# Patient Record
Sex: MALE | Race: WHITE | NOT HISPANIC OR LATINO | Employment: OTHER | URBAN - METROPOLITAN AREA
[De-identification: names, ages, dates, MRNs, and addresses within clinical notes are randomized per-mention and may not be internally consistent; named-entity substitution may affect disease eponyms.]

---

## 2017-01-05 DIAGNOSIS — I10 HTN (HYPERTENSION): ICD-10-CM

## 2017-01-05 DIAGNOSIS — M10.9 GOUT: Primary | ICD-10-CM

## 2017-01-09 RX ORDER — LISINOPRIL 40 MG/1
40 TABLET ORAL DAILY
Qty: 90 TABLET | Refills: 2 | Status: SHIPPED
Start: 2017-01-09 | End: 2017-09-22

## 2017-01-09 RX ORDER — ALLOPURINOL 100 MG/1
100 TABLET ORAL DAILY
Qty: 90 TABLET | Refills: 1 | Status: SHIPPED
Start: 2017-01-09 | End: 2017-08-25

## 2017-01-09 NOTE — TELEPHONE ENCOUNTER
allopurinol (ZYLOPRIM) 100 MG tablet  Last Written Prescription Date:    5/6/16  Last Fill Quantity: 90,   # refills: 3  Last Office Visit with G, P or [facility] prescribing provider:   11/23/16  Future visit:   None  CREATININE   Date Value Ref Range Status   08/22/2016 0.92 0.66 - 1.25 mg/dL Final   ]  URIC ACID   Date Value Ref Range Status   02/06/2013 10.0* 3.5 - 8.5 mg/dL Final   ]WBC      7.4   5/3/2016  RBC     5.26   5/3/2016  HGB     15.4   5/3/2016  HCT     48.6   5/3/2016  No components found with this name: mct  MCV       92   5/3/2016  MCH     29.3   5/3/2016  MCHC     31.7   5/3/2016  RDW     15.2   5/3/2016  PLT      215   5/3/2016      lisinopril (PRINIVIL,ZESTRIL) 40 MG tablet  Last Written Prescription Date: 1/4/16  Last Fill Quantity: 90,   # refills: 2    POTASSIUM   Date Value Ref Range Status   08/22/2016 3.8 3.4 - 5.3 mmol/L Final   ]  CREATININE   Date Value Ref Range Status   08/22/2016 0.92 0.66 - 1.25 mg/dL Final   ]  BP Readings from Last 3 Encounters:   11/23/16 113/68   11/23/16 138/77   10/11/16 143/85             Routing refill request to provider for review/approval because:   allopurinol (ZYLOPRIM) 100 MG tablet.  Uric acid past due.

## 2017-01-18 ENCOUNTER — OFFICE VISIT (OUTPATIENT)
Dept: PSYCHOLOGY | Facility: CLINIC | Age: 82
End: 2017-01-18
Payer: COMMERCIAL

## 2017-01-18 ENCOUNTER — OFFICE VISIT (OUTPATIENT)
Dept: PSYCHIATRY | Facility: CLINIC | Age: 82
End: 2017-01-18
Attending: CLINICAL NURSE SPECIALIST
Payer: MEDICARE

## 2017-01-18 VITALS
HEART RATE: 71 BPM | BODY MASS INDEX: 31.82 KG/M2 | SYSTOLIC BLOOD PRESSURE: 164 MMHG | WEIGHT: 203.2 LBS | DIASTOLIC BLOOD PRESSURE: 91 MMHG

## 2017-01-18 DIAGNOSIS — F42.9 OCD (OBSESSIVE COMPULSIVE DISORDER): ICD-10-CM

## 2017-01-18 DIAGNOSIS — F41.9 ANXIETY DISORDER, UNSPECIFIED: ICD-10-CM

## 2017-01-18 DIAGNOSIS — F32.A DEPRESSION, UNSPECIFIED DEPRESSION TYPE: ICD-10-CM

## 2017-01-18 DIAGNOSIS — F84.0 AUTISM SPECTRUM DISORDER: Primary | ICD-10-CM

## 2017-01-18 DIAGNOSIS — F42.8 OTHER OBSESSIVE-COMPULSIVE DISORDER: Primary | ICD-10-CM

## 2017-01-18 PROCEDURE — 99212 OFFICE O/P EST SF 10 MIN: CPT | Mod: ZF

## 2017-01-18 PROCEDURE — 90834 PSYTX W PT 45 MINUTES: CPT | Performed by: SOCIAL WORKER

## 2017-01-18 RX ORDER — QUETIAPINE FUMARATE 25 MG/1
TABLET, FILM COATED ORAL
Qty: 90 TABLET | Refills: 1 | Status: SHIPPED | OUTPATIENT
Start: 2017-01-18 | End: 2017-06-09

## 2017-01-18 ASSESSMENT — ANXIETY QUESTIONNAIRES
1. FEELING NERVOUS, ANXIOUS, OR ON EDGE: SEVERAL DAYS
GAD7 TOTAL SCORE: 4
7. FEELING AFRAID AS IF SOMETHING AWFUL MIGHT HAPPEN: NOT AT ALL
IF YOU CHECKED OFF ANY PROBLEMS ON THIS QUESTIONNAIRE, HOW DIFFICULT HAVE THESE PROBLEMS MADE IT FOR YOU TO DO YOUR WORK, TAKE CARE OF THINGS AT HOME, OR GET ALONG WITH OTHER PEOPLE: SOMEWHAT DIFFICULT
5. BEING SO RESTLESS THAT IT IS HARD TO SIT STILL: NOT AT ALL
2. NOT BEING ABLE TO STOP OR CONTROL WORRYING: SEVERAL DAYS
6. BECOMING EASILY ANNOYED OR IRRITABLE: SEVERAL DAYS
3. WORRYING TOO MUCH ABOUT DIFFERENT THINGS: SEVERAL DAYS

## 2017-01-18 ASSESSMENT — PATIENT HEALTH QUESTIONNAIRE - PHQ9: 5. POOR APPETITE OR OVEREATING: NOT AT ALL

## 2017-01-18 NOTE — PROGRESS NOTES
Outpatient Psychiatry Progress Note     Provider: TINY Knox CNS  Date: 2017  Service:  Medication follow up with counseling.   Patient Identification: Sharon Baer  : 6/10/1931   MRN: [id number]    Sharon Baer is a 85 year old year old male who presents for ongoing psychiatric care.  Sharon Baer was last seen in clinic on .   At that time,   Assessment & Plan       Sharon Baer is seen today for follow up and reports he is feeling ok. Still getting used to his living situation. His sister reports that the family continues to see improvement. Agrees with plan to continue current medications. He is using skills he is learning in therapy.    Diagnosis  Axis 1: Autism Spectrum Disorder, Anxiety Unspecified, OCD, Depression Unspecified  Axis 2: none  Axis 3: See problem list in the medical record    Plan:  Medication: Continue current medications  OTC Recommendations: none  Lab Orders:  none  Referrals: none  Release of Information: none  Future Treatment Considerations:per symptoms  Return for Follow Up:8 weeks               2016  Patient is seen with his sister Josephine  Today Sharon reports he is feeling ok.  He has difficulty hearing me. Has been getting along at his assisted living center.  Reports there is another resident he would like to talk with because they have similar interests but not sure how to approach him.   Some obsessive thoughts but he can let them go easily.  Side effects of medication include: none reported  Psychiatric Review of Systems:  The patient endorses symptoms of depression: In the last 2 weeks several days anhedonia, feeling depressed, fatigue, feelings of failure, thoughts of being better off dead but denies SI.  He  patient endorses symptoms of anxiety : social anxiety  He endorses symptoms of vidya including none.    He endorses symptoms of psychosis including no psychotic symptoms.       Review of Medical  Systems:  Sleep: stable  Energy: stable  Concentration: stable  Appetite: stable  GI Concerns: none  Cardiac concerns: none  Neurological concerns: none  Other medical concerns: no new concerns  Current Substance Use:  Alcohol:none  Other drugs:none  Caffeine:not reviewed  Nicotine: none  Past Medical History:   Past Medical History   Diagnosis Date     IGT (impaired glucose tolerance)      Hypertension      Gout      Vitamin D deficiency      resolved 2/2013     GERD (gastroesophageal reflux disease)      OCD (obsessive compulsive disorder)      on cymbalta     Hearing loss      Hypotestosteronism      Osteoarthritis, hip, bilateral      Autism spectrum disorder 7/16/2015     Hyperlipemia      on atorvastatin     Patient Active Problem List   Diagnosis     Gout     Hypertension     IGT (impaired glucose tolerance)     GERD (gastroesophageal reflux disease)     Hypotestosteronism     Neoplasm of uncertain behavior of skin     AK (actinic keratosis)     History of nonmelanoma skin cancer     Osteoarthritis of right hip     OA (osteoarthritis) of hip     Fall     Facial laceration     Autism spectrum disorder     Anxiety disorder, unspecified     OCD (obsessive compulsive disorder)     Depression, unspecified depression type     Vitamin D deficiency     Loss of weight     ACP (advance care planning)     Obsessive compulsive disorder       Allergies:   Allergies   Allergen Reactions     Penicillins Rash          Current Medications     Current Outpatient Prescriptions Ordered in [facility]   Medication Sig Dispense Refill     allopurinol (ZYLOPRIM) 100 MG tablet Take 1 tablet (100 mg) by mouth daily 90 tablet 1     lisinopril (PRINIVIL/ZESTRIL) 40 MG tablet Take 1 tablet (40 mg) by mouth daily 90 tablet 2     cholecalciferol (VITAMIN D) 1000 UNIT tablet Take 2 tablets (2,000 Units) by mouth daily 100 tablet 3     furosemide (LASIX) 20 MG tablet Take 1 tablet (20 mg) by mouth daily 100 tablet 3     omeprazole (PRILOSEC) 40 MG  "capsule Take 1 capsule (40 mg) by mouth 2 times daily as needed 200 capsule 3     FLUoxetine (PROZAC) 20 MG capsule Take 2 capsules (40 mg) by mouth every morning 180 capsule 1     QUEtiapine (SEROQUEL) 25 MG tablet Take one tablet by mouth every evening with supper 90 tablet 1     acetaminophen 500 MG CAPS Take 1,000 mg by mouth 3 times daily 250 capsule 99     allopurinol (ZYLOPRIM) 100 MG tablet Take 1 tablet (100 mg) by mouth daily 90 tablet 3     atorvastatin (LIPITOR) 20 MG tablet Take 1 tablet (20 mg) by mouth daily 90 tablet 2     aspirin 325 MG EC tablet Take 1 tablet (325 mg) by mouth daily 90 tablet 3     No current Epic-ordered facility-administered medications on file.        Mental Status Exam     Appearance:  Casually dressed and Well groomed  Behavior/relationship to examiner/demeanor:  Cooperative and Reduced eye contact  Orientation: Oriented to person, place, time and situation  Psychomotor: normal form  Speech Rate:  Normal  Speech Spontaneity:  Normal  Mood:  \"okay\"  Affect:  Appropriate/mood-congruent  Thought Process (Associations):  Goal directed  Thought Content:  no overt psychosis, denies suicidal ideation, intent or thoughts and patient does not appear to be responding to internal stimuli  Abnormal Perception:  None  Attention/Concentration:  Normal  Language:  Intact  Insight:  Adequate  Judgment:  Good      Results     Vital signs: /91 mmHg  Pulse 71  Wt 92.171 kg (203 lb 3.2 oz)    Laboratory Data:  no new data reviewed    Assessment & Plan      Sharon Baer is seen today for follow up and reports he continues to have some improvement in anxiety and depression. Getting along in his living situation but some anxiety.  There are no concerns about safety. Patient agrees with plan to continue current medications.    Diagnosis  Axis 1: Autism Spectrum Disorder, Anxiety Unspecified, OCD, Depression, Unspecified  Axis 2: none  Axis 3: See problem list in the medical " record    Plan:  Medication: Continue current medications  OTC Recommendations: none  Lab Orders:  none  Referrals: none  Release of Information: none  Future Treatment Considerations:per symptoms  Return for Follow Up: 3 months.   The risks, benefits, alternatives and side effects have been discussed and are understood by the patient. The patient understands the risks of using street drugs or alcohol. There are no medical contraindications, the patient agrees to treatment, and has the capacity to do so. The patient understands to call 911 or come to the nearest ED if life threatening or urgent symptoms present.  Over 50% of this time was spent counseling the patient and/or coordinating care regarding review of social and occupational functioning.  In addition patient was counseled on health and wellness practices to augment medication treatment of symptoms. See note for details.    Dyan Mathis, APRN CNS 1/18/2017

## 2017-01-18 NOTE — MR AVS SNAPSHOT
After Visit Summary   1/18/2017    Sharon Baer    MRN: [id number]           Patient Information     Date Of Birth          6/10/1931        Visit Information        Provider Department      1/18/2017 2:15 PM Dyan Mathis APRN [facility]        Today's Diagnoses     Autism spectrum disorder    -  1     Anxiety disorder, unspecified         OCD (obsessive compulsive disorder)         Depression, unspecified depression type            Follow-ups after your visit        Follow-up notes from your care team     Return in about 3 months (around 4/18/2017).      Your next 10 appointments already scheduled     Jan 18, 2017  4:00 PM   Return Visit with Loi Prescott, [facility] ([facility])    [address] [place] [address] [phone]            Apr 19, 2017 10:15 AM   Adult Med Follow UP with TINY Knox CNS   Psychiatry Clinic ([facility])    [place] [id number] [facility] [address] [phone]              Who to contact     Please call your clinic at [phone] to:    Ask questions about your health    Make or cancel appointments    Discuss your medicines    Learn about your test results    Speak to your doctor   If you have compliments or concerns about an experience at your clinic, or if you wish to file a complaint, please contact [facility] Patient Relations at [phone] or email us at [email or web address]         Additional Information About Your Visit        MyChart Information     [employer] gives you secure access to your electronic health record. If you see a primary care provider, you can also send messages to your care team and make appointments. If you have questions, please call your primary care clinic.  If you do not have a primary care provider, please call [phone] and they  will assist you.      [email or web address] is an electronic gateway that provides easy, online access to your medical records. With [email or web address], you can request a clinic appointment, read your test results, renew a prescription or communicate with your care team.     To access your existing account, please contact your [facility] or call [phone] for assistance.        Care EveryWhere ID     This is your Care EveryWhere ID. This could be used by other organizations to access your [place] medical records  [id number]        Your Vitals Were     Pulse                   71            Blood Pressure from Last 3 Encounters:   01/18/17 164/91   11/23/16 113/68   11/23/16 138/77    Weight from Last 3 Encounters:   01/18/17 92.171 kg (203 lb 3.2 oz)   11/23/16 90.719 kg (200 lb)   11/23/16 90.357 kg (199 lb 3.2 oz)              Today, you had the following     No orders found for display         Where to get your medicines      These medications were sent to [facility] - [place], [place] - [address] [place] [address]  [address] [facility] [address]    Hours:  Mon-Fri 8:30am-5:00pm Toll Free [phone] Phone:  [phone]    - FLUoxetine 20 MG capsule  - QUEtiapine 25 MG tablet       Primary Care Provider Office Phone # Fax #    Neo Castano [phone]        [facility] [address] [facility] [address]  [facility] [address]        Thank you!     Thank you for choosing PSYCHIATRY CLINIC  for your care. Our goal is always to provide you with excellent care. Hearing back from our patients is one way we can continue to improve our services. Please take a few minutes to complete the written survey that you may receive in the mail after your visit with us. Thank you!             Your Updated Medication List - Protect others around you: Learn how to safely use, store and throw away your medicines at www.disposemymeds.org.          This list is accurate as  of: 1/18/17  3:06 PM.  Always use your most recent med list.                   Brand Name Dispense Instructions for use    acetaminophen 500 MG Caps     250 capsule    Take 1,000 mg by mouth 3 times daily       allopurinol 100 MG tablet    ZYLOPRIM    90 tablet    Take 1 tablet (100 mg) by mouth daily       aspirin 325 MG EC tablet     90 tablet    Take 1 tablet (325 mg) by mouth daily       atorvastatin 20 MG tablet    LIPITOR    90 tablet    Take 1 tablet (20 mg) by mouth daily       cholecalciferol 1000 UNIT tablet    vitamin D    100 tablet    Take 2 tablets (2,000 Units) by mouth daily       FLUoxetine 20 MG capsule    PROzac    180 capsule    Take 2 capsules (40 mg) by mouth every morning       furosemide 20 MG tablet    LASIX    100 tablet    Take 1 tablet (20 mg) by mouth daily       lisinopril 40 MG tablet    PRINIVIL/ZESTRIL    90 tablet    Take 1 tablet (40 mg) by mouth daily       omeprazole 40 MG capsule    priLOSEC    200 capsule    Take 1 capsule (40 mg) by mouth 2 times daily as needed       QUEtiapine 25 MG tablet    SEROquel    90 tablet    Take one tablet by mouth every evening with supper

## 2017-01-18 NOTE — NURSING NOTE
Chief Complaint   Patient presents with     Recheck Medication   Autism Spectrum Disorder, Anxiety Unspecified, OCD, Depression, Unspecified      Reviewed allergies, smoking status, and pharmacy preference  Administered abuse screening questions   Obtained weight, blood pressure and heart rate

## 2017-01-19 ASSESSMENT — ANXIETY QUESTIONNAIRES: GAD7 TOTAL SCORE: 4

## 2017-01-19 ASSESSMENT — PATIENT HEALTH QUESTIONNAIRE - PHQ9: SUM OF ALL RESPONSES TO PHQ QUESTIONS 1-9: 5

## 2017-01-19 NOTE — PROGRESS NOTES
"                                             Progress Note    Client Name: Sharon Baer  Date: 1/18/17         Service Type: Individual      Session Start Time: 4:00  Session End Time: 4:45      Session Length: 45     Session #: 9     Attendees: Client    Treatment Plan Last Reviewed: tx plan created on 9/19/16  PHQ-9 / KIET-7 : Completed this session      DATA      Progress Since Last Session (Related to Symptoms / Goals / Homework):   Symptoms: Stable    Homework: Completed in session discussed distraction activities he can engage in and continue the thought stopping process when obsessive thoughts appear. Discussed what he tried to decrease in regard to his intrusive harm thoughts. Processed harmful thoughts and reassured client that he has not acted on them.  Continue activities he engages in and how these activities can distract thoughts so he does not get caught up in his negative thinking.  Discussed people and events that he was grateful for in his life.  Client likes to discuss his past and happy and fond memories of the past.  This makes client feel good.  Client created list of important people and descriptors of them and we processed in session.        Episode of Care Goals: Satisfactory progress - ACTION (Actively working towards change); Intervened by reinforcing change plan / affirming steps taken     Current / Ongoing Stressors and Concerns:   Moved to new residence, has thoughts of hurting others and suicidal ideation which is apart of his Harm OCD.  He has never acted on these thoughts.      Treatment Objective(s) Addressed in This Session:   use thought-stopping strategy daily to reduce intrusive thoughts   Engage in activities that he enjoys to distract obssesive thoughts  Chew on ice or use cold pack on face to distract from anxiety about thoughts  Utilize deep breathing when stressed or anxious              Write in Book \"Handbook for Happiness\"                Complete \"Wake up in " "Gratitude and in the moment\" exercise in the book.  Joyful attention skills.     Intervention:   CBT: start teaching skills - gave client mood log and feelings worksheet to try and bring into next session.                Engage in Relaxation and distraction activities that he enjoys    Deep breathing exercises  Thought stopping strategy and engage in activities that he enjoys to distract from negative thinking.    Use guided imagery and go to pleasant memories and events to distract from negative intrusive thoughts when needed.  Complete some activities in happiness Book-bring in and we can process work     ASSESSMENT: Current Emotional / Mental Status (status of significant symptoms):   Risk status (Self / Other harm or suicidal ideation)   Client reports the following current fears or concerns for personal safety: concerned that he might hurt someone or self. (OCD Harm type) .   Client reports the following current or recent suicidal ideation or behaviors: Has obsessive thoughts about not wanting to be alive and suicidal ideation.  Has not attempted in past apart of his obsessive thinking. .   Client reports current or recent homicidal ideation or behaviors including thinking about hurting others but has not acted on this.    Client denies current or recent self injurious behavior or ideation.   Client denies other safety concerns.   A safety and risk management plan has not been developed at this time, however client was given the after-hours number / 911 should there be a change in any of these risk factors.     Appearance:   Appropriate    Eye Contact:   Fair    Psychomotor Behavior: Restless  Retarded (Slowed)    Attitude:   Cooperative    Orientation:   All   Speech    Rate / Production: Slow     Volume:  Soft    Mood:    Anxious    Affect:    Blunted  Flat  Restricted  Subdued    Thought Content:  Rumination    Thought Form:  Blocking  Coherent    Insight:    Fair      Medication Review:   No changes to " "current psychiatric medication(s)     Medication Compliance:   Yes     Changes in Health Issues:   None reported     Chemical Use Review:   Substance Use: Chemical use reviewed, no active concerns identified      Tobacco Use: No current tobacco use.       Collateral Reports Completed:   Not Applicable    PLAN: (Client Tasks / Therapist Tasks / Other)  Client to start using thought stopping process when he has obsessive thoughts of hurting others or self.  Engage in activities that he enjoys like: watching movies, comedies, cross word puzzles, word find games, coloring or drawing.  Chew on ice or use cold pack on face to distract thinking if anxiety is too great.  Engage in activities with others when he feels comfortable doing this. Practice deep breathing skills to decrease obsessive thoughts. Client was given mood log and feelings worksheet to read and bring into next session to process with therapist.  Ask for DVD's of movies or shows that he enjoys.   Listen to music and continue computer use when intrusive thoughts appear.  Think of pleasant memories and happy times from the past and write them down and go to list when he get's stuck in negative obsessive thoughts.   Continue with exercises discussed in session to complete in \"Handbook for Happiness\" .  Complete Wake up in gratitude and in the moment activity in book--to help with joyful attention skills. Discuss and process in next session.            Loi Prescott, [facility] HospitalSW                                                         ________________________________________________________________________    Treatment Plan    Client's Name: Sharon Baer  YOB: 1931    Date: 9/19/16    DSM-V Diagnoses: 300.3 (F42) Obsessive Compulsive Disorder (Harm OCD type)  Psychosocial / Contextual Factors: moving to new residence, has autism Spectrum disorder. Learning disorder, obsessive thoughts about harming others and self.  Increased anxiety.  WHODAS: " Completed first session    Referral / Collaboration:  Referral to another professional/service is not indicated at this time..    Anticipated number of session or this episode of care: 20      MeasurableTreatment Goal(s) related to diagnosis / functional impairment(s)  Goal 1: Client will function daily at a consistent level with minimal interference from obsessions and compulsions.     I will know I've met my goal when I do not have increased anxiety that I will hurt others or self.       Objective #A (Client Action)    Client will use thought-stopping strategy daily to reduce intrusive thoughts.  Status: Continued - Date(s):     Intervention(s)  Therapist will teach thought stopping process to interrupt obsessions.      Objective #B  Client will learn CBT skills to to identify distorted automatic thoughts and beliefs.  .  Status: Continued - Date(s):     Intervention(s)  Therapist will teach CBT skills. .    Objective #C  Client will practice 2 relaxation techniques and engage in 3 distraction activities that reduce obsessive thoughts .  Status: Continued - Date(s):     Intervention(s)  Therapist will assign homework determine a list of activities that client can use to distract form obsessive thinking.   Teach relaxation techniques that are helpful to reduce overall anxiety.       Goal 2: Client will resolve key life conflicts and the emotional stress that fuels obsessive-compulsive behavior patterns.     I will know I've met my goal when I feel settled and comfortable with my life.      Objective #A (Client Action)    Status: Continued - Date(s):     Client will engage in acceptance and commitment  (ACT) therapy or exposure and ritual prevention therapy to reduce obsessions.     Intervention(s)  Therapist will teach ACT or ERP therapy with client and evaluate it's effectiveness in reducing intrusive obsessive thoughts.      Objective #B  Client will limit amount of time spent on repetitive or obsessive thoughts  to 15 minutes.    Status: Continued - Date(s):     Intervention(s)  Therapist will teach and assign homework to determine time to accomplish good time to limit obsessive thoughts and evaluate it's effectiveness. .    Objective #C  Client will use positive self-affirmations daily.  Status: Continued - Date(s):     Intervention(s)  Therapist will provide encouragement and affirmations to develop further overall strengths and self esteem .        Client has reviewed and agreed to the above plan.      DANNI Raines  September 20, 2016

## 2017-02-02 PROBLEM — F42.8 OTHER OBSESSIVE-COMPULSIVE DISORDER: Status: ACTIVE | Noted: 2017-02-02

## 2017-02-14 ENCOUNTER — OFFICE VISIT (OUTPATIENT)
Dept: PSYCHOLOGY | Facility: CLINIC | Age: 82
End: 2017-02-14
Payer: COMMERCIAL

## 2017-02-14 DIAGNOSIS — F42.8 OTHER OBSESSIVE-COMPULSIVE DISORDER: Primary | ICD-10-CM

## 2017-02-14 PROCEDURE — 90834 PSYTX W PT 45 MINUTES: CPT | Performed by: SOCIAL WORKER

## 2017-02-14 ASSESSMENT — ANXIETY QUESTIONNAIRES
IF YOU CHECKED OFF ANY PROBLEMS ON THIS QUESTIONNAIRE, HOW DIFFICULT HAVE THESE PROBLEMS MADE IT FOR YOU TO DO YOUR WORK, TAKE CARE OF THINGS AT HOME, OR GET ALONG WITH OTHER PEOPLE: SOMEWHAT DIFFICULT
2. NOT BEING ABLE TO STOP OR CONTROL WORRYING: MORE THAN HALF THE DAYS
5. BEING SO RESTLESS THAT IT IS HARD TO SIT STILL: NOT AT ALL
6. BECOMING EASILY ANNOYED OR IRRITABLE: SEVERAL DAYS
3. WORRYING TOO MUCH ABOUT DIFFERENT THINGS: SEVERAL DAYS
1. FEELING NERVOUS, ANXIOUS, OR ON EDGE: SEVERAL DAYS
GAD7 TOTAL SCORE: 7
7. FEELING AFRAID AS IF SOMETHING AWFUL MIGHT HAPPEN: MORE THAN HALF THE DAYS

## 2017-02-14 ASSESSMENT — PATIENT HEALTH QUESTIONNAIRE - PHQ9: 5. POOR APPETITE OR OVEREATING: NOT AT ALL

## 2017-02-14 NOTE — MR AVS SNAPSHOT
MRN:[id number]                      After Visit Summary   2/14/2017    Sharon Baer    MRN: [id number]           Visit Information        Provider Department      2/14/2017 5:00 PM Loi Prescott [facility]      Your next 10 appointments already scheduled     Mar 22, 2017  2:00 PM CDT   Return Visit with Loi Prescott [facility] ([facility])    [address] [facility] [address]   [phone]            Apr 11, 2017  2:00 PM CDT   Return Visit with Loi Prescott [facility] ([facility])    [address] [facility] [address]   [phone]            Apr 19, 2017 10:15 AM CDT   Adult Med Follow UP with TINY Knox CNS   Psychiatry Clinic ([facility])    [place] [id number]  [address] [facility] [id number] [phone]              MyChart Information     [employer] gives you secure access to your electronic health record. If you see a primary care provider, you can also send messages to your care team and make appointments. If you have questions, please call your primary care clinic.  If you do not have a primary care provider, please call [phone] and they will assist you.        Care EveryWhere ID     This is your Care EveryWhere ID. This could be used by other organizations to access your [place] medical records  [id number]

## 2017-02-14 NOTE — PROGRESS NOTES
Progress Note    Client Name: Sharon Baer  Date: 2/14/17         Service Type: Individual      Session Start Time: 5:00  Session End Time: 5:45      Session Length: 45     Session #: 10     Attendees: Client    Treatment Plan Last Reviewed: tx plan created on 9/19/16  PHQ-9 / KIET-7 : Completed this session      DATA      Progress Since Last Session (Related to Symptoms / Goals / Homework):   Symptoms: Worsening    Homework: Completed in session discussed distraction activities he can engage in and continue the thought stopping process when obsessive thoughts appear. Discussed what he tried to decrease in regard to his intrusive harm thoughts. Processed harmful thoughts and reassured client that he has not acted on them.  Continue activities he engages in and how these activities can distract thoughts so he does not get caught up in his negative thinking.  Discussed people and events that he was grateful for in his life.  Client likes to discuss his past and happy and fond memories of the past.  This makes client feel good.  Client created list of important people and descriptors of them and we processed in session. Client forgot book we usually work in during session.  Discussed and reviewed CBT skills.        Episode of Care Goals: Minimal progress - PREPARATION (Decided to change - considering how); Intervened by negotiating a change plan and determining options / strategies for behavior change, identifying triggers, exploring social supports, and working towards setting a date to begin behavior change     Current / Ongoing Stressors and Concerns:   Moved to new residence, has thoughts of hurting others and suicidal ideation which is apart of his Harm OCD.  He has never acted on these thoughts.      Treatment Objective(s) Addressed in This Session:   use thought-stopping strategy daily to reduce intrusive thoughts   Engage in activities that he enjoys to  "distract obssesive thoughts  Chew on ice or use cold pack on face to distract from anxiety about thoughts  Utilize deep breathing when stressed or anxious              Write in Book \"Handbook for Happiness\"                Complete \"Wake up in Gratitude and in the moment\" exercise in the book.  Joyful attention skills.-bring in next session               Boththe PHQ9 and GAD7 scores higher this session. Discussed worries and concerns about the president and concerns about possible war for the future.  Reframed and concentrated on positives and                  what he is grateful for.      Intervention:   CBT: start teaching skills - gave client mood log and feelings worksheet to try and bring into next session.                Engage in Relaxation and distraction activities that he enjoys    Deep breathing exercises  Thought stopping strategy and engage in activities that he enjoys to distract from negative thinking.    Use guided imagery and go to pleasant memories and events to distract from negative intrusive thoughts when needed.  Complete some activities in happiness Book-bring in and we can process work   Emphasized CBT skills--gratitude and positive thoughts    ASSESSMENT: Current Emotional / Mental Status (status of significant symptoms):   Risk status (Self / Other harm or suicidal ideation)   Client reports the following current fears or concerns for personal safety: concerned that he might hurt someone or self. (OCD Harm type) .   Client reports the following current or recent suicidal ideation or behaviors: Has obsessive thoughts about not wanting to be alive and suicidal ideation.  Has not attempted in past apart of his obsessive thinking. .   Client reports current or recent homicidal ideation or behaviors including thinking about hurting others but has not acted on this.    Client denies current or recent self injurious behavior or ideation.   Client denies other safety concerns.   A safety and risk " "management plan has not been developed at this time, however client was given the after-hours number / 911 should there be a change in any of these risk factors.     Appearance:   Appropriate    Eye Contact:   Fair    Psychomotor Behavior: Restless  Retarded (Slowed)    Attitude:   Cooperative    Orientation:   All   Speech    Rate / Production: Slow     Volume:  Soft    Mood:    Anxious  Depressed  Sad    Affect:    Blunted  Flat  Restricted  Subdued    Thought Content:  Rumination    Thought Form:  Blocking  Coherent    Insight:    Fair      Medication Review:   No changes to current psychiatric medication(s)     Medication Compliance:   Yes     Changes in Health Issues:   None reported     Chemical Use Review:   Substance Use: Chemical use reviewed, no active concerns identified      Tobacco Use: No current tobacco use.       Collateral Reports Completed:   Not Applicable    PLAN: (Client Tasks / Therapist Tasks / Other)  Client to start using thought stopping process when he has obsessive thoughts of hurting others or self.  Engage in activities that he enjoys like: watching movies, comedies, cross word puzzles, word find games, coloring or drawing.  Chew on ice or use cold pack on face to distract thinking if anxiety is too great.  Engage in activities with others when he feels comfortable doing this. Practice deep breathing skills to decrease obsessive thoughts. Client was given mood log and feelings worksheet to read and bring into next session to process with therapist.  Ask for DVD's of movies or shows that he enjoys.   Listen to music and continue computer use when intrusive thoughts appear.  Think of pleasant memories and happy times from the past and write them down and go to list when he get's stuck in negative obsessive thoughts.   Continue with exercises discussed in session to complete in \"Handbook for Happiness\" .  Complete Wake up in gratitude and in the moment activity in book--to help with joyful " attention skills. Discuss and process in next session. Reviewed and practiced CBT skills--talked about positive things and what client was grateful for.  Concentrate on positive things and try to let go of negative intrusive thoughts.             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 Learning disorder, obsessive thoughts about harming others and self.  Increased anxiety.  WHODAS: Completed first session    Referral / Collaboration:  Referral to another professional/service is not indicated at this time..    Anticipated number of session or this episode of care: 20      MeasurableTreatment Goal(s) related to diagnosis / functional impairment(s)  Goal 1: Client will function daily at a consistent level with minimal interference from obsessions and compulsions.     I will know I've met my goal when I do not have increased anxiety that I will hurt others or self.       Objective #A (Client Action)    Client will use thought-stopping strategy daily to reduce intrusive thoughts.  Status: Continued - Date(s):     Intervention(s)  Therapist will teach thought stopping process to interrupt obsessions.      Objective #B  Client will learn CBT skills to to identify distorted automatic thoughts and beliefs.  .  Status: Continued - Date(s):     Intervention(s)  Therapist will teach CBT skills. .    Objective #C  Client will practice 2 relaxation techniques and engage in 3 distraction activities that reduce obsessive thoughts .  Status: Continued - Date(s):     Intervention(s)  Therapist will assign homework determine a list of activities that client can  use to distract form obsessive thinking.   Teach relaxation techniques that are helpful to reduce overall anxiety.       Goal 2: Client will resolve key life conflicts and the emotional stress that fuels obsessive-compulsive behavior patterns.     I will know I've met my goal when I feel settled and comfortable with my life.      Objective #A (Client Action)    Status: Continued - Date(s):     Client will engage in acceptance and commitment  (ACT) therapy or exposure and ritual prevention therapy to reduce obsessions.     Intervention(s)  Therapist will teach ACT or ERP therapy with client and evaluate it's effectiveness in reducing intrusive obsessive thoughts.      Objective #B  Client will limit amount of time spent on repetitive or obsessive thoughts to 15 minutes.    Status: Continued - Date(s):     Intervention(s)  Therapist will teach and assign homework to determine time to accomplish good time to limit obsessive thoughts and evaluate it's effectiveness. .    Objective #C  Client will use positive self-affirmations daily.  Status: Continued - Date(s):     Intervention(s)  Therapist will provide encouragement and affirmations to develop further overall strengths and self esteem .        Client has reviewed and agreed to the above plan.      DANNI Raines  September 20, 2016

## 2017-02-15 ASSESSMENT — PATIENT HEALTH QUESTIONNAIRE - PHQ9: SUM OF ALL RESPONSES TO PHQ QUESTIONS 1-9: 12

## 2017-02-15 ASSESSMENT — ANXIETY QUESTIONNAIRES: GAD7 TOTAL SCORE: 7

## 2017-02-21 ENCOUNTER — TELEPHONE (OUTPATIENT)
Dept: PSYCHIATRY | Facility: CLINIC | Age: 82
End: 2017-02-21

## 2017-02-21 ENCOUNTER — OFFICE VISIT (OUTPATIENT)
Dept: PSYCHIATRY | Facility: CLINIC | Age: 82
End: 2017-02-21
Attending: CLINICAL NURSE SPECIALIST
Payer: MEDICARE

## 2017-02-21 VITALS
DIASTOLIC BLOOD PRESSURE: 84 MMHG | HEART RATE: 96 BPM | WEIGHT: 200.7 LBS | BODY MASS INDEX: 31.43 KG/M2 | SYSTOLIC BLOOD PRESSURE: 159 MMHG

## 2017-02-21 DIAGNOSIS — M16.11 PRIMARY OSTEOARTHRITIS OF RIGHT HIP: ICD-10-CM

## 2017-02-21 DIAGNOSIS — F41.9 ANXIETY DISORDER, UNSPECIFIED: ICD-10-CM

## 2017-02-21 DIAGNOSIS — R73.02 IGT (IMPAIRED GLUCOSE TOLERANCE): ICD-10-CM

## 2017-02-21 DIAGNOSIS — F32.A DEPRESSION, UNSPECIFIED DEPRESSION TYPE: ICD-10-CM

## 2017-02-21 DIAGNOSIS — F42.8 OTHER OBSESSIVE-COMPULSIVE DISORDER: ICD-10-CM

## 2017-02-21 DIAGNOSIS — Z79.899 ENCOUNTER FOR LONG-TERM (CURRENT) USE OF MEDICATIONS: ICD-10-CM

## 2017-02-21 DIAGNOSIS — F84.0 AUTISM SPECTRUM DISORDER: Primary | ICD-10-CM

## 2017-02-21 LAB
ALBUMIN SERPL-MCNC: 3.8 G/DL (ref 3.4–5)
ALP SERPL-CCNC: 101 U/L (ref 40–150)
ALT SERPL W P-5'-P-CCNC: 18 U/L (ref 0–70)
ANION GAP SERPL CALCULATED.3IONS-SCNC: 10 MMOL/L (ref 3–14)
AST SERPL W P-5'-P-CCNC: 17 U/L (ref 0–45)
BILIRUB SERPL-MCNC: 0.2 MG/DL (ref 0.2–1.3)
BUN SERPL-MCNC: 22 MG/DL (ref 7–30)
CALCIUM SERPL-MCNC: 9 MG/DL (ref 8.5–10.1)
CHLORIDE SERPL-SCNC: 110 MMOL/L (ref 94–109)
CO2 SERPL-SCNC: 25 MMOL/L (ref 20–32)
CREAT SERPL-MCNC: 1.49 MG/DL (ref 0.66–1.25)
GFR SERPL CREATININE-BSD FRML MDRD: 45 ML/MIN/1.7M2
GLUCOSE SERPL-MCNC: 115 MG/DL (ref 70–99)
HBA1C MFR BLD: 6.1 % (ref 4.3–6)
POTASSIUM SERPL-SCNC: 4.3 MMOL/L (ref 3.4–5.3)
PROT SERPL-MCNC: 7.7 G/DL (ref 6.8–8.8)
SODIUM SERPL-SCNC: 145 MMOL/L (ref 133–144)

## 2017-02-21 PROCEDURE — 83036 HEMOGLOBIN GLYCOSYLATED A1C: CPT | Performed by: CLINICAL NURSE SPECIALIST

## 2017-02-21 PROCEDURE — 99212 OFFICE O/P EST SF 10 MIN: CPT | Mod: ZF

## 2017-02-21 PROCEDURE — 36415 COLL VENOUS BLD VENIPUNCTURE: CPT | Performed by: CLINICAL NURSE SPECIALIST

## 2017-02-21 PROCEDURE — 80053 COMPREHEN METABOLIC PANEL: CPT | Performed by: CLINICAL NURSE SPECIALIST

## 2017-02-21 RX ORDER — FLUOXETINE 10 MG/1
10 CAPSULE ORAL DAILY
Qty: 90 CAPSULE | Refills: 0 | Status: SHIPPED | OUTPATIENT
Start: 2017-02-21 | End: 2017-06-09

## 2017-02-21 NOTE — LETTER
February 21, 2017      RE: Sharon Baer  [address]  [facility] [address]        Koby the psychiatric provider for  Sharon Baer. He is not able to manage his finances due to the level of stress of this task and agrees that his sister Monica Newman be his Power Of .      Sincerely,        Dyan FRANKS CNS

## 2017-02-21 NOTE — LETTER
February 21, 2017      RE: Sharon Baer  [address]  [facility] [address]        I am the psychiatric provider for Sharon Baer. He is not able to manage his finances due to the level of stress of this task and agrees that his sister Monica Newman be his Power Of .      Sincerely,        Dyan FRANKS CNS

## 2017-02-21 NOTE — MR AVS SNAPSHOT
After Visit Summary   2/21/2017    Sharon Baer    MRN: [id number]           Patient Information     Date Of Birth          6/10/1931        Visit Information        Provider Department      2/21/2017 2:45 PM Dyan Mathis APRN [facility]        Today's Diagnoses     Autism spectrum disorder    -  1    Anxiety disorder, unspecified        Depression, unspecified depression type        Other obsessive-compulsive disorder        Primary osteoarthritis of right hip        Encounter for long-term (current) use of medications        IGT (impaired glucose tolerance)           Follow-ups after your visit        Follow-up notes from your care team     Return in about 4 weeks (around 3/21/2017).      Your next 10 appointments already scheduled     Mar 21, 2017  3:15 PM CDT   Adult Med Follow UP with TINY Knox CNS   Psychiatry Clinic ([facility])    [place] [id number] [facility] [id number] [phone]            Mar 22, 2017  2:00 PM CDT   [facility]    [address] [place] [id number]   [phone]            Apr 11, 2017  2:00 PM CDT   Return Visit with Loi Prescott [facility] ([facility])    [address] [place] [address]   [phone]            Apr 19, 2017 10:15 AM CDT   Adult Med Follow UP with TINY Knox CNS   Psychiatry Clinic ([facility])    [place] [id number] [facility] [address] [phone]              Who to contact     Please call your clinic at [phone] to:    Ask questions about your health    Make or cancel appointments    Discuss your medicines    Learn about your test results    Speak to your doctor   If you have compliments or  concerns about an experience at your clinic, or if you wish to file a complaint, please contact [facility] Patient Relations at [phone] or email us at [email or web address]         Additional Information About Your Visit        [employer] Information     [employer] gives you secure access to your electronic health record. If you see a primary care provider, you can also send messages to your care team and make appointments. If you have questions, please call your primary care clinic.  If you do not have a primary care provider, please call [phone] and they will assist you.      [employer] is an electronic gateway that provides easy, online access to your medical records. With [employer], you can request a clinic appointment, read your test results, renew a prescription or communicate with your care team.     To access your existing account, please contact your [facility] Clinic or call [phone] for assistance.        Care EveryWhere ID     This is your Care EveryWhere ID. This could be used by other organizations to access your [place] medical records  [id number]        Your Vitals Were     Pulse BMI (Body Mass Index)                96 31.43 kg/m2           Blood Pressure from Last 3 Encounters:   02/21/17 159/84   01/18/17 (!) 164/91   11/23/16 113/68    Weight from Last 3 Encounters:   02/21/17 91 kg (200 lb 11.2 oz)   01/18/17 92.2 kg (203 lb 3.2 oz)   11/23/16 90.7 kg (200 lb)              We Performed the Following     Comprehensive metabolic panel     Hemoglobin A1c          Today's Medication Changes          These changes are accurate as of: 2/21/17 11:59 PM.  If you have any questions, ask your nurse or doctor.               These medicines have changed or have updated prescriptions.        Dose/Directions    acetaminophen 500 MG Caps   This may have changed:    - how much to take  - when to take this   Used for:  Primary osteoarthritis of  right hip   Changed by:  Dyan Mathis APRN CNS        Dose:  500 mg   Take 500 mg by mouth 2 times daily   Refills:  0       * FLUoxetine 20 MG capsule   Commonly known as:  PROzac   This may have changed:  Another medication with the same name was added. Make sure you understand how and when to take each.   Used for:  OCD (obsessive compulsive disorder), Depression, unspecified depression type, Autism spectrum disorder   Changed by:  Dyan Mathis APRN CNS        Dose:  40 mg   Take 2 capsules (40 mg) by mouth every morning   Quantity:  180 capsule   Refills:  1       * FLUoxetine 10 MG capsule   Commonly known as:  PROzac   This may have changed:  You were already taking a medication with the same name, and this prescription was added. Make sure you understand how and when to take each.   Used for:  Anxiety disorder, unspecified, Depression, unspecified depression type, Other obsessive-compulsive disorder   Changed by:  Dyan Mathis APRN CNS        Dose:  10 mg   Take 1 capsule (10 mg) by mouth daily In addition to two 20mg capsules for total dose of 50mg.   Quantity:  90 capsule   Refills:  0       * Notice:  This list has 2 medication(s) that are the same as other medications prescribed for you. Read the directions carefully, and ask your doctor or other care provider to review them with you.         Where to get your medicines      These medications were sent to [facility] - [place], [place] - [address]  [address] [facility] [address]    Hours:  Mon-Fri 8:30am-5:00pm Toll Free [phone] Phone:  [phone]     FLUoxetine 10 MG capsule                Primary Care Provider Office Phone # Fax #    Neo Castano [phone]        [facility] [address] [facility] [address]  [facility] [address]        Thank you!     Thank you for choosing [facility] CLINIC  for your care. Our goal is always to provide you with  excellent care. Hearing back from our patients is one way we can continue to improve our services. Please take a few minutes to complete the written survey that you may receive in the mail after your visit with us. Thank you!             Your Updated Medication List - Protect others around you: Learn how to safely use, store and throw away your medicines at www.disposemymeds.org.          This list is accurate as of: 2/21/17 11:59 PM.  Always use your most recent med list.                   Brand Name Dispense Instructions for use    acetaminophen 500 MG Caps      Take 500 mg by mouth 2 times daily       allopurinol 100 MG tablet    ZYLOPRIM    90 tablet    Take 1 tablet (100 mg) by mouth daily       aspirin 325 MG EC tablet     90 tablet    Take 1 tablet (325 mg) by mouth daily       cholecalciferol 1000 UNIT tablet    vitamin D    100 tablet    Take 2 tablets (2,000 Units) by mouth daily       * FLUoxetine 20 MG capsule    PROzac    180 capsule    Take 2 capsules (40 mg) by mouth every morning       * FLUoxetine 10 MG capsule    PROzac    90 capsule    Take 1 capsule (10 mg) by mouth daily In addition to two 20mg capsules for total dose of 50mg.       furosemide 20 MG tablet    LASIX    100 tablet    Take 1 tablet (20 mg) by mouth daily       lisinopril 40 MG tablet    PRINIVIL/ZESTRIL    90 tablet    Take 1 tablet (40 mg) by mouth daily       omeprazole 40 MG capsule    priLOSEC    200 capsule    Take 1 capsule (40 mg) by mouth 2 times daily as needed       QUEtiapine 25 MG tablet    SEROquel    90 tablet    Take one tablet by mouth every evening with supper       * Notice:  This list has 2 medication(s) that are the same as other medications prescribed for you. Read the directions carefully, and ask your doctor or other care provider to review them with you.

## 2017-02-21 NOTE — NURSING NOTE
Chief Complaint   Patient presents with     Recheck Medication       Autism spectrum disorder      Reviewed allergies, smoking status, and pharmacy preference  Administered abuse screening questions   Obtained weight, blood pressure and heart rate

## 2017-02-21 NOTE — TELEPHONE ENCOUNTER
Rec'd letter from provider that pt's sister Josephine had given to clinic with update that pt is more depressed and with request to provide documentation that pt is unable to effectively manage his finances.  Per request of provider call is placed to Josephine to schedule pt for an appt ASAP.  No answer and msg left for Josephine at [phone] requesting c/b to schedule appt this week with Dyan Mathis, CNS, APRN.  Clinic number provided for c/b.  Letter placed in scanning.

## 2017-02-21 NOTE — PROGRESS NOTES
Outpatient Psychiatry Progress Note     Provider: TINY Knox CNS  Date: 2017  Service:  Medication follow up with counseling.   Patient Identification: Sharon Baer  : 6/10/1931   MRN: [id number]    Sharon Baer is a 85 year old year old male who presents for ongoing psychiatric care.  Sharon Baer was last seen in clinic on 17.   At that time,   Assessment & Plan      Sharon Baer is seen today for follow up and reports he continues to have some improvement in anxiety and depression. Getting along in his living situation but some anxiety. There are no concerns about safety. Patient agrees with plan to continue current medications.     Diagnosis  Axis 1: Autism Spectrum Disorder, Anxiety Unspecified, OCD, Depression, Unspecified  Axis 2: none  Axis 3: See problem list in the medical record     Plan:  Medication: Continue current medications  OTC Recommendations: none  Lab Orders: none  Referrals: none  Release of Information: none  Future Treatment Considerations:per symptoms  Return for Follow Up: 3 months      2017  Today Sharon is seen with his sister Josephine.  Patient reports that he has been feeling more depressed in the last month.  In part this might be due to Ricardo, an employee at nursing home, who came by a lot took a leave to take care of his mother. Ricardo would encourage him to attend activities and since he is gone he isn't going out much.  Josephine reported that he continues to isolate in his room.  He had the flu and was sick for last week. Worry about the new president.  Pt denies having any worry that he is going to harm other people.    Josephine requested another letter to state she is his power of  today.  This was already done, but  is requesting another letter.    Side effects of medication include: none known    Psychiatric Review of Systems:  The patient endorses symptoms of depression: In the last 2 weeks, several days  "anhedonia, feeling fatigue, trouble concentration, moving slow, thought of better off dead,  more than half the days: depressed, feeling of failure,  He  patient endorses symptoms of anxiety : worry about new president  He endorses symptoms of vidya including denies.    He endorses symptoms of psychosis including no psychotic symptoms.       Review of Medical Systems:  Sleep: \"OK\"  Energy: decreased   Concentration: no change  Appetite: no change, states \"I never had a good appetite.\"  GI Concerns: denies  Cardiac concerns: denies  Neurological concerns: denies  Other medical concerns: occasional back pain    Current Substance Use:  Alcohol:none  Other drugs:none  Caffeine:not reviewed  Nicotine: none    Past Medical History:   Past Medical History   Diagnosis Date     Autism spectrum disorder 7/16/2015     GERD (gastroesophageal reflux disease)      Gout      Hearing loss      Hyperlipemia      on atorvastatin     Hypertension      Hypotestosteronism      IGT (impaired glucose tolerance)      OCD (obsessive compulsive disorder)      on cymbalta     Osteoarthritis, hip, bilateral      Vitamin D deficiency      resolved 2/2013     Patient Active Problem List   Diagnosis     Gout     Hypertension     IGT (impaired glucose tolerance)     GERD (gastroesophageal reflux disease)     Hypotestosteronism     Neoplasm of uncertain behavior of skin     AK (actinic keratosis)     History of nonmelanoma skin cancer     Osteoarthritis of right hip     OA (osteoarthritis) of hip     Fall     Facial laceration     Autism spectrum disorder     Anxiety disorder, unspecified     Depression, unspecified depression type     Vitamin D deficiency     Loss of weight     ACP (advance care planning)     Other obsessive-compulsive disorder       Allergies:   Allergies   Allergen Reactions     Penicillins Rash          Current Medications     Current Outpatient Prescriptions Ordered in [facility]   Medication Sig Dispense Refill     FLUoxetine " "(PROZAC) 20 MG capsule Take 2 capsules (40 mg) by mouth every morning 180 capsule 1     QUEtiapine (SEROQUEL) 25 MG tablet Take one tablet by mouth every evening with supper 90 tablet 1     allopurinol (ZYLOPRIM) 100 MG tablet Take 1 tablet (100 mg) by mouth daily 90 tablet 1     lisinopril (PRINIVIL/ZESTRIL) 40 MG tablet Take 1 tablet (40 mg) by mouth daily 90 tablet 2     cholecalciferol (VITAMIN D) 1000 UNIT tablet Take 2 tablets (2,000 Units) by mouth daily 100 tablet 3     furosemide (LASIX) 20 MG tablet Take 1 tablet (20 mg) by mouth daily 100 tablet 3     omeprazole (PRILOSEC) 40 MG capsule Take 1 capsule (40 mg) by mouth 2 times daily as needed 200 capsule 3     acetaminophen 500 MG CAPS Take 1,000 mg by mouth 3 times daily 250 capsule 99     atorvastatin (LIPITOR) 20 MG tablet Take 1 tablet (20 mg) by mouth daily 90 tablet 2     aspirin 325 MG EC tablet Take 1 tablet (325 mg) by mouth daily 90 tablet 3     No current Epic-ordered facility-administered medications on file.         Mental Status Exam     Appearance:  Casually dressed and Well groomed  Behavior/relationship to examiner/demeanor:  Cooperative and Pleasant  Orientation: Oriented to person, place, time and situation  Psychomotor: normal form  Speech Rate:  Normal  Speech Spontaneity:  Normal  Mood:  \"more depressed\"  Affect:  Appropriate/mood-congruent  Thought Process (Associations):  Goal directed  Thought Content:  no overt psychosis, denies suicidal ideation, intent or thoughts and patient does not appear to be responding to internal stimuli  Abnormal Perception:  None  Attention/Concentration:  Normal  Language:  Intact  Insight:  Adequate  Judgment:  Adequate for safety      Results     Vital signs: /84  Pulse 96  Wt 91 kg (200 lb 11.2 oz)  BMI 31.43 kg/m2    Laboratory Data:  Data from PCP 8/2016 and 11/2016 reviewed.  Elevated Hgb A1c    Assessment & Plan      Sharon Baer is seen today for follow up with his sister Josephine.  " Pt reports worsening depressed mood with stable anxiety mostly.  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  Pt's depressed mood may be exacerbated by one of friendly staff leaving temporarily from work.  Will increase Prozac to 50 mg daily at this time to improve his mood.  Will also order CMP as his last lab was 6 months ago.  Also encouraged him to follow up with PCP in June per their instruction for elevated Hgb A1c.  Letter to state that pt agrees for his sister to be his power of  done today per their request.    Diagnosis  Axis 1: Autism Spectrum Disorder, Anxiety Unspecified, OCD, Depression, Unspecified  Axis 2: none  Axis 3: See problem list in the medical record    Plan:  Medication: Increase Fluoxetine to 50mg daily, Continue Seroquel   OTC Recommendations: none  Lab Orders:  CMP  Referrals: none  Release of Information: none  Future Treatment Considerations:per symptoms  Return for Follow Up:4 weeks   The risks, benefits, alternatives and side effects have been discussed and are understood by the patient. The patient understands the risks of using street drugs or alcohol. There are no medical contraindications, the patient agrees to treatment, and has the capacity to do so. The patient understands to call 911 or come to the nearest ED if life threatening or urgent symptoms present.  Over 50% of this time was spent counseling the patient and/or coordinating care regarding review of social and occupational functioning.  In addition patient was counseled on health and wellness practices to augment medication treatment of symptoms. See note for details.    Machelle Huntley, Psychiatric/Mental Health Nurse Practitioner Student, 2/21/2017      Dyan Mathis, APRN CNS 2/21/2017

## 2017-02-22 DIAGNOSIS — Z00.00 ROUTINE HEALTH MAINTENANCE: ICD-10-CM

## 2017-02-23 RX ORDER — ATORVASTATIN CALCIUM 20 MG/1
20 TABLET, FILM COATED ORAL DAILY
Qty: 90 TABLET | Refills: 2 | Status: SHIPPED | OUTPATIENT
Start: 2017-02-23 | End: 2017-09-22

## 2017-02-23 NOTE — TELEPHONE ENCOUNTER
atorvastatin (LIPITOR) 20 MG      Last Written Prescription Date:  2/16/16  Last Fill Quantity: 90,   # refills: 2  Last Office Visit : 11/23/16  Future Office visit:  none

## 2017-03-21 ENCOUNTER — OFFICE VISIT (OUTPATIENT)
Dept: PSYCHIATRY | Facility: CLINIC | Age: 82
End: 2017-03-21
Attending: CLINICAL NURSE SPECIALIST
Payer: MEDICARE

## 2017-03-21 VITALS
HEART RATE: 74 BPM | SYSTOLIC BLOOD PRESSURE: 173 MMHG | DIASTOLIC BLOOD PRESSURE: 90 MMHG | WEIGHT: 199 LBS | BODY MASS INDEX: 31.16 KG/M2

## 2017-03-21 DIAGNOSIS — F42.8 OTHER OBSESSIVE-COMPULSIVE DISORDER: ICD-10-CM

## 2017-03-21 DIAGNOSIS — F84.0 AUTISM SPECTRUM DISORDER: ICD-10-CM

## 2017-03-21 DIAGNOSIS — F41.9 ANXIETY DISORDER, UNSPECIFIED: Primary | ICD-10-CM

## 2017-03-21 DIAGNOSIS — F32.A DEPRESSION, UNSPECIFIED DEPRESSION TYPE: ICD-10-CM

## 2017-03-21 PROCEDURE — 99212 OFFICE O/P EST SF 10 MIN: CPT | Mod: ZF

## 2017-03-21 NOTE — PROGRESS NOTES
Outpatient Psychiatry Progress Note     Provider: TINY Knox CNS  Date: 3/21/2017  Service:  Medication follow up with counseling.   Patient Identification: Sharon Baer  : 6/10/1931   MRN: [id number]    Sharon Baer is a 85 year old year old male who presents for ongoing psychiatric care.  Sharon Baer was last seen in clinic on 17.   At that time,   Assessment & Plan      Sharon Baer is seen today for follow up with his sister Josephine. Pt reports worsening depressed mood with stable anxiety mostly. 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 Pt's depressed mood may be exacerbated by one of friendly staff leaving temporarily from work. Will increase Prozac to 50 mg daily at this time to improve his mood. Will also order CMP as his last lab was 6 months ago. Also encouraged him to follow up with PCP in  per their instruction for elevated Hgb A1c. Letter to state that pt agrees for his sister to be his power of  done today per their request.     Diagnosis  Axis 1: Autism Spectrum Disorder, Anxiety Unspecified, OCD, Depression, Unspecified  Axis 2: none  Axis 3: See problem list in the medical record     Plan:  Medication: Increase Fluoxetine to 50mg daily, Continue Seroquel   OTC Recommendations: none  Lab Orders: CMP  Referrals: none  Release of Information: none  Future Treatment Considerations:per symptoms  Return for Follow Up:4 weeks      3/21/2017  Patient is seen alone. Today Sharon reports he might feel a little better. He thinks he would be better if he could hear so he could understand them.  He has been using voice enhancer that has helped interactions with other residents and has been out of his room more.  Ricardo, the staff who he liked, is back and they played pool this afternoon. Still naps during the day.     Side effects of medication include: known none     Psychiatric Review of Systems:  The patient endorses symptoms of  depression: In the last 2 weeks per PHQ 9 several days anhedonia, feeling depressed, fatigue, feelings of failure, concentration problems.   He  patient endorses symptoms of anxiety : stable  He endorses symptoms of vidya including none.    He endorses symptoms of psychosis including no psychotic symptoms.       Review of Medical Systems:  Sleep: continued to have naps  Energy: stable  Concentration: stable  Appetite: stable  GI Concerns: denies  Cardiac concerns: denies  Neurological concerns: denies  Other medical concerns: denies    Current Substance Use:  Alcohol:denies  Other drugs:denies  Caffeine:not reviewed  Nicotine: denies    Past Medical History:   Past Medical History   Diagnosis Date     Autism spectrum disorder 7/16/2015     GERD (gastroesophageal reflux disease)      Gout      Hearing loss      Hyperlipemia      on atorvastatin     Hypertension      Hypotestosteronism      IGT (impaired glucose tolerance)      OCD (obsessive compulsive disorder)      on cymbalta     Osteoarthritis, hip, bilateral      Vitamin D deficiency      resolved 2/2013     Patient Active Problem List   Diagnosis     Gout     Hypertension     IGT (impaired glucose tolerance)     GERD (gastroesophageal reflux disease)     Hypotestosteronism     Neoplasm of uncertain behavior of skin     AK (actinic keratosis)     History of nonmelanoma skin cancer     Osteoarthritis of right hip     OA (osteoarthritis) of hip     Fall     Facial laceration     Autism spectrum disorder     Anxiety disorder, unspecified     Depression, unspecified depression type     Vitamin D deficiency     Loss of weight     ACP (advance care planning)     Other obsessive-compulsive disorder       Allergies:   Allergies   Allergen Reactions     Penicillins Rash          Current Medications     Current Outpatient Prescriptions Ordered in [facility]   Medication Sig Dispense Refill     atorvastatin (LIPITOR) 20 MG tablet Take 1 tablet (20 mg) by mouth daily 90 tablet 2  "    acetaminophen 500 MG CAPS Take 500 mg by mouth 2 times daily       FLUoxetine (PROZAC) 10 MG capsule Take 1 capsule (10 mg) by mouth daily In addition to two 20mg capsules for total dose of 50mg. 90 capsule 0     FLUoxetine (PROZAC) 20 MG capsule Take 2 capsules (40 mg) by mouth every morning 180 capsule 1     QUEtiapine (SEROQUEL) 25 MG tablet Take one tablet by mouth every evening with supper 90 tablet 1     allopurinol (ZYLOPRIM) 100 MG tablet Take 1 tablet (100 mg) by mouth daily 90 tablet 1     lisinopril (PRINIVIL/ZESTRIL) 40 MG tablet Take 1 tablet (40 mg) by mouth daily 90 tablet 2     cholecalciferol (VITAMIN D) 1000 UNIT tablet Take 2 tablets (2,000 Units) by mouth daily 100 tablet 3     furosemide (LASIX) 20 MG tablet Take 1 tablet (20 mg) by mouth daily 100 tablet 3     omeprazole (PRILOSEC) 40 MG capsule Take 1 capsule (40 mg) by mouth 2 times daily as needed 200 capsule 3     aspirin 325 MG EC tablet Take 1 tablet (325 mg) by mouth daily 90 tablet 3     No current Epic-ordered facility-administered medications on file.         Mental Status Exam     Appearance:  Casually dressed and Adequately groomed  Behavior/relationship to examiner/demeanor:  Cooperative, more engaged than last visit and Pleasant  Orientation: Oriented to person, place, time and situation  Psychomotor: normal form  Speech Rate:  Normal  Speech Spontaneity:  Normal  Mood:  \"okay\"  Affect:  Appropriate/mood-congruent  Thought Process (Associations):  Goal directed  Thought Content:  no overt psychosis, denies suicidal ideation, intent or thoughts and patient does not appear to be responding to internal stimuli  Abnormal Perception:  None  Attention/Concentration:  Fair  Language:  Intact  Insight:  Adequate  Judgment:  Adequate for safety      Results     Vital signs: Wt 90.3 kg (199 lb)  BMI 31.16 kg/m2    Laboratory Data:  reviewed elevated A1C,  increased creatinine and decreased GFR from 2/21/2017.  This is managed by PCP who " recommended 6 months follow up.    Assessment & Plan      Sharon Baer is seen today for follow up and reports stability in his mood and anxiety.  Pt seemed to be more engaged in interview than last visit.  This may be due to his increased hearing as pt is wearing voice enhancer today.  Recommended pt to follow up with PCP in May which would be 6 months from his last visit. Pt was escorted down to [facility] as he came alone to the clinic today to be picked up by his sister, Josephine.    Diagnosis  Axis 1: Autism Spectrum Disorder, Anxiety Unspecified, OCD, Depression, Unspecified  Axis 2: none  Axis 3: See problem list in the medical record      Plan:  Medication: Continue current medications  OTC Recommendations: none  Lab Orders:  none  Referrals: none  Release of Information: none  Future Treatment Considerations:per symptoms  Return for Follow Up:4 weeks   The risks, benefits, alternatives and side effects have been discussed and are understood by the patient. The patient understands the risks of using street drugs or alcohol. There are no medical contraindications, the patient agrees to treatment, and has the capacity to do so. The patient understands to call 911 or come to the nearest ED if life threatening or urgent symptoms present.  Over 50% of this time was spent counseling the patient and/or coordinating care regarding review of social and occupational functioning.  In addition patient was counseled on health and wellness practices to augment medication treatment of symptoms. See note for details.    Machelle Huntley, Psychiatric/Mental Health Nurse Practitioner Student, 3/21/2017      Dyan Mathis, APRN CNS 3/21/2017

## 2017-03-21 NOTE — MR AVS SNAPSHOT
After Visit Summary   3/21/2017    Sharon Baer    MRN: [id number]           Patient Information     Date Of Birth          6/10/1931        Visit Information        Provider Department      3/21/2017 3:15 PM Dyan Mathis APRN [facility] Psychiatry [facility]        Today's Diagnoses     Anxiety disorder, unspecified    -  1    Depression, unspecified depression type        Other obsessive-compulsive disorder        Autism spectrum disorder           Follow-ups after your visit        Follow-up notes from your care team     Return in about 4 weeks (around 4/18/2017).      Your next 10 appointments already scheduled     Mar 22, 2017  2:00 PM CDT   [facility]    [address] [address]   [phone]            Apr 11, 2017  2:00 PM CDT   Return Visit with Loi Prescott [facility] ([facility])    [address] [address]   [phone]            Apr 19, 2017 10:15 AM CDT   Adult Med Follow UP with TINY Knox CNS   Psychiatry Clinic ([facility])    [place] [id number] [address] [phone]              Who to contact     Please call your clinic at [phone] to:    Ask questions about your health    Make or cancel appointments    Discuss your medicines    Learn about your test results    Speak to your doctor   If you have compliments or concerns about an experience at your clinic, or if you wish to file a complaint, please contact [facility] Patient Relations at [phone] or email us at [email or web address]         Additional Information About Your Visit        MyChart Information     MyChart gives you secure access to your electronic health record. If you see a primary  care provider, you can also send messages to your care team and make appointments. If you have questions, please call your primary care clinic.  If you do not have a primary care provider, please call [phone] and they will assist you.      [employer] is an electronic gateway that provides easy, online access to your medical records. With [employer], you can request a clinic appointment, read your test results, renew a prescription or communicate with your care team.     To access your existing account, please contact your [facility] or call [phone] for assistance.        Care EveryWhere ID     This is your Care EveryWhere ID. This could be used by other organizations to access your [place] medical records  [id number]        Your Vitals Were     Pulse BMI (Body Mass Index)                74 31.16 kg/m2           Blood Pressure from Last 3 Encounters:   03/21/17 173/90   02/21/17 159/84   01/18/17 (!) 164/91    Weight from Last 3 Encounters:   03/21/17 90.3 kg (199 lb)   02/21/17 91 kg (200 lb 11.2 oz)   01/18/17 92.2 kg (203 lb 3.2 oz)              Today, you had the following     No orders found for display       Primary Care Provider Office Phone # Fax #    Neo Castano [phone]        [facility] [address] [address]        Thank you!     Thank you for choosing PSYCHIATRY CLINIC  for your care. Our goal is always to provide you with excellent care. Hearing back from our patients is one way we can continue to improve our services. Please take a few minutes to complete the written survey that you may receive in the mail after your visit with us. Thank you!             Your Updated Medication List - Protect others around you: Learn how to safely use, store and throw away your medicines at www.disposemymeds.org.          This list is accurate as of: 3/21/17  3:52 PM.  Always use your most recent med list.                    Brand Name Dispense Instructions for use    acetaminophen 500 MG Caps      Take 500 mg by mouth 2 times daily       allopurinol 100 MG tablet    ZYLOPRIM    90 tablet    Take 1 tablet (100 mg) by mouth daily       aspirin 325 MG EC tablet     90 tablet    Take 1 tablet (325 mg) by mouth daily       atorvastatin 20 MG tablet    LIPITOR    90 tablet    Take 1 tablet (20 mg) by mouth daily       cholecalciferol 1000 UNIT tablet    vitamin D    100 tablet    Take 2 tablets (2,000 Units) by mouth daily       * FLUoxetine 20 MG capsule    PROzac    180 capsule    Take 2 capsules (40 mg) by mouth every morning       * FLUoxetine 10 MG capsule    PROzac    90 capsule    Take 1 capsule (10 mg) by mouth daily In addition to two 20mg capsules for total dose of 50mg.       furosemide 20 MG tablet    LASIX    100 tablet    Take 1 tablet (20 mg) by mouth daily       lisinopril 40 MG tablet    PRINIVIL/ZESTRIL    90 tablet    Take 1 tablet (40 mg) by mouth daily       omeprazole 40 MG capsule    priLOSEC    200 capsule    Take 1 capsule (40 mg) by mouth 2 times daily as needed       QUEtiapine 25 MG tablet    SEROquel    90 tablet    Take one tablet by mouth every evening with supper       * Notice:  This list has 2 medication(s) that are the same as other medications prescribed for you. Read the directions carefully, and ask your doctor or other care provider to review them with you.

## 2017-03-21 NOTE — Clinical Note
Call jessica to see about scheduling with Dr. Castano in May for 6 month recommended follow up also discuss kidney function.

## 2017-03-21 NOTE — NURSING NOTE
Chief Complaint   Patient presents with     RECHECK     Autism spectrum disorder      Reviewed allergies, smoking status, and pharmacy preference   Obtained weight, blood pressure and heart rate   Zaynab Grady LPN

## 2017-03-22 ENCOUNTER — OFFICE VISIT (OUTPATIENT)
Dept: PSYCHOLOGY | Facility: CLINIC | Age: 82
End: 2017-03-22
Payer: COMMERCIAL

## 2017-03-22 DIAGNOSIS — F42.8 OTHER OBSESSIVE-COMPULSIVE DISORDER: Primary | ICD-10-CM

## 2017-03-22 PROCEDURE — 90834 PSYTX W PT 45 MINUTES: CPT | Performed by: SOCIAL WORKER

## 2017-03-22 ASSESSMENT — ANXIETY QUESTIONNAIRES
5. BEING SO RESTLESS THAT IT IS HARD TO SIT STILL: NOT AT ALL
IF YOU CHECKED OFF ANY PROBLEMS ON THIS QUESTIONNAIRE, HOW DIFFICULT HAVE THESE PROBLEMS MADE IT FOR YOU TO DO YOUR WORK, TAKE CARE OF THINGS AT HOME, OR GET ALONG WITH OTHER PEOPLE: SOMEWHAT DIFFICULT
GAD7 TOTAL SCORE: 5
1. FEELING NERVOUS, ANXIOUS, OR ON EDGE: SEVERAL DAYS
2. NOT BEING ABLE TO STOP OR CONTROL WORRYING: SEVERAL DAYS
7. FEELING AFRAID AS IF SOMETHING AWFUL MIGHT HAPPEN: SEVERAL DAYS
3. WORRYING TOO MUCH ABOUT DIFFERENT THINGS: SEVERAL DAYS
6. BECOMING EASILY ANNOYED OR IRRITABLE: SEVERAL DAYS

## 2017-03-22 ASSESSMENT — PATIENT HEALTH QUESTIONNAIRE - PHQ9: 5. POOR APPETITE OR OVEREATING: NOT AT ALL

## 2017-03-22 NOTE — MR AVS SNAPSHOT
MRN:[id number]                      After Visit Summary   3/22/2017    Sharon Baer    MRN: [id number]           Visit Information        Provider Department      3/22/2017 2:00 PM Loi Prescott [facility]      Your next 10 appointments already scheduled     Apr 11, 2017  2:00 PM CDT   Return Visit with DANNI Davidson   [facility])    [address] [address]   [phone]            Apr 19, 2017 10:15 AM CDT   Adult Med Follow UP with TINY Knox CNS   Psychiatry Clinic ([facility])    [place] [id number]  [address] [facility] [address] [facility] [address] [phone]            May 02, 2017  2:00 PM CDT   Return Visit with DANNI Davidson   [facility] ([facility])    [address] [address] [phone]              MyChart Information     [employer] gives you secure access to your electronic health record. If you see a primary care provider, you can also send messages to your care team and make appointments. If you have questions, please call your primary care clinic.  If you do not have a primary care provider, please call [phone] and they will assist you.        Care EveryWhere ID     This is your Care EveryWhere ID. This could be used by other organizations to access your [place] medical records  [id number]

## 2017-03-22 NOTE — PROGRESS NOTES
Progress Note    Client Name: Sharon Baer  Date: 3/22/17         Service Type: Individual      Session Start Time: 2:00  Session End Time: 2:45      Session Length: 45     Session #: 11     Attendees: Client    Treatment Plan Last Reviewed: tx plan created on 9/19/16  PHQ-9 / KIET-7 : Completed this session      DATA      Progress Since Last Session (Related to Symptoms / Goals / Homework):   Symptoms: Stable    Homework: Completed in session discussed distraction activities he can engage in and continue the thought stopping process when obsessive thoughts appear. Discussed what he tried to decrease in regard to his intrusive harm thoughts. Processed harmful thoughts and reassured client that he has not acted on them.  Continue activities he engages in and how these activities can distract thoughts so he does not get caught up in his negative thinking.  Discussed people and events that he was grateful for in his life.  Client likes to discuss his past and happy and fond memories of the past.  This makes client feel good.  Client created list of important people and descriptors of them and we processed in session. Client forgot book we usually work in during session.  Discussed and reviewed CBT skills, acceptance of OCD thoughts and distraction activities to get mind off of harmful thoughts he has towards others.          Episode of Care Goals: Minimal progress - PREPARATION (Decided to change - considering how); Intervened by negotiating a change plan and determining options / strategies for behavior change, identifying triggers, exploring social supports, and working towards setting a date to begin behavior change     Current / Ongoing Stressors and Concerns:   Moved to new residence, has thoughts of hurting others and suicidal ideation which is apart of his Harm OCD.  He has never acted on these thoughts.      Treatment Objective(s) Addressed in This  "Session:   use thought-stopping strategy daily to reduce intrusive thoughts   Engage in activities that he enjoys to distract obssesive thoughts  Chew on ice or use cold pack on face to distract from anxiety about thoughts  Utilize deep breathing when stressed or anxious              Write in Book \"Handbook for Happiness\"                Complete \"Wake up in Gratitude and in the moment\" exercise in the book.  Joyful attention skills.-bring in next session            Both the PHQ9 and GAD7 scores higher this session. Discussed worries and concerns about the president and concerns about possible war for the future.  Reframed and concentrated on positives and                     what he is grateful for.               Concentrated on CBT skills and cognitive restructuring this session, acceptance therapy of negative harmful thoughts, Concentrated on strengths and positives in life.      Intervention:   CBT: start teaching skills - gave client mood log and feelings worksheet to try and bring into next session.                Engage in Relaxation and distraction activities that he enjoys    Deep breathing exercises  Thought stopping strategy and engage in activities that he enjoys to distract from negative thinking.    Use guided imagery and go to pleasant memories and events to distract from negative intrusive thoughts when needed.  Complete some activities in happiness Book-bring in and we can process work   Emphasized CBT skills--gratitude and positive thoughts, acceptance therapy, strengths    ASSESSMENT: Current Emotional / Mental Status (status of significant symptoms):   Risk status (Self / Other harm or suicidal ideation)   Client reports the following current fears or concerns for personal safety: concerned that he might hurt someone or self. (OCD Harm type) .   Client reports the following current or recent suicidal ideation or behaviors: Has obsessive thoughts about not wanting to be alive and suicidal ideation. "  Has not attempted in past apart of his obsessive thinking. .   Client reports current or recent homicidal ideation or behaviors including thinking about hurting others but has not acted on this.    Client denies current or recent self injurious behavior or ideation.   Client denies other safety concerns.   A safety and risk management plan has not been developed at this time, however client was given the after-hours number / 911 should there be a change in any of these risk factors.     Appearance:   Appropriate    Eye Contact:   Fair    Psychomotor Behavior: Retarded (Slowed)    Attitude:   Cooperative    Orientation:   All   Speech    Rate / Production: Slow     Volume:  Soft    Mood:    Anxious    Affect:    Blunted  Flat  Restricted  Subdued    Thought Content:  Rumination    Thought Form:  Coherent    Insight:    Fair      Medication Review:   No changes to current psychiatric medication(s)     Medication Compliance:   Yes     Changes in Health Issues:   None reported     Chemical Use Review:   Substance Use: Chemical use reviewed, no active concerns identified      Tobacco Use: No current tobacco use.       Collateral Reports Completed:   Not Applicable    PLAN: (Client Tasks / Therapist Tasks / Other)  Client to start using thought stopping process when he has obsessive thoughts of hurting others or self.  Engage in activities that he enjoys like: watching movies, comedies, cross word puzzles, word find games, coloring or drawing.  Chew on ice or use cold pack on face to distract thinking if anxiety is too great.  Engage in activities with others when he feels comfortable doing this. Practice deep breathing skills to decrease obsessive thoughts. Client was given mood log and feelings worksheet to read and bring into next session to process with therapist.  Ask for DVD's of movies or shows that he enjoys.   Listen to music and continue computer use when intrusive thoughts appear.  Think of pleasant memories  "and happy times from the past and write them down and go to list when he get's stuck in negative obsessive thoughts.   Continue with exercises discussed in session to complete in \"Handbook for Happiness\" .  Complete Wake up in gratitude and in the moment activity in book--to help with joyful attention skills. Discuss and process in next session. Reviewed and practiced CBT skills--talked about positive things and what client was grateful for.  Concentrate on positive things and try to let go of negative intrusive thoughts. Revisited acceptance therapy and think of negative thoughts as a wave, reframe to positive thoughts, concentrated on strengths.  Client will bring in Ace[employer]o book next session for homework in between session.               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 Learning disorder, obsessive thoughts about harming others and self.  Increased anxiety.  WHODAS: Completed first session    Referral / Collaboration:  Referral to another professional/service is not indicated at this time..    Anticipated number of session or this episode of care: 20      MeasurableTreatment Goal(s) related to diagnosis / functional impairment(s)  Goal 1: Client will function daily at a consistent level with minimal interference from obsessions and compulsions.     I will know I've met my goal when I do not have increased anxiety that I will hurt others or self.       Objective #A (Client Action)    Client will use thought-stopping strategy daily to reduce intrusive thoughts.  Status: Continued - Date(s):     Intervention(s)  Therapist will teach " thought stopping process to interrupt obsessions.      Objective #B  Client will learn CBT skills to to identify distorted automatic thoughts and beliefs.  .  Status: Continued - Date(s):     Intervention(s)  Therapist will teach CBT skills. .    Objective #C  Client will practice 2 relaxation techniques and engage in 3 distraction activities that reduce obsessive thoughts .  Status: Continued - Date(s):     Intervention(s)  Therapist will assign homework determine a list of activities that client can use to distract form obsessive thinking.   Teach relaxation techniques that are helpful to reduce overall anxiety.       Goal 2: Client will resolve key life conflicts and the emotional stress that fuels obsessive-compulsive behavior patterns.     I will know I've met my goal when I feel settled and comfortable with my life.      Objective #A (Client Action)    Status: Continued - Date(s):     Client will engage in acceptance and commitment  (ACT) therapy or exposure and ritual prevention therapy to reduce obsessions.     Intervention(s)  Therapist will teach ACT or ERP therapy with client and evaluate it's effectiveness in reducing intrusive obsessive thoughts.      Objective #B  Client will limit amount of time spent on repetitive or obsessive thoughts to 15 minutes.    Status: Continued - Date(s):     Intervention(s)  Therapist will teach and assign homework to determine time to accomplish good time to limit obsessive thoughts and evaluate it's effectiveness. .    Objective #C  Client will use positive self-affirmations daily.  Status: Continued - Date(s):     Intervention(s)  Therapist will provide encouragement and affirmations to develop further overall strengths and self esteem .        Client has reviewed and agreed to the above plan.      Loi Prescott [facility]  September 20, 2016                                             Progress Note    Client Name: Sharon TEMPLE Penelopelisa  Date: 2/14/17         Service  Type: Individual      Session Start Time: 5:00  Session End Time: 5:45      Session Length: 45     Session #: 10     Attendees: Client    Treatment Plan Last Reviewed: tx plan created on 9/19/16  PHQ-9 / KIET-7 : Completed this session      DATA      Progress Since Last Session (Related to Symptoms / Goals / Homework):   Symptoms: Worsening    Homework: Completed in session discussed distraction activities he can engage in and continue the thought stopping process when obsessive thoughts appear. Discussed what he tried to decrease in regard to his intrusive harm thoughts. Processed harmful thoughts and reassured client that he has not acted on them.  Continue activities he engages in and how these activities can distract thoughts so he does not get caught up in his negative thinking.  Discussed people and events that he was grateful for in his life.  Client likes to discuss his past and happy and fond memories of the past.  This makes client feel good.  Client created list of important people and descriptors of them and we processed in session. Client forgot book we usually work in during session.  Discussed and reviewed CBT skills.        Episode of Care Goals: Minimal progress - PREPARATION (Decided to change - considering how); Intervened by negotiating a change plan and determining options / strategies for behavior change, identifying triggers, exploring social supports, and working towards setting a date to begin behavior change     Current / Ongoing Stressors and Concerns:   Moved to new residence, has thoughts of hurting others and suicidal ideation which is apart of his Harm OCD.  He has never acted on these thoughts.      Treatment Objective(s) Addressed in This Session:   use thought-stopping strategy daily to reduce intrusive thoughts   Engage in activities that he enjoys to distract obssesive thoughts  Chew on ice or use cold pack on face to distract from anxiety about thoughts  Utilize deep breathing  "when stressed or anxious              Write in Book \"Handbook for Happiness\"                Complete \"Wake up in Gratitude and in the moment\" exercise in the book.  Joyful attention skills.-bring in next session               Boththe PHQ9 and GAD7 scores higher this session. Discussed worries and concerns about the president and concerns about possible war for the future.  Reframed and concentrated on positives and                  what he is grateful for.      Intervention:   CBT: start teaching skills - gave client mood log and feelings worksheet to try and bring into next session.                Engage in Relaxation and distraction activities that he enjoys    Deep breathing exercises  Thought stopping strategy and engage in activities that he enjoys to distract from negative thinking.    Use guided imagery and go to pleasant memories and events to distract from negative intrusive thoughts when needed.  Complete some activities in happiness Book-bring in and we can process work   Emphasized CBT skills--gratitude and positive thoughts    ASSESSMENT: Current Emotional / Mental Status (status of significant symptoms):   Risk status (Self / Other harm or suicidal ideation)   Client reports the following current fears or concerns for personal safety: concerned that he might hurt someone or self. (OCD Harm type) .   Client reports the following current or recent suicidal ideation or behaviors: Has obsessive thoughts about not wanting to be alive and suicidal ideation.  Has not attempted in past apart of his obsessive thinking. .   Client reports current or recent homicidal ideation or behaviors including thinking about hurting others but has not acted on this.    Client denies current or recent self injurious behavior or ideation.   Client denies other safety concerns.   A safety and risk management plan has not been developed at this time, however client was given the after-hours number / 911 should there be a change " "in any of these risk factors.     Appearance:   Appropriate    Eye Contact:   Fair    Psychomotor Behavior: Restless  Retarded (Slowed)    Attitude:   Cooperative    Orientation:   All   Speech    Rate / Production: Slow     Volume:  Soft    Mood:    Anxious  Depressed  Sad    Affect:    Blunted  Flat  Restricted  Subdued    Thought Content:  Rumination    Thought Form:  Blocking  Coherent    Insight:    Fair      Medication Review:   No changes to current psychiatric medication(s)     Medication Compliance:   Yes     Changes in Health Issues:   None reported     Chemical Use Review:   Substance Use: Chemical use reviewed, no active concerns identified      Tobacco Use: No current tobacco use.       Collateral Reports Completed:   Not Applicable    PLAN: (Client Tasks / Therapist Tasks / Other)  Client to start using thought stopping process when he has obsessive thoughts of hurting others or self.  Engage in activities that he enjoys like: watching movies, comedies, cross word puzzles, word find games, coloring or drawing.  Chew on ice or use cold pack on face to distract thinking if anxiety is too great.  Engage in activities with others when he feels comfortable doing this. Practice deep breathing skills to decrease obsessive thoughts. Client was given mood log and feelings worksheet to read and bring into next session to process with therapist.  Ask for DVD's of movies or shows that he enjoys.   Listen to music and continue computer use when intrusive thoughts appear.  Think of pleasant memories and happy times from the past and write them down and go to list when he get's stuck in negative obsessive thoughts.   Continue with exercises discussed in session to complete in \"Handbook for Happiness\" .  Complete Wake up in gratitude and in the moment activity in book--to help with joyful attention skills. Discuss and process in next session. Reviewed and practiced CBT skills--talked about positive things and what " client was grateful for.  Concentrate on positive things and try to let go of negative intrusive thoughts.             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 Learning disorder, obsessive thoughts about harming others and self.  Increased anxiety.  WHODAS: Completed first session    Referral / Collaboration:  Referral to another professional/service is not indicated at this time..    Anticipated number of session or this episode of care: 20      MeasurableTreatment Goal(s) related to diagnosis / functional impairment(s)  Goal 1: Client will function daily at a consistent level with minimal interference from obsessions and compulsions.     I will know I've met my goal when I do not have increased anxiety that I will hurt others or self.       Objective #A (Client Action)    Client will use thought-stopping strategy daily to reduce intrusive thoughts.  Status: Continued - Date(s):     Intervention(s)  Therapist will teach thought stopping process to interrupt obsessions.      Objective #B  Client will learn CBT skills to to identify distorted automatic thoughts and beliefs.  .  Status: Continued - Date(s):     Intervention(s)  Therapist will teach CBT skills. .    Objective #C  Client will practice 2 relaxation techniques and engage in 3 distraction activities that reduce obsessive thoughts .  Status: Continued - Date(s):     Intervention(s)  Therapist will assign homework determine a list of activities that client can use to distract form obsessive thinking.   Teach relaxation techniques that are helpful to reduce overall anxiety.       Goal 2:  Client will resolve key life conflicts and the emotional stress that fuels obsessive-compulsive behavior patterns.     I will know I've met my goal when I feel settled and comfortable with my life.      Objective #A (Client Action)    Status: Continued - Date(s):     Client will engage in acceptance and commitment  (ACT) therapy or exposure and ritual prevention therapy to reduce obsessions.     Intervention(s)  Therapist will teach ACT or ERP therapy with client and evaluate it's effectiveness in reducing intrusive obsessive thoughts.      Objective #B  Client will limit amount of time spent on repetitive or obsessive thoughts to 15 minutes.    Status: Continued - Date(s):     Intervention(s)  Therapist will teach and assign homework to determine time to accomplish good time to limit obsessive thoughts and evaluate it's effectiveness. .    Objective #C  Client will use positive self-affirmations daily.  Status: Continued - Date(s):     Intervention(s)  Therapist will provide encouragement and affirmations to develop further overall strengths and self esteem .        Client has reviewed and agreed to the above plan.      Loi Prescott, [facility]  September 20, 2016

## 2017-03-23 ASSESSMENT — ANXIETY QUESTIONNAIRES: GAD7 TOTAL SCORE: 5

## 2017-03-23 ASSESSMENT — PATIENT HEALTH QUESTIONNAIRE - PHQ9: SUM OF ALL RESPONSES TO PHQ QUESTIONS 1-9: 4

## 2017-04-11 ENCOUNTER — OFFICE VISIT (OUTPATIENT)
Dept: PSYCHOLOGY | Facility: CLINIC | Age: 82
End: 2017-04-11
Payer: COMMERCIAL

## 2017-04-11 DIAGNOSIS — F42.8 OTHER OBSESSIVE-COMPULSIVE DISORDER: Primary | ICD-10-CM

## 2017-04-11 PROCEDURE — 90834 PSYTX W PT 45 MINUTES: CPT | Performed by: SOCIAL WORKER

## 2017-04-11 ASSESSMENT — PATIENT HEALTH QUESTIONNAIRE - PHQ9: 5. POOR APPETITE OR OVEREATING: NOT AT ALL

## 2017-04-11 ASSESSMENT — ANXIETY QUESTIONNAIRES
1. FEELING NERVOUS, ANXIOUS, OR ON EDGE: SEVERAL DAYS
7. FEELING AFRAID AS IF SOMETHING AWFUL MIGHT HAPPEN: SEVERAL DAYS
3. WORRYING TOO MUCH ABOUT DIFFERENT THINGS: SEVERAL DAYS
GAD7 TOTAL SCORE: 8
6. BECOMING EASILY ANNOYED OR IRRITABLE: SEVERAL DAYS
2. NOT BEING ABLE TO STOP OR CONTROL WORRYING: NEARLY EVERY DAY
IF YOU CHECKED OFF ANY PROBLEMS ON THIS QUESTIONNAIRE, HOW DIFFICULT HAVE THESE PROBLEMS MADE IT FOR YOU TO DO YOUR WORK, TAKE CARE OF THINGS AT HOME, OR GET ALONG WITH OTHER PEOPLE: SOMEWHAT DIFFICULT
5. BEING SO RESTLESS THAT IT IS HARD TO SIT STILL: SEVERAL DAYS

## 2017-04-11 NOTE — MR AVS SNAPSHOT
MRN:[id number]                      After Visit Summary   4/11/2017    Sharon Baer    MRN: [id number]           Visit Information        Provider Department      4/11/2017 2:00 PM Loi Prescott [facility]      Your next 10 appointments already scheduled     Apr 19, 2017 10:15 AM CDT   Adult Med Follow UP with TINY Knox CNS   Psychiatry Clinic ([facility])    [place] [id number]  [address] [id number]   [phone]            May 02, 2017  2:00 PM CDT   Return Visit with Loi Prescott [facility] ([facility])    [address] [place] [id number] [phone]            May 31, 2017  2:00 PM CDT   Return Visit with Loi Prescott [facility] ([facility])    [address] [place] [id number] [phone]              MyChart Information     [facility] gives you secure access to your electronic health record. If you see a primary care provider, you can also send messages to your care team and make appointments. If you have questions, please call your primary care clinic.  If you do not have a primary care provider, please call [phone] and they will assist you.        Care EveryWhere ID     This is your Care EveryWhere ID. This could be used by other organizations to access your [place] medical records  [id number]

## 2017-04-11 NOTE — PROGRESS NOTES
Progress Note    Client Name: Sharon Baer  Date: 4/11/17         Service Type: Individual      Session Start Time: 2:00  Session End Time: 2:45      Session Length: 45     Session #: 12     Attendees: Client    Treatment Plan Last Reviewed: tx plan created on 9/19/16  PHQ-9 / KIET-7 : Completed this session      DATA      Progress Since Last Session (Related to Symptoms / Goals / Homework):   Symptoms: Stable    Homework: Completed in session discussed distraction activities he can engage in and continue the thought stopping process when obsessive thoughts appear. Discussed what he tried to decrease in regard to his intrusive harm thoughts. Processed harmful thoughts and reassured client that he has not acted on them.  Continue activities he engages in and how these activities can distract thoughts so he does not get caught up in his negative thinking.  Discussed people and events that he was grateful for in his life.  Client likes to discuss his past and happy and fond memories of the past.  This makes client feel good.  Client created list of important people and descriptors of them and we processed in session. Discussed and reviewed CBT skills, acceptance of OCD thoughts and distraction activities to get mind off of harmful thoughts he has towards others.  We worked on two gratitude activities in book and client stated he would work on this.          Episode of Care Goals: Minimal progress - PREPARATION (Decided to change - considering how); Intervened by negotiating a change plan and determining options / strategies for behavior change, identifying triggers, exploring social supports, and working towards setting a date to begin behavior change     Current / Ongoing Stressors and Concerns:   Moved to new residence, has thoughts of hurting others and suicidal ideation which is apart of his Harm OCD.  He has never acted on these thoughts.      Treatment  "Objective(s) Addressed in This Session:   use thought-stopping strategy daily to reduce intrusive thoughts   Engage in activities that he enjoys to distract obssesive thoughts  Chew on ice or use cold pack on face to distract from anxiety about thoughts  Utilize deep breathing when stressed or anxious              Write in Book \"Handbook for Happiness\"                Complete \"Wake up in Gratitude and in the moment\" exercise in the book.  Joyful attention skills.-bring in next session            Both the PHQ9 and GAD7 scores higher this session. Discussed worries and concerns about the president and concerns about possible war for the future.  Reframed and concentrated on positives and                     what he is grateful for.               Concentrated on CBT skills and cognitive restructuring this session, acceptance therapy of negative harmful thoughts, Concentrated on strengths and positives in life.               Complete two gratitude exercises daily if he can-especially when he is ruminating on OCD thoughts   Intervention:   CBT: start teaching skills - gave client mood log and feelings worksheet to try and bring into next session.                Engage in Relaxation and distraction activities that he enjoys    Deep breathing exercises  Thought stopping strategy and engage in activities that he enjoys to distract from negative thinking.    Use guided imagery and go to pleasant memories and events to distract from negative intrusive thoughts when needed.  Complete some activities in happiness Book-bring in and we can process work   Emphasized CBT skills--gratitude and positive thoughts, acceptance therapy, strengths   Gratitude exercises   ASSESSMENT: Current Emotional / Mental Status (status of significant symptoms):   Risk status (Self / Other harm or suicidal ideation)   Client reports the following current fears or concerns for personal safety: concerned that he might hurt someone or self. (OCD Harm " type) .   Client reports the following current or recent suicidal ideation or behaviors: Has obsessive thoughts about not wanting to be alive and suicidal ideation.  Has not attempted in past apart of his obsessive thinking. .   Client reports current or recent homicidal ideation or behaviors including thinking about hurting others but has not acted on this.    Client denies current or recent self injurious behavior or ideation.   Client denies other safety concerns.   A safety and risk management plan has not been developed at this time, however client was given the after-hours number / 911 should there be a change in any of these risk factors.     Appearance:   Appropriate    Eye Contact:   Fair    Psychomotor Behavior: Retarded (Slowed)    Attitude:   Cooperative    Orientation:   All   Speech    Rate / Production: Slow     Volume:  Soft    Mood:    Anxious  Depressed    Affect:    Blunted  Flat  Restricted  Subdued    Thought Content:  Rumination    Thought Form:  Coherent    Insight:    Fair      Medication Review:   No changes to current psychiatric medication(s)     Medication Compliance:   Yes     Changes in Health Issues:   None reported     Chemical Use Review:   Substance Use: Chemical use reviewed, no active concerns identified      Tobacco Use: No current tobacco use.       Collateral Reports Completed:   Not Applicable    PLAN: (Client Tasks / Therapist Tasks / Other)  Client to start using thought stopping process when he has obsessive thoughts of hurting others or self.  Engage in activities that he enjoys like: watching movies, comedies, cross word puzzles, word find games, coloring or drawing.  Chew on ice or use cold pack on face to distract thinking if anxiety is too great.  Engage in activities with others when he feels comfortable doing this. Practice deep breathing skills to decrease obsessive thoughts. Client was given mood log and feelings worksheet to read and bring into next session to  "process with therapist.  Ask for DVD's of movies or shows that he enjoys.   Listen to music and continue computer use when intrusive thoughts appear.  Think of pleasant memories and happy times from the past and write them down and go to list when he get's stuck in negative obsessive thoughts.   Continue with exercises discussed in session to complete in \"Handbook for Happiness\" .  Complete Wake up in gratitude and in the moment activity in book--to help with joyful attention skills. Discuss and process in next session. Reviewed and practiced CBT skills--talked about positive things and what client was grateful for.  Concentrate on positive things and try to let go of negative intrusive thoughts. Revisited acceptance therapy and think of negative thoughts as a wave, reframe to positive thoughts, concentrated on strengths.  Client brought in Acevedo to session--we discussed 2 gratitude exercises to work on.  Once when he gets up in the morning and thinks about people he is grateful for.  The other to concentrate on nature and gratitude of the environment and the beauty of the outdoors.        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 Learning disorder, obsessive thoughts about harming others and self.  Increased anxiety.  WHODAS: Completed first session    Referral / Collaboration:  Referral to another professional/service is not indicated at this time..    Anticipated number of session or this episode of care: 20      MeasurableTreatment Goal(s) related to diagnosis / functional impairment(s)  Goal 1: Client will function daily at a consistent " level with minimal interference from obsessions and compulsions.     I will know I've met my goal when I do not have increased anxiety that I will hurt others or self.       Objective #A (Client Action)    Client will use thought-stopping strategy daily to reduce intrusive thoughts.  Status: Continued - Date(s):     Intervention(s)  Therapist will teach thought stopping process to interrupt obsessions.      Objective #B  Client will learn CBT skills to to identify distorted automatic thoughts and beliefs.  .  Status: Continued - Date(s):     Intervention(s)  Therapist will teach CBT skills. .    Objective #C  Client will practice 2 relaxation techniques and engage in 3 distraction activities that reduce obsessive thoughts .  Status: Continued - Date(s):     Intervention(s)  Therapist will assign homework determine a list of activities that client can use to distract form obsessive thinking.   Teach relaxation techniques that are helpful to reduce overall anxiety.       Goal 2: Client will resolve key life conflicts and the emotional stress that fuels obsessive-compulsive behavior patterns.     I will know I've met my goal when I feel settled and comfortable with my life.      Objective #A (Client Action)    Status: Continued - Date(s):     Client will engage in acceptance and commitment  (ACT) therapy or exposure and ritual prevention therapy to reduce obsessions.     Intervention(s)  Therapist will teach ACT or ERP therapy with client and evaluate it's effectiveness in reducing intrusive obsessive thoughts.      Objective #B  Client will limit amount of time spent on repetitive or obsessive thoughts to 15 minutes.    Status: Continued - Date(s):     Intervention(s)  Therapist will teach and assign homework to determine time to accomplish good time to limit obsessive thoughts and evaluate it's effectiveness. .    Objective #C  Client will use positive self-affirmations daily.  Status: Continued - Date(s):      Intervention(s)  Therapist will provide encouragement and affirmations to develop further overall strengths and self esteem .        Client has reviewed and agreed to the above plan.      Loi Prescott [facility]  September 20, 2016                                             Progress Note    Client Name: Sharon Baer  Date: 2/14/17         Service Type: Individual      Session Start Time: 5:00  Session End Time: 5:45      Session Length: 45     Session #: 10     Attendees: Client    Treatment Plan Last Reviewed: tx plan created on 9/19/16  PHQ-9 / KIET-7 : Completed this session      DATA      Progress Since Last Session (Related to Symptoms / Goals / Homework):   Symptoms: Worsening    Homework: Completed in session discussed distraction activities he can engage in and continue the thought stopping process when obsessive thoughts appear. Discussed what he tried to decrease in regard to his intrusive harm thoughts. Processed harmful thoughts and reassured client that he has not acted on them.  Continue activities he engages in and how these activities can distract thoughts so he does not get caught up in his negative thinking.  Discussed people and events that he was grateful for in his life.  Client likes to discuss his past and happy and fond memories of the past.  This makes client feel good.  Client created list of important people and descriptors of them and we processed in session. Client forgot book we usually work in during session.  Discussed and reviewed CBT skills.        Episode of Care Goals: Minimal progress - PREPARATION (Decided to change - considering how); Intervened by negotiating a change plan and determining options / strategies for behavior change, identifying triggers, exploring social supports, and working towards setting a date to begin behavior change     Current / Ongoing Stressors and Concerns:   Moved to new residence, has thoughts of hurting others and suicidal ideation which  "is apart of his Harm OCD.  He has never acted on these thoughts.      Treatment Objective(s) Addressed in This Session:   use thought-stopping strategy daily to reduce intrusive thoughts   Engage in activities that he enjoys to distract obssesive thoughts  Chew on ice or use cold pack on face to distract from anxiety about thoughts  Utilize deep breathing when stressed or anxious              Write in Book \"Handbook for Happiness\"                Complete \"Wake up in Gratitude and in the moment\" exercise in the book.  Joyful attention skills.-bring in next session               Boththe PHQ9 and GAD7 scores higher this session. Discussed worries and concerns about the president and concerns about possible war for the future.  Reframed and concentrated on positives and                  what he is grateful for.      Intervention:   CBT: start teaching skills - gave client mood log and feelings worksheet to try and bring into next session.                Engage in Relaxation and distraction activities that he enjoys    Deep breathing exercises  Thought stopping strategy and engage in activities that he enjoys to distract from negative thinking.    Use guided imagery and go to pleasant memories and events to distract from negative intrusive thoughts when needed.  Complete some activities in happiness Book-bring in and we can process work   Emphasized CBT skills--gratitude and positive thoughts    ASSESSMENT: Current Emotional / Mental Status (status of significant symptoms):   Risk status (Self / Other harm or suicidal ideation)   Client reports the following current fears or concerns for personal safety: concerned that he might hurt someone or self. (OCD Harm type) .   Client reports the following current or recent suicidal ideation or behaviors: Has obsessive thoughts about not wanting to be alive and suicidal ideation.  Has not attempted in past apart of his obsessive thinking. .   Client reports current or recent " homicidal ideation or behaviors including thinking about hurting others but has not acted on this.    Client denies current or recent self injurious behavior or ideation.   Client denies other safety concerns.   A safety and risk management plan has not been developed at this time, however client was given the after-hours number / 911 should there be a change in any of these risk factors.     Appearance:   Appropriate    Eye Contact:   Fair    Psychomotor Behavior: Restless  Retarded (Slowed)    Attitude:   Cooperative    Orientation:   All   Speech    Rate / Production: Slow     Volume:  Soft    Mood:    Anxious  Depressed  Sad    Affect:    Blunted  Flat  Restricted  Subdued    Thought Content:  Rumination    Thought Form:  Blocking  Coherent    Insight:    Fair      Medication Review:   No changes to current psychiatric medication(s)     Medication Compliance:   Yes     Changes in Health Issues:   None reported     Chemical Use Review:   Substance Use: Chemical use reviewed, no active concerns identified      Tobacco Use: No current tobacco use.       Collateral Reports Completed:   Not Applicable    PLAN: (Client Tasks / Therapist Tasks / Other)  Client to start using thought stopping process when he has obsessive thoughts of hurting others or self.  Engage in activities that he enjoys like: watching movies, comedies, cross word puzzles, word find games, coloring or drawing.  Chew on ice or use cold pack on face to distract thinking if anxiety is too great.  Engage in activities with others when he feels comfortable doing this. Practice deep breathing skills to decrease obsessive thoughts. Client was given mood log and feelings worksheet to read and bring into next session to process with therapist.  Ask for DVD's of movies or shows that he enjoys.   Listen to music and continue computer use when intrusive thoughts appear.  Think of pleasant memories and happy times from the past and write them down and go to  "list when he get's stuck in negative obsessive thoughts.   Continue with exercises discussed in session to complete in \"Handbook for Happiness\" .  Complete Wake up in gratitude and in the moment activity in book--to help with joyful attention skills. Discuss and process in next session. Reviewed and practiced CBT skills--talked about positive things and what client was grateful for.  Concentrate on positive things and try to let go of negative intrusive thoughts.             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 Learning disorder, obsessive thoughts about harming others and self.  Increased anxiety.  WHODAS: Completed first session    Referral / Collaboration:  Referral to another professional/service is not indicated at this time..    Anticipated number of session or this episode of care: 20      MeasurableTreatment Goal(s) related to diagnosis / functional impairment(s)  Goal 1: Client will function daily at a consistent level with minimal interference from obsessions and compulsions.     I will know I've met my goal when I do not have increased anxiety that I will hurt others or self.       Objective #A (Client Action)    Client will use thought-stopping strategy daily to reduce intrusive thoughts.  Status: Continued - Date(s):     Intervention(s)  Therapist will teach thought stopping process to interrupt obsessions.      Objective #B  Client will learn CBT skills to to identify distorted automatic thoughts and beliefs.  .  Status: Continued - Date(s):     Intervention(s)  Therapist will teach CBT skills. .    Objective #C  Client will " practice 2 relaxation techniques and engage in 3 distraction activities that reduce obsessive thoughts .  Status: Continued - Date(s):     Intervention(s)  Therapist will assign homework determine a list of activities that client can use to distract form obsessive thinking.   Teach relaxation techniques that are helpful to reduce overall anxiety.       Goal 2: Client will resolve key life conflicts and the emotional stress that fuels obsessive-compulsive behavior patterns.     I will know I've met my goal when I feel settled and comfortable with my life.      Objective #A (Client Action)    Status: Continued - Date(s):     Client will engage in acceptance and commitment  (ACT) therapy or exposure and ritual prevention therapy to reduce obsessions.     Intervention(s)  Therapist will teach ACT or ERP therapy with client and evaluate it's effectiveness in reducing intrusive obsessive thoughts.      Objective #B  Client will limit amount of time spent on repetitive or obsessive thoughts to 15 minutes.    Status: Continued - Date(s):     Intervention(s)  Therapist will teach and assign homework to determine time to accomplish good time to limit obsessive thoughts and evaluate it's effectiveness. .    Objective #C  Client will use positive self-affirmations daily.  Status: Continued - Date(s):     Intervention(s)  Therapist will provide encouragement and affirmations to develop further overall strengths and self esteem .        Client has reviewed and agreed to the above plan.      Loi Prescott [facility]  September 20, 2016                                             Progress Note    Client Name: Sharon Baer  Date: 3/22/17         Service Type: Individual      Session Start Time: 2:00  Session End Time: 2:45      Session Length: 45     Session #: 11     Attendees: Client    Treatment Plan Last Reviewed: tx plan created on 9/19/16  PHQ-9 / KIET-7 : Completed this session      DATA      Progress Since Last Session  "(Related to Symptoms / Goals / Homework):   Symptoms: Stable    Homework: Completed in session discussed distraction activities he can engage in and continue the thought stopping process when obsessive thoughts appear. Discussed what he tried to decrease in regard to his intrusive harm thoughts. Processed harmful thoughts and reassured client that he has not acted on them.  Continue activities he engages in and how these activities can distract thoughts so he does not get caught up in his negative thinking.  Discussed people and events that he was grateful for in his life.  Client likes to discuss his past and happy and fond memories of the past.  This makes client feel good.  Client created list of important people and descriptors of them and we processed in session. Client forgot book we usually work in during session.  Discussed and reviewed CBT skills, acceptance of OCD thoughts and distraction activities to get mind off of harmful thoughts he has towards others.          Episode of Care Goals: Minimal progress - PREPARATION (Decided to change - considering how); Intervened by negotiating a change plan and determining options / strategies for behavior change, identifying triggers, exploring social supports, and working towards setting a date to begin behavior change     Current / Ongoing Stressors and Concerns:   Moved to new residence, has thoughts of hurting others and suicidal ideation which is apart of his Harm OCD.  He has never acted on these thoughts.      Treatment Objective(s) Addressed in This Session:   use thought-stopping strategy daily to reduce intrusive thoughts   Engage in activities that he enjoys to distract obssesive thoughts  Chew on ice or use cold pack on face to distract from anxiety about thoughts  Utilize deep breathing when stressed or anxious              Write in Book \"Handbook for Happiness\"                Complete \"Wake up in Gratitude and in the moment\" exercise in the book.  " Joyful attention skills.-bring in next session            Both the PHQ9 and GAD7 scores higher this session. Discussed worries and concerns about the president and concerns about possible war for the future.  Reframed and concentrated on positives and                     what he is grateful for.               Concentrated on CBT skills and cognitive restructuring this session, acceptance therapy of negative harmful thoughts, Concentrated on strengths and positives in life.      Intervention:   CBT: start teaching skills - gave client mood log and feelings worksheet to try and bring into next session.                Engage in Relaxation and distraction activities that he enjoys    Deep breathing exercises  Thought stopping strategy and engage in activities that he enjoys to distract from negative thinking.    Use guided imagery and go to pleasant memories and events to distract from negative intrusive thoughts when needed.  Complete some activities in happiness Book-bring in and we can process work   Emphasized CBT skills--gratitude and positive thoughts, acceptance therapy, strengths    ASSESSMENT: Current Emotional / Mental Status (status of significant symptoms):   Risk status (Self / Other harm or suicidal ideation)   Client reports the following current fears or concerns for personal safety: concerned that he might hurt someone or self. (OCD Harm type) .   Client reports the following current or recent suicidal ideation or behaviors: Has obsessive thoughts about not wanting to be alive and suicidal ideation.  Has not attempted in past apart of his obsessive thinking. .   Client reports current or recent homicidal ideation or behaviors including thinking about hurting others but has not acted on this.    Client denies current or recent self injurious behavior or ideation.   Client denies other safety concerns.   A safety and risk management plan has not been developed at this time, however client was given the  "after-hours number / 911 should there be a change in any of these risk factors.     Appearance:   Appropriate    Eye Contact:   Fair    Psychomotor Behavior: Retarded (Slowed)    Attitude:   Cooperative    Orientation:   All   Speech    Rate / Production: Slow     Volume:  Soft    Mood:    Anxious    Affect:    Blunted  Flat  Restricted  Subdued    Thought Content:  Rumination    Thought Form:  Coherent    Insight:    Fair      Medication Review:   No changes to current psychiatric medication(s)     Medication Compliance:   Yes     Changes in Health Issues:   None reported     Chemical Use Review:   Substance Use: Chemical use reviewed, no active concerns identified      Tobacco Use: No current tobacco use.       Collateral Reports Completed:   Not Applicable    PLAN: (Client Tasks / Therapist Tasks / Other)  Client to start using thought stopping process when he has obsessive thoughts of hurting others or self.  Engage in activities that he enjoys like: watching movies, comedies, cross word puzzles, word find games, coloring or drawing.  Chew on ice or use cold pack on face to distract thinking if anxiety is too great.  Engage in activities with others when he feels comfortable doing this. Practice deep breathing skills to decrease obsessive thoughts. Client was given mood log and feelings worksheet to read and bring into next session to process with therapist.  Ask for DVD's of movies or shows that he enjoys.   Listen to music and continue computer use when intrusive thoughts appear.  Think of pleasant memories and happy times from the past and write them down and go to list when he get's stuck in negative obsessive thoughts.   Continue with exercises discussed in session to complete in \"Handbook for Happiness\" .  Complete Wake up in gratitude and in the moment activity in book--to help with joyful attention skills. Discuss and process in next session. Reviewed and practiced CBT skills--talked about positive " things and what client was grateful for.  Concentrate on positive things and try to let go of negative intrusive thoughts. Revisited acceptance therapy and think of negative thoughts as a wave, reframe to positive thoughts, concentrated on strengths.  Client will bring in [employer] book next session for homework in between session.               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 Learning disorder, obsessive thoughts about harming others and self.  Increased anxiety.  WHODAS: Completed first session    Referral / Collaboration:  Referral to another professional/service is not indicated at this time..    Anticipated number of session or this episode of care: 20      MeasurableTreatment Goal(s) related to diagnosis / functional impairment(s)  Goal 1: Client will function daily at a consistent level with minimal interference from obsessions and compulsions.     I will know I've met my goal when I do not have increased anxiety that I will hurt others or self.       Objective #A (Client Action)    Client will use thought-stopping strategy daily to reduce intrusive thoughts.  Status: Continued - Date(s):     Intervention(s)  Therapist will teach thought stopping process to interrupt obsessions.      Objective #B  Client will learn CBT skills to to identify distorted automatic thoughts and beliefs.  .  Status: Continued - Date(s):     Intervention(s)  Therapist will teach CBT skills. .    Objective #C  Client will practice 2 relaxation techniques and engage in 3 distraction activities that reduce obsessive thoughts .  Status: Continued - Date(s):      Intervention(s)  Therapist will assign homework determine a list of activities that client can use to distract form obsessive thinking.   Teach relaxation techniques that are helpful to reduce overall anxiety.       Goal 2: Client will resolve key life conflicts and the emotional stress that fuels obsessive-compulsive behavior patterns.     I will know I've met my goal when I feel settled and comfortable with my life.      Objective #A (Client Action)    Status: Continued - Date(s):     Client will engage in acceptance and commitment  (ACT) therapy or exposure and ritual prevention therapy to reduce obsessions.     Intervention(s)  Therapist will teach ACT or ERP therapy with client and evaluate it's effectiveness in reducing intrusive obsessive thoughts.      Objective #B  Client will limit amount of time spent on repetitive or obsessive thoughts to 15 minutes.    Status: Continued - Date(s):     Intervention(s)  Therapist will teach and assign homework to determine time to accomplish good time to limit obsessive thoughts and evaluate it's effectiveness. .    Objective #C  Client will use positive self-affirmations daily.  Status: Continued - Date(s):     Intervention(s)  Therapist will provide encouragement and affirmations to develop further overall strengths and self esteem .        Client has reviewed and agreed to the above plan.      Loi Prescott [facility]  September 20, 2016                                             Progress Note    Client Name: Sharon Baer  Date: 2/14/17         Service Type: Individual      Session Start Time: 5:00  Session End Time: 5:45      Session Length: 45     Session #: 10     Attendees: Client    Treatment Plan Last Reviewed: tx plan created on 9/19/16  PHQ-9 / KIET-7 : Completed this session      DATA      Progress Since Last Session (Related to Symptoms / Goals / Homework):   Symptoms: Worsening    Homework: Completed in session discussed distraction activities he  "can engage in and continue the thought stopping process when obsessive thoughts appear. Discussed what he tried to decrease in regard to his intrusive harm thoughts. Processed harmful thoughts and reassured client that he has not acted on them.  Continue activities he engages in and how these activities can distract thoughts so he does not get caught up in his negative thinking.  Discussed people and events that he was grateful for in his life.  Client likes to discuss his past and happy and fond memories of the past.  This makes client feel good.  Client created list of important people and descriptors of them and we processed in session. Client forgot book we usually work in during session.  Discussed and reviewed CBT skills.        Episode of Care Goals: Minimal progress - PREPARATION (Decided to change - considering how); Intervened by negotiating a change plan and determining options / strategies for behavior change, identifying triggers, exploring social supports, and working towards setting a date to begin behavior change     Current / Ongoing Stressors and Concerns:   Moved to new residence, has thoughts of hurting others and suicidal ideation which is apart of his Harm OCD.  He has never acted on these thoughts.      Treatment Objective(s) Addressed in This Session:   use thought-stopping strategy daily to reduce intrusive thoughts   Engage in activities that he enjoys to distract obssesive thoughts  Chew on ice or use cold pack on face to distract from anxiety about thoughts  Utilize deep breathing when stressed or anxious              Write in Book \"Handbook for Happiness\"                Complete \"Wake up in Gratitude and in the moment\" exercise in the book.  Joyful attention skills.-bring in next session               Boththe PHQ9 and GAD7 scores higher this session. Discussed worries and concerns about the president and concerns about possible war for the future.  Reframed and concentrated on positives " and                  what he is grateful for.      Intervention:   CBT: start teaching skills - gave client mood log and feelings worksheet to try and bring into next session.                Engage in Relaxation and distraction activities that he enjoys    Deep breathing exercises  Thought stopping strategy and engage in activities that he enjoys to distract from negative thinking.    Use guided imagery and go to pleasant memories and events to distract from negative intrusive thoughts when needed.  Complete some activities in happiness Book-bring in and we can process work   Emphasized CBT skills--gratitude and positive thoughts    ASSESSMENT: Current Emotional / Mental Status (status of significant symptoms):   Risk status (Self / Other harm or suicidal ideation)   Client reports the following current fears or concerns for personal safety: concerned that he might hurt someone or self. (OCD Harm type) .   Client reports the following current or recent suicidal ideation or behaviors: Has obsessive thoughts about not wanting to be alive and suicidal ideation.  Has not attempted in past apart of his obsessive thinking. .   Client reports current or recent homicidal ideation or behaviors including thinking about hurting others but has not acted on this.    Client denies current or recent self injurious behavior or ideation.   Client denies other safety concerns.   A safety and risk management plan has not been developed at this time, however client was given the after-hours number / 911 should there be a change in any of these risk factors.     Appearance:   Appropriate    Eye Contact:   Fair    Psychomotor Behavior: Restless  Retarded (Slowed)    Attitude:   Cooperative    Orientation:   All   Speech    Rate / Production: Slow     Volume:  Soft    Mood:    Anxious  Depressed  Sad    Affect:    Blunted  Flat  Restricted  Subdued    Thought Content:  Rumination    Thought Form:  Blocking  Coherent    Insight:    Fair  "     Medication Review:   No changes to current psychiatric medication(s)     Medication Compliance:   Yes     Changes in Health Issues:   None reported     Chemical Use Review:   Substance Use: Chemical use reviewed, no active concerns identified      Tobacco Use: No current tobacco use.       Collateral Reports Completed:   Not Applicable    PLAN: (Client Tasks / Therapist Tasks / Other)  Client to start using thought stopping process when he has obsessive thoughts of hurting others or self.  Engage in activities that he enjoys like: watching movies, comedies, cross word puzzles, word find games, coloring or drawing.  Chew on ice or use cold pack on face to distract thinking if anxiety is too great.  Engage in activities with others when he feels comfortable doing this. Practice deep breathing skills to decrease obsessive thoughts. Client was given mood log and feelings worksheet to read and bring into next session to process with therapist.  Ask for DVD's of movies or shows that he enjoys.   Listen to music and continue computer use when intrusive thoughts appear.  Think of pleasant memories and happy times from the past and write them down and go to list when he get's stuck in negative obsessive thoughts.   Continue with exercises discussed in session to complete in \"Handbook for Happiness\" .  Complete Wake up in gratitude and in the moment activity in book--to help with joyful attention skills. Discuss and process in next session. Reviewed and practiced CBT skills--talked about positive things and what client was grateful for.  Concentrate on positive things and try to let go of negative intrusive thoughts.             Loi Prescott, LICSW                                                         ________________________________________________________________________    Treatment Plan    Client's Name: Sharon Baer  YOB: 1931    Date: 9/19/16    DSM-V Diagnoses: 300.3 (F42) Obsessive " Compulsive Disorder (Harm OCD type)  Psychosocial / Contextual Factors: moving to new residence, has autism Spectrum disorder. Learning disorder, obsessive thoughts about harming others and self.  Increased anxiety.  WHODAS: Completed first session    Referral / Collaboration:  Referral to another professional/service is not indicated at this time..    Anticipated number of session or this episode of care: 20      MeasurableTreatment Goal(s) related to diagnosis / functional impairment(s)  Goal 1: Client will function daily at a consistent level with minimal interference from obsessions and compulsions.     I will know I've met my goal when I do not have increased anxiety that I will hurt others or self.       Objective #A (Client Action)    Client will use thought-stopping strategy daily to reduce intrusive thoughts.  Status: Continued - Date(s):     Intervention(s)  Therapist will teach thought stopping process to interrupt obsessions.      Objective #B  Client will learn CBT skills to to identify distorted automatic thoughts and beliefs.  .  Status: Continued - Date(s):     Intervention(s)  Therapist will teach CBT skills. .    Objective #C  Client will practice 2 relaxation techniques and engage in 3 distraction activities that reduce obsessive thoughts .  Status: Continued - Date(s):     Intervention(s)  Therapist will assign homework determine a list of activities that client can use to distract form obsessive thinking.   Teach relaxation techniques that are helpful to reduce overall anxiety.       Goal 2: Client will resolve key life conflicts and the emotional stress that fuels obsessive-compulsive behavior patterns.     I will know I've met my goal when I feel settled and comfortable with my life.      Objective #A (Client Action)    Status: Continued - Date(s):     Client will engage in acceptance and commitment  (ACT) therapy or exposure and ritual prevention therapy to reduce obsessions.      Intervention(s)  Therapist will teach ACT or ERP therapy with client and evaluate it's effectiveness in reducing intrusive obsessive thoughts.      Objective #B  Client will limit amount of time spent on repetitive or obsessive thoughts to 15 minutes.    Status: Continued - Date(s):     Intervention(s)  Therapist will teach and assign homework to determine time to accomplish good time to limit obsessive thoughts and evaluate it's effectiveness. .    Objective #C  Client will use positive self-affirmations daily.  Status: Continued - Date(s):     Intervention(s)  Therapist will provide encouragement and affirmations to develop further overall strengths and self esteem .        Client has reviewed and agreed to the above plan.      Loi Prescott [facility]  September 20, 2016

## 2017-04-12 ASSESSMENT — ANXIETY QUESTIONNAIRES: GAD7 TOTAL SCORE: 8

## 2017-04-12 ASSESSMENT — PATIENT HEALTH QUESTIONNAIRE - PHQ9: SUM OF ALL RESPONSES TO PHQ QUESTIONS 1-9: 6

## 2017-05-16 ENCOUNTER — OFFICE VISIT (OUTPATIENT)
Dept: PSYCHOLOGY | Facility: CLINIC | Age: 82
End: 2017-05-16
Payer: COMMERCIAL

## 2017-05-16 DIAGNOSIS — F42.8 OTHER OBSESSIVE-COMPULSIVE DISORDER: Primary | ICD-10-CM

## 2017-05-16 PROCEDURE — 90834 PSYTX W PT 45 MINUTES: CPT | Performed by: SOCIAL WORKER

## 2017-05-16 ASSESSMENT — PATIENT HEALTH QUESTIONNAIRE - PHQ9: 5. POOR APPETITE OR OVEREATING: SEVERAL DAYS

## 2017-05-16 ASSESSMENT — ANXIETY QUESTIONNAIRES
3. WORRYING TOO MUCH ABOUT DIFFERENT THINGS: SEVERAL DAYS
6. BECOMING EASILY ANNOYED OR IRRITABLE: SEVERAL DAYS
2. NOT BEING ABLE TO STOP OR CONTROL WORRYING: SEVERAL DAYS
GAD7 TOTAL SCORE: 7
7. FEELING AFRAID AS IF SOMETHING AWFUL MIGHT HAPPEN: SEVERAL DAYS
IF YOU CHECKED OFF ANY PROBLEMS ON THIS QUESTIONNAIRE, HOW DIFFICULT HAVE THESE PROBLEMS MADE IT FOR YOU TO DO YOUR WORK, TAKE CARE OF THINGS AT HOME, OR GET ALONG WITH OTHER PEOPLE: SOMEWHAT DIFFICULT
1. FEELING NERVOUS, ANXIOUS, OR ON EDGE: SEVERAL DAYS
5. BEING SO RESTLESS THAT IT IS HARD TO SIT STILL: SEVERAL DAYS

## 2017-05-16 NOTE — MR AVS SNAPSHOT
MRN:[id number]                      After Visit Summary   5/16/2017    Sharon Baer    MRN: [id number]           Visit Information        Provider Department      5/16/2017 10:30 AM Loi Prescott [facility]      Your next 10 appointments already scheduled     May 31, 2017  2:00 PM CDT   Return Visit with Loi HUTCHINSON [facility]    [address] [place] [id number] [phone]            Jun 20, 2017 10:30 AM CDT   Return Visit with Loi HUTCHINSON [facility]    [address] [place] [id number]   [phone]              MyChart Information     [employer] gives you secure access to your electronic health record. If you see a primary care provider, you can also send messages to your care team and make appointments. If you have questions, please call your primary care clinic.  If you do not have a primary care provider, please call [phone] and they will assist you.        Care EveryWhere ID     This is your Care EveryWhere ID. This could be used by other organizations to access your [facility] medical records  [id number]

## 2017-05-17 ASSESSMENT — PATIENT HEALTH QUESTIONNAIRE - PHQ9: SUM OF ALL RESPONSES TO PHQ QUESTIONS 1-9: 9

## 2017-05-17 ASSESSMENT — ANXIETY QUESTIONNAIRES: GAD7 TOTAL SCORE: 7

## 2017-06-09 ENCOUNTER — OFFICE VISIT (OUTPATIENT)
Dept: PSYCHIATRY | Facility: CLINIC | Age: 82
End: 2017-06-09
Attending: CLINICAL NURSE SPECIALIST
Payer: MEDICARE

## 2017-06-09 VITALS
HEART RATE: 86 BPM | WEIGHT: 198 LBS | DIASTOLIC BLOOD PRESSURE: 84 MMHG | SYSTOLIC BLOOD PRESSURE: 146 MMHG | BODY MASS INDEX: 31 KG/M2

## 2017-06-09 DIAGNOSIS — F32.A DEPRESSION, UNSPECIFIED DEPRESSION TYPE: ICD-10-CM

## 2017-06-09 DIAGNOSIS — F84.0 AUTISM SPECTRUM DISORDER: Primary | ICD-10-CM

## 2017-06-09 DIAGNOSIS — F42.9 OCD (OBSESSIVE COMPULSIVE DISORDER): ICD-10-CM

## 2017-06-09 DIAGNOSIS — F41.9 ANXIETY DISORDER, UNSPECIFIED: ICD-10-CM

## 2017-06-09 PROCEDURE — 99212 OFFICE O/P EST SF 10 MIN: CPT | Mod: ZF

## 2017-06-09 RX ORDER — FLUOXETINE 10 MG/1
10 CAPSULE ORAL DAILY
Qty: 90 CAPSULE | Refills: 0 | Status: SHIPPED | OUTPATIENT
Start: 2017-06-09 | End: 2017-08-25 | Stop reason: DRUGHIGH

## 2017-06-09 RX ORDER — QUETIAPINE FUMARATE 25 MG/1
TABLET, FILM COATED ORAL
Qty: 90 TABLET | Refills: 1 | Status: SHIPPED | OUTPATIENT
Start: 2017-06-09 | End: 2017-11-01

## 2017-06-09 NOTE — MR AVS SNAPSHOT
After Visit Summary   6/9/2017    Sharon Baer    MRN: [id number]           Patient Information     Date Of Birth          6/10/1931        Visit Information        Provider Department      6/9/2017 8:45 AM Dyan Mathis APRN [facility]        Today's Diagnoses     Autism spectrum disorder    -  1    Anxiety disorder, unspecified        Depression, unspecified depression type        Other obsessive-compulsive disorder        OCD (obsessive compulsive disorder)          Care Instructions    Schedule for followup with Dr. Castano. At last visit in November the recommendation was to return in 6 months so that would have been May.  Talk with Loi about breathing not being helpful and transitions with Ricardo leaving the assisted living facility and also the frequency of followups with him.     Thank you for coming to the PSYCHIATRY CLINIC.    Lab Testing:  **If you had lab testing today and your results are reassuring or normal they will be mailed to you or sent through NCR within 7 days.   **If the lab tests need quick action we will call you with the results.  The phone number we will call with results is # [phone] (home) . If this is not the best number please call our clinic and change the number.    Medication Refills:  If you need any refills please call your pharmacy and they will contact us. Please allow three business for refill processing.   If you need to  your refill at a new pharmacy, please contact the new pharmacy directly. The new pharmacy will help you get your medications transferred.     Scheduling:  If you have any concerns about today's visit or wish to schedule another appointment please call our office during normal business hours [phone] (8-5:00 M-F)    Contact Us:  Please call [phone] during business hours (8-5:00 M-F).  If after clinic hours, or on the weekend, please call  [phone].      MENTAL HEALTH CRISIS NUMBERS:  [place]:   [facility] - [phone]   Crisis Residence \[facility] [place] [facility]\"" [facility] [facility] - [phone]   Walk-In Counseling Center \[facility] [place] [facility]\"" - [phone]   COPE 24/7 [place] Mobile Team for Adults - [[phone]]; Child - [[phone]]     Crisis Connection - [phone]     [facility] - [phone]   Walk-in counseling [facility] - [phone]   Walk-in counseling [facility] - [phone]   Crisis Residence [facility] - [phone]   Urgent Care Adult Mental Health:   --Drop-in, 24/7 crisis line, and [facility] Mobile Team [[phone]]    CRISIS TEXT LINE: Text [phone] from anywhere, anytime, any crisis 24/7;    OR SEE www.crisistextline.org     Poison Control Center - [phone]    CHILD: [facility] needs assessment team - [phone]     [facility] Lifeline - [phone]; or [employer] [facility] - [phone]    If you have a medical emergency please call 911or go to the nearest ER.                    _____________________________________________    Again thank you for choosing PSYCHIATRY CLINIC and please let us know how we can best partner with you to improve you and your family's health.  You may be receiving a survey in the mail regarding this appointment. We would love to have your feedback, both positive and negative, so please fill out the survey and return it using the provided envolpe. The survey is done by an external company, so your answers are anonymous.             Follow-ups after your visit        Follow-up notes from your care team     Return in about 8 weeks (around 8/4/2017).      Your next 10 appointments already scheduled     Jun 20, 2017 10:30 AM CDT   Return Visit with DANNI Davidson   [facility]    [address] [facility] [address]   [phone]            Jun 26, 2017  3:30 PM CDT   Return  Visit with Loi [facility]    [address] [id number] [phone]            Aug 11, 2017 10:15 AM CDT   Adult Med Follow UP with TINY Knox CNS   Psychiatry Clinic ([facility])    [address]  [address] [facility] [address] [phone]              Who to contact     Please call your clinic at [phone] to:    Ask questions about your health    Make or cancel appointments    Discuss your medicines    Learn about your test results    Speak to your doctor   If you have compliments or concerns about an experience at your clinic, or if you wish to file a complaint, please contact [facility] Patient Relations at [phone] or email us at [email or web address]         Additional Information About Your Visit        [employer] Information     [employer] gives you secure access to your electronic health record. If you see a primary care provider, you can also send messages to your care team and make appointments. If you have questions, please call your primary care clinic.  If you do not have a primary care provider, please call [phone] and they will assist you.      [employer] is an electronic gateway that provides easy, online access to your medical records. With [employer], you can request a clinic appointment, read your test results, renew a prescription or communicate with your care team.     To access your existing account, please contact your [facility] Clinic or call [phone] for assistance.        Care EveryWhere ID     This is your Care EveryWhere ID. This could be used by other organizations to access your [place] medical records  [id number]        Your Vitals Were     Pulse BMI (Body Mass Index)                86 31 kg/m2           Blood Pressure from Last 3 Encounters:    06/09/17 146/84   03/21/17 173/90   02/21/17 159/84    Weight from Last 3 Encounters:   06/09/17 89.8 kg (198 lb)   03/21/17 90.3 kg (199 lb)   02/21/17 91 kg (200 lb 11.2 oz)              Today, you had the following     No orders found for display         Where to get your medicines      These medications were sent to [facility] - [facility] [address]  [address] [facility] [address]    Hours:  Mon-Fri 8:30am-5:00pm Toll Free [phone] Phone:  [phone]     FLUoxetine 10 MG capsule    FLUoxetine 20 MG capsule    QUEtiapine 25 MG tablet          Primary Care Provider Office Phone # Fax #    Neo Castano [phone]        PH[facility] [address] [facility] [address]  [facility] [address]        Thank you!     Thank you for choosing PSYCHIATRY CLINIC  for your care. Our goal is always to provide you with excellent care. Hearing back from our patients is one way we can continue to improve our services. Please take a few minutes to complete the written survey that you may receive in the mail after your visit with us. Thank you!             Your Updated Medication List - Protect others around you: Learn how to safely use, store and throw away your medicines at www.disposemymeds.org.          This list is accurate as of: 6/9/17  9:38 AM.  Always use your most recent med list.                   Brand Name Dispense Instructions for use    acetaminophen 500 MG Caps      Take 500 mg by mouth 2 times daily       allopurinol 100 MG tablet    ZYLOPRIM    90 tablet    Take 1 tablet (100 mg) by mouth daily       aspirin 325 MG EC tablet     90 tablet    Take 1 tablet (325 mg) by mouth daily       atorvastatin 20 MG tablet    LIPITOR    90 tablet    Take 1 tablet (20 mg) by mouth daily       cholecalciferol 1000 UNIT tablet    vitamin D    100 tablet    Take 2 tablets (2,000 Units) by mouth daily       * FLUoxetine 20 MG capsule    PROzac    180  capsule    Take 2 capsules (40 mg) by mouth every morning       * FLUoxetine 10 MG capsule    PROzac    90 capsule    Take 1 capsule (10 mg) by mouth daily In addition to two 20mg capsules for total dose of 50mg.       furosemide 20 MG tablet    LASIX    100 tablet    Take 1 tablet (20 mg) by mouth daily       lisinopril 40 MG tablet    PRINIVIL/ZESTRIL    90 tablet    Take 1 tablet (40 mg) by mouth daily       omeprazole 40 MG capsule    priLOSEC    200 capsule    Take 1 capsule (40 mg) by mouth 2 times daily as needed       QUEtiapine 25 MG tablet    SEROquel    90 tablet    Take one tablet by mouth every evening with supper       * Notice:  This list has 2 medication(s) that are the same as other medications prescribed for you. Read the directions carefully, and ask your doctor or other care provider to review them with you.

## 2017-06-09 NOTE — PATIENT INSTRUCTIONS
Schedule for followup with Dr. Castano. At last visit in November the recommendation was to return in 6 months so that would have been May.  Talk with Loi about breathing not being helpful and transitions with Ricardo leaving the assisted living facility and also the frequency of followups with him.     Thank you for coming to the PSYCHIATRY CLINIC.    Lab Testing:  **If you had lab testing today and your results are reassuring or normal they will be mailed to you or sent through [employer] within 7 days.   **If the lab tests need quick action we will call you with the results.  The phone number we will call with results is # [phone] (home) . If this is not the best number please call our clinic and change the number.    Medication Refills:  If you need any refills please call your pharmacy and they will contact us. Please allow three business for refill processing.   If you need to  your refill at a new pharmacy, please contact the new pharmacy directly. The new pharmacy will help you get your medications transferred.     Scheduling:  If you have any concerns about today's visit or wish to schedule another appointment please call our office during normal business hours [phone] (8-5:00 M-F)    Contact Us:  Please call [phone] during business hours (8-5:00 M-F).  If after clinic hours, or on the weekend, please call  [phone].      MENTAL HEALTH CRISIS NUMBERS:  [facility]:   [facility] - [phone]   [facility] - [phone]   Walk-In Counseling [facility] - [phone]   COPE 24/7 [facility] Mobile Team for Adults - [[phone]]; Child - [[phone]]     Crisis Connection - [phone]     [facility]:   [facility] - [phone]   Walk-in counseling [facility] - [phone]   Walk-in counseling [facility] - [phone]   Crisis Residence [facility] -  [phone]   Urgent Care Adult Mental Health:   --Drop-in, 24/7 crisis line, and [facility] Mobile Team [[phone]]    CRISIS TEXT LINE: Text [phone] from anywhere, anytime, any crisis 24/7;    OR SEE www.crisistextline.org     Poison Control Center - [phone]    CHILD: [facility] needs assessment team - [phone]     [facility] - [phone]; or [facility] Lifeline - [phone]    If you have a medical emergency please call 911or go to the nearest ER.                    _____________________________________________    Again thank you for choosing PSYCHIATRY CLINIC and please let us know how we can best partner with you to improve you and your family's health.  You may be receiving a survey in the mail regarding this appointment. We would love to have your feedback, both positive and negative, so please fill out the survey and return it using the provided envolpe. The survey is done by an external company, so your answers are anonymous.

## 2017-06-09 NOTE — NURSING NOTE
Chief Complaint   Patient presents with     RECHECK     Anxiety disorder, unspecified      Reviewed allergies, smoking status, and pharmacy preference  Administered abuse screening questions-done 6/9/17   Obtained weight, blood pressure and heart rate   Zaynab Grady LPN

## 2017-06-09 NOTE — PROGRESS NOTES
Outpatient Psychiatry Progress Note     Provider: TINY Knox CNS  Date: 2017  Service:  Medication follow up with counseling.   Patient Identification: Sharon Baer  : 6/10/1931   MRN: [id number]    Sharon Baer is a 85 year old year old male who presents for ongoing psychiatric care.  Sharon Baer was last seen in clinic on 3/21/17.   At that time,   Assessment & Plan       Sharon Baer is seen today for follow up and reports stability in his mood and anxiety.  Pt seemed to be more engaged in interview than last visit.  This may be due to his increased hearing as pt is wearing voice enhancer today.  Recommended pt to follow up with PCP in May which would be 6 months from his last visit. Pt was escorted down to [facility] as he came alone to the clinic today to be picked up by his sister, Josephine.     Diagnosis  Axis 1: Autism Spectrum Disorder, Anxiety Unspecified, OCD, Depression, Unspecified  Axis 2: none  Axis 3: See problem list in the medical record        Plan:  Medication: Continue current medications  OTC Recommendations: none  Lab Orders:  none  Referrals: none  Release of Information: none  Future Treatment Considerations:per symptoms  Return for Follow Up:4 weeks      ____________________________________________________________________________________________________________________________________________    2017   Seen with his sister Josephine.  Today Sharon reports has been having difficult with hearing aids working and so isolating more.  The person who had been most helpful getting him out of his room to do social things has taken a new job and Josephine has some worries about this.  Attends a few of the activities at his residence.  Gets along well with his family and is comfortable with them.  Side effects of medication include: none known  Psychiatric Review of Systems:  The patient endorses symptoms of depression: In the last 2 weeks per PHQ 9  several days anhedonia, fatigue, feelings of failure.   He  patient endorses symptoms of anxiety : stable worry, social anxiety  He endorses symptoms of vidya including none.    He endorses symptoms of psychosis including no psychotic symptoms.       Review of Medical Systems:  Sleep: stable  Energy: stable  Concentration: stable  Appetite: stable  GI Concerns: none  Cardiac concerns: none  Neurological concerns: none  Other medical concerns: no new concerns  Current Substance Use:  Alcohol:none  Other drugs:none  Caffeine:not reviewed  Nicotine: none  Past Medical History:   Past Medical History:   Diagnosis Date     Autism spectrum disorder 7/16/2015     GERD (gastroesophageal reflux disease)      Gout      Hearing loss      Hyperlipemia     on atorvastatin     Hypertension      Hypotestosteronism      IGT (impaired glucose tolerance)      OCD (obsessive compulsive disorder)     on cymbalta     Osteoarthritis, hip, bilateral      Vitamin D deficiency     resolved 2/2013     Patient Active Problem List   Diagnosis     Gout     Hypertension     IGT (impaired glucose tolerance)     GERD (gastroesophageal reflux disease)     Hypotestosteronism     Neoplasm of uncertain behavior of skin     AK (actinic keratosis)     History of nonmelanoma skin cancer     Osteoarthritis of right hip     OA (osteoarthritis) of hip     Fall     Facial laceration     Autism spectrum disorder     Anxiety disorder, unspecified     Depression, unspecified depression type     Vitamin D deficiency     Loss of weight     ACP (advance care planning)     Other obsessive-compulsive disorder       Allergies:   Allergies   Allergen Reactions     Penicillins Rash          Current Medications     Current Outpatient Prescriptions Ordered in [facility]   Medication Sig Dispense Refill     atorvastatin (LIPITOR) 20 MG tablet Take 1 tablet (20 mg) by mouth daily 90 tablet 2     acetaminophen 500 MG CAPS Take 500 mg by mouth 2 times daily       FLUoxetine  "(PROZAC) 10 MG capsule Take 1 capsule (10 mg) by mouth daily In addition to two 20mg capsules for total dose of 50mg. 90 capsule 0     FLUoxetine (PROZAC) 20 MG capsule Take 2 capsules (40 mg) by mouth every morning 180 capsule 1     QUEtiapine (SEROQUEL) 25 MG tablet Take one tablet by mouth every evening with supper 90 tablet 1     allopurinol (ZYLOPRIM) 100 MG tablet Take 1 tablet (100 mg) by mouth daily 90 tablet 1     lisinopril (PRINIVIL/ZESTRIL) 40 MG tablet Take 1 tablet (40 mg) by mouth daily 90 tablet 2     cholecalciferol (VITAMIN D) 1000 UNIT tablet Take 2 tablets (2,000 Units) by mouth daily 100 tablet 3     furosemide (LASIX) 20 MG tablet Take 1 tablet (20 mg) by mouth daily 100 tablet 3     omeprazole (PRILOSEC) 40 MG capsule Take 1 capsule (40 mg) by mouth 2 times daily as needed 200 capsule 3     aspirin 325 MG EC tablet Take 1 tablet (325 mg) by mouth daily 90 tablet 3     No current Epic-ordered facility-administered medications on file.         Mental Status Exam     Appearance:  Casually dressed and Adequately groomed  Behavior/relationship to examiner/demeanor:  Cooperative  Orientation: Oriented to person, place, time and situation  Psychomotor: normal form  Speech Rate:  Normal  Speech Spontaneity:  Normal  Mood:  \"okay\"  Affect:  Appropriate/mood-congruent and Anxious/Nervous  Thought Process (Associations):  Goal directed  Thought Content:  no overt psychosis, denies suicidal ideation, intent or thoughts, patient does not appear to be responding to internal stimuli, No violent ideation and No homicidal ideation  Abnormal Perception:  None  Attention/Concentration:  Normal  Language:  Intact  Insight:  Good  Judgment:  Good      Results     Vital signs: /84  Pulse 86  Wt 89.8 kg (198 lb)  BMI 31 kg/m2    Laboratory Data:  no new data    Assessment & Plan      Sharon Baer is seen today for follow up and reports his mood has been stable with residual symptoms.  Patient is seen " with his sister Josephine who notes that there are some stresses with a key staff person leaving his assisted living facility.    Diagnosis  Axis 1: Autism, Depression Unspecified, Anxiety, OCD  Axis 2: none  Axis 3: See problem list in the medical record    Plan:  Medication: Continue current medications  OTC Recommendations: none  Lab Orders:  none  Referrals: none, he will schedule follow up with Dr. Castano  Release of Information: none  Future Treatment Considerations:per symptoms  Return for Follow Up: 8 weeks   The risks, benefits, alternatives and side effects have been discussed and are understood by the patient. The patient understands the risks of using street drugs or alcohol. There are no medical contraindications, the patient agrees to treatment, and has the capacity to do so. The patient understands to call 911 or come to the nearest ED if life threatening or urgent symptoms present.  Over 50% of this time was spent counseling the patient and/or coordinating care regarding review of social and occupational functioning.  In addition patient was counseled on health and wellness practices to augment medication treatment of symptoms. See note for details.    Dyan Mathis, APRN CNS 6/9/2017

## 2017-06-20 ENCOUNTER — OFFICE VISIT (OUTPATIENT)
Dept: PSYCHOLOGY | Facility: CLINIC | Age: 82
End: 2017-06-20
Payer: COMMERCIAL

## 2017-06-20 DIAGNOSIS — F42.8 OTHER OBSESSIVE-COMPULSIVE DISORDER: Primary | ICD-10-CM

## 2017-06-20 PROCEDURE — 90834 PSYTX W PT 45 MINUTES: CPT | Performed by: SOCIAL WORKER

## 2017-06-20 ASSESSMENT — PATIENT HEALTH QUESTIONNAIRE - PHQ9: 5. POOR APPETITE OR OVEREATING: NOT AT ALL

## 2017-06-20 ASSESSMENT — ANXIETY QUESTIONNAIRES
GAD7 TOTAL SCORE: 3
IF YOU CHECKED OFF ANY PROBLEMS ON THIS QUESTIONNAIRE, HOW DIFFICULT HAVE THESE PROBLEMS MADE IT FOR YOU TO DO YOUR WORK, TAKE CARE OF THINGS AT HOME, OR GET ALONG WITH OTHER PEOPLE: SOMEWHAT DIFFICULT
7. FEELING AFRAID AS IF SOMETHING AWFUL MIGHT HAPPEN: SEVERAL DAYS
5. BEING SO RESTLESS THAT IT IS HARD TO SIT STILL: SEVERAL DAYS
1. FEELING NERVOUS, ANXIOUS, OR ON EDGE: SEVERAL DAYS
3. WORRYING TOO MUCH ABOUT DIFFERENT THINGS: NOT AT ALL
6. BECOMING EASILY ANNOYED OR IRRITABLE: NOT AT ALL
2. NOT BEING ABLE TO STOP OR CONTROL WORRYING: NOT AT ALL

## 2017-06-20 NOTE — PROGRESS NOTES
Progress Note    Client Name: Sharon Baer  Date: 6/20/17         Service Type: Individual      Session Start Time: 10:30  Session End Time: 11:15      Session Length: 45     Session #: 14     Attendees: Client    Treatment Plan Last Reviewed: tx plan created on 9/19/16-reviewed & updated  6-20-17  PHQ-9 / KIET-7 : Completed this session      DATA      Progress Since Last Session (Related to Symptoms / Goals / Homework):   Symptoms: Stable    Homework: Completed in session discussed distraction activities he can engage in and continue the thought stopping process when obsessive thoughts appear. Discussed what he tried to decrease in regard to his intrusive harm thoughts. Processed harmful thoughts and reassured client that he has not acted on them.  Continue activities he engages in and how these activities can distract thoughts so he does not get caught up in his negative thinking.  Discussed people and events that he was grateful for in his life.  Client likes to discuss his past and happy and fond memories of the past.  This makes client feel good.  Client created list of important people and descriptors of them and we processed in session. Discussed and reviewed CBT skills, acceptance of OCD thoughts and distraction activities to get mind off of harmful thoughts he has towards others.  We worked on two gratitude activities in book and client stated he would work on this. Did not bring in book today so we discussed activities he engages in and did depression and anxiety screening. Reviewed tx plan in session today.           Episode of Care Goals: Minimal progress - PREPARATION (Decided to change - considering how); Intervened by negotiating a change plan and determining options / strategies for behavior change, identifying triggers, exploring social supports, and working towards setting a date to begin behavior change     Current / Ongoing Stressors and  "Concerns:   Moved to new residence, has thoughts of hurting others and suicidal ideation which is apart of his Harm OCD.  He has never acted on these thoughts.      Treatment Objective(s) Addressed in This Session:   use thought-stopping strategy daily to reduce intrusive thoughts   Engage in activities that he enjoys to distract obssesive thoughts  Chew on ice or use cold pack on face to distract from anxiety about thoughts  Utilize deep breathing when stressed or anxious              Write in Book \"Handbook for Happiness\"                Complete \"Wake up in Gratitude and in the moment\" exercise in the book.  Joyful attention skills.-bring in next session            Both the PHQ9 and GAD7 scores higher this session. Discussed worries and concerns about the president and concerns about possible war for the future.  Reframed and concentrated on positives and                     what he is grateful for.               Concentrated on CBT skills and cognitive restructuring this session, acceptance therapy of negative harmful thoughts, Concentrated on strengths and positives in life.               Complete two gratitude exercises daily if he can-especially when he is ruminating on OCD thoughts               Engage in activities that distract from his thinking especially harmful thoughts and think of them as a wave and go with them instead of fighting them.  Knowing that he will not act on them.    Intervention:   CBT: start teaching skills - gave client mood log and feelings worksheet to try and bring into next session.                Engage in Relaxation and distraction activities that he enjoys    Deep breathing exercises  Thought stopping strategy and engage in activities that he enjoys to distract from negative thinking.    Use guided imagery and go to pleasant memories and events to distract from negative intrusive thoughts when needed.  Complete some activities in happiness Book-bring in and we can process work "   Emphasized CBT skills--gratitude and positive thoughts, acceptance therapy, strengths   Gratitude exercises  Acceptance and distraction activities to get off of ruminating thoughts   ASSESSMENT: Current Emotional / Mental Status (status of significant symptoms):   Risk status (Self / Other harm or suicidal ideation)   Client reports the following current fears or concerns for personal safety: concerned that he might hurt someone or self. (OCD Harm type) .   Client reports the following current or recent suicidal ideation or behaviors: Has obsessive thoughts about not wanting to be alive and suicidal ideation.  Has not attempted in past apart of his obsessive thinking. .   Client reports current or recent homicidal ideation or behaviors including thinking about hurting others but has not acted on this.    Client denies current or recent self injurious behavior or ideation.   Client denies other safety concerns.   A safety and risk management plan has not been developed at this time, however client was given the after-hours number / 911 should there be a change in any of these risk factors.     Appearance:   Appropriate    Eye Contact:   Fair    Psychomotor Behavior: Retarded (Slowed)    Attitude:   Cooperative    Orientation:   All   Speech    Rate / Production: Slow     Volume:  Soft    Mood:    Anxious  Depressed    Affect:    Blunted  Flat  Restricted  Subdued    Thought Content:  Rumination    Thought Form:  Blocking    Insight:    Fair      Medication Review:   No changes to current psychiatric medication(s)     Medication Compliance:   Yes     Changes in Health Issues:   None reported     Chemical Use Review:   Substance Use: Chemical use reviewed, no active concerns identified      Tobacco Use: No current tobacco use.       Collateral Reports Completed:   Not Applicable    PLAN: (Client Tasks / Therapist Tasks / Other)  Client to start using thought stopping process when he has obsessive thoughts of hurting  "others or self.  Engage in activities that he enjoys like: watching movies, comedies, cross word puzzles, word find games, coloring or drawing.  Chew on ice or use cold pack on face to distract thinking if anxiety is too great.  Engage in activities with others when he feels comfortable doing this. Practice deep breathing skills to decrease obsessive thoughts. Client was given mood log and feelings worksheet to read and bring into next session to process with therapist.  Ask for DVD's of movies or shows that he enjoys.   Listen to music and continue computer use when intrusive thoughts appear.  Think of pleasant memories and happy times from the past and write them down and go to list when he get's stuck in negative obsessive thoughts.   Continue with exercises discussed in session to complete in \"Handbook for Happiness\" .  Complete Wake up in gratitude and in the moment activity in book--to help with joyful attention skills. Discuss and process in next session. Reviewed and practiced CBT skills--talked about positive things and what client was grateful for.  Concentrate on positive things and try to let go of negative intrusive thoughts. Revisited acceptance therapy and think of negative thoughts as a wave, reframe to positive thoughts, concentrated on strengths.  Client brought in Acevedo to session--we discussed 2 gratitude exercises to work on.  Once when he gets up in the morning and thinks about people he is grateful for.  The other to concentrate on nature and gratitude of the environment and the beauty of the outdoors. Engage in distraction activities and acceptance of harmful thoughts-like a wave-knowing that he will not act on them. Started to discuss the book \"Brainlock\" and learning about the brain and reviewed tx plan in session today.        Loi Prescott, 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 Learning disorder, obsessive thoughts about harming others and self.  Increased anxiety.  WHODAS: Completed first session    Referral / Collaboration:  Referral to another professional/service is not indicated at this time..    Anticipated number of session or this episode of care: 20      MeasurableTreatment Goal(s) related to diagnosis / functional impairment(s)  Goal 1: Client will function daily at a consistent level with minimal interference from obsessions and compulsions.     I will know I've met my goal when I do not have increased anxiety that I will hurt others or self.       Objective #A (Client Action)    Client will use thought-stopping strategy daily to reduce intrusive thoughts.  Status: Continued - Date(s): 6-20-17    Intervention(s)  Therapist will teach thought stopping process to interrupt obsessions.      Objective #B  Client will learn CBT skills to to identify distorted automatic thoughts and beliefs.  .  Status: Continued - Date(s): 6-20-17    Intervention(s)  Therapist will teach CBT skills. .    Objective #C  Client will practice 2 relaxation techniques and engage in 3 distraction activities that reduce obsessive thoughts .  Status: Continued - Date(s): 6-20-17    Intervention(s)  Therapist will assign homework determine a list of activities that client can use to distract form obsessive thinking.   Teach relaxation techniques that are helpful to reduce overall anxiety.       Goal 2: Client will resolve key life conflicts and the emotional stress that fuels obsessive-compulsive behavior patterns.     I will know I've met my goal when I feel settled and comfortable with my life.      Objective #A (Client Action)    Status: Continued -  "Date(s):6-20-17     Client will engage in acceptance and commitment  (ACT) therapy or exposure and ritual prevention therapy to reduce obsessions.     Intervention(s)  Therapist will teach ACT or ERP therapy with client and evaluate it's effectiveness in reducing intrusive obsessive thoughts.      Objective #B  Client will limit amount of time spent on repetitive or obsessive thoughts to 15 minutes.    Status: Continued - Date(s): 6-20-17    Intervention(s)  Therapist will teach and assign homework to determine time to accomplish good time to limit obsessive thoughts and evaluate it's effectiveness. .    Objective #C  Client will learn skills based onthe book \"Brainlock\" to manage OCD obsessions and compulsions.     Status: Continued - Date(s): 6-20-17    Intervention(s)  Therapist will teach 4 step process of relabeling. reattributing, refocusing and revaluing to diminish OCD intrusive thoughts.         Client has reviewed and agreed to the above plan.      Loi Prescott [facility]  September 20, 2016                                             Progress Note    Client Name: Sharon Baer  Date: 2/14/17         Service Type: Individual      Session Start Time: 5:00  Session End Time: 5:45      Session Length: 45     Session #: 10     Attendees: Client    Treatment Plan Last Reviewed: tx plan created on 9/19/16  PHQ-9 / KIET-7 : Completed this session      DATA      Progress Since Last Session (Related to Symptoms / Goals / Homework):   Symptoms: Worsening    Homework: Completed in session discussed distraction activities he can engage in and continue the thought stopping process when obsessive thoughts appear. Discussed what he tried to decrease in regard to his intrusive harm thoughts. Processed harmful thoughts and reassured client that he has not acted on them.  Continue activities he engages in and how these activities can distract thoughts so he does not get caught up in his negative thinking.  Discussed " "people and events that he was grateful for in his life.  Client likes to discuss his past and happy and fond memories of the past.  This makes client feel good.  Client created list of important people and descriptors of them and we processed in session. Client forgot book we usually work in during session.  Discussed and reviewed CBT skills.        Episode of Care Goals: Minimal progress - PREPARATION (Decided to change - considering how); Intervened by negotiating a change plan and determining options / strategies for behavior change, identifying triggers, exploring social supports, and working towards setting a date to begin behavior change     Current / Ongoing Stressors and Concerns:   Moved to new residence, has thoughts of hurting others and suicidal ideation which is apart of his Harm OCD.  He has never acted on these thoughts.      Treatment Objective(s) Addressed in This Session:   use thought-stopping strategy daily to reduce intrusive thoughts   Engage in activities that he enjoys to distract obssesive thoughts  Chew on ice or use cold pack on face to distract from anxiety about thoughts  Utilize deep breathing when stressed or anxious              Write in Book \"Handbook for Happiness\"                Complete \"Wake up in Gratitude and in the moment\" exercise in the book.  Joyful attention skills.-bring in next session               Boththe PHQ9 and GAD7 scores higher this session. Discussed worries and concerns about the president and concerns about possible war for the future.  Reframed and concentrated on positives and                  what he is grateful for.      Intervention:   CBT: start teaching skills - gave client mood log and feelings worksheet to try and bring into next session.                Engage in Relaxation and distraction activities that he enjoys    Deep breathing exercises  Thought stopping strategy and engage in activities that he enjoys to distract from negative thinking.    Use " guided imagery and go to pleasant memories and events to distract from negative intrusive thoughts when needed.  Complete some activities in happiness Book-bring in and we can process work   Emphasized CBT skills--gratitude and positive thoughts    ASSESSMENT: Current Emotional / Mental Status (status of significant symptoms):   Risk status (Self / Other harm or suicidal ideation)   Client reports the following current fears or concerns for personal safety: concerned that he might hurt someone or self. (OCD Harm type) .   Client reports the following current or recent suicidal ideation or behaviors: Has obsessive thoughts about not wanting to be alive and suicidal ideation.  Has not attempted in past apart of his obsessive thinking. .   Client reports current or recent homicidal ideation or behaviors including thinking about hurting others but has not acted on this.    Client denies current or recent self injurious behavior or ideation.   Client denies other safety concerns.   A safety and risk management plan has not been developed at this time, however client was given the after-hours number / 911 should there be a change in any of these risk factors.     Appearance:   Appropriate    Eye Contact:   Fair    Psychomotor Behavior: Restless  Retarded (Slowed)    Attitude:   Cooperative    Orientation:   All   Speech    Rate / Production: Slow     Volume:  Soft    Mood:    Anxious  Depressed  Sad    Affect:    Blunted  Flat  Restricted  Subdued    Thought Content:  Rumination    Thought Form:  Blocking  Coherent    Insight:    Fair      Medication Review:   No changes to current psychiatric medication(s)     Medication Compliance:   Yes     Changes in Health Issues:   None reported     Chemical Use Review:   Substance Use: Chemical use reviewed, no active concerns identified      Tobacco Use: No current tobacco use.       Collateral Reports Completed:   Not Applicable    PLAN: (Client Tasks / Therapist Tasks /  "Other)  Client to start using thought stopping process when he has obsessive thoughts of hurting others or self.  Engage in activities that he enjoys like: watching movies, comedies, cross word puzzles, word find games, coloring or drawing.  Chew on ice or use cold pack on face to distract thinking if anxiety is too great.  Engage in activities with others when he feels comfortable doing this. Practice deep breathing skills to decrease obsessive thoughts. Client was given mood log and feelings worksheet to read and bring into next session to process with therapist.  Ask for DVD's of movies or shows that he enjoys.   Listen to music and continue computer use when intrusive thoughts appear.  Think of pleasant memories and happy times from the past and write them down and go to list when he get's stuck in negative obsessive thoughts.   Continue with exercises discussed in session to complete in \"Handbook for Happiness\" .  Complete Wake up in gratitude and in the moment activity in book--to help with joyful attention skills. Discuss and process in next session. Reviewed and practiced CBT skills--talked about positive things and what client was grateful for.  Concentrate on positive things and try to let go of negative intrusive thoughts.             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 Learning disorder, obsessive thoughts about harming others and self.  Increased anxiety.  WHODAS: Completed first session    Referral / Collaboration:  Referral to another professional/service is not indicated at this time..    Anticipated number of session or " this episode of care: 20      MeasurableTreatment Goal(s) related to diagnosis / functional impairment(s)  Goal 1: Client will function daily at a consistent level with minimal interference from obsessions and compulsions.     I will know I've met my goal when I do not have increased anxiety that I will hurt others or self.       Objective #A (Client Action)    Client will use thought-stopping strategy daily to reduce intrusive thoughts.  Status: Continued - Date(s):     Intervention(s)  Therapist will teach thought stopping process to interrupt obsessions.      Objective #B  Client will learn CBT skills to to identify distorted automatic thoughts and beliefs.  .  Status: Continued - Date(s):     Intervention(s)  Therapist will teach CBT skills. .    Objective #C  Client will practice 2 relaxation techniques and engage in 3 distraction activities that reduce obsessive thoughts .  Status: Continued - Date(s):     Intervention(s)  Therapist will assign homework determine a list of activities that client can use to distract form obsessive thinking.   Teach relaxation techniques that are helpful to reduce overall anxiety.       Goal 2: Client will resolve key life conflicts and the emotional stress that fuels obsessive-compulsive behavior patterns.     I will know I've met my goal when I feel settled and comfortable with my life.      Objective #A (Client Action)    Status: Continued - Date(s):     Client will engage in acceptance and commitment  (ACT) therapy or exposure and ritual prevention therapy to reduce obsessions.     Intervention(s)  Therapist will teach ACT or ERP therapy with client and evaluate it's effectiveness in reducing intrusive obsessive thoughts.      Objective #B  Client will limit amount of time spent on repetitive or obsessive thoughts to 15 minutes.    Status: Continued - Date(s):     Intervention(s)  Therapist will teach and assign homework to determine time to accomplish good time to limit  obsessive thoughts and evaluate it's effectiveness. .    Objective #C  Client will use positive self-affirmations daily.  Status: Continued - Date(s):     Intervention(s)  Therapist will provide encouragement and affirmations to develop further overall strengths and self esteem .        Client has reviewed and agreed to the above plan.      Loi Prescott [facility]  September 20, 2016                                             Progress Note    Client Name: Sharon Baer  Date: 3/22/17         Service Type: Individual      Session Start Time: 2:00  Session End Time: 2:45      Session Length: 45     Session #: 11     Attendees: Client    Treatment Plan Last Reviewed: tx plan created on 9/19/16  PHQ-9 / KIET-7 : Completed this session      DATA      Progress Since Last Session (Related to Symptoms / Goals / Homework):   Symptoms: Stable    Homework: Completed in session discussed distraction activities he can engage in and continue the thought stopping process when obsessive thoughts appear. Discussed what he tried to decrease in regard to his intrusive harm thoughts. Processed harmful thoughts and reassured client that he has not acted on them.  Continue activities he engages in and how these activities can distract thoughts so he does not get caught up in his negative thinking.  Discussed people and events that he was grateful for in his life.  Client likes to discuss his past and happy and fond memories of the past.  This makes client feel good.  Client created list of important people and descriptors of them and we processed in session. Client forgot book we usually work in during session.  Discussed and reviewed CBT skills, acceptance of OCD thoughts and distraction activities to get mind off of harmful thoughts he has towards others.          Episode of Care Goals: Minimal progress - PREPARATION (Decided to change - considering how); Intervened by negotiating a change plan and determining options / strategies  "for behavior change, identifying triggers, exploring social supports, and working towards setting a date to begin behavior change     Current / Ongoing Stressors and Concerns:   Moved to new residence, has thoughts of hurting others and suicidal ideation which is apart of his Harm OCD.  He has never acted on these thoughts.      Treatment Objective(s) Addressed in This Session:   use thought-stopping strategy daily to reduce intrusive thoughts   Engage in activities that he enjoys to distract obssesive thoughts  Chew on ice or use cold pack on face to distract from anxiety about thoughts  Utilize deep breathing when stressed or anxious              Write in Book \"Handbook for Happiness\"                Complete \"Wake up in Gratitude and in the moment\" exercise in the book.  Joyful attention skills.-bring in next session            Both the PHQ9 and GAD7 scores higher this session. Discussed worries and concerns about the president and concerns about possible war for the future.  Reframed and concentrated on positives and                     what he is grateful for.               Concentrated on CBT skills and cognitive restructuring this session, acceptance therapy of negative harmful thoughts, Concentrated on strengths and positives in life.      Intervention:   CBT: start teaching skills - gave client mood log and feelings worksheet to try and bring into next session.                Engage in Relaxation and distraction activities that he enjoys    Deep breathing exercises  Thought stopping strategy and engage in activities that he enjoys to distract from negative thinking.    Use guided imagery and go to pleasant memories and events to distract from negative intrusive thoughts when needed.  Complete some activities in happiness Book-bring in and we can process work   Emphasized CBT skills--gratitude and positive thoughts, acceptance therapy, strengths    ASSESSMENT: Current Emotional / Mental Status (status of " significant symptoms):   Risk status (Self / Other harm or suicidal ideation)   Client reports the following current fears or concerns for personal safety: concerned that he might hurt someone or self. (OCD Harm type) .   Client reports the following current or recent suicidal ideation or behaviors: Has obsessive thoughts about not wanting to be alive and suicidal ideation.  Has not attempted in past apart of his obsessive thinking. .   Client reports current or recent homicidal ideation or behaviors including thinking about hurting others but has not acted on this.    Client denies current or recent self injurious behavior or ideation.   Client denies other safety concerns.   A safety and risk management plan has not been developed at this time, however client was given the after-hours number / 911 should there be a change in any of these risk factors.     Appearance:   Appropriate    Eye Contact:   Fair    Psychomotor Behavior: Retarded (Slowed)    Attitude:   Cooperative    Orientation:   All   Speech    Rate / Production: Slow     Volume:  Soft    Mood:    Anxious    Affect:    Blunted  Flat  Restricted  Subdued    Thought Content:  Rumination    Thought Form:  Coherent    Insight:    Fair      Medication Review:   No changes to current psychiatric medication(s)     Medication Compliance:   Yes     Changes in Health Issues:   None reported     Chemical Use Review:   Substance Use: Chemical use reviewed, no active concerns identified      Tobacco Use: No current tobacco use.       Collateral Reports Completed:   Not Applicable    PLAN: (Client Tasks / Therapist Tasks / Other)  Client to start using thought stopping process when he has obsessive thoughts of hurting others or self.  Engage in activities that he enjoys like: watching movies, comedies, cross word puzzles, word find games, coloring or drawing.  Chew on ice or use cold pack on face to distract thinking if anxiety is too great.  Engage in activities  "with others when he feels comfortable doing this. Practice deep breathing skills to decrease obsessive thoughts. Client was given mood log and feelings worksheet to read and bring into next session to process with therapist.  Ask for DVD's of movies or shows that he enjoys.   Listen to music and continue computer use when intrusive thoughts appear.  Think of pleasant memories and happy times from the past and write them down and go to list when he get's stuck in negative obsessive thoughts.   Continue with exercises discussed in session to complete in \"Handbook for Happiness\" .  Complete Wake up in gratitude and in the moment activity in book--to help with joyful attention skills. Discuss and process in next session. Reviewed and practiced CBT skills--talked about positive things and what client was grateful for.  Concentrate on positive things and try to let go of negative intrusive thoughts. Revisited acceptance therapy and think of negative thoughts as a wave, reframe to positive thoughts, concentrated on strengths.  Client will bring in Acevedo book next session for homework in between session.               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 Learning disorder, obsessive thoughts about harming others and self.  Increased anxiety.  WHODAS: Completed first session    Referral / Collaboration:  Referral to another professional/service is not indicated at this time..    Anticipated number of session or this episode of care: 20      MeasurableTreatment Goal(s) related to diagnosis / functional impairment(s)  Goal 1: Client will function daily " at a consistent level with minimal interference from obsessions and compulsions.     I will know I've met my goal when I do not have increased anxiety that I will hurt others or self.       Objective #A (Client Action)    Client will use thought-stopping strategy daily to reduce intrusive thoughts.  Status: Continued - Date(s):     Intervention(s)  Therapist will teach thought stopping process to interrupt obsessions.      Objective #B  Client will learn CBT skills to to identify distorted automatic thoughts and beliefs.  .  Status: Continued - Date(s):     Intervention(s)  Therapist will teach CBT skills. .    Objective #C  Client will practice 2 relaxation techniques and engage in 3 distraction activities that reduce obsessive thoughts .  Status: Continued - Date(s):     Intervention(s)  Therapist will assign homework determine a list of activities that client can use to distract form obsessive thinking.   Teach relaxation techniques that are helpful to reduce overall anxiety.       Goal 2: Client will resolve key life conflicts and the emotional stress that fuels obsessive-compulsive behavior patterns.     I will know I've met my goal when I feel settled and comfortable with my life.      Objective #A (Client Action)    Status: Continued - Date(s):     Client will engage in acceptance and commitment  (ACT) therapy or exposure and ritual prevention therapy to reduce obsessions.     Intervention(s)  Therapist will teach ACT or ERP therapy with client and evaluate it's effectiveness in reducing intrusive obsessive thoughts.      Objective #B  Client will limit amount of time spent on repetitive or obsessive thoughts to 15 minutes.    Status: Continued - Date(s):     Intervention(s)  Therapist will teach and assign homework to determine time to accomplish good time to limit obsessive thoughts and evaluate it's effectiveness. .    Objective #C  Client will use positive self-affirmations daily.  Status: Continued -  Date(s):     Intervention(s)  Therapist will provide encouragement and affirmations to develop further overall strengths and self esteem .        Client has reviewed and agreed to the above plan.      Loi Prescott [facility]  September 20, 2016                                             Progress Note    Client Name: Sharon Baer  Date: 2/14/17         Service Type: Individual      Session Start Time: 5:00  Session End Time: 5:45      Session Length: 45     Session #: 10     Attendees: Client    Treatment Plan Last Reviewed: tx plan created on 9/19/16  PHQ-9 / KIET-7 : Completed this session      DATA      Progress Since Last Session (Related to Symptoms / Goals / Homework):   Symptoms: Worsening    Homework: Completed in session discussed distraction activities he can engage in and continue the thought stopping process when obsessive thoughts appear. Discussed what he tried to decrease in regard to his intrusive harm thoughts. Processed harmful thoughts and reassured client that he has not acted on them.  Continue activities he engages in and how these activities can distract thoughts so he does not get caught up in his negative thinking.  Discussed people and events that he was grateful for in his life.  Client likes to discuss his past and happy and fond memories of the past.  This makes client feel good.  Client created list of important people and descriptors of them and we processed in session. Client forgot book we usually work in during session.  Discussed and reviewed CBT skills.        Episode of Care Goals: Minimal progress - PREPARATION (Decided to change - considering how); Intervened by negotiating a change plan and determining options / strategies for behavior change, identifying triggers, exploring social supports, and working towards setting a date to begin behavior change     Current / Ongoing Stressors and Concerns:   Moved to new residence, has thoughts of hurting others and suicidal  "ideation which is apart of his Harm OCD.  He has never acted on these thoughts.      Treatment Objective(s) Addressed in This Session:   use thought-stopping strategy daily to reduce intrusive thoughts   Engage in activities that he enjoys to distract obssesive thoughts  Chew on ice or use cold pack on face to distract from anxiety about thoughts  Utilize deep breathing when stressed or anxious              Write in Book \"Handbook for Happiness\"                Complete \"Wake up in Gratitude and in the moment\" exercise in the book.  Joyful attention skills.-bring in next session               Boththe PHQ9 and GAD7 scores higher this session. Discussed worries and concerns about the president and concerns about possible war for the future.  Reframed and concentrated on positives and                  what he is grateful for.      Intervention:   CBT: start teaching skills - gave client mood log and feelings worksheet to try and bring into next session.                Engage in Relaxation and distraction activities that he enjoys    Deep breathing exercises  Thought stopping strategy and engage in activities that he enjoys to distract from negative thinking.    Use guided imagery and go to pleasant memories and events to distract from negative intrusive thoughts when needed.  Complete some activities in happiness Book-bring in and we can process work   Emphasized CBT skills--gratitude and positive thoughts    ASSESSMENT: Current Emotional / Mental Status (status of significant symptoms):   Risk status (Self / Other harm or suicidal ideation)   Client reports the following current fears or concerns for personal safety: concerned that he might hurt someone or self. (OCD Harm type) .   Client reports the following current or recent suicidal ideation or behaviors: Has obsessive thoughts about not wanting to be alive and suicidal ideation.  Has not attempted in past apart of his obsessive thinking. .   Client reports current " or recent homicidal ideation or behaviors including thinking about hurting others but has not acted on this.    Client denies current or recent self injurious behavior or ideation.   Client denies other safety concerns.   A safety and risk management plan has not been developed at this time, however client was given the after-hours number / 911 should there be a change in any of these risk factors.     Appearance:   Appropriate    Eye Contact:   Fair    Psychomotor Behavior: Restless  Retarded (Slowed)    Attitude:   Cooperative    Orientation:   All   Speech    Rate / Production: Slow     Volume:  Soft    Mood:    Anxious  Depressed  Sad    Affect:    Blunted  Flat  Restricted  Subdued    Thought Content:  Rumination    Thought Form:  Blocking  Coherent    Insight:    Fair      Medication Review:   No changes to current psychiatric medication(s)     Medication Compliance:   Yes     Changes in Health Issues:   None reported     Chemical Use Review:   Substance Use: Chemical use reviewed, no active concerns identified      Tobacco Use: No current tobacco use.       Collateral Reports Completed:   Not Applicable    PLAN: (Client Tasks / Therapist Tasks / Other)  Client to start using thought stopping process when he has obsessive thoughts of hurting others or self.  Engage in activities that he enjoys like: watching movies, comedies, cross word puzzles, word find games, coloring or drawing.  Chew on ice or use cold pack on face to distract thinking if anxiety is too great.  Engage in activities with others when he feels comfortable doing this. Practice deep breathing skills to decrease obsessive thoughts. Client was given mood log and feelings worksheet to read and bring into next session to process with therapist.  Ask for DVD's of movies or shows that he enjoys.   Listen to music and continue computer use when intrusive thoughts appear.  Think of pleasant memories and happy times from the past and write them down  "and go to list when he get's stuck in negative obsessive thoughts.   Continue with exercises discussed in session to complete in \"Handbook for Happiness\" .  Complete Wake up in gratitude and in the moment activity in book--to help with joyful attention skills. Discuss and process in next session. Reviewed and practiced CBT skills--talked about positive things and what client was grateful for.  Concentrate on positive things and try to let go of negative intrusive thoughts.             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 Learning disorder, obsessive thoughts about harming others and self.  Increased anxiety.  WHODAS: Completed first session    Referral / Collaboration:  Referral to another professional/service is not indicated at this time..    Anticipated number of session or this episode of care: 20      MeasurableTreatment Goal(s) related to diagnosis / functional impairment(s)  Goal 1: Client will function daily at a consistent level with minimal interference from obsessions and compulsions.     I will know I've met my goal when I do not have increased anxiety that I will hurt others or self.       Objective #A (Client Action)    Client will use thought-stopping strategy daily to reduce intrusive thoughts.  Status: Continued - Date(s):     Intervention(s)  Therapist will teach thought stopping process to interrupt obsessions.      Objective #B  Client will learn CBT skills to to identify distorted automatic thoughts and beliefs.  .  Status: Continued - Date(s):     Intervention(s)  Therapist will teach CBT skills. .    Objective " #C  Client will practice 2 relaxation techniques and engage in 3 distraction activities that reduce obsessive thoughts .  Status: Continued - Date(s):     Intervention(s)  Therapist will assign homework determine a list of activities that client can use to distract form obsessive thinking.   Teach relaxation techniques that are helpful to reduce overall anxiety.       Goal 2: Client will resolve key life conflicts and the emotional stress that fuels obsessive-compulsive behavior patterns.     I will know I've met my goal when I feel settled and comfortable with my life.      Objective #A (Client Action)    Status: Continued - Date(s):     Client will engage in acceptance and commitment  (ACT) therapy or exposure and ritual prevention therapy to reduce obsessions.     Intervention(s)  Therapist will teach ACT or ERP therapy with client and evaluate it's effectiveness in reducing intrusive obsessive thoughts.      Objective #B  Client will limit amount of time spent on repetitive or obsessive thoughts to 15 minutes.    Status: Continued - Date(s):     Intervention(s)  Therapist will teach and assign homework to determine time to accomplish good time to limit obsessive thoughts and evaluate it's effectiveness. .    Objective #C  Client will use positive self-affirmations daily.  Status: Continued - Date(s):     Intervention(s)  Therapist will provide encouragement and affirmations to develop further overall strengths and self esteem .        Client has reviewed and agreed to the above plan.      Loi Prescott [facility]  September 20, 2016

## 2017-06-20 NOTE — MR AVS SNAPSHOT
MRN:[id number]                      After Visit Summary   6/20/2017    Sharon Baer    MRN: [id number]           Visit Information        Provider Department      6/20/2017 10:30 AM Loi Prescott LIC[facility]      Your next 10 appointments already scheduled     Jun 26, 2017  3:30 PM CDT   Return Visit with DANNI Davidson   [facility] ([facility])    [address] [place] [id number] [phone]            Aug 11, 2017 10:15 AM CDT   Adult Med Follow UP with TINY Knox CNS   [facility] ([facility])    [place] [id number] [facility] [id number] [phone]              MyChart Information     [employer] gives you secure access to your electronic health record. If you see a primary care provider, you can also send messages to your care team and make appointments. If you have questions, please call your primary care clinic.  If you do not have a primary care provider, please call [phone] and they will assist you.        Care EveryWhere ID     This is your Care EveryWhere ID. This could be used by other organizations to access your [place] medical records  [id number]

## 2017-06-21 ASSESSMENT — PATIENT HEALTH QUESTIONNAIRE - PHQ9: SUM OF ALL RESPONSES TO PHQ QUESTIONS 1-9: 4

## 2017-06-21 ASSESSMENT — ANXIETY QUESTIONNAIRES: GAD7 TOTAL SCORE: 3

## 2017-07-20 ENCOUNTER — OFFICE VISIT (OUTPATIENT)
Dept: PSYCHOLOGY | Facility: CLINIC | Age: 82
End: 2017-07-20
Payer: COMMERCIAL

## 2017-07-20 DIAGNOSIS — F42.8 OTHER OBSESSIVE-COMPULSIVE DISORDER: Primary | ICD-10-CM

## 2017-07-20 PROCEDURE — 90834 PSYTX W PT 45 MINUTES: CPT | Performed by: SOCIAL WORKER

## 2017-07-20 NOTE — PROGRESS NOTES
"                                             Progress Note    Client Name: Sharon Baer  Date: 6/20/17         Service Type: Individual      Session Start Time: 10:30  Session End Time: 11:15      Session Length: 45     Session #: 14     Attendees: Client    Treatment Plan Last Reviewed: tx plan created on 9/19/16-reviewed & updated  6-20-17  PHQ-9 / KIET-7 : Complete next session      DATA      Progress Since Last Session (Related to Symptoms / Goals / Homework):   Symptoms: Stable    Homework: Completed in session discussed distraction activities he can engage in and continue the thought stopping process when obsessive thoughts appear. Discussed what he tried to decrease in regard to his intrusive harm thoughts. Processed harmful thoughts and reassured client that he has not acted on them.  Continue activities he engages in and how these activities can distract thoughts so he does not get caught up in his negative thinking.  Discussed people and events that he was grateful for in his life.  Client likes to discuss his past and happy and fond memories of the past.  This makes client feel good.  Client created list of important people and descriptors of them and we processed in session. Discussed and reviewed CBT skills, acceptance of OCD thoughts and distraction activities to get mind off of harmful thoughts he has towards others.  We worked on two gratitude activities in book and client stated he would work on this. Did not bring in book today so we discussed activities he engages in and did depression and anxiety screening. Reviewed tx plan in session today.        Started with refocusing and stating the mantra of \"It's not me-It's my OCD\" to develop awareness and mindfulness that it is an obsessive thought he get's caught up in when he has negative harmful thoughts.    Episode of Care Goals: Minimal progress - PREPARATION (Decided to change - considering how); Intervened by negotiating a change plan and " "determining options / strategies for behavior change, identifying triggers, exploring social supports, and working towards setting a date to begin behavior change     Current / Ongoing Stressors and Concerns:   Moved to new residence, has thoughts of hurting others and suicidal ideation which is apart of his Harm OCD.  He has never acted on these thoughts.      Treatment Objective(s) Addressed in This Session:   use thought-stopping strategy daily to reduce intrusive thoughts   Engage in activities that he enjoys to distract obssesive thoughts  Chew on ice or use cold pack on face to distract from anxiety about thoughts  Utilize deep breathing when stressed or anxious              Write in Book \"Handbook for Happiness\"                Complete \"Wake up in Gratitude and in the moment\" exercise in the book.  Joyful attention skills.-bring in next session            Both the PHQ9 and GAD7 scores higher this session. Discussed worries and concerns about the president and concerns about possible war for the future.  Reframed and concentrated on positives and                     what he is grateful for.               Concentrated on CBT skills and cognitive restructuring this session, acceptance therapy of negative harmful thoughts, Concentrated on strengths and positives in life.               Complete two gratitude exercises daily if he can-especially when he is ruminating on OCD thoughts               Engage in activities that distract from his thinking especially harmful thoughts and think of them as a wave and go with them instead of fighting them.  Knowing that he will not act on them.                Refocus obsessions and use mantra discussed in session when obsessive thoughts occur.    Intervention:   CBT: start teaching skills - gave client mood log and feelings worksheet to try and bring into next session.                Engage in Relaxation and distraction activities that he enjoys    Deep breathing " exercises  Thought stopping strategy and engage in activities that he enjoys to distract from negative thinking.    Use guided imagery and go to pleasant memories and events to distract from negative intrusive thoughts when needed.  Complete some activities in happiness Book-bring in and we can process work   Emphasized CBT skills--gratitude and positive thoughts, acceptance therapy, strengths   Gratitude exercises  Acceptance and distraction activities to get off of ruminating thoughts  Refocus thoughts and develop mindfulness   ASSESSMENT: Current Emotional / Mental Status (status of significant symptoms):   Risk status (Self / Other harm or suicidal ideation)   Client reports the following current fears or concerns for personal safety: concerned that he might hurt someone or self. (OCD Harm type) .   Client reports the following current or recent suicidal ideation or behaviors: Has obsessive thoughts about not wanting to be alive and suicidal ideation.  Has not attempted in past apart of his obsessive thinking. .   Client reports current or recent homicidal ideation or behaviors including thinking about hurting others but has not acted on this.    Client denies current or recent self injurious behavior or ideation.   Client denies other safety concerns.   A safety and risk management plan has not been developed at this time, however client was given the after-hours number / 911 should there be a change in any of these risk factors.     Appearance:   Appropriate    Eye Contact:   Fair    Psychomotor Behavior: Retarded (Slowed)    Attitude:   Cooperative    Orientation:   All   Speech    Rate / Production: Slow     Volume:  Soft    Mood:    Anxious    Affect:    Blunted  Flat  Restricted  Subdued    Thought Content:  Rumination    Thought Form:  Blocking    Insight:    Fair      Medication Review:   No changes to current psychiatric medication(s)     Medication Compliance:   Yes     Changes in Health Issues:   None  "reported     Chemical Use Review:   Substance Use: Chemical use reviewed, no active concerns identified      Tobacco Use: No current tobacco use.       Collateral Reports Completed:   Not Applicable    PLAN: (Client Tasks / Therapist Tasks / Other)  Client to start using thought stopping process when he has obsessive thoughts of hurting others or self.  Engage in activities that he enjoys like: watching movies, comedies, cross word puzzles, word find games, coloring or drawing.  Chew on ice or use cold pack on face to distract thinking if anxiety is too great.  Engage in activities with others when he feels comfortable doing this. Practice deep breathing skills to decrease obsessive thoughts. Client was given mood log and feelings worksheet to read and bring into next session to process with therapist.  Ask for DVD's of movies or shows that he enjoys.   Listen to music and continue computer use when intrusive thoughts appear.  Think of pleasant memories and happy times from the past and write them down and go to list when he get's stuck in negative obsessive thoughts.   Continue with exercises discussed in session to complete in \"Handbook for Happiness\" .  Complete Wake up in gratitude and in the moment activity in book--to help with joyful attention skills. Discuss and process in next session. Reviewed and practiced CBT skills--talked about positive things and what client was grateful for.  Concentrate on positive things and try to let go of negative intrusive thoughts. Revisited acceptance therapy and think of negative thoughts as a wave, reframe to positive thoughts, concentrated on strengths.  Client brought in Acevedo to session--we discussed 2 gratitude exercises to work on.  Once when he gets up in the morning and thinks about people he is grateful for.  The other to concentrate on nature and gratitude of the environment and the beauty of the outdoors. Engage in distraction activities and acceptance of harmful " "thoughts-like a wave-knowing that he will not act on them. Started to discuss the book \"Brainlock\" and learning about the brain and reviewed tx plan in session today.  Started with refocusing and stating the mantra of \"It's not me-It's my OCD\" to develop awareness and mindfulness that it is an obsessive thought he get's caught up in when he has negative harmful thoughts. Discussed with Reilly it is a chemical issue in his brain that is locked up and to practice this mantra when he has these obsessions.          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 Learning disorder, obsessive thoughts about harming others and self.  Increased anxiety.  WHODAS: Completed first session    Referral / Collaboration:  Referral to another professional/service is not indicated at this time..    Anticipated number of session or this episode of care: 20      MeasurableTreatment Goal(s) related to diagnosis / functional impairment(s)  Goal 1: Client will function daily at a consistent level with minimal interference from obsessions and compulsions.     I will know I've met my goal when I do not have increased anxiety that I will hurt others or self.       Objective #A (Client Action)    Client will use thought-stopping strategy daily to reduce intrusive thoughts.  Status: Continued - Date(s): 6-20-17    Intervention(s)  Therapist will teach thought stopping process to interrupt obsessions.      Objective #B  Client will learn CBT skills to to identify distorted automatic thoughts and beliefs.  .  Status: Continued - Date(s): 6-20-17    Intervention(s)  Therapist will " "teach CBT skills. .    Objective #C  Client will practice 2 relaxation techniques and engage in 3 distraction activities that reduce obsessive thoughts .  Status: Continued - Date(s): 6-20-17    Intervention(s)  Therapist will assign homework determine a list of activities that client can use to distract form obsessive thinking.   Teach relaxation techniques that are helpful to reduce overall anxiety.       Goal 2: Client will resolve key life conflicts and the emotional stress that fuels obsessive-compulsive behavior patterns.     I will know I've met my goal when I feel settled and comfortable with my life.      Objective #A (Client Action)    Status: Continued - Date(s):6-20-17     Client will engage in acceptance and commitment  (ACT) therapy or exposure and ritual prevention therapy to reduce obsessions.     Intervention(s)  Therapist will teach ACT or ERP therapy with client and evaluate it's effectiveness in reducing intrusive obsessive thoughts.      Objective #B  Client will limit amount of time spent on repetitive or obsessive thoughts to 15 minutes.    Status: Continued - Date(s): 6-20-17    Intervention(s)  Therapist will teach and assign homework to determine time to accomplish good time to limit obsessive thoughts and evaluate it's effectiveness. .    Objective #C  Client will learn skills based onthe book \"Brainlock\" to manage OCD obsessions and compulsions.     Status: Continued - Date(s): 6-20-17    Intervention(s)  Therapist will teach 4 step process of relabeling. reattributing, refocusing and revaluing to diminish OCD intrusive thoughts.         Client has reviewed and agreed to the above plan.      Loi Prescott, [facility]  September 20, 2016                                               "

## 2017-07-20 NOTE — MR AVS SNAPSHOT
MRN:[id number]                      After Visit Summary   7/20/2017    Sharon Baer    MRN: [id number]           Visit Information        Provider Department      7/20/2017 3:30 PM Loi Prescott LICSW [facility]      Your next 10 appointments already scheduled     Aug 11, 2017 10:15 AM CDT   Adult Med Follow UP with TINY Knox CNS   Psychiatry Clinic ([facility])    [place] [id number]  [address] [facility] [address] [phone]            Aug 21, 2017  2:30 PM CDT   Return Visit with DANNI Davidson   [facility]    [address] [place] [address] [phone]              MyChart Information     [facility] gives you secure access to your electronic health record. If you see a primary care provider, you can also send messages to your care team and make appointments. If you have questions, please call your primary care clinic.  If you do not have a primary care provider, please call [phone] and they will assist you.        Care EveryWhere ID     This is your Care EveryWhere ID. This could be used by other organizations to access your [place] medical records  [id number]        Equal Access to Services     ESTELITA WAGNER: Jd baugho Solauraali, waaxda luqadaha, qaybta kaalmada adeegyada, fahad wagner. So [facility] [phone].    ATENCIÓN: Si habla español, tiene a delacruz disposición servicios gratuitos de asistencia lingüística. Llame al [phone].    We comply with applicable federal civil rights laws and [place] laws. We do not discriminate on the basis of race, color, national origin, age, disability sex, sexual orientation or gender identity.

## 2017-07-24 DIAGNOSIS — Z00.00 ROUTINE HEALTH MAINTENANCE: ICD-10-CM

## 2017-07-25 NOTE — TELEPHONE ENCOUNTER
allopurinol 100 MG      Last Written Prescription Date:  1/9/17  Last Fill Quantity: 90,   # refills: 1  Last Office Visit : 11/23/16  Future Office visit:  NONE  CBC RESULTS:   Recent Labs   Lab Test  05/03/16   1011   WBC  7.4   RBC  5.26   HGB  15.4   HCT  48.6   MCV  92   MCH  29.3   MCHC  31.7   RDW  15.2*   PLT  215     Creatinine   Date Value Ref Range Status   02/21/2017 1.49 (H) 0.66 - 1.25 mg/dL Final      Routing refill request to provider for review/approval because:**labs are overdue, route to provider

## 2017-07-26 RX ORDER — ALLOPURINOL 100 MG/1
100 TABLET ORAL DAILY
Qty: 90 TABLET | Refills: 0 | Status: SHIPPED | OUTPATIENT
Start: 2017-07-26 | End: 2017-09-22

## 2017-08-25 ENCOUNTER — OFFICE VISIT (OUTPATIENT)
Dept: PSYCHIATRY | Facility: CLINIC | Age: 82
End: 2017-08-25
Attending: CLINICAL NURSE SPECIALIST
Payer: MEDICARE

## 2017-08-25 VITALS
WEIGHT: 200 LBS | SYSTOLIC BLOOD PRESSURE: 175 MMHG | DIASTOLIC BLOOD PRESSURE: 85 MMHG | BODY MASS INDEX: 31.32 KG/M2 | HEART RATE: 81 BPM

## 2017-08-25 DIAGNOSIS — F42.8 OTHER OBSESSIVE-COMPULSIVE DISORDER: ICD-10-CM

## 2017-08-25 DIAGNOSIS — F41.9 ANXIETY DISORDER, UNSPECIFIED: ICD-10-CM

## 2017-08-25 DIAGNOSIS — F32.A DEPRESSION, UNSPECIFIED DEPRESSION TYPE: ICD-10-CM

## 2017-08-25 DIAGNOSIS — Z79.899 ENCOUNTER FOR LONG-TERM (CURRENT) USE OF MEDICATIONS: ICD-10-CM

## 2017-08-25 DIAGNOSIS — F84.0 AUTISM SPECTRUM DISORDER: Primary | ICD-10-CM

## 2017-08-25 PROCEDURE — 99212 OFFICE O/P EST SF 10 MIN: CPT | Mod: ZF

## 2017-08-25 NOTE — PROGRESS NOTES
Outpatient Psychiatry Progress Note     Provider: TINY Knox CNS  Date: 2017  Service:  Medication follow up with counseling.   Patient Identification: Sharon Baer  : 6/10/1931   MRN: [id number]    Sharon Baer is a 86 year old year old male who presents for ongoing psychiatric care.  Sharon Baer was last seen in clinic on 17.   At that time,   Assessment & Plan       Sharon Baer is seen today for follow up and reports his mood has been stable with residual symptoms.  Patient is seen with his sister Josephine who notes that there are some stresses with a key staff person leaving his assisted living facility.     Diagnosis  Axis 1: Autism, Depression Unspecified, Anxiety, OCD  Axis 2: none  Axis 3: See problem list in the medical record     Plan:  Medication: Continue current medications  OTC Recommendations: none  Lab Orders:  none  Referrals: none, he will schedule follow up with Dr. Castano  Release of Information: none  Future Treatment Considerations:per symptoms  Return for Follow Up: 8 weeks      ____________________________________________________________________________________________________________________________________________    2017   Seen with his sister Nicolette.  Today Sharon reports his mood has been alittle worse lately with some increased obsessive thinking. Meets with  regularly but hasn't changed much as far as activities. Has difficulty hearing what people say to him at activities making it difficult to socialize.  Side effects of medication include: none known  Psychiatric Review of Systems:  The patient endorses symptoms of depression: In the last 2 weeks per PHQ 9 several days anhedonia, feeling depressed, fatigue, feelings of failure.   He  patient endorses symptoms of anxiety :  See subjective  He endorses symptoms of vidya including none.    He endorses symptoms of psychosis including no psychotic symptoms.        Review of Medical Systems:  Sleep: some napping, doesn't think he sleeps too much. Sleeps well at night.  Energy: mild  Concentration: no concerns  Appetite: stable  GI Concerns: none  Cardiac concerns: none  Neurological concerns: none  Other medical concerns: no new concerns  Current Substance Use:  Alcohol:denies  Other drugs:none  Caffeine:no change  Nicotine: none  Past Medical History:   Past Medical History:   Diagnosis Date     Autism spectrum disorder 7/16/2015     GERD (gastroesophageal reflux disease)      Gout      Hearing loss      Hyperlipemia     on atorvastatin     Hypertension      Hypotestosteronism      IGT (impaired glucose tolerance)      OCD (obsessive compulsive disorder)     on cymbalta     Osteoarthritis, hip, bilateral      Vitamin D deficiency     resolved 2/2013     Patient Active Problem List   Diagnosis     Gout     Hypertension     IGT (impaired glucose tolerance)     GERD (gastroesophageal reflux disease)     Hypotestosteronism     Neoplasm of uncertain behavior of skin     AK (actinic keratosis)     History of nonmelanoma skin cancer     Osteoarthritis of right hip     OA (osteoarthritis) of hip     Fall     Facial laceration     Autism spectrum disorder     Anxiety disorder, unspecified     Depression, unspecified depression type     Vitamin D deficiency     Loss of weight     ACP (advance care planning)     Other obsessive-compulsive disorder       Allergies:   Allergies   Allergen Reactions     Penicillins Rash          Current Medications     Current Outpatient Prescriptions Ordered in [facility]   Medication Sig Dispense Refill     allopurinol (ZYLOPRIM) 100 MG tablet Take 1 tablet (100 mg) by mouth daily 90 tablet 0     QUEtiapine (SEROQUEL) 25 MG tablet Take one tablet by mouth every evening with supper 90 tablet 1     FLUoxetine (PROZAC) 20 MG capsule Take 2 capsules (40 mg) by mouth every morning 180 capsule 1     FLUoxetine (PROZAC) 10 MG capsule Take 1 capsule (10 mg) by  "mouth daily In addition to two 20mg capsules for total dose of 50mg. 90 capsule 0     atorvastatin (LIPITOR) 20 MG tablet Take 1 tablet (20 mg) by mouth daily 90 tablet 2     acetaminophen 500 MG CAPS Take 500 mg by mouth 2 times daily       allopurinol (ZYLOPRIM) 100 MG tablet Take 1 tablet (100 mg) by mouth daily 90 tablet 1     lisinopril (PRINIVIL/ZESTRIL) 40 MG tablet Take 1 tablet (40 mg) by mouth daily 90 tablet 2     cholecalciferol (VITAMIN D) 1000 UNIT tablet Take 2 tablets (2,000 Units) by mouth daily 100 tablet 3     furosemide (LASIX) 20 MG tablet Take 1 tablet (20 mg) by mouth daily 100 tablet 3     omeprazole (PRILOSEC) 40 MG capsule Take 1 capsule (40 mg) by mouth 2 times daily as needed 200 capsule 3     aspirin 325 MG EC tablet Take 1 tablet (325 mg) by mouth daily 90 tablet 3     No current Epic-ordered facility-administered medications on file.         Mental Status Exam     Appearance:  Casually dressed and Well groomed  Behavior/relationship to examiner/demeanor:  Cooperative, some difficulty hearing me  Orientation: Oriented to person, place, time and situation  Psychomotor: normal form  Speech Rate:  Normal  Speech Spontaneity:  Mild latency of response and poverty of speech  Mood:  \"a little more anxious I think\"  Affect:  Appropriate/mood-congruent and Anxious/Nervous  Thought Process (Associations):  Goal directed  Thought Content:  no overt psychosis, denies suicidal ideation, intent or thoughts and patient does not appear to be responding to internal stimuli  Abnormal Perception:  None  Attention/Concentration:  Normal  Language:  Intact  Insight:  Good  Judgment:  Good      Results     Vital signs: /85  Pulse 81  Wt 90.7 kg (200 lb)  BMI 31.32 kg/m2    Laboratory Data:  no new data    Assessment & Plan      Sharon Baer is seen today for follow up and reports he has been a little more anxious lately but still much improved from before starting Fluoxetine. No apparent " side effects.  Patient and his sister agree to trial of increase in Fluoxetine. Watch for fatigue, tremor, lethargy.  Will also follow up with PCP for 6 month check up.     Diagnosis  Autism, Spectrum, Anxiety,  OCD, other type, Depression Unspecified    Plan:  Medication: Increase Fluoxetine to 60mg, Continue Seroquel 25mg at bedtime  OTC Recommendations: none  Lab Orders:  CMP - need electrolytes with SSRI medication  Referrals: none  Release of Information: none  Future Treatment Considerations:per symptoms, side effects  Return for Follow Up:8 weeks   The risks, benefits, alternatives and side effects have been discussed and are understood by the patient. The patient understands the risks of using street drugs or alcohol. There are no medical contraindications, the patient agrees to treatment, and has the capacity to do so. The patient understands to call 911 or come to the nearest ED if life threatening or urgent symptoms present.  Over 50% of this time was spent counseling the patient and/or coordinating care regarding review of social and occupational functioning.  In addition patient was counseled on health and wellness practices to augment medication treatment of symptoms. See note for details.    Dyan Mathis, APRN CNS 8/25/2017

## 2017-08-25 NOTE — Clinical Note
Hello, I just wanted to check in and see how you think Reilly has been doing?  I increased Prozac further at the last appointment due to some increase in anxiety and intrusive thoughts.  Not sure if it is due to changes where is living or isolation.  I will watch carefully for side effects since he is at high dose now. Dyan Mathis CNS, APRN

## 2017-08-25 NOTE — PATIENT INSTRUCTIONS
Plan:  Medication: Increase Fluoxetine to 60mg, Continue Seroquel 25mg at bedtime  OTC Recommendations: none  Lab Orders:  CMP - need electrolytes with SSRI medication  Referrals: none  Release of Information: none  Future Treatment Considerations:per symptoms, side effects  Return for Follow Up:8 weeks

## 2017-08-25 NOTE — NURSING NOTE
Chief Complaint   Patient presents with     Recheck Medication     Autism spectrum disorder      Reviewed allergies, smoking status, and pharmacy preference  Administered abuse screening question  Obtained weight, blood pressure and heart rate

## 2017-08-25 NOTE — MR AVS SNAPSHOT
After Visit Summary   8/25/2017    Sharon Baer    MRN: [id number]           Patient Information     Date Of Birth          6/10/1931        Visit Information        Provider Department      8/25/2017 8:45 AM yDan Mathis APRN [facility]        Today's Diagnoses     Autism spectrum disorder    -  1    Anxiety disorder, unspecified        Depression, unspecified depression type        Other obsessive-compulsive disorder        Encounter for long-term (current) use of medications          Care Instructions    Plan:  Medication: Increase Fluoxetine to 60mg, Continue Seroquel 25mg at bedtime  OTC Recommendations: none  Lab Orders:  CMP - need electrolytes with SSRI medication  Referrals: none  Release of Information: none  Future Treatment Considerations:per symptoms, side effects  Return for Follow Up:8 weeks          Follow-ups after your visit        Follow-up notes from your care team     Return in about 8 weeks (around 10/20/2017).      Your next 10 appointments already scheduled     Sep 19, 2017  3:00 PM CDT   Return Visit with DANNI Davidson   [facility] ([facility])    [address] [place] [address] [phone]            Sep 22, 2017  2:40 PM CDT   (Arrive by 2:25 PM)   Return Visit with Neo Castano MD   [facility] ([facility])    [address] [place] [address] [phone]            Oct 18, 2017  9:45 AM CDT   Adult Med Follow UP with TINY Knox CNS   Psychiatry Clinic ([facility])    [address] [facility] [id number] [phone]              Who to contact     Please call your clinic at [phone] to:    Ask questions about your health    Make or cancel appointments    Discuss your medicines    Learn about your test results    Speak  to your doctor   If you have compliments or concerns about an experience at your clinic, or if you wish to file a complaint, please contact [facility] Patient Relations at [phone] or email us at [email or web address]         Additional Information About Your Visit        MyChart Information     Tip or Skiphart gives you secure access to your electronic health record. If you see a primary care provider, you can also send messages to your care team and make appointments. If you have questions, please call your primary care clinic.  If you do not have a primary care provider, please call [phone] and they will assist you.      Connectify is an electronic gateway that provides easy, online access to your medical records. With Connectify, you can request a clinic appointment, read your test results, renew a prescription or communicate with your care team.     To access your existing account, please contact your [facility] or call [phone] for assistance.        Care EveryWhere ID     This is your Care EveryWhere ID. This could be used by other organizations to access your [place] medical records  [id number]        Your Vitals Were     Pulse BMI (Body Mass Index)                81 31.32 kg/m2           Blood Pressure from Last 3 Encounters:   08/25/17 175/85   06/09/17 146/84   03/21/17 173/90    Weight from Last 3 Encounters:   08/25/17 90.7 kg (200 lb)   06/09/17 89.8 kg (198 lb)   03/21/17 90.3 kg (199 lb)                 Today's Medication Changes          These changes are accurate as of: 8/25/17 11:59 PM.  If you have any questions, ask your nurse or doctor.               These medicines have changed or have updated prescriptions.        Dose/Directions    FLUoxetine 20 MG capsule   Commonly known as:  PROzac   This may have changed:    - how much to take  - Another medication with the same name was removed. Continue taking this medication, and  follow the directions you see here.   Used for:  Depression, unspecified depression type, Other obsessive-compulsive disorder   Changed by:  Dyan Mathis APRN CNS        Dose:  60 mg   Take 3 capsules (60 mg) by mouth every morning   Quantity:  270 capsule   Refills:  1            Where to get your medicines      These medications were sent to [facility] - [place], [place] - [address]   [address] , [facility] [address]    Hours:  Mon-Fri 8:30am-5:00pm Toll Free [phone] Phone:  [phone]     FLUoxetine 20 MG capsule                Primary Care Provider Office Phone # Fax #    Neo Castano [phone]       [address] [place] [address]  [facility] [address]        Equal Access to Services     [facility]: Hadii june stanley hadasho Soluis, waaxda luqadaha, qaybta kaalmada adeegyada, fahad ricardo . So [facility] [phone].    ATENCIÓN: Si habla español, tiene a delacruz disposición servicios gratuitos de asistencia lingüística. Helen al [phone].    We comply with applicable federal civil rights laws and [place] laws. We do not discriminate on the basis of race, color, national origin, age, disability sex, sexual orientation or gender identity.            Thank you!     Thank you for choosing PSY[facility]ATRY CLINIC  for your care. Our goal is always to provide you with excellent care. Hearing back from our patients is one way we can continue to improve our services. Please take a few minutes to complete the written survey that you may receive in the mail after your visit with us. Thank you!             Your Updated Medication List - Protect others around you: Learn how to safely use, store and throw away your medicines at www.disposemymeds.org.          This list is accurate as of: 8/25/17 11:59 PM.  Always use your most recent med list.                   Brand Name Dispense Instructions for use Diagnosis     acetaminophen 500 MG Caps      Take 500 mg by mouth 2 times daily    Primary osteoarthritis of right hip       allopurinol 100 MG tablet    ZYLOPRIM    90 tablet    Take 1 tablet (100 mg) by mouth daily    Routine health maintenance       aspirin 325 MG EC tablet     90 tablet    Take 1 tablet (325 mg) by mouth daily    Routine health maintenance       atorvastatin 20 MG tablet    LIPITOR    90 tablet    Take 1 tablet (20 mg) by mouth daily    Routine health maintenance       cholecalciferol 1000 UNIT tablet    vitamin D    100 tablet    Take 2 tablets (2,000 Units) by mouth daily    Routine health maintenance, IGT (impaired glucose tolerance), Gastroesophageal reflux disease without esophagitis       FLUoxetine 20 MG capsule    PROzac    270 capsule    Take 3 capsules (60 mg) by mouth every morning    Depression, unspecified depression type, Other obsessive-compulsive disorder       furosemide 20 MG tablet    LASIX    100 tablet    Take 1 tablet (20 mg) by mouth daily    Routine health maintenance, IGT (impaired glucose tolerance), Gastroesophageal reflux disease without esophagitis       lisinopril 40 MG tablet    PRINIVIL/ZESTRIL    90 tablet    Take 1 tablet (40 mg) by mouth daily    HTN (hypertension)       omeprazole 40 MG capsule    priLOSEC    200 capsule    Take 1 capsule (40 mg) by mouth 2 times daily as needed    Gastroesophageal reflux disease without esophagitis, Routine health maintenance, IGT (impaired glucose tolerance)       QUEtiapine 25 MG tablet    SEROquel    90 tablet    Take one tablet by mouth every evening with supper    OCD (obsessive compulsive disorder), Anxiety disorder, unspecified

## 2017-08-29 ENCOUNTER — OFFICE VISIT (OUTPATIENT)
Dept: PSYCHOLOGY | Facility: CLINIC | Age: 82
End: 2017-08-29
Payer: COMMERCIAL

## 2017-08-29 DIAGNOSIS — F42.8 OTHER OBSESSIVE-COMPULSIVE DISORDER: Primary | ICD-10-CM

## 2017-08-29 PROCEDURE — 90834 PSYTX W PT 45 MINUTES: CPT | Performed by: SOCIAL WORKER

## 2017-08-29 ASSESSMENT — PATIENT HEALTH QUESTIONNAIRE - PHQ9
SUM OF ALL RESPONSES TO PHQ QUESTIONS 1-9: 5
5. POOR APPETITE OR OVEREATING: NOT AT ALL

## 2017-08-29 ASSESSMENT — ANXIETY QUESTIONNAIRES
5. BEING SO RESTLESS THAT IT IS HARD TO SIT STILL: NOT AT ALL
6. BECOMING EASILY ANNOYED OR IRRITABLE: SEVERAL DAYS
IF YOU CHECKED OFF ANY PROBLEMS ON THIS QUESTIONNAIRE, HOW DIFFICULT HAVE THESE PROBLEMS MADE IT FOR YOU TO DO YOUR WORK, TAKE CARE OF THINGS AT HOME, OR GET ALONG WITH OTHER PEOPLE: SOMEWHAT DIFFICULT
3. WORRYING TOO MUCH ABOUT DIFFERENT THINGS: SEVERAL DAYS
GAD7 TOTAL SCORE: 5
1. FEELING NERVOUS, ANXIOUS, OR ON EDGE: SEVERAL DAYS
2. NOT BEING ABLE TO STOP OR CONTROL WORRYING: SEVERAL DAYS
7. FEELING AFRAID AS IF SOMETHING AWFUL MIGHT HAPPEN: SEVERAL DAYS

## 2017-08-29 NOTE — PROGRESS NOTES
"                                             Progress Note    Client Name: Sharon Baer  Date: 8/29/17         Service Type: Individual      Session Start Time: 3:15  Session End Time: 4:00      Session Length: 45     Session #: 15     Attendees: Client    Treatment Plan Last Reviewed: tx plan created on 9/19/16-reviewed & updated  6-20-17  PHQ-9 / KIET-7 : Completed this  session      DATA      Progress Since Last Session (Related to Symptoms / Goals / Homework):   Symptoms: Stable    Homework: Completed in session discussed distraction activities he can engage in and continue the thought stopping process when obsessive thoughts appear. Discussed what he tried to decrease in regard to his intrusive harm thoughts. Processed harmful thoughts and reassured client that he has not acted on them.  Continue activities he engages in and how these activities can distract thoughts so he does not get caught up in his negative thinking.  Discussed people and events that he was grateful for in his life.  Client likes to discuss his past and happy and fond memories of the past.  This makes client feel good.  Client created list of important people and descriptors of them and we processed in session. Discussed and reviewed CBT skills, acceptance of OCD thoughts and distraction activities to get mind off of harmful thoughts he has towards others.  We worked on two gratitude activities in book and client stated he would work on this. Did not bring in book today so we discussed activities he engages in and did depression and anxiety screening. Reviewed tx plan in session today. Started with refocusing and stating the mantra of \"It's not me-It's my OCD\" to develop awareness and mindfulness that it is an obsessive thought he get's caught up in when he has negative harmful thoughts. Continue to reattribute, relabel and refocus when OCD thoughts appear, engage in distraction activities discussed in session for 15 minutes to get " "mind off of obsessive thoughts.  Concentrate on writing book which makes him feel good, stay away from negative news stories that create anxiety.  Concentrate on gratitude and things that he accomplishes each day and write three of them down a day to feel better about self.     Episode of Care Goals: Minimal progress - PREPARATION (Decided to change - considering how); Intervened by negotiating a change plan and determining options / strategies for behavior change, identifying triggers, exploring social supports, and working towards setting a date to begin behavior change     Current / Ongoing Stressors and Concerns:   Moved to new residence, has thoughts of hurting others and suicidal ideation which is apart of his Harm OCD.  He has never acted on these thoughts.      Treatment Objective(s) Addressed in This Session:   use thought-stopping strategy daily to reduce intrusive thoughts   Engage in activities that he enjoys to distract obssesive thoughts  Chew on ice or use cold pack on face to distract from anxiety about thoughts  Utilize deep breathing when stressed or anxious              Write in Book \"Handbook for Happiness\"                Complete \"Wake up in Gratitude and in the moment\" exercise in the book.  Joyful attention skills.-bring in next session            Both the PHQ9 and GAD7 scores higher this session. Discussed worries and concerns about the president and concerns about possible war for the future.  Reframed and concentrated on positives and                     what he is grateful for.               Concentrated on CBT skills and cognitive restructuring this session, acceptance therapy of negative harmful thoughts, Concentrated on strengths and positives in life.               Complete two gratitude exercises daily if he can-especially when he is ruminating on OCD thoughts               Engage in activities that distract from his thinking especially harmful thoughts and think of them as a wave and " go with them instead of fighting them.  Knowing that he will not act on them.                Refocus obsessions and use mantra discussed in session when obsessive thoughts occur.                Practice OCD skills discussed in session, engage in distraction activities that he enjoys, practice gratitude and what he has accomplished each day to feel better about self and improve self esteem    Intervention:   CBT: start teaching skills - gave client mood log and feelings worksheet to try and bring into next session.                Engage in Relaxation and distraction activities that he enjoys    Deep breathing exercises  Thought stopping strategy and engage in activities that he enjoys to distract from negative thinking.    Use guided imagery and go to pleasant memories and events to distract from negative intrusive thoughts when needed.  Complete some activities in happiness Book-bring in and we can process work   Emphasized CBT skills--gratitude and positive thoughts, acceptance therapy, strengths   Gratitude exercises  Acceptance and distraction activities to get off of ruminating thoughts  Refocus thoughts and develop mindfulness  Continue OCD strategies, concentrate on gratitude and self compassion   ASSESSMENT: Current Emotional / Mental Status (status of significant symptoms):   Risk status (Self / Other harm or suicidal ideation)   Client reports the following current fears or concerns for personal safety: concerned that he might hurt someone or self. (OCD Harm type) .   Client reports the following current or recent suicidal ideation or behaviors: Has obsessive thoughts about not wanting to be alive and suicidal ideation.  Has not attempted in past apart of his obsessive thinking. .   Client reports current or recent homicidal ideation or behaviors including thinking about hurting others but has not acted on this.    Client denies current or recent self injurious behavior or ideation.   Client denies other  "safety concerns.   A safety and risk management plan has not been developed at this time, however client was given the after-hours number / 911 should there be a change in any of these risk factors.     Appearance:   Appropriate    Eye Contact:   Fair    Psychomotor Behavior: Retarded (Slowed)    Attitude:   Cooperative    Orientation:   All   Speech    Rate / Production: Slow     Volume:  Soft    Mood:    Anxious  Irritable    Affect:    Blunted  Flat  Restricted  Subdued    Thought Content:  Rumination    Thought Form:  Blocking    Insight:    Fair      Medication Review:   No changes to current psychiatric medication(s)     Medication Compliance:   Yes     Changes in Health Issues:   None reported     Chemical Use Review:   Substance Use: Chemical use reviewed, no active concerns identified      Tobacco Use: No current tobacco use.       Collateral Reports Completed:   Not Applicable    PLAN: (Client Tasks / Therapist Tasks / Other)  Client to start using thought stopping process when he has obsessive thoughts of hurting others or self.  Engage in activities that he enjoys like: watching movies, comedies, cross word puzzles, word find games, coloring or drawing.  Chew on ice or use cold pack on face to distract thinking if anxiety is too great.  Engage in activities with others when he feels comfortable doing this. Practice deep breathing skills to decrease obsessive thoughts. Client was given mood log and feelings worksheet to read and bring into next session to process with therapist.  Ask for DVD's of movies or shows that he enjoys.   Listen to music and continue computer use when intrusive thoughts appear.  Think of pleasant memories and happy times from the past and write them down and go to list when he get's stuck in negative obsessive thoughts.   Continue with exercises discussed in session to complete in \"Handbook for Happiness\" .  Complete Wake up in gratitude and in the moment activity in book--to help " "with joyful attention skills. Discuss and process in next session. Reviewed and practiced CBT skills--talked about positive things and what client was grateful for.  Concentrate on positive things and try to let go of negative intrusive thoughts. Revisited acceptance therapy and think of negative thoughts as a wave, reframe to positive thoughts, concentrated on strengths.  Client brought in Acevedo to session--we discussed 2 gratitude exercises to work on.  Once when he gets up in the morning and thinks about people he is grateful for.  The other to concentrate on nature and gratitude of the environment and the beauty of the outdoors. Engage in distraction activities and acceptance of harmful thoughts-like a wave-knowing that he will not act on them. Started to discuss the book \"Brainlock\" and learning about the brain and reviewed tx plan in session today.  Started with refocusing and stating the mantra of \"It's not me-It's my OCD\" to develop awareness and mindfulness that it is an obsessive thought he get's caught up in when he has negative harmful thoughts. Discussed with Reilly it is a chemical issue in his brain that is locked up and to practice this mantra when he has these obsessions.Continue to reattribute, relabel and refocus when OCD thoughts appear, engage in distraction activities discussed in session for 15 minutes to get mind off of obsessive thoughts.  Concentrate on writing book which makes him feel good, stay away from negative news stories that create anxiety.  Concentrate on gratitude and things that he accomplishes each day and write three of them down a day to feel better about self.              Loi Prescott, [facility]                                                         ________________________________________________________________________    Treatment Plan    Client's Name: Sharon Baer  YOB: 1931    Date: 9/19/16    DSM-V Diagnoses: 300.3 (F42) Obsessive Compulsive " Disorder (Harm OCD type)  Psychosocial / Contextual Factors: moving to new residence, has autism Spectrum disorder. Learning disorder, obsessive thoughts about harming others and self.  Increased anxiety.  WHODAS: Completed first session    Referral / Collaboration:  Referral to another professional/service is not indicated at this time..    Anticipated number of session or this episode of care: 20      MeasurableTreatment Goal(s) related to diagnosis / functional impairment(s)  Goal 1: Client will function daily at a consistent level with minimal interference from obsessions and compulsions.     I will know I've met my goal when I do not have increased anxiety that I will hurt others or self.       Objective #A (Client Action)    Client will use thought-stopping strategy daily to reduce intrusive thoughts.  Status: Continued - Date(s): 6-20-17    Intervention(s)  Therapist will teach thought stopping process to interrupt obsessions.      Objective #B  Client will learn CBT skills to to identify distorted automatic thoughts and beliefs.  .  Status: Continued - Date(s): 6-20-17    Intervention(s)  Therapist will teach CBT skills. .    Objective #C  Client will practice 2 relaxation techniques and engage in 3 distraction activities that reduce obsessive thoughts .  Status: Continued - Date(s): 6-20-17    Intervention(s)  Therapist will assign homework determine a list of activities that client can use to distract form obsessive thinking.   Teach relaxation techniques that are helpful to reduce overall anxiety.       Goal 2: Client will resolve key life conflicts and the emotional stress that fuels obsessive-compulsive behavior patterns.     I will know I've met my goal when I feel settled and comfortable with my life.      Objective #A (Client Action)    Status: Continued - Date(s):6-20-17     Client will engage in acceptance and commitment  (ACT) therapy or exposure and ritual prevention therapy to reduce obsessions.  "    Intervention(s)  Therapist will teach ACT or ERP therapy with client and evaluate it's effectiveness in reducing intrusive obsessive thoughts.      Objective #B  Client will limit amount of time spent on repetitive or obsessive thoughts to 15 minutes.    Status: Continued - Date(s): 6-20-17    Intervention(s)  Therapist will teach and assign homework to determine time to accomplish good time to limit obsessive thoughts and evaluate it's effectiveness. .    Objective #C  Client will learn skills based onthe book \"Brainlock\" to manage OCD obsessions and compulsions.     Status: Continued - Date(s): 6-20-17    Intervention(s)  Therapist will teach 4 step process of relabeling. reattributing, refocusing and revaluing to diminish OCD intrusive thoughts.         Client has reviewed and agreed to the above plan.      Loi Prescott, [facility]  September 20, 2016                                               "

## 2017-08-29 NOTE — MR AVS SNAPSHOT
MRN:[id number]                      After Visit Summary   8/29/2017    Sharon Baer    MRN: [id number]           Visit Information        Provider Department      8/29/2017 3:00 PM Loi Prescott LICSW [facility]      Your next 10 appointments already scheduled     Sep 19, 2017  3:00 PM CDT   Return Visit with DANNI Davidson   [facility] ([facility])    [address] [facility] [address] [phone]            Sep 22, 2017  2:40 PM CDT   (Arrive by 2:25 PM)   Return Visit with Neo Castano MD   [facility] ([facility])    [address] [address] [phone]            Oct 18, 2017  9:45 AM CDT   Adult Med Follow UP with TINY Knox CNS   Psychiatry Clinic ([facility])    [place] [id number] [place] [address] [phone]              MyChart Information     Breeze Technologyhart gives you secure access to your electronic health record. If you see a primary care provider, you can also send messages to your care team and make appointments. If you have questions, please call your primary care clinic.  If you do not have a primary care provider, please call [phone] and they will assist you.        Care EveryWhere ID     This is your Care EveryWhere ID. This could be used by other organizations to access your [place] medical records  [id number]        Equal Access to Services     [facility]: Hadii aad ku hadasho Soomaali, waaxda luqadaha, qaybta kaalmada adeegyada, waxay ari wagner. So [facility] [phone].    ATENCIÓN: Si habla español, tiene a delacrzu disposición servicios gratuitos de asistencia lingüística. Llame al [phone].    We comply with applicable federal civil rights laws and [place]  laws. We do not discriminate on the basis of race, color, national origin, age, disability sex, sexual orientation or gender identity.

## 2017-08-30 ASSESSMENT — ANXIETY QUESTIONNAIRES: GAD7 TOTAL SCORE: 5

## 2017-09-19 ENCOUNTER — OFFICE VISIT (OUTPATIENT)
Dept: PSYCHOLOGY | Facility: CLINIC | Age: 82
End: 2017-09-19
Payer: COMMERCIAL

## 2017-09-19 DIAGNOSIS — F42.8 OTHER OBSESSIVE-COMPULSIVE DISORDER: Primary | ICD-10-CM

## 2017-09-19 PROCEDURE — 90834 PSYTX W PT 45 MINUTES: CPT | Performed by: SOCIAL WORKER

## 2017-09-19 ASSESSMENT — ANXIETY QUESTIONNAIRES
6. BECOMING EASILY ANNOYED OR IRRITABLE: SEVERAL DAYS
2. NOT BEING ABLE TO STOP OR CONTROL WORRYING: SEVERAL DAYS
IF YOU CHECKED OFF ANY PROBLEMS ON THIS QUESTIONNAIRE, HOW DIFFICULT HAVE THESE PROBLEMS MADE IT FOR YOU TO DO YOUR WORK, TAKE CARE OF THINGS AT HOME, OR GET ALONG WITH OTHER PEOPLE: SOMEWHAT DIFFICULT
3. WORRYING TOO MUCH ABOUT DIFFERENT THINGS: MORE THAN HALF THE DAYS
1. FEELING NERVOUS, ANXIOUS, OR ON EDGE: SEVERAL DAYS
5. BEING SO RESTLESS THAT IT IS HARD TO SIT STILL: NOT AT ALL
GAD7 TOTAL SCORE: 6
7. FEELING AFRAID AS IF SOMETHING AWFUL MIGHT HAPPEN: SEVERAL DAYS

## 2017-09-19 ASSESSMENT — PATIENT HEALTH QUESTIONNAIRE - PHQ9
5. POOR APPETITE OR OVEREATING: NOT AT ALL
SUM OF ALL RESPONSES TO PHQ QUESTIONS 1-9: 5

## 2017-09-19 NOTE — PROGRESS NOTES
"                                             Progress Note    Client Name: Sharon Baer  Date: 9/19/17         Service Type: Individual      Session Start Time: 3:00  Session End Time: 3:45      Session Length: 45     Session #: 16     Attendees: Client    Treatment Plan Last Reviewed: tx plan created on 9/19/16-reviewed & updated  6-20-17  PHQ-9 / KIET-7 : Completed this  session      DATA      Progress Since Last Session (Related to Symptoms / Goals / Homework):   Symptoms: Stable    Homework: Completed in session discussed distraction activities he can engage in and continue the thought stopping process when obsessive thoughts appear. Discussed what he tried to decrease in regard to his intrusive harm thoughts. Processed harmful thoughts and reassured client that he has not acted on them.  Continue activities he engages in and how these activities can distract thoughts so he does not get caught up in his negative thinking.  Discussed people and events that he was grateful for in his life.  Client likes to discuss his past and happy and fond memories of the past.  This makes client feel good.  Client created list of important people and descriptors of them and we processed in session. Discussed and reviewed CBT skills, acceptance of OCD thoughts and distraction activities to get mind off of harmful thoughts he has towards others.  We worked on two gratitude activities in book and client stated he would work on this. Did not bring in book today so we discussed activities he engages in and did depression and anxiety screening. Reviewed tx plan in session today. Started with refocusing and stating the mantra of \"It's not me-It's my OCD\" to develop awareness and mindfulness that it is an obsessive thought he get's caught up in when he has negative harmful thoughts. Continue to reattribute, relabel and refocus when OCD thoughts appear, engage in distraction activities discussed in session for 15 minutes to get " "mind off of obsessive thoughts.  Concentrate on writing book which makes him feel good, stay away from negative news stories that create anxiety.  Concentrate on gratitude and things that he accomplishes each day and write three of them down a day to feel better about self.  Discussed ways to be assertive with staff if he wants something like weaker coffee, what his bill is and what he is being charged, better food.  Gave client worksheets to read on assertiveness and a copy of his persona;l bill of rights.  Reinforced OCD skills that we have discussed in previous sessions.      Episode of Care Goals: Minimal progress - PREPARATION (Decided to change - considering how); Intervened by negotiating a change plan and determining options / strategies for behavior change, identifying triggers, exploring social supports, and working towards setting a date to begin behavior change     Current / Ongoing Stressors and Concerns:   Moved to new residence, has thoughts of hurting others and suicidal ideation which is apart of his Harm OCD.  He has never acted on these thoughts.      Treatment Objective(s) Addressed in This Session:   use thought-stopping strategy daily to reduce intrusive thoughts   Engage in activities that he enjoys to distract obssesive thoughts  Chew on ice or use cold pack on face to distract from anxiety about thoughts  Utilize deep breathing when stressed or anxious              Write in Book \"Handbook for Happiness\"                Complete \"Wake up in Gratitude and in the moment\" exercise in the book.  Joyful attention skills.-bring in next session            Both the PHQ9 and GAD7 scores higher this session. Discussed worries and concerns about the president and concerns about possible war for the future.  Reframed and concentrated on positives and                     what he is grateful for.               Concentrated on CBT skills and cognitive restructuring this session, acceptance therapy of " negative harmful thoughts, Concentrated on strengths and positives in life.               Complete two gratitude exercises daily if he can-especially when he is ruminating on OCD thoughts               Engage in activities that distract from his thinking especially harmful thoughts and think of them as a wave and go with them instead of fighting them.  Knowing that he will not act on them.                Refocus obsessions and use mantra discussed in session when obsessive thoughts occur.                Practice OCD skills discussed in session, engage in distraction activities that he enjoys, practice gratitude and what he has accomplished each day to feel better about self and improve self esteem                Assertiveness skills, reinforced OCD skills to practice daily   Intervention:   CBT: start teaching skills - gave client mood log and feelings worksheet to try and bring into next session.                Engage in Relaxation and distraction activities that he enjoys    Deep breathing exercises  Thought stopping strategy and engage in activities that he enjoys to distract from negative thinking.    Use guided imagery and go to pleasant memories and events to distract from negative intrusive thoughts when needed.  Complete some activities in happiness Book-bring in and we can process work   Emphasized CBT skills--gratitude and positive thoughts, acceptance therapy, strengths   Gratitude exercises  Acceptance and distraction activities to get off of ruminating thoughts  Refocus thoughts and develop mindfulness  Continue OCD strategies, concentrate on gratitude and self compassion  OCD strategies and assertiveness   ASSESSMENT: Current Emotional / Mental Status (status of significant symptoms):   Risk status (Self / Other harm or suicidal ideation)   Client reports the following current fears or concerns for personal safety: concerned that he might hurt someone or self. (OCD Harm type) .   Client reports the  following current or recent suicidal ideation or behaviors: Has obsessive thoughts about not wanting to be alive and suicidal ideation.  Has not attempted in past apart of his obsessive thinking. .   Client reports current or recent homicidal ideation or behaviors including thinking about hurting others but has not acted on this.    Client denies current or recent self injurious behavior or ideation.   Client denies other safety concerns.   A safety and risk management plan has not been developed at this time, however client was given the after-hours number / 911 should there be a change in any of these risk factors.     Appearance:   Appropriate    Eye Contact:   Fair    Psychomotor Behavior: Retarded (Slowed)    Attitude:   Cooperative    Orientation:   All   Speech    Rate / Production: Slow     Volume:  Soft    Mood:    Anxious    Affect:    Blunted  Flat  Restricted  Subdued    Thought Content:  Rumination    Thought Form:  Blocking    Insight:    Fair      Medication Review:   No changes to current psychiatric medication(s)     Medication Compliance:   Yes     Changes in Health Issues:   None reported     Chemical Use Review:   Substance Use: Chemical use reviewed, no active concerns identified      Tobacco Use: No current tobacco use.       Collateral Reports Completed:   Not Applicable    PLAN: (Client Tasks / Therapist Tasks / Other)  Client to start using thought stopping process when he has obsessive thoughts of hurting others or self.  Engage in activities that he enjoys like: watching movies, comedies, cross word puzzles, word find games, coloring or drawing.  Chew on ice or use cold pack on face to distract thinking if anxiety is too great.  Engage in activities with others when he feels comfortable doing this. Practice deep breathing skills to decrease obsessive thoughts. Client was given mood log and feelings worksheet to read and bring into next session to process with therapist.  Ask for DVD's of  "movies or shows that he enjoys.   Listen to music and continue computer use when intrusive thoughts appear.  Think of pleasant memories and happy times from the past and write them down and go to list when he get's stuck in negative obsessive thoughts.   Continue with exercises discussed in session to complete in \"Handbook for Happiness\" .  Complete Wake up in gratitude and in the moment activity in book--to help with joyful attention skills. Discuss and process in next session. Reviewed and practiced CBT skills--talked about positive things and what client was grateful for.  Concentrate on positive things and try to let go of negative intrusive thoughts. Revisited acceptance therapy and think of negative thoughts as a wave, reframe to positive thoughts, concentrated on strengths.  Client brought in Acevedo to session--we discussed 2 gratitude exercises to work on.  Once when he gets up in the morning and thinks about people he is grateful for.  The other to concentrate on nature and gratitude of the environment and the beauty of the outdoors. Engage in distraction activities and acceptance of harmful thoughts-like a wave-knowing that he will not act on them. Started to discuss the book \"Brainlock\" and learning about the brain and reviewed tx plan in session today.  Started with refocusing and stating the mantra of \"It's not me-It's my OCD\" to develop awareness and mindfulness that it is an obsessive thought he get's caught up in when he has negative harmful thoughts. Discussed with Reilly it is a chemical issue in his brain that is locked up and to practice this mantra when he has these obsessions.Continue to reattribute, relabel and refocus when OCD thoughts appear, engage in distraction activities discussed in session for 15 minutes to get mind off of obsessive thoughts.  Concentrate on writing book which makes him feel good, stay away from negative news stories that create anxiety.  Concentrate on gratitude and things " that he accomplishes each day and write three of them down a day to feel better about self.  Reinforced and reviewed OCD strategies and gave client assertiveness worksheets to work on at his residence and gave him a personal bill of rights sheet to review and read on a regular basis.             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 Learning disorder, obsessive thoughts about harming others and self.  Increased anxiety.  WHODAS: Completed first session    Referral / Collaboration:  Referral to another professional/service is not indicated at this time..    Anticipated number of session or this episode of care: 20      MeasurableTreatment Goal(s) related to diagnosis / functional impairment(s)  Goal 1: Client will function daily at a consistent level with minimal interference from obsessions and compulsions.     I will know I've met my goal when I do not have increased anxiety that I will hurt others or self.       Objective #A (Client Action)    Client will use thought-stopping strategy daily to reduce intrusive thoughts.  Status: Continued - Date(s): 6-20-17    Intervention(s)  Therapist will teach thought stopping process to interrupt obsessions.      Objective #B  Client will learn CBT skills to to identify distorted automatic thoughts and beliefs.  .  Status: Continued - Date(s): 6-20-17    Intervention(s)  Therapist will teach CBT skills. .    Objective #C  Client will practice 2 relaxation techniques and engage in 3 distraction activities that reduce obsessive thoughts .  Status: Continued - Date(s):  "6-20-17    Intervention(s)  Therapist will assign homework determine a list of activities that client can use to distract form obsessive thinking.   Teach relaxation techniques that are helpful to reduce overall anxiety.       Goal 2: Client will resolve key life conflicts and the emotional stress that fuels obsessive-compulsive behavior patterns.     I will know I've met my goal when I feel settled and comfortable with my life.      Objective #A (Client Action)    Status: Continued - Date(s):6-20-17     Client will engage in acceptance and commitment  (ACT) therapy or exposure and ritual prevention therapy to reduce obsessions.     Intervention(s)  Therapist will teach ACT or ERP therapy with client and evaluate it's effectiveness in reducing intrusive obsessive thoughts.      Objective #B  Client will limit amount of time spent on repetitive or obsessive thoughts to 15 minutes.    Status: Continued - Date(s): 6-20-17    Intervention(s)  Therapist will teach and assign homework to determine time to accomplish good time to limit obsessive thoughts and evaluate it's effectiveness. .    Objective #C  Client will learn skills based onthe book \"Brainlock\" to manage OCD obsessions and compulsions.     Status: Continued - Date(s): 6-20-17    Intervention(s)  Therapist will teach 4 step process of relabeling. reattributing, refocusing and revaluing to diminish OCD intrusive thoughts.         Client has reviewed and agreed to the above plan.      Loi Prescott, [facility]  September 20, 2016                                               "

## 2017-09-19 NOTE — MR AVS SNAPSHOT
MRN:[id number]                      After Visit Summary   9/19/2017    Sharon Baer    MRN: [id number]           Visit Information        Provider Department      9/19/2017 3:00 PM Loi Prescott LICSW [facility]      Your next 10 appointments already scheduled     Sep 22, 2017  2:40 PM CDT   (Arrive by 2:25 PM)   Return Visit with Neo Castano MD   [facility] ([facility])    [address] [id number] [phone]            Oct 18, 2017  9:45 AM CDT   Adult Med Follow UP with TINY Knox CNS   Psychiatry Clinic ([facility])    [place] [id number] [facility] [address] [phone]            Oct 19, 2017  2:30 PM CDT   Return Visit with DANNI Davidson   [facility] ([facility])    [address] [place] [id number] [phone]              MyChart Information     [email or web address] gives you secure access to your electronic health record. If you see a primary care provider, you can also send messages to your care team and make appointments. If you have questions, please call your primary care clinic.  If you do not have a primary care provider, please call [phone] and they will assist you.        Care EveryWhere ID     This is your Care EveryWhere ID. This could be used by other organizations to access your [facility] medical records  [id number]        Equal Access to Services     ESTELITA HARE AH: Hadii june stanley hadasho Solauraali, waaxda luqadaha, qaybta kaalmada adeegyada, fahad wagner. So [facility] [phone].    ATENCIÓN: Si habla español, tiene a delacruz disposición servicios gratuitos de asistencia lingüística. Llame al [phone].    We comply with applicable federal civil rights laws and [place]  laws. We do not discriminate on the basis of race, color, national origin, age, disability sex, sexual orientation or gender identity.

## 2017-09-20 ASSESSMENT — ANXIETY QUESTIONNAIRES: GAD7 TOTAL SCORE: 6

## 2017-09-22 ENCOUNTER — OFFICE VISIT (OUTPATIENT)
Dept: INTERNAL MEDICINE | Facility: CLINIC | Age: 82
End: 2017-09-22

## 2017-09-22 VITALS
HEART RATE: 63 BPM | WEIGHT: 198.6 LBS | OXYGEN SATURATION: 96 % | SYSTOLIC BLOOD PRESSURE: 155 MMHG | DIASTOLIC BLOOD PRESSURE: 86 MMHG | RESPIRATION RATE: 18 BRPM | BODY MASS INDEX: 31.1 KG/M2

## 2017-09-22 DIAGNOSIS — N28.9 RENAL INSUFFICIENCY: ICD-10-CM

## 2017-09-22 DIAGNOSIS — R73.02 IGT (IMPAIRED GLUCOSE TOLERANCE): ICD-10-CM

## 2017-09-22 DIAGNOSIS — Z00.00 ROUTINE HEALTH MAINTENANCE: ICD-10-CM

## 2017-09-22 DIAGNOSIS — M10.9 GOUT, UNSPECIFIED CAUSE, UNSPECIFIED CHRONICITY, UNSPECIFIED SITE: ICD-10-CM

## 2017-09-22 DIAGNOSIS — I10 ESSENTIAL HYPERTENSION: Primary | ICD-10-CM

## 2017-09-22 DIAGNOSIS — Z79.899 ENCOUNTER FOR LONG-TERM (CURRENT) USE OF MEDICATIONS: ICD-10-CM

## 2017-09-22 DIAGNOSIS — Z91.81 AT RISK FOR FALLING: ICD-10-CM

## 2017-09-22 DIAGNOSIS — I10 ESSENTIAL HYPERTENSION: ICD-10-CM

## 2017-09-22 LAB
ALBUMIN SERPL-MCNC: 3.5 G/DL (ref 3.4–5)
ALP SERPL-CCNC: 106 U/L (ref 40–150)
ALT SERPL W P-5'-P-CCNC: 18 U/L (ref 0–70)
ANION GAP SERPL CALCULATED.3IONS-SCNC: 7 MMOL/L (ref 3–14)
AST SERPL W P-5'-P-CCNC: 13 U/L (ref 0–45)
BASOPHILS # BLD AUTO: 0.1 10E9/L (ref 0–0.2)
BASOPHILS NFR BLD AUTO: 0.9 %
BILIRUB SERPL-MCNC: 0.4 MG/DL (ref 0.2–1.3)
BUN SERPL-MCNC: 27 MG/DL (ref 7–30)
CALCIUM SERPL-MCNC: 8.9 MG/DL (ref 8.5–10.1)
CHLORIDE SERPL-SCNC: 111 MMOL/L (ref 94–109)
CO2 SERPL-SCNC: 22 MMOL/L (ref 20–32)
CREAT SERPL-MCNC: 1.33 MG/DL (ref 0.66–1.25)
DIFFERENTIAL METHOD BLD: NORMAL
EOSINOPHIL # BLD AUTO: 0.3 10E9/L (ref 0–0.7)
EOSINOPHIL NFR BLD AUTO: 4.3 %
ERYTHROCYTE [DISTWIDTH] IN BLOOD BY AUTOMATED COUNT: 14.9 % (ref 10–15)
GFR SERPL CREATININE-BSD FRML MDRD: 51 ML/MIN/1.7M2
GLUCOSE SERPL-MCNC: 112 MG/DL (ref 70–99)
HBA1C MFR BLD: 6.6 % (ref 4.3–6)
HCT VFR BLD AUTO: 43.4 % (ref 40–53)
HGB BLD-MCNC: 13.7 G/DL (ref 13.3–17.7)
IMM GRANULOCYTES # BLD: 0 10E9/L (ref 0–0.4)
IMM GRANULOCYTES NFR BLD: 0.4 %
LYMPHOCYTES # BLD AUTO: 1.8 10E9/L (ref 0.8–5.3)
LYMPHOCYTES NFR BLD AUTO: 23.8 %
MCH RBC QN AUTO: 29.1 PG (ref 26.5–33)
MCHC RBC AUTO-ENTMCNC: 31.6 G/DL (ref 31.5–36.5)
MCV RBC AUTO: 92 FL (ref 78–100)
MONOCYTES # BLD AUTO: 0.5 10E9/L (ref 0–1.3)
MONOCYTES NFR BLD AUTO: 6.2 %
NEUTROPHILS # BLD AUTO: 5 10E9/L (ref 1.6–8.3)
NEUTROPHILS NFR BLD AUTO: 64.4 %
NRBC # BLD AUTO: 0 10*3/UL
NRBC BLD AUTO-RTO: 0 /100
PLATELET # BLD AUTO: 178 10E9/L (ref 150–450)
POTASSIUM SERPL-SCNC: 4.5 MMOL/L (ref 3.4–5.3)
PROT SERPL-MCNC: 7.6 G/DL (ref 6.8–8.8)
RBC # BLD AUTO: 4.71 10E12/L (ref 4.4–5.9)
SODIUM SERPL-SCNC: 140 MMOL/L (ref 133–144)
URATE SERPL-MCNC: 5.7 MG/DL (ref 3.5–7.2)
WBC # BLD AUTO: 7.7 10E9/L (ref 4–11)

## 2017-09-22 RX ORDER — ATORVASTATIN CALCIUM 20 MG/1
20 TABLET, FILM COATED ORAL DAILY
Qty: 100 TABLET | Refills: 3 | Status: SHIPPED | OUTPATIENT
Start: 2017-09-22 | End: 2018-02-28

## 2017-09-22 RX ORDER — LISINOPRIL 40 MG/1
40 TABLET ORAL DAILY
Qty: 100 TABLET | Refills: 3 | Status: SHIPPED | OUTPATIENT
Start: 2017-09-22 | End: 2018-02-28

## 2017-09-22 RX ORDER — ALLOPURINOL 100 MG/1
100 TABLET ORAL DAILY
Qty: 100 TABLET | Refills: 3 | Status: SHIPPED | OUTPATIENT
Start: 2017-09-22 | End: 2018-02-28

## 2017-09-22 ASSESSMENT — PAIN SCALES - GENERAL: PAINLEVEL: MILD PAIN (2)

## 2017-09-22 NOTE — NURSING NOTE
Chief Complaint   Patient presents with     Medication Follow-up     Patient is here to follow up on medications.      Lu Aguirre LPN at 2:39 PM on 9/22/2017.

## 2017-09-22 NOTE — PATIENT INSTRUCTIONS
[facility]: [phone]     [facility] Medication Refill Request Information:  * Please contact your pharmacy regarding ANY request for medication refills.  ** [facility] Prescription Fax = [phone]  * Please allow 3 business days for routine medication refills.  * Please allow 5 business days for controlled substance medication refills.     [facility] Test Result notification information:  *You will be notified with in 7-10 days of your appointment day regarding the results of your test.  If you are on MyChart you will be notified as soon as the provider has reviewed the results and signed off on them.

## 2017-09-22 NOTE — PROGRESS NOTES
HPI  86-year-old seen today with his sister for reevaluation of his blood pressure. He is currently on a consistent to a shin and a living facility. He's been doing well in regards to his autism and his obsessive-compulsive behavior. He's been feeling well but he isn't doing limited physical activity. He does limited walking and he is not participating in the daily exercise classes and we will discuss this. He is asymptomatic no chest pain dyspnea change in bowel movements or other complaints. His having no problems on his present medications. Is following with mental health and doing extremely well under their direction. His weight is been stable and his diet unchanged.    Past and Family hx reviewed and updated    Past Medical History:   Diagnosis Date     Autism spectrum disorder 7/16/2015     GERD (gastroesophageal reflux disease)      Gout      Hearing loss      Hyperlipemia     on atorvastatin     Hypertension      Hypotestosteronism      IGT (impaired glucose tolerance)      OCD (obsessive compulsive disorder)     on cymbalta     Osteoarthritis, hip, bilateral      Vitamin D deficiency     resolved 2/2013     Past Surgical History:   Procedure Laterality Date     bilateral cataract surgery       bilateral ear surgery       HERNIA REPAIR, INGUINAL RT/LT       SUPRAPUBIC PROSTATECTOMY       Family History   Problem Relation Age of Onset     CANCER Sister      skin     Myocardial Infarction Maternal Uncle 60     Social History     Social History     Marital status: Single     Spouse name: N/A     Number of children: N/A     Years of education: N/A     Occupational History     retired      Social History Main Topics     Smoking status: Former Smoker     Packs/day: 1.00     Years: 20.00     Smokeless tobacco: Never Used     Alcohol use No     Drug use: No     Sexual activity: Not Asked     Other Topics Concern     Special Diet No     Exercise No     Social History Narrative     Complete review of symptoms negative  except as noted above.    Exam:  /86  Pulse 63  Resp 18  Wt 90.1 kg (198 lb 9.6 oz)  SpO2 96%  BMI 31.1 kg/m2  198 lbs 9.6 oz  The patient is alert, oriented with a clear sensorium.   Skin shows no lesions or rashes and good turgor.   Head is normocephalic and atraumatic.    Neck shows no nodes, thyromegaly.     Lungs are clear.   Heart shows normal S1 and S2 without murmur or gallop.    Extremities show no edema.    ASSESSMENT  1 hypertension marginal control  2 hyperlipidemia on atorvastatin  3 impaired glucose tolerance needs reassessment  4 gout in remission on allopurinol  5 renal insufficiency needs reevaluation    Plan  Reinforced nonpharmacologic blood pressure control measures with an emphasis on increasing his physical activity and exercise. May have him have his labs done today she did get his flu shot at his home and will have him reassessed next year I refilled his medications for the next year.      Neo Castano MD  General Internal Medicine  [facility]  [phone]

## 2017-09-22 NOTE — MR AVS SNAPSHOT
After Visit Summary   9/22/2017    Sharon Baer    MRN: [id number]           Patient Information     Date Of Birth          6/10/1931        Visit Information        Provider Department      9/22/2017 2:40 PM Neo Castano MD [facility]        Today's Diagnoses     Essential hypertension    -  1    Renal insufficiency        At risk for falling        IGT (impaired glucose tolerance)        Gout, unspecified cause, unspecified chronicity, unspecified site        Routine health maintenance          Care Instructions    Primary Care Center: [phone]     Primary Care Center Medication Refill Request Information:  * Please contact your pharmacy regarding ANY request for medication refills.  ** [facility] Prescription Fax = [phone]  * Please allow 3 business days for routine medication refills.  * Please allow 5 business days for controlled substance medication refills.     Primary Care Center Test Result notification information:  *You will be notified with in 7-10 days of your appointment day regarding the results of your test.  If you are on MyChart you will be notified as soon as the provider has reviewed the results and signed off on them.            Follow-ups after your visit        Follow-up notes from your care team     Return in about 1 year (around 9/22/2018) for BP Recheck.      Your next 10 appointments already scheduled     Sep 22, 2017  3:15 PM CDT   LAB with  LAB   [facility] ([facility])    [address] [place] [address] [phone]           Patient must bring picture ID. Patient should be prepared to give a urine specimen  Please do not eat 10-12 hours before your appointment if you are coming in fasting for labs on lipids, cholesterol, or glucose (sugar). Pregnant women should follow their Care Team instructions. Water with medications is okay. Do not drink coffee or other fluids. If you  have concerns about taking  your medications, please ask at office or if scheduling via Flare Code, send a message by clicking on Secure Messaging, Message Your Care Team.            Oct 18, 2017  9:45 AM CDT   Adult Med Follow UP with TINY Knox CNS   Psychiatry Clinic ([facility])    [address]  [address] [facility] [address]   [phone]            Oct 19, 2017  2:30 PM CDT   Return Visit with Loi Prescott [facility] ([facility])    [address] [facility] [address] [phone]              Future tests that were ordered for you today     Open Future Orders        Priority Expected Expires Ordered    Uric acid Routine 9/22/2017 9/22/2018 9/22/2017    Hemoglobin A1c Routine  9/22/2018 9/22/2017    CBC with platelets differential Routine  9/22/2018 9/22/2017    Basic metabolic panel Routine  9/22/2018 9/22/2017            Who to contact     Please call your clinic at [phone] to:    Ask questions about your health    Make or cancel appointments    Discuss your medicines    Learn about your test results    Speak to your doctor   If you have compliments or concerns about an experience at your clinic, or if you wish to file a complaint, please contact [facility] Patient Relations at [phone] or email us at [email or web address]         Additional Information About Your Visit        [employer] Information     Flare Code gives you secure access to your electronic health record. If you see a primary care provider, you can also send messages to your care team and make appointments. If you have questions, please call your primary care clinic.  If you do not have a primary care provider, please call [phone] and they will assist you.      Flare Code is an electronic gateway that provides easy, online access to your medical records. With Flare Code,  you can request a clinic appointment, read your test results, renew a prescription or communicate with your care team.     To access your existing account, please contact your [facility] or call [phone] for assistance.        Care EveryWhere ID     This is your Care EveryWhere ID. This could be used by other organizations to access your [place] medical records  [id number]        Your Vitals Were     Pulse Respirations Pulse Oximetry BMI (Body Mass Index)          63 18 96% 31.1 kg/m2         Blood Pressure from Last 3 Encounters:   09/22/17 148/86   11/23/16 113/68   08/22/16 160/81    Weight from Last 3 Encounters:   09/22/17 90.1 kg (198 lb 9.6 oz)   11/23/16 90.7 kg (200 lb)   08/22/16 91.9 kg (202 lb 8 oz)                 Where to get your medicines      These medications were sent to [facility] - [place], [place] - [facility]  [address] [facility] [address]    Hours:  Mon-Fri 8:30am-5:00pm Toll Free [phone] Phone:  [phone]     allopurinol 100 MG tablet    atorvastatin 20 MG tablet    lisinopril 40 MG tablet          Primary Care Provider Office Phone # Fax #    Noe Castano [phone]       [address] [place] [address]  [facility] [address]        Equal Access to Services     ESTELITA [facility] HospitalCECILIA AH: Hadii june ku hadasho Soluis, waaxda luqadaha, qaybta kaalmada adeegyada, fahad wagner. So [facility] [phone].    ATENCIÓN: Si habla español, tiene a delacruz disposición servicios gratuitos de asistencia lingüística. Helen al [phone].    We comply with applicable federal civil rights laws and [place] laws. We do not discriminate on the basis of race, color, national origin, age, disability sex, sexual orientation or gender identity.            Thank you!     Thank you for choosing [facility]  for your care. Our goal is always to provide you with excellent  care. Hearing back from our patients is one way we can continue to improve our services. Please take a few minutes to complete the written survey that you may receive in the mail after your visit with us. Thank you!             Your Updated Medication List - Protect others around you: Learn how to safely use, store and throw away your medicines at www.disposemymeds.org.          This list is accurate as of: 9/22/17  3:05 PM.  Always use your most recent med list.                   Brand Name Dispense Instructions for use Diagnosis    acetaminophen 500 MG Caps      Take 500 mg by mouth 2 times daily    Primary osteoarthritis of right hip       allopurinol 100 MG tablet    ZYLOPRIM    100 tablet    Take 1 tablet (100 mg) by mouth daily    Routine health maintenance       aspirin 325 MG EC tablet     90 tablet    Take 1 tablet (325 mg) by mouth daily    Routine health maintenance       atorvastatin 20 MG tablet    LIPITOR    100 tablet    Take 1 tablet (20 mg) by mouth daily    Routine health maintenance       cholecalciferol 1000 UNIT tablet    vitamin D    100 tablet    Take 2 tablets (2,000 Units) by mouth daily    Routine health maintenance, IGT (impaired glucose tolerance), Gastroesophageal reflux disease without esophagitis       FLUoxetine 20 MG capsule    PROzac    270 capsule    Take 3 capsules (60 mg) by mouth every morning    Depression, unspecified depression type, Other obsessive-compulsive disorder       furosemide 20 MG tablet    LASIX    100 tablet    Take 1 tablet (20 mg) by mouth daily    Routine health maintenance, IGT (impaired glucose tolerance), Gastroesophageal reflux disease without esophagitis       lisinopril 40 MG tablet    PRINIVIL/ZESTRIL    100 tablet    Take 1 tablet (40 mg) by mouth daily    Essential hypertension       omeprazole 40 MG capsule    priLOSEC    200 capsule    Take 1 capsule (40 mg) by mouth 2 times daily as needed    Gastroesophageal reflux disease without esophagitis,  Routine health maintenance, IGT (impaired glucose tolerance)       QUEtiapine 25 MG tablet    SEROquel    90 tablet    Take one tablet by mouth every evening with supper    OCD (obsessive compulsive disorder), Anxiety disorder, unspecified

## 2017-10-19 ENCOUNTER — OFFICE VISIT (OUTPATIENT)
Dept: PSYCHOLOGY | Facility: CLINIC | Age: 82
End: 2017-10-19
Payer: COMMERCIAL

## 2017-10-19 DIAGNOSIS — F42.8 OTHER OBSESSIVE-COMPULSIVE DISORDER: Primary | ICD-10-CM

## 2017-10-19 PROCEDURE — 90834 PSYTX W PT 45 MINUTES: CPT | Performed by: SOCIAL WORKER

## 2017-10-19 NOTE — MR AVS SNAPSHOT
MRN:[id number]                      After Visit Summary   10/19/2017    Sharon Baer    MRN: [id number]           Visit Information        Provider Department      10/19/2017 2:30 PM Loi Prescott [facility]      Your next 10 appointments already scheduled     Oct 25, 2017  2:25 PM CDT   (Arrive by 2:10 PM)   Return Visit with Rebeca Lemons MD   [facility] ([facility])    [address] [facility]  [address] [facility] [id number] [phone]            Nov 01, 2017 10:45 AM CDT   Adult Med Follow UP with TINY Knox CNS   Psychiatry Clinic ([facility])    [place] [id number]  [address] [facility] [id number] [phone]            Nov 21, 2017  2:00 PM CST   Return Visit with DANNI Davidson   [facility] ([facility])    [address] [place] [address] [phone]              MyChart Information     [employer] gives you secure access to your electronic health record. If you see a primary care provider, you can also send messages to your care team and make appointments. If you have questions, please call your primary care clinic.  If you do not have a primary care provider, please call [phone] and they will assist you.        Care EveryWhere ID     This is your Care EveryWhere ID. This could be used by other organizations to access your [place] medical records  [id number]        Equal Access to Services     ESTELITA HARE AH: Hadii aad ku hadasho Soomaali, waaxda luqadaha, qaybta kaalmada adeegyada, waxbutch ari wagner. So [facility] [phone].    ATENCIÓN: Si habla español, tiene a delacruz disposición servicios gratuitos de asistencia lingüística. Llame al [phone].    We comply with applicable federal civil rights laws and [place] laws.  We do not discriminate on the basis of race, color, national origin, age, disability, sex, sexual orientation, or gender identity.
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
## 2017-10-25 ENCOUNTER — OFFICE VISIT (OUTPATIENT)
Dept: INTERNAL MEDICINE | Facility: CLINIC | Age: 82
End: 2017-10-25

## 2017-10-25 VITALS
WEIGHT: 190.2 LBS | DIASTOLIC BLOOD PRESSURE: 87 MMHG | TEMPERATURE: 97.7 F | HEART RATE: 86 BPM | RESPIRATION RATE: 18 BRPM | OXYGEN SATURATION: 95 % | BODY MASS INDEX: 29.78 KG/M2 | SYSTOLIC BLOOD PRESSURE: 157 MMHG

## 2017-10-25 DIAGNOSIS — R19.5 DARK STOOLS: ICD-10-CM

## 2017-10-25 DIAGNOSIS — Z23 NEED FOR PROPHYLACTIC VACCINATION AND INOCULATION AGAINST INFLUENZA: Primary | ICD-10-CM

## 2017-10-25 LAB
ERYTHROCYTE [DISTWIDTH] IN BLOOD BY AUTOMATED COUNT: 14.6 % (ref 10–15)
HCT VFR BLD AUTO: 46.9 % (ref 40–53)
HGB BLD-MCNC: 14.4 G/DL (ref 13.3–17.7)
MCH RBC QN AUTO: 28.5 PG (ref 26.5–33)
MCHC RBC AUTO-ENTMCNC: 30.7 G/DL (ref 31.5–36.5)
MCV RBC AUTO: 93 FL (ref 78–100)
PLATELET # BLD AUTO: 191 10E9/L (ref 150–450)
RBC # BLD AUTO: 5.05 10E12/L (ref 4.4–5.9)
WBC # BLD AUTO: 6.7 10E9/L (ref 4–11)

## 2017-10-25 ASSESSMENT — PAIN SCALES - GENERAL: PAINLEVEL: NO PAIN (0)

## 2017-10-25 NOTE — MR AVS SNAPSHOT
After Visit Summary   10/25/2017    Sharon Baer    MRN: [id number]           Patient Information     Date Of Birth          6/10/1931        Visit Information        Provider Department      10/25/2017 2:25 PM Rebeca Lemons MD [facility]        Today's Diagnoses     Need for prophylactic vaccination and inoculation against influenza    -  1    Dark stools          Care Instructions    Primary Care Center: [phone]     Primary Care Center Medication Refill Request Information:  * Please contact your pharmacy regarding ANY request for medication refills.  ** [facility] Prescription Fax = [phone]  * Please allow 3 business days for routine medication refills.  * Please allow 5 business days for controlled substance medication refills.     Primary Care Center Test Result notification information:  *You will be notified with in 7-10 days of your appointment day regarding the results of your test.  If you are on MyChart you will be notified as soon as the provider has reviewed the results and signed off on them.            Follow-ups after your visit        Your next 10 appointments already scheduled     Nov 01, 2017 10:45 AM CDT   Adult Med Follow UP with TINY Knox CNS   Psychiatry Clinic ([facility])    [address]  [address] [facility] [address] [phone]            Nov 21, 2017  2:00 PM CST   Return Visit with Loi Prescott [facility] ([facility])    [address] [facility] [id number] [phone]              Who to contact     Please call your clinic at [phone] to:    Ask questions about your health    Make or cancel appointments    Discuss your medicines    Learn about your test results    Speak to your doctor   If you have compliments or concerns about an experience at your clinic, or if you wish to file a complaint,  please contact [facility] Patient Relations at [phone] or email us at [email or web address]         Additional Information About Your Visit        [employer] Information     [employer] gives you secure access to your electronic health record. If you see a primary care provider, you can also send messages to your care team and make appointments. If you have questions, please call your primary care clinic.  If you do not have a primary care provider, please call [phone] and they will assist you.      [employer] is an electronic gateway that provides easy, online access to your medical records. With [employer], you can request a clinic appointment, read your test results, renew a prescription or communicate with your care team.     To access your existing account, please contact your [facility] or call [phone] for assistance.        Care EveryWhere ID     This is your Care EveryWhere ID. This could be used by other organizations to access your [facility] medical records  [id number]        Your Vitals Were     Pulse Temperature Respirations Pulse Oximetry BMI (Body Mass Index)       86 97.7  F (36.5  C) (Oral) 18 95% 29.78 kg/m2        Blood Pressure from Last 3 Encounters:   10/25/17 157/87   09/22/17 155/86   11/23/16 113/68    Weight from Last 3 Encounters:   10/25/17 86.3 kg (190 lb 3.2 oz)   09/22/17 90.1 kg (198 lb 9.6 oz)   11/23/16 90.7 kg (200 lb)              We Performed the Following     FLU VACCINE, INCREASED ANTIGEN, PRESV FREE, AGE 65+ [93637]        Primary Care Provider Office Phone # Fax #    Neo Castano [phone]       [address] [address]        Equal Access to Services     ESTELITA HARE : Jd Cazares, laith hardin, fahad patterson. So [facility] [phone].    ATENCIÓN: Si ken flores delacruz  disposición servicios gratuitos de asistencia lingüística. Helen arredondo [phone].    We comply with applicable federal civil rights laws and [place] laws. We do not discriminate on the basis of race, color, national origin, age, disability, sex, sexual orientation, or gender identity.            Thank you!     Thank you for choosing [facility]  for your care. Our goal is always to provide you with excellent care. Hearing back from our patients is one way we can continue to improve our services. Please take a few minutes to complete the written survey that you may receive in the mail after your visit with us. Thank you!             Your Updated Medication List - Protect others around you: Learn how to safely use, store and throw away your medicines at www.disposemymeds.org.          This list is accurate as of: 10/25/17  4:05 PM.  Always use your most recent med list.                   Brand Name Dispense Instructions for use Diagnosis    acetaminophen 500 MG Caps      Take 500 mg by mouth 2 times daily    Primary osteoarthritis of right hip       allopurinol 100 MG tablet    ZYLOPRIM    100 tablet    Take 1 tablet (100 mg) by mouth daily    Routine health maintenance       aspirin 325 MG EC tablet     90 tablet    Take 1 tablet (325 mg) by mouth daily    Routine health maintenance       atorvastatin 20 MG tablet    LIPITOR    100 tablet    Take 1 tablet (20 mg) by mouth daily    Routine health maintenance       cholecalciferol 1000 UNIT tablet    vitamin D3    100 tablet    Take 2 tablets (2,000 Units) by mouth daily    Routine health maintenance, IGT (impaired glucose tolerance), Gastroesophageal reflux disease without esophagitis       FLUoxetine 20 MG capsule    PROzac    270 capsule    Take 3 capsules (60 mg) by mouth every morning    Depression, unspecified depression type, Other obsessive-compulsive disorder       furosemide 20 MG tablet    LASIX    100 tablet    Take 1 tablet (20 mg) by  mouth daily    Routine health maintenance, IGT (impaired glucose tolerance), Gastroesophageal reflux disease without esophagitis       lisinopril 40 MG tablet    PRINIVIL/ZESTRIL    100 tablet    Take 1 tablet (40 mg) by mouth daily    Essential hypertension       omeprazole 40 MG capsule    priLOSEC    200 capsule    Take 1 capsule (40 mg) by mouth 2 times daily as needed    Gastroesophageal reflux disease without esophagitis, Routine health maintenance, IGT (impaired glucose tolerance)       QUEtiapine 25 MG tablet    SEROquel    90 tablet    Take one tablet by mouth every evening with supper    OCD (obsessive compulsive disorder), Anxiety disorder, unspecified

## 2017-10-25 NOTE — NURSING NOTE
Chief Complaint   Patient presents with     GI Problem     Patient is here to discuss GI problems.      Lu Aguirre LPN at 2:43 PM on 10/25/2017.

## 2017-10-25 NOTE — PATIENT INSTRUCTIONS
[facility]: [phone]     [facility] Medication Refill Request Information:  * Please contact your pharmacy regarding ANY request for medication refills.  ** [facility] Prescription Fax = [phone]  * Please allow 3 business days for routine medication refills.  * Please allow 5 business days for controlled substance medication refills.     [facility] Test Result notification information:  *You will be notified with in 7-10 days of your appointment day regarding the results of your test.  If you are on MyChart you will be notified as soon as the provider has reviewed the results and signed off on them.

## 2017-10-25 NOTE — NURSING NOTE
"Injectable Influenza Immunization Documentation    1.  Has the patient received the information for the injectable influenza vaccine? YES     2. Is the patient 6 months of age or older? YES     3. Does the patient have any of the following contraindications?         Severe allergy to eggs?  No     Severe allergic reaction to previous influenza vaccines? No   Severe allergy to latex? No       History of Guillain-Euless syndrome? No     Currently have a temperature greater than 100.4F? No        4.  Severely egg allergic patients should have flu vaccine eligibility assessed by an MD, RN, or pharmacist, and those who received flu vaccine should be observed for 15 min by an MD, RN, Pharmacist, Medical Technician, or member of clinic staff.\": YES    5. Latex-allergic patients should be given latex-free influenza vaccine Yes. Please reference the Vaccine latex table to determine if your clinic s product is latex-containing.       Vaccination given by Lu Aguirre LPN at 4:18 PM on 10/25/2017.        "

## 2017-10-26 NOTE — PROGRESS NOTES
"HPI:  This 86 year old man presents today with his sister.  He has autism and other long-standing mental health problems and lives in a supervised setting.  He was feeling well until about 2 weeks ago when he had \"stomach upset\" and mild nausea.  He consulted a pharmacist at the facility where he lives and given Pepto-Bismol.  Abd discomfort has largely resolved but he has developed dark/black stools that he mentioned to his sister, who became concerned.  He has no lightheadedness or red blood in his stool.  He has no more nausea or vomiting, no diarrhea.  Of note, he has a h/o GERD and has a prescription for omeprazole on his medication but has not been taking it regularly.  He also takes ASA prophylaxis. He is eating and drinking well without difficulty.    He had a CBC in September 2017 showing a hemoglobin of 13.7.    ROS:  Constitutional: no fevers, night sweats or unintentional weight change   Cardiovascular: no chest pain, palpitations,orthopnea or PND   Respiratory: no dyspnea, cough, shortness of breath or wheezing   GI: see HPI  : no change in urine, no dysuria or hematuria     Patient Active Problem List   Diagnosis     Gout     Hypertension     IGT (impaired glucose tolerance)     GERD (gastroesophageal reflux disease)     Hypotestosteronism     Neoplasm of uncertain behavior of skin     AK (actinic keratosis)     History of nonmelanoma skin cancer     Osteoarthritis of right hip     OA (osteoarthritis) of hip     Fall     Facial laceration     Autism spectrum disorder     Anxiety disorder, unspecified     Depression, unspecified depression type     Vitamin D deficiency     Loss of weight     ACP (advance care planning)     Other obsessive-compulsive disorder     Past Medical History:   Diagnosis Date     Autism spectrum disorder 7/16/2015     GERD (gastroesophageal reflux disease)      Gout      Hearing loss      Hyperlipemia     on atorvastatin     Hypertension      Hypotestosteronism      IGT " (impaired glucose tolerance)      OCD (obsessive compulsive disorder)     on cymbalta     Osteoarthritis, hip, bilateral      Vitamin D deficiency     resolved 2/2013     Past Surgical History:   Procedure Laterality Date     bilateral cataract surgery       bilateral ear surgery       HERNIA REPAIR, INGUINAL RT/LT       SUPRAPUBIC PROSTATECTOMY       Social History   Substance Use Topics     Smoking status: Former Smoker     Packs/day: 1.00     Years: 20.00     Smokeless tobacco: Never Used     Alcohol use No     Family History   Problem Relation Age of Onset     CANCER Sister      skin     Myocardial Infarction Maternal Uncle 60     Allergies   Allergen Reactions     Penicillins Rash and Other (See Comments)     He believes he had a positive skin test for PCN allergy at the time of a dog bite, about 1960.     Current Outpatient Prescriptions   Medication Sig Dispense Refill     lisinopril (PRINIVIL/ZESTRIL) 40 MG tablet Take 1 tablet (40 mg) by mouth daily 100 tablet 3     atorvastatin (LIPITOR) 20 MG tablet Take 1 tablet (20 mg) by mouth daily 100 tablet 3     allopurinol (ZYLOPRIM) 100 MG tablet Take 1 tablet (100 mg) by mouth daily 100 tablet 3     FLUoxetine (PROZAC) 20 MG capsule Take 3 capsules (60 mg) by mouth every morning 270 capsule 1     QUEtiapine (SEROQUEL) 25 MG tablet Take one tablet by mouth every evening with supper 90 tablet 1     acetaminophen 500 MG CAPS Take 500 mg by mouth 2 times daily       cholecalciferol (VITAMIN D) 1000 UNIT tablet Take 2 tablets (2,000 Units) by mouth daily 100 tablet 3     furosemide (LASIX) 20 MG tablet Take 1 tablet (20 mg) by mouth daily 100 tablet 3     omeprazole (PRILOSEC) 40 MG capsule Take 1 capsule (40 mg) by mouth 2 times daily as needed 200 capsule 3     aspirin 325 MG EC tablet Take 1 tablet (325 mg) by mouth daily 90 tablet 3     /87  Pulse 86  Temp 97.7  F (36.5  C) (Oral)  Resp 18  Wt 86.3 kg (190 lb 3.2 oz)  SpO2 95%  BMI 29.78  kg/m2    PHYSICAL EXAM:  Constitutional: no distress, comfortable, pleasant, overweight  Cardiovascular: regular rate and rhythm, normal S1 and S2, no murmurs, rubs or gallops  Respiratory: clear to auscultation, no wheezes or crackles, normal breath sounds   Gastrointestinal: positive bowel sounds, nontender, no hepatosplenomegaly, no masses    Psychological: appropriate mood, sister helping with a number of Qs, he seemed to mostly, but not completely understand the Qs  Lymphatic: no cervical lymphadenopathy  Patient declined rectal exam.    Hg today: 14.4    A/P:    GI upset x 2 weeks, improving. Dark stools likely due to Pepto-Bismol; hg stable and no other evidence of bleeding.  Rec he discontinue Pepto-Bismol and start PPI (omeprazole 20 mg QD) on a regular basis. RTC prn no improvement.    Total time spent 25 minutes.  More than 50% of the time spent with Mr. Baer on counseling / coordinating his care.

## 2017-11-01 ENCOUNTER — OFFICE VISIT (OUTPATIENT)
Dept: PSYCHIATRY | Facility: CLINIC | Age: 82
End: 2017-11-01
Attending: CLINICAL NURSE SPECIALIST
Payer: MEDICARE

## 2017-11-01 VITALS
SYSTOLIC BLOOD PRESSURE: 172 MMHG | HEART RATE: 91 BPM | BODY MASS INDEX: 29.88 KG/M2 | WEIGHT: 190.8 LBS | DIASTOLIC BLOOD PRESSURE: 94 MMHG

## 2017-11-01 DIAGNOSIS — F32.A DEPRESSION, UNSPECIFIED DEPRESSION TYPE: ICD-10-CM

## 2017-11-01 DIAGNOSIS — F41.8 OTHER SPECIFIED ANXIETY DISORDERS: ICD-10-CM

## 2017-11-01 DIAGNOSIS — R73.02 IGT (IMPAIRED GLUCOSE TOLERANCE): ICD-10-CM

## 2017-11-01 DIAGNOSIS — F42.9 OBSESSIVE-COMPULSIVE DISORDER, UNSPECIFIED TYPE: ICD-10-CM

## 2017-11-01 DIAGNOSIS — F84.0 AUTISM SPECTRUM DISORDER: Primary | ICD-10-CM

## 2017-11-01 DIAGNOSIS — F42.8 OTHER OBSESSIVE-COMPULSIVE DISORDER: ICD-10-CM

## 2017-11-01 DIAGNOSIS — Z00.00 ROUTINE HEALTH MAINTENANCE: ICD-10-CM

## 2017-11-01 DIAGNOSIS — K21.9 GASTROESOPHAGEAL REFLUX DISEASE WITHOUT ESOPHAGITIS: ICD-10-CM

## 2017-11-01 PROCEDURE — 99212 OFFICE O/P EST SF 10 MIN: CPT | Mod: ZF

## 2017-11-01 RX ORDER — QUETIAPINE FUMARATE 25 MG/1
TABLET, FILM COATED ORAL
Qty: 90 TABLET | Refills: 1 | Status: SHIPPED | OUTPATIENT
Start: 2017-11-01 | End: 2018-03-29

## 2017-11-01 RX ORDER — OMEPRAZOLE 40 MG/1
40 CAPSULE, DELAYED RELEASE ORAL DAILY
COMMUNITY
Start: 2017-11-01 | End: 2018-05-23

## 2017-11-01 NOTE — PATIENT INSTRUCTIONS
Take a shower on Monday and Thursday night.  Change  Clothes every other day.  Take all the medications in the morning in the white box.   In the evening take all the medication in the blue box.

## 2017-11-01 NOTE — MR AVS SNAPSHOT
After Visit Summary   11/1/2017    Sharon Baer    MRN: [id number]           Patient Information     Date Of Birth          6/10/1931        Visit Information        Provider Department      11/1/2017 10:45 AM Dyan Mathis APRN [facility]        Today's Diagnoses     Autism spectrum disorder    -  1    Other specified anxiety disorders        Depression, unspecified depression type        Gastroesophageal reflux disease without esophagitis        Routine health maintenance        IGT (impaired glucose tolerance)        Obsessive-compulsive disorder, unspecified type        Other obsessive-compulsive disorder          Care Instructions    Take a shower on Monday and Thursday night.  Change  Clothes every other day.  Take all the medications in the morning in the white box.   In the evening take all the medication in the blue box.              Follow-ups after your visit        Your next 10 appointments already scheduled     Nov 21, 2017  2:00 PM CST   Return Visit with Loi Prescott, [facility] ([facility])    [address] [place] [id number]   [phone]            Dec 01, 2017 10:45 AM CST   Adult Med Follow UP with TINY Knox CNS   Psychiatry Clinic ([facility])    [address] [facility] [address] [phone]              Who to contact     Please call your clinic at [phone] to:    Ask questions about your health    Make or cancel appointments    Discuss your medicines    Learn about your test results    Speak to your doctor   If you have compliments or concerns about an experience at your clinic, or if you wish to file a complaint, please contact [facility] Patient Relations at [phone] or email us at [email or web address]         Additional Information About Your Visit         [employer] Information     [employer] gives you secure access to your electronic health record. If you see a primary care provider, you can also send messages to your care team and make appointments. If you have questions, please call your primary care clinic.  If you do not have a primary care provider, please call [phone] and they will assist you.      [employer] is an electronic gateway that provides easy, online access to your medical records. With [employer], you can request a clinic appointment, read your test results, renew a prescription or communicate with your care team.     To access your existing account, please contact your [facility] or call [phone] for assistance.        Care EveryWhere ID     This is your Care EveryWhere ID. This could be used by other organizations to access your [place] medical records  [id number]        Your Vitals Were     Pulse BMI (Body Mass Index)                91 29.88 kg/m2           Blood Pressure from Last 3 Encounters:   11/01/17 (!) 172/94   10/25/17 157/87   09/22/17 155/86    Weight from Last 3 Encounters:   11/01/17 86.5 kg (190 lb 12.8 oz)   10/25/17 86.3 kg (190 lb 3.2 oz)   09/22/17 90.1 kg (198 lb 9.6 oz)              Today, you had the following     No orders found for display         Today's Medication Changes          These changes are accurate as of: 11/1/17 12:05 PM.  If you have any questions, ask your nurse or doctor.               These medicines have changed or have updated prescriptions.        Dose/Directions    FLUoxetine 20 MG capsule   Commonly known as:  PROzac   This may have changed:    - how much to take  - additional instructions   Used for:  Depression, unspecified depression type, Other obsessive-compulsive disorder   Changed by:  Dyan Mathis APRN CNS        Dose:  40 mg   Take 2 capsules (40 mg) by mouth every morning Discontinue order for 3 capsules daily   Quantity:  180 capsule   Refills:  1        omeprazole 40 MG capsule   Commonly known as:  priLOSEC   This may have changed:    - when to take this  - reasons to take this  - additional instructions   Used for:  Gastroesophageal reflux disease without esophagitis, Routine health maintenance, IGT (impaired glucose tolerance)   Changed by:  Dyan Mathis APRN CNS        Dose:  40 mg   Take 1 capsule (40 mg) by mouth daily Reported by family   Refills:  0            Where to get your medicines      These medications were sent to [facility] - [address]  [address] [facility] [address]    Hours:  Mon-Fri 8:30am-5:00pm Toll Free [phone] Phone:  [phone]     FLUoxetine 20 MG capsule    QUEtiapine 25 MG tablet                Primary Care Provider Office Phone # Fax #    Neo Castano [phone]       [address] [place] [address]  [facility] [address]        Equal Access to Services     ESTELITA HARE AH: Hadii june ku hadasho Soomaali, waaxda luqadaha, qaybta kaalmada adeegyada, waxay bernadettein haysuzannen radha ricardo . So [facility] [phone].    ATENCIÓN: Si habla español, tiene a delacruz disposición servicios gratuitos de asistencia lingüística. Llame al [phone].    We comply with applicable federal civil rights laws and [place] laws. We do not discriminate on the basis of race, color, national origin, age, disability, sex, sexual orientation, or gender identity.            Thank you!     Thank you for choosing [facility]  for your care. Our goal is always to provide you with excellent care. Hearing back from our patients is one way we can continue to improve our services. Please take a few minutes to complete the written survey that you may receive in the mail after your visit with us. Thank you!             Your Updated Medication List - Protect others around you: Learn how to safely use, store and throw away your medicines at www.disposemymeds.org.           This list is accurate as of: 11/1/17 12:05 PM.  Always use your most recent med list.                   Brand Name Dispense Instructions for use Diagnosis    acetaminophen 500 MG Caps      Take 500 mg by mouth 2 times daily    Primary osteoarthritis of right hip       allopurinol 100 MG tablet    ZYLOPRIM    100 tablet    Take 1 tablet (100 mg) by mouth daily    Routine health maintenance       aspirin 325 MG EC tablet     90 tablet    Take 1 tablet (325 mg) by mouth daily    Routine health maintenance       atorvastatin 20 MG tablet    LIPITOR    100 tablet    Take 1 tablet (20 mg) by mouth daily    Routine health maintenance       cholecalciferol 1000 UNIT tablet    vitamin D3    100 tablet    Take 2 tablets (2,000 Units) by mouth daily    Routine health maintenance, IGT (impaired glucose tolerance), Gastroesophageal reflux disease without esophagitis       FLUoxetine 20 MG capsule    PROzac    180 capsule    Take 2 capsules (40 mg) by mouth every morning Discontinue order for 3 capsules daily    Depression, unspecified depression type, Other obsessive-compulsive disorder       furosemide 20 MG tablet    LASIX    100 tablet    Take 1 tablet (20 mg) by mouth daily    Routine health maintenance, IGT (impaired glucose tolerance), Gastroesophageal reflux disease without esophagitis       lisinopril 40 MG tablet    PRINIVIL/ZESTRIL    100 tablet    Take 1 tablet (40 mg) by mouth daily    Essential hypertension       omeprazole 40 MG capsule    priLOSEC     Take 1 capsule (40 mg) by mouth daily Reported by family    Gastroesophageal reflux disease without esophagitis, Routine health maintenance, IGT (impaired glucose tolerance)       QUEtiapine 25 MG tablet    SEROquel    90 tablet    Take one tablet by mouth every evening with supper    Obsessive-compulsive disorder, unspecified type

## 2017-11-01 NOTE — NURSING NOTE
Chief Complaint   Patient presents with     Recheck Medication     anxiety disorder, autism spectrum     Reviewed allergies, smoking status, and pharmacy preference  Obtained weight, blood pressure and heart rate

## 2017-11-01 NOTE — PROGRESS NOTES
Outpatient Psychiatry Progress Note     Provider: TINY Knox CNS  Date: 2017  Service:  Medication follow up with counseling.   Patient Identification: Sharon Baer  : 6/10/1931   MRN: [id number]    Sharon Bear is a 86 year old year old male who presents for ongoing psychiatric care.  Sharon Baer was last seen in clinic on 17.   At that time,  Assessment & Plan       Sharon Baer is seen today for follow up and reports he has been a little more anxious lately but still much improved from before starting Fluoxetine. No apparent side effects.  Patient and his sister agree to trial of increase in Fluoxetine. Watch for fatigue, tremor, lethargy.  Will also follow up with PCP for 6 month check up.      Diagnosis  Autism, Spectrum, Anxiety,  OCD, other type, Depression Unspecified     Plan:  Medication: Increase Fluoxetine to 60mg, Continue Seroquel 25mg at bedtime  OTC Recommendations: none  Lab Orders:  CMP - need electrolytes with SSRI medication  Referrals: none  Release of Information: none  Future Treatment Considerations:per symptoms, side effects  Return for Follow Up:8 weeks       ____________________________________________________________________________________________________________________________________________    2017  Today Sharon reports his is feeling more depressed. Having trouble with internet access. The staff at his facility has been turning over and this is stressful. When visiting family he is ok.   Side effects of medication include: unknown  Psychiatric Review of Systems:  The patient endorses symptoms of depression: In the last 2 weeks per PHQ 9 several days anhedonia, feeling depressed, concentration problems, lethargy. More than 1/2 days fatigue, appetite disturbance.  Nearly everyday sleep disturbance, feelings of failure.  He  patient endorses symptoms of anxiety : increase intrusive thoughts but still much better than  before fluoxetine and Seroquel.  He endorses symptoms of vidya including none.    He endorses symptoms of psychosis including no psychotic symptoms.       Review of Medical Systems:  Sleep: probably excessive   Energy: mild  Concentration: reports more difficulty  Appetite: decreased, the food is  GI Concerns: none  Cardiac concerns: none  Neurological concerns: none  Other medical concerns: no new concerns. Had follow up with PCP and no concerns with labs  Current Substance Use:  Alcohol:denies  Other drugs:none  Caffeine:no change  Nicotine: none  Past Medical History:   Past Medical History:   Diagnosis Date     Autism spectrum disorder 7/16/2015     GERD (gastroesophageal reflux disease)      Gout      Hearing loss      Hyperlipemia     on atorvastatin     Hypertension      Hypotestosteronism      IGT (impaired glucose tolerance)      OCD (obsessive compulsive disorder)     on cymbalta     Osteoarthritis, hip, bilateral      Vitamin D deficiency     resolved 2/2013     Patient Active Problem List   Diagnosis     Gout     Hypertension     IGT (impaired glucose tolerance)     GERD (gastroesophageal reflux disease)     Hypotestosteronism     Neoplasm of uncertain behavior of skin     AK (actinic keratosis)     History of nonmelanoma skin cancer     Osteoarthritis of right hip     OA (osteoarthritis) of hip     Fall     Facial laceration     Autism spectrum disorder     Depression, unspecified depression type     Vitamin D deficiency     Loss of weight     ACP (advance care planning)     Other obsessive-compulsive disorder     Other specified anxiety disorders       Allergies:   Allergies   Allergen Reactions     Penicillins Rash and Other (See Comments)     He believes he had a positive skin test for PCN allergy at the time of a dog bite, about 1960.          Current Medications     Current Outpatient Prescriptions Ordered in [facility]   Medication Sig Dispense Refill     lisinopril (PRINIVIL/ZESTRIL) 40 MG tablet Take  "1 tablet (40 mg) by mouth daily 100 tablet 3     atorvastatin (LIPITOR) 20 MG tablet Take 1 tablet (20 mg) by mouth daily 100 tablet 3     allopurinol (ZYLOPRIM) 100 MG tablet Take 1 tablet (100 mg) by mouth daily 100 tablet 3     FLUoxetine (PROZAC) 20 MG capsule Take 3 capsules (60 mg) by mouth every morning 270 capsule 1     QUEtiapine (SEROQUEL) 25 MG tablet Take one tablet by mouth every evening with supper 90 tablet 1     acetaminophen 500 MG CAPS Take 500 mg by mouth 2 times daily       cholecalciferol (VITAMIN D) 1000 UNIT tablet Take 2 tablets (2,000 Units) by mouth daily 100 tablet 3     furosemide (LASIX) 20 MG tablet Take 1 tablet (20 mg) by mouth daily 100 tablet 3     omeprazole (PRILOSEC) 40 MG capsule Take 1 capsule (40 mg) by mouth 2 times daily as needed 200 capsule 3     aspirin 325 MG EC tablet Take 1 tablet (325 mg) by mouth daily 90 tablet 3     No current Epic-ordered facility-administered medications on file.         Mental Status Exam     Appearance:  Casually dressed and Well groomed  Behavior/relationship to examiner/demeanor:  Cooperative but hard of hearing   Orientation: Oriented to person, place, time and situation  Psychomotor: normal form  Speech Rate:  Normal  Speech Spontaneity:  Mild latency and poverty but also hard of hearling  Mood:  \"I think more depressed, anxious\"  Affect:  Appropriate/mood-congruent and Anxious/Nervous  Thought Process (Associations):  Goal directed and Circumstantial  Thought Content:  no overt psychosis, denies suicidal ideation, intent or thoughts, patient does not appear to be responding to internal stimuli, No violent ideation and No homicidal ideation  Abnormal Perception:  None  Attention/Concentration:  Normal  Language:  Intact  Insight:  Adequate  Judgment:  Good      Results     Vital signs: BP (!) 172/94  Pulse 91  Wt 86.5 kg (190 lb 12.8 oz)  BMI 29.88 kg/m2    Laboratory Data:  reviewed from PCP appointment. No abnormalities affecting " psychiatric medications.    Assessment & Plan      Sharon Baer is seen today for follow up with his sister Josephine.  Patient reports he has been more depressed and having mild increase in intrusive thoughts.  Although Josephine reports that she sets up his medications and gives him 60mg of Fluoxetine, Reilly state that he takes one out and only takes 2 of them.  It is not clear what happens to the extra since there are not in the med keeper when Josephine sets them up.  Since prior to the increase to 60mg he was taking 50mg (two 20mg and one 10mg capsule from June 9 to 8/23/17), it is also unclear if he had been taking this dose.  Do to possibility that he is taking 60mg and may be more anxious at this time will decrease Fluoxetine to 40mg and see if mood changes or is the same.  Possibly increase symptoms are due to changes in staff resulting in increase isolation at his care center.  With family, his mood and behavior have not changed and he continues to be more social with them.    Diagnosis  Autism, Spectrum, Anxiety,  OCD, other type, Depression Unspecified    Plan:  Medication: Decrease Fluoxetine to 40mg again, Continue current dose of Seroquel  OTC Recommendations: none  Lab Orders:  none  Referrals: none  Release of Information: none  Future Treatment Considerations:per symptom improvement or worsening  Return for Follow Up:4 weeks   The risks, benefits, alternatives and side effects have been discussed and are understood by the patient. The patient understands the risks of using street drugs or alcohol. There are no medical contraindications, the patient agrees to treatment, and has the capacity to do so. The patient understands to call 911 or come to the nearest ED if life threatening or urgent symptoms present.  In addition time was spent counseling the patient and/or coordinating care regarding review of social and occupational functioning.  In addition patient was counseled on health and wellness  practices to augment medication treatment of symptoms. See note for details.    Dyan Mathis, APRN CNS 11/1/2017

## 2017-11-21 ENCOUNTER — OFFICE VISIT (OUTPATIENT)
Dept: PSYCHOLOGY | Facility: CLINIC | Age: 82
End: 2017-11-21
Payer: COMMERCIAL

## 2017-11-21 DIAGNOSIS — F42.8 OTHER OBSESSIVE-COMPULSIVE DISORDER: Primary | ICD-10-CM

## 2017-11-21 PROCEDURE — 90834 PSYTX W PT 45 MINUTES: CPT | Performed by: SOCIAL WORKER

## 2017-11-21 ASSESSMENT — ANXIETY QUESTIONNAIRES
2. NOT BEING ABLE TO STOP OR CONTROL WORRYING: SEVERAL DAYS
GAD7 TOTAL SCORE: 6
5. BEING SO RESTLESS THAT IT IS HARD TO SIT STILL: NOT AT ALL
1. FEELING NERVOUS, ANXIOUS, OR ON EDGE: SEVERAL DAYS
IF YOU CHECKED OFF ANY PROBLEMS ON THIS QUESTIONNAIRE, HOW DIFFICULT HAVE THESE PROBLEMS MADE IT FOR YOU TO DO YOUR WORK, TAKE CARE OF THINGS AT HOME, OR GET ALONG WITH OTHER PEOPLE: VERY DIFFICULT
6. BECOMING EASILY ANNOYED OR IRRITABLE: SEVERAL DAYS
3. WORRYING TOO MUCH ABOUT DIFFERENT THINGS: SEVERAL DAYS
7. FEELING AFRAID AS IF SOMETHING AWFUL MIGHT HAPPEN: SEVERAL DAYS

## 2017-11-21 ASSESSMENT — PATIENT HEALTH QUESTIONNAIRE - PHQ9
5. POOR APPETITE OR OVEREATING: SEVERAL DAYS
SUM OF ALL RESPONSES TO PHQ QUESTIONS 1-9: 5

## 2017-11-21 NOTE — MR AVS SNAPSHOT
MRN:[id number]                      After Visit Summary   11/21/2017    Sharon Baer    MRN: [id number]           Visit Information        Provider Department      11/21/2017 2:00 PM Loi Prescott LICSW [facility]      Your next 10 appointments already scheduled     Dec 01, 2017 10:45 AM CST   Adult Med Follow UP with TINY Knox CNS   Psychiatry Clinic ([facility])    [place] [id number]  [address] [facility] [address] [phone]            Dec 19, 2017  2:00 PM CST   Return Visit with DANNI Davidson   [facility]    [address] [place] [address] [phone]              MyChart Information     [employer] gives you secure access to your electronic health record. If you see a primary care provider, you can also send messages to your care team and make appointments. If you have questions, please call your primary care clinic.  If you do not have a primary care provider, please call [phone] and they will assist you.        Care EveryWhere ID     This is your Care EveryWhere ID. This could be used by other organizations to access your [place] medical records  [id number]        Equal Access to Services     ESTELITA HARE : Jd baugho Solauraali, waaxda luqadaha, qaybta kaalmada adeegyada, fahad wagner. So [facility] [phone].    ATENCIÓN: Si habla español, tiene a delacruz disposición servicios gratuitos de asistencia lingüística. Llame al [phone].    We comply with applicable federal civil rights laws and [place] laws. We do not discriminate on the basis of race, color, national origin, age, disability, sex, sexual orientation, or gender identity.
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
## 2017-11-22 ASSESSMENT — ANXIETY QUESTIONNAIRES: GAD7 TOTAL SCORE: 6

## 2017-12-06 ENCOUNTER — OFFICE VISIT (OUTPATIENT)
Dept: PSYCHIATRY | Facility: CLINIC | Age: 82
End: 2017-12-06
Attending: CLINICAL NURSE SPECIALIST
Payer: MEDICARE

## 2017-12-06 VITALS
WEIGHT: 193 LBS | SYSTOLIC BLOOD PRESSURE: 198 MMHG | HEART RATE: 84 BPM | DIASTOLIC BLOOD PRESSURE: 84 MMHG | BODY MASS INDEX: 30.22 KG/M2

## 2017-12-06 DIAGNOSIS — F32.A DEPRESSION, UNSPECIFIED DEPRESSION TYPE: ICD-10-CM

## 2017-12-06 DIAGNOSIS — F84.0 AUTISM SPECTRUM DISORDER: Primary | ICD-10-CM

## 2017-12-06 PROCEDURE — 99212 OFFICE O/P EST SF 10 MIN: CPT | Mod: ZF

## 2017-12-06 NOTE — MR AVS SNAPSHOT
After Visit Summary   12/6/2017    Sharon Baer    MRN: [id number]           Patient Information     Date Of Birth          6/10/1931        Visit Information        Provider Department      12/6/2017 1:15 PM Dyan Mathis APRN [facility]        Today's Diagnoses     Autism spectrum disorder    -  1    Depression, unspecified depression type           Follow-ups after your visit        Follow-up notes from your care team     Return in about 3 months (around 3/6/2018).      Your next 10 appointments already scheduled     Dec 19, 2017  2:00 PM CST   Return Visit with Loi Prescott, [facility] ([facility])    [address] [address] [phone]            Mar 06, 2018  1:45 PM CST   Adult Med Follow UP with TINY Knox CNS   Psychiatry Clinic ([facility])    [place] [id number]  [address] [address] [phone]              Who to contact     Please call your clinic at [phone] to:    Ask questions about your health    Make or cancel appointments    Discuss your medicines    Learn about your test results    Speak to your doctor   If you have compliments or concerns about an experience at your clinic, or if you wish to file a complaint, please contact [facility] Patient Relations at [phone] or email us at [email or web address]         Additional Information About Your Visit        MyChart Information     [employer] gives you secure access to your electronic health record. If you see a primary care provider, you can also send messages to your care team and make appointments. If you have questions, please call your primary care clinic.  If you do not have a primary care provider, please call [phone] and they will assist you.      [employer] is an electronic gateway that provides easy,  online access to your medical records. With [employer], you can request a clinic appointment, read your test results, renew a prescription or communicate with your care team.     To access your existing account, please contact your [facility] or call [phone] for assistance.        Care EveryWhere ID     This is your Care EveryWhere ID. This could be used by other organizations to access your [facility] medical records  [id number]        Your Vitals Were     Pulse BMI (Body Mass Index)                84 30.22 kg/m2           Blood Pressure from Last 3 Encounters:   12/06/17 198/84   11/01/17 (!) 172/94   10/25/17 157/87    Weight from Last 3 Encounters:   12/06/17 87.5 kg (193 lb)   11/01/17 86.5 kg (190 lb 12.8 oz)   10/25/17 86.3 kg (190 lb 3.2 oz)              Today, you had the following     No orders found for display       Primary Care Provider Office Phone # Fax #    Neo Castano [phone]       [address] [place] [address]        Equal Access to Services     [facility] : Hadii june peraza Soluis, waaxda lulauren, qaybta kaaldaniela mao, fahad wagner. So [facility] [phone].    ATENCIÓN: Si habla español, tiene a delacruz disposición servicios gratuitos de asistencia lingüística. [facility] [phone].    We comply with applicable federal civil rights laws and [place] laws. We do not discriminate on the basis of race, color, national origin, age, disability, sex, sexual orientation, or gender identity.            Thank you!     Thank you for choosing PSYCHIATRY CLINIC  for your care. Our goal is always to provide you with excellent care. Hearing back from our patients is one way we can continue to improve our services. Please take a few minutes to complete the written survey that you may receive in the mail after your visit with us. Thank you!             Your Updated Medication List - Protect  others around you: Learn how to safely use, store and throw away your medicines at www.disposemymeds.org.          This list is accurate as of: 12/6/17 11:59 PM.  Always use your most recent med list.                   Brand Name Dispense Instructions for use Diagnosis    acetaminophen 500 MG Caps      Take 500 mg by mouth 2 times daily    Primary osteoarthritis of right hip       allopurinol 100 MG tablet    ZYLOPRIM    100 tablet    Take 1 tablet (100 mg) by mouth daily    Routine health maintenance       aspirin 325 MG EC tablet     90 tablet    Take 1 tablet (325 mg) by mouth daily    Routine health maintenance       atorvastatin 20 MG tablet    LIPITOR    100 tablet    Take 1 tablet (20 mg) by mouth daily    Routine health maintenance       cholecalciferol 1000 UNIT tablet    vitamin D3    100 tablet    Take 2 tablets (2,000 Units) by mouth daily    Routine health maintenance, IGT (impaired glucose tolerance), Gastroesophageal reflux disease without esophagitis       FLUoxetine 20 MG capsule    PROzac    180 capsule    Take 2 capsules (40 mg) by mouth every morning Discontinue order for 3 capsules daily    Depression, unspecified depression type, Other obsessive-compulsive disorder       furosemide 20 MG tablet    LASIX    100 tablet    Take 1 tablet (20 mg) by mouth daily    Routine health maintenance, IGT (impaired glucose tolerance), Gastroesophageal reflux disease without esophagitis       lisinopril 40 MG tablet    PRINIVIL/ZESTRIL    100 tablet    Take 1 tablet (40 mg) by mouth daily    Essential hypertension       omeprazole 40 MG capsule    priLOSEC     Take 1 capsule (40 mg) by mouth daily Reported by family    Gastroesophageal reflux disease without esophagitis, Routine health maintenance, IGT (impaired glucose tolerance)       QUEtiapine 25 MG tablet    SEROquel    90 tablet    Take one tablet by mouth every evening with supper    Obsessive-compulsive disorder, unspecified type

## 2017-12-06 NOTE — NURSING NOTE
Chief Complaint   Patient presents with     Recheck Medication   Autism Spectrum Disorder    Reviewed allergies, smoking status, and pharmacy preference    Obtained weight, blood pressure and heart rate

## 2017-12-11 ENCOUNTER — TELEPHONE (OUTPATIENT)
Dept: PSYCHIATRY | Facility: CLINIC | Age: 82
End: 2017-12-11

## 2017-12-11 NOTE — TELEPHONE ENCOUNTER
Social Work   Incoming/Outgoing Call  [facility]    Outgoing Call To: Josephine Paty, patient's sister and power of     Reason for Call:  SW following up on referral from Dyan Mathis.    Response/Plan:  SW assessed needs and options. Patient is living in assisted living and his sister feels this is a good place for him. However, he is very lonely. He is unable to participate in groups due to severe hearing loss. Josephine has tried having staff come in and also hiring local college students, but these individuals often leave very quickly. Patient is in need of an individual that can spend consistent time with patient over a period of time.    Reilly self-pays for services at his assisted living. Josephine stated patient would be able to pay for  services. SUDHIR discussed option of [facility]. Josephine did not feel this would be the best approach. SUDHIR provided possible resource of [facility]'s Senior  Program. Per Josephine's approval, SUDHIR called and left a message with Ene Jones at [facility] [phone] to get more information about the program and options.        Will route to patient's current psychiatric provider(s) as an FYI.   Please call or EPIC message with any questions or concerns.    KLARISSA Londono, [facility]  [phone]

## 2017-12-11 NOTE — TELEPHONE ENCOUNTER
----- Message from TINY Knox sent at 12/6/2017  2:00 PM CST -----  Social Work Consult Request    Reason for Referral and/or Desired Outcome:    Sister Josephine is wondering about resources to have visitors for patient since he is lonely although living in assisted housing.  Has hearing impairment so difficulty communicating in dining stewart and activites    Is this a provider recommendation or patient request?   Family request    How have they been getting this need met or coping until this point?   Family visits but not able to get there enough.  Also had some staff from the residence but they have been quitting and so stop coming which is distressful to him.    Have there been changes recently that contribute to this need?   Staff leaving at residence as noted above    Does this person have a  or other  involved in their care currently?  If yes, is there an DAVID? No      Additional Comments or History:  (Are there specific dates of service SW can review that will provide helpful context)  DAVID for  is chart please call her instead of patient    Preferred Mode of Communication: Contact with patient and patient's family  - Is this individual aware of this consult?  Yes  - If the individual is someone other than the patient, is there a Release of Information/DAVID?  Yes    If contacting patient by phone, is it okay to leave a detailed message?   yes    Preferred Response Time:  When ever possilble  (Note: standard response time is 2-3 business days)

## 2017-12-12 ENCOUNTER — TELEPHONE (OUTPATIENT)
Dept: PSYCHIATRY | Facility: CLINIC | Age: 82
End: 2017-12-12

## 2017-12-13 ENCOUNTER — TELEPHONE (OUTPATIENT)
Dept: PSYCHIATRY | Facility: CLINIC | Age: 82
End: 2017-12-13

## 2017-12-13 DIAGNOSIS — Z00.00 ROUTINE HEALTH MAINTENANCE: ICD-10-CM

## 2017-12-13 DIAGNOSIS — R73.02 IGT (IMPAIRED GLUCOSE TOLERANCE): ICD-10-CM

## 2017-12-13 DIAGNOSIS — K21.9 GASTROESOPHAGEAL REFLUX DISEASE WITHOUT ESOPHAGITIS: ICD-10-CM

## 2017-12-13 NOTE — TELEPHONE ENCOUNTER
Social Work   Incoming/Outgoing Call  [facility]    Outgoing Call To: Monica Newman, patient's brother    Reason for Call:  SW following up on request for referral for in home socialization/befriender program.    Response/Plan:  SW provided update to Monica. [facility] has a Neighbor to Neighbor program that is private pay. SW noted that they do a background check and that [facility] staff stated there was typically not a lot of turnover within the program. Patient's sister felt this could be a good fit for Reilly.     SUDHIR provided contact information for CULLEN Bolaños at [phone]. SW encouraged patient's sister to call writer if she needed further referral ideas. Patient's sister will follow up with [facility].      Will route to patient's current psychiatric provider(s) as an FYI.   Please call or EPIC message with any questions or concerns.    Evelyn Gonzales, KLARISSA, [facility]  [phone]

## 2017-12-13 NOTE — TELEPHONE ENCOUNTER
Social Work   Incoming/Outgoing Call  [facility]    Incoming From:  Ene at [facility]. [phone]    Reason for Call:  Ene following up on SW request for more information.    Response/Plan:  SW shared that patient was in assisted living and was self-paying for services. He has support from staff, but would benefit from more social connections. Ene shared that their Neighbor to Neighbor program would be a better fit for him as this was a private pay option. The individuals do not do chores, but do provide opportunities to get out into the community. SW followed up on possible concerns from patient's sister. [facility] does do a [employer] background check on individuals and also checks the national sex offender registry. Workers go through an interview and orientation process. The program generally has limited turnover, but it does occasionally happen. Ene shared contact for the Neighbor to Neighbor program, as she does not coordinate this program: Mami at [phone].    SW will contact patient's sister to provide update and see if she would like to move forward with this referral.      Will route to patient's current psychiatric provider(s) as an FYI.   Please call or EPIC message with any questions or concerns.    Evelyn Gonzales, KLARISSA, [facility]  [phone]

## 2017-12-15 NOTE — TELEPHONE ENCOUNTER
prilosec  Last Written Prescription Date:  Pt reported    Last Office Visit : 10/25/17  Future Office visit:  No future appt    Routing refill request to clinic nurse for review/approval because:  Medication is reported/historical

## 2017-12-15 NOTE — TELEPHONE ENCOUNTER
I spoke to pt's sister for clarification regarding medication. Last written rx for 40mg BID, though recent notes suggest only daily use. Visit from October with Dr. Lemons states to take 20mg daily, but pt med list has 40mg dose daily. Per Monica (pt's sister), pt has been taking 40mg daily with good effect. He has not felt 2nd dose needed. Will update rx and route to provider for review.    Yajaira Duke RN

## 2017-12-16 RX ORDER — OMEPRAZOLE 40 MG/1
40 CAPSULE, DELAYED RELEASE ORAL DAILY
Qty: 90 CAPSULE | Refills: 3 | Status: SHIPPED | OUTPATIENT
Start: 2017-12-16 | End: 2018-08-02

## 2017-12-19 ENCOUNTER — OFFICE VISIT (OUTPATIENT)
Dept: PSYCHOLOGY | Facility: CLINIC | Age: 82
End: 2017-12-19
Payer: COMMERCIAL

## 2017-12-19 DIAGNOSIS — F42.8 OTHER OBSESSIVE-COMPULSIVE DISORDER: Primary | ICD-10-CM

## 2017-12-19 PROCEDURE — 90834 PSYTX W PT 45 MINUTES: CPT | Performed by: SOCIAL WORKER

## 2017-12-19 NOTE — MR AVS SNAPSHOT
MRN:[id number]                      After Visit Summary   12/19/2017    Sharon Baer    MRN: [id number]           Visit Information        Provider Department      12/19/2017 2:00 PM Loi Prescott LICSW [facility] Generic      Your next 10 appointments already scheduled     Jan 18, 2018  2:30 PM CST   Return Visit with Loi EVANGELINA SamDANNI may   [facility] ([facility])    [address] [id number] [phone]            Mar 06, 2018  1:45 PM CST   Adult Med Follow UP with TINY Knox CNS   Psychiatry Clinic (P [facility])    [place] [id number] [facility] [address] [phone]              MyChart Information     [facility] gives you secure access to your electronic health record. If you see a primary care provider, you can also send messages to your care team and make appointments. If you have questions, please call your primary care clinic.  If you do not have a primary care provider, please call [phone] and they will assist you.        Care EveryWhere ID     This is your Care EveryWhere ID. This could be used by other organizations to access your [place] medical records  [id number]        Equal Access to Services     ESTELITA HARE : Hadii june baugho Solauraali, waaxda luqadaha, qaybta kaalmada adeegyada, fahad wagner. So [facility] [phone].    ATENCIÓN: Si habla español, tiene a delacruz disposición servicios gratuitos de asistencia lingüística. Llame al [phone].    We comply with applicable federal civil rights laws and [place] laws. We do not discriminate on the basis of race, color, national origin, age, disability, sex, sexual orientation, or gender identity.
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
## 2018-01-18 ENCOUNTER — OFFICE VISIT (OUTPATIENT)
Dept: PSYCHOLOGY | Facility: CLINIC | Age: 83
End: 2018-01-18
Payer: COMMERCIAL

## 2018-01-18 DIAGNOSIS — F42.8 OTHER OBSESSIVE-COMPULSIVE DISORDER: Primary | ICD-10-CM

## 2018-01-18 PROCEDURE — 90834 PSYTX W PT 45 MINUTES: CPT | Performed by: SOCIAL WORKER

## 2018-01-18 ASSESSMENT — ANXIETY QUESTIONNAIRES
1. FEELING NERVOUS, ANXIOUS, OR ON EDGE: SEVERAL DAYS
GAD7 TOTAL SCORE: 6
IF YOU CHECKED OFF ANY PROBLEMS ON THIS QUESTIONNAIRE, HOW DIFFICULT HAVE THESE PROBLEMS MADE IT FOR YOU TO DO YOUR WORK, TAKE CARE OF THINGS AT HOME, OR GET ALONG WITH OTHER PEOPLE: SOMEWHAT DIFFICULT
3. WORRYING TOO MUCH ABOUT DIFFERENT THINGS: SEVERAL DAYS
7. FEELING AFRAID AS IF SOMETHING AWFUL MIGHT HAPPEN: SEVERAL DAYS
5. BEING SO RESTLESS THAT IT IS HARD TO SIT STILL: SEVERAL DAYS
2. NOT BEING ABLE TO STOP OR CONTROL WORRYING: SEVERAL DAYS
6. BECOMING EASILY ANNOYED OR IRRITABLE: SEVERAL DAYS

## 2018-01-18 ASSESSMENT — PATIENT HEALTH QUESTIONNAIRE - PHQ9
5. POOR APPETITE OR OVEREATING: NOT AT ALL
SUM OF ALL RESPONSES TO PHQ QUESTIONS 1-9: 7

## 2018-01-18 NOTE — MR AVS SNAPSHOT
MRN:[id number]                      After Visit Summary   1/18/2018    Sharon Baer    MRN: [id number]           Visit Information        Provider Department      1/18/2018 2:30 PM Loi Prescott LICSW [facility] Generic      Your next 10 appointments already scheduled     Feb 22, 2018  2:30 PM CST   Return Visit with Loi EVANGELINA DANNI Prescott   [facility] ([facility])    [address] [id number] [phone]            Mar 06, 2018  1:45 PM CST   Adult Med Follow UP with TINY Knox CNS   Psychiatry Clinic (P [facility])    [place] [id number] [address] [phone]              MyChart Information     [facility] gives you secure access to your electronic health record. If you see a primary care provider, you can also send messages to your care team and make appointments. If you have questions, please call your primary care clinic.  If you do not have a primary care provider, please call [phone] and they will assist you.        Care EveryWhere ID     This is your Care EveryWhere ID. This could be used by other organizations to access your [place] medical records  [id number]        Equal Access to Services     ESTELITA WAGNER: Jd baugho Solauraali, waaxda luqadaha, qaybta kaalmada adeegyada, fahad wagner. So [facility] [phone].    ATENCIÓN: Si habla español, tiene a delacruz disposición servicios gratuitos de asistencia lingüística. Llame al [phone].    We comply with applicable federal civil rights laws and [place] laws. We do not discriminate on the basis of race, color, national origin, age, disability, sex, sexual orientation, or gender identity.
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
## 2018-01-19 ASSESSMENT — ANXIETY QUESTIONNAIRES: GAD7 TOTAL SCORE: 6

## 2018-02-22 ENCOUNTER — OFFICE VISIT (OUTPATIENT)
Dept: PSYCHOLOGY | Facility: CLINIC | Age: 83
End: 2018-02-22
Payer: COMMERCIAL

## 2018-02-22 DIAGNOSIS — F42.8 OTHER OBSESSIVE-COMPULSIVE DISORDER: Primary | ICD-10-CM

## 2018-02-22 PROCEDURE — 90834 PSYTX W PT 45 MINUTES: CPT | Performed by: SOCIAL WORKER

## 2018-02-22 NOTE — MR AVS SNAPSHOT
MRN:[id number]                      After Visit Summary   2/22/2018    Sharon Baer    MRN: [id number]           Visit Information        Provider Department      2/22/2018 2:30 PM Loi Prescott LICSW [facility] Generic      Your next 10 appointments already scheduled     Mar 06, 2018  1:45 PM CST   Adult Med Follow UP with Dyan Mathis, APRN CNS   Psychiatry Clinic ([facility])    [place] [id number]  [address] [address] [phone]            Apr 05, 2018  2:30 PM CDT   Return Visit with DANNI Davidson   [facility] ([facility])    [address] [place] [id number] [phone]              MyChart Information     [facility] gives you secure access to your electronic health record. If you see a primary care provider, you can also send messages to your care team and make appointments. If you have questions, please call your primary care clinic.  If you do not have a primary care provider, please call [phone] and they will assist you.        Care EveryWhere ID     This is your Care EveryWhere ID. This could be used by other organizations to access your [facility] medical records  [id number]        Equal Access to Services     ESTELITA DALE: Jd baugho Solauraali, waaxda luqadaha, qaybta kaalmada adeegyada, fahad dale. So [facility] [phone].    ATENCIÓN: Si habla español, tiene a delacruz disposición servicios gratuitos de asistencia lingüística. Llame al [phone].    We comply with applicable federal civil rights laws and [place] laws. We do not discriminate on the basis of race, color, national origin, age, disability, sex, sexual orientation, or gender identity.
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
## 2018-02-28 ENCOUNTER — TELEPHONE (OUTPATIENT)
Dept: INTERNAL MEDICINE | Facility: CLINIC | Age: 83
End: 2018-02-28

## 2018-02-28 DIAGNOSIS — Z00.00 ROUTINE HEALTH MAINTENANCE: ICD-10-CM

## 2018-02-28 DIAGNOSIS — I10 ESSENTIAL HYPERTENSION: ICD-10-CM

## 2018-02-28 RX ORDER — ATORVASTATIN CALCIUM 20 MG/1
20 TABLET, FILM COATED ORAL DAILY
Qty: 100 TABLET | Refills: 1 | Status: SHIPPED | OUTPATIENT
Start: 2018-02-28 | End: 2019-04-29

## 2018-02-28 RX ORDER — LISINOPRIL 40 MG/1
40 TABLET ORAL DAILY
Qty: 100 TABLET | Refills: 1 | Status: SHIPPED | OUTPATIENT
Start: 2018-02-28 | End: 2018-11-09

## 2018-02-28 RX ORDER — ALLOPURINOL 100 MG/1
100 TABLET ORAL DAILY
Qty: 100 TABLET | Refills: 1 | Status: SHIPPED | OUTPATIENT
Start: 2018-02-28 | End: 2019-04-04

## 2018-03-12 ENCOUNTER — TELEPHONE (OUTPATIENT)
Dept: PSYCHIATRY | Facility: CLINIC | Age: 83
End: 2018-03-12

## 2018-03-12 NOTE — TELEPHONE ENCOUNTER
----- Message from Kingsley Solis sent at 3/12/2018 10:54 AM CDT -----  Regarding: Care Coordination  Contact: [phone]  Pt sister Monica call 3/12/18@10:55am regarding denial prescription for Fluoxetine and Quetiapine through [facility]. Pt sister asked if either the nurse or provider Del could please give her a call. Ok to leave msg if unavailable    Thanks

## 2018-03-12 NOTE — TELEPHONE ENCOUNTER
Called [facility] as refills had been sent to them in Nov.  Pharmacy confirms they still have 90 d/s on file for both Prozac and Seroquel.      Returned call to Monica (consent to communicate on file).  Inquires about denial of refills to [facility].  Unable to find documentation about this which is relayed to her.  States that she has been having issues with refilling through [facility] and at this time prefers to refill through [facility].  Encouraged to c/b if needing further assistance in refilling meds.

## 2018-03-29 ENCOUNTER — OFFICE VISIT (OUTPATIENT)
Dept: PSYCHIATRY | Facility: CLINIC | Age: 83
End: 2018-03-29
Attending: CLINICAL NURSE SPECIALIST
Payer: MEDICARE

## 2018-03-29 VITALS
DIASTOLIC BLOOD PRESSURE: 88 MMHG | WEIGHT: 191 LBS | HEART RATE: 74 BPM | SYSTOLIC BLOOD PRESSURE: 181 MMHG | BODY MASS INDEX: 29.91 KG/M2

## 2018-03-29 DIAGNOSIS — F32.A DEPRESSION, UNSPECIFIED DEPRESSION TYPE: Primary | ICD-10-CM

## 2018-03-29 DIAGNOSIS — F41.8 OTHER SPECIFIED ANXIETY DISORDERS: ICD-10-CM

## 2018-03-29 DIAGNOSIS — F84.0 AUTISM SPECTRUM DISORDER: ICD-10-CM

## 2018-03-29 DIAGNOSIS — F42.8 OTHER OBSESSIVE-COMPULSIVE DISORDER: ICD-10-CM

## 2018-03-29 DIAGNOSIS — F42.9 OBSESSIVE-COMPULSIVE DISORDER, UNSPECIFIED TYPE: ICD-10-CM

## 2018-03-29 PROCEDURE — G0463 HOSPITAL OUTPT CLINIC VISIT: HCPCS | Mod: ZF

## 2018-03-29 RX ORDER — BUPROPION HYDROCHLORIDE 100 MG/1
100 TABLET, EXTENDED RELEASE ORAL EVERY MORNING
Qty: 90 TABLET | Refills: 1 | Status: SHIPPED | OUTPATIENT
Start: 2018-03-29 | End: 2018-07-25

## 2018-03-29 RX ORDER — QUETIAPINE FUMARATE 25 MG/1
TABLET, FILM COATED ORAL
Qty: 90 TABLET | Refills: 1 | Status: SHIPPED | OUTPATIENT
Start: 2018-03-29 | End: 2018-07-25

## 2018-03-29 ASSESSMENT — PAIN SCALES - GENERAL: PAINLEVEL: NO PAIN (0)

## 2018-03-29 NOTE — NURSING NOTE
Chief Complaint   Patient presents with     Recheck Medication     Autism spectrum disorder      Reviewed Allergies, Smoking Status, and Pain Level     Obtained Weight, Blood Pressure, Heart Rate

## 2018-03-29 NOTE — Clinical Note
Hello, I thought I would check with you about Reilly's BP if he should come in to see you.  Currently feeling depressed, maybe due to vision, hearing impairment and isolation. Family is still very involved and supportive. Dyan KIMBROUGH, APRN

## 2018-03-29 NOTE — MR AVS SNAPSHOT
After Visit Summary   3/29/2018    Sharon Baer    MRN: [id number]           Patient Information     Date Of Birth          6/10/1931        Visit Information        Provider Department      3/29/2018 3:15 PM Dyan Mathis APRN [facility]        Today's Diagnoses     Depression, unspecified depression type    -  1    Autism spectrum disorder        Other obsessive-compulsive disorder        Other specified anxiety disorders        Obsessive-compulsive disorder, unspecified type           Follow-ups after your visit        Follow-up notes from your care team     Return in about 6 weeks (around 5/10/2018).      Your next 10 appointments already scheduled     Apr 05, 2018  2:30 PM CDT   Return Visit with Loi Prescott, [facility]    [address] [place] [id number] [phone]            May 10, 2018  3:15 PM CDT   Adult Med Follow UP with TINY Knox CNS   Psychiatry Clinic ([facility])    [place] [id number] [place] [address] [phone]              Who to contact     Please call your clinic at [phone] to:    Ask questions about your health    Make or cancel appointments    Discuss your medicines    Learn about your test results    Speak to your doctor            Additional Information About Your Visit        David Information     [employer] gives you secure access to your electronic health record. If you see a primary care provider, you can also send messages to your care team and make appointments. If you have questions, please call your primary care clinic.  If you do not have a primary care provider, please call [phone] and they will assist you.      [employer] is an electronic gateway that provides easy, online access to your medical records. With [employer], you can request a clinic appointment, read your  test results, renew a prescription or communicate with your care team.     To access your existing account, please contact your [facility] or call [phone] for assistance.        Care EveryWhere ID     This is your Care EveryWhere ID. This could be used by other organizations to access your [facility] medical records  [id number]        Your Vitals Were     Pulse BMI (Body Mass Index)                74 29.91 kg/m2           Blood Pressure from Last 3 Encounters:   03/29/18 181/88   12/06/17 198/84   11/01/17 (!) 172/94    Weight from Last 3 Encounters:   03/29/18 86.6 kg (191 lb)   12/06/17 87.5 kg (193 lb)   11/01/17 86.5 kg (190 lb 12.8 oz)              Today, you had the following     No orders found for display         Today's Medication Changes          These changes are accurate as of 3/29/18 11:59 PM.  If you have any questions, ask your nurse or doctor.               Start taking these medicines.        Dose/Directions    buPROPion 100 MG 12 hr tablet   Commonly known as:  WELLBUTRIN SR   Used for:  Depression, unspecified depression type   Started by:  Dyan Mathis APRN CNS        Dose:  100 mg   Take 1 tablet (100 mg) by mouth every morning   Quantity:  90 tablet   Refills:  1            Where to get your medicines      These medications were sent to [facility] - [place], [place] - [address]  [address] [facility] [address]    Hours:  Mon-Fri 8:30am-5:00pm Toll Free [phone] Phone:  [phone]     buPROPion 100 MG 12 hr tablet    FLUoxetine 20 MG capsule    QUEtiapine 25 MG tablet                Primary Care Provider Office Phone # Fax #    Neo Castano [phone]       [address] [facility] [address]  [facility] [address]        Equal Access to Services     ESTELITA HARE AH: Jd Cazares, waaxda luqadaha, qaybta kaalmada mayco, fahad ricardo  ah. So [facility] [phone].    ATENCIÓN: Si drake arriaza, tiene a delacruz disposición servicios gratuitos de asistencia lingüística. Helen arredondo [phone].    We comply with applicable federal civil rights laws and [place] laws. We do not discriminate on the basis of race, color, national origin, age, disability, sex, sexual orientation, or gender identity.            Thank you!     Thank you for choosing [facility]  for your care. Our goal is always to provide you with excellent care. Hearing back from our patients is one way we can continue to improve our services. Please take a few minutes to complete the written survey that you may receive in the mail after your visit with us. Thank you!             Your Updated Medication List - Protect others around you: Learn how to safely use, store and throw away your medicines at www.disposemymeds.org.          This list is accurate as of 3/29/18 11:59 PM.  Always use your most recent med list.                   Brand Name Dispense Instructions for use Diagnosis    acetaminophen 500 MG Caps      Take 500 mg by mouth 2 times daily    Primary osteoarthritis of right hip       allopurinol 100 MG tablet    ZYLOPRIM    100 tablet    Take 1 tablet (100 mg) by mouth daily    Routine health maintenance       aspirin 325 MG EC tablet     90 tablet    Take 1 tablet (325 mg) by mouth daily    Routine health maintenance       atorvastatin 20 MG tablet    LIPITOR    100 tablet    Take 1 tablet (20 mg) by mouth daily    Routine health maintenance       buPROPion 100 MG 12 hr tablet    WELLBUTRIN SR    90 tablet    Take 1 tablet (100 mg) by mouth every morning    Depression, unspecified depression type       cholecalciferol 1000 UNIT tablet    vitamin D3    100 tablet    Take 2 tablets (2,000 Units) by mouth daily    Routine health maintenance, IGT (impaired glucose tolerance), Gastroesophageal reflux disease without esophagitis       FLUoxetine 20 MG capsule    PROzac    180 capsule     Take 2 capsules (40 mg) by mouth every morning Discontinue order for 3 capsules daily    Depression, unspecified depression type, Other obsessive-compulsive disorder       furosemide 20 MG tablet    LASIX    100 tablet    Take 1 tablet (20 mg) by mouth daily    Routine health maintenance, IGT (impaired glucose tolerance), Gastroesophageal reflux disease without esophagitis       lisinopril 40 MG tablet    PRINIVIL/ZESTRIL    100 tablet    Take 1 tablet (40 mg) by mouth daily    Essential hypertension       * omeprazole 40 MG capsule    priLOSEC     Take 1 capsule (40 mg) by mouth daily Reported by family    Gastroesophageal reflux disease without esophagitis, Routine health maintenance, IGT (impaired glucose tolerance)       * omeprazole 40 MG capsule    priLOSEC    90 capsule    Take 1 capsule (40 mg) by mouth daily    Gastroesophageal reflux disease without esophagitis, Routine health maintenance, IGT (impaired glucose tolerance)       QUEtiapine 25 MG tablet    SEROquel    90 tablet    Take one tablet by mouth every evening with supper    Obsessive-compulsive disorder, unspecified type       * Notice:  This list has 2 medication(s) that are the same as other medications prescribed for you. Read the directions carefully, and ask your doctor or other care provider to review them with you.

## 2018-03-29 NOTE — PROGRESS NOTES
Outpatient Psychiatry Progress Note     Provider: TINY Knox CNS  Date: 3/29/2018  Service:  Medication follow up with counseling.   Patient Identification: Sharon Baer  : 6/10/1931   MRN: [id number]    Sharon Baer is a 86 year old year old male who presents for ongoing psychiatric care.  Sharon Baer was last seen in clinic on 17.   At that time,   Assessment & Plan       Sharon Baer is seen today for follow up and reports he is feeling about the same but his sister notes he has been much better- more outgoing and talkative.  Today he appears less anxious, more verbal and happy.  Josephine reports that the his biggest concern is loneliness and is wondering about services for visiting companions.  Diagnosis  Autism, Spectrum, Anxiety,  OCD, other type, Depression Unspecified     Plan:  Medication: Continued current medication  OTC Recommendations: none  Lab Orders:  none  Referrals: will  Have Kati Montes De Oca Carlson call Josephine about information possible visting problems  Release of Information: none  Future Treatment Considerations:per symptoms  Return for Follow Up: 3 months      ____________________________________________________________________________________________________________________________________________    2018   Reilly is seen with his sister Nicolette.  She reports that Reilly has visits from a volunteer and also his adult nephew weekly. Also seeing therapist Loi Prescott once a month.  Today Reilly reports he is feeling more depressed. He goes to exercise daily for 1/2 an hour then spends most of the time other than that by himself. His eyes hurt from dry eyes and he has been seen an optimologist and prescribed drops but not finding them helpful. This limits his reading. In addition he has difficulty using his phone and hearing. He does have hearing aids but has difficulty getting them to work. Had had been back in for adjustments to them but continues  to have difficulty hearing.  Side effects of medication include: none known  Psychiatric Review of Systems:  Depression: PHQ 9 not completed. Reports moderate anhedonia, feeling depressed, fatigue, passive thoughts of being better off dead.  Anxiety : stable. Denies obsessive thinking  Jackie n/a   Psychosis  n/a.   ADHD none    Review of Medical Systems:  Sleep: stable, continues to nap during the day also  Energy: decreased  Concentration: decreased  Appetite: doesn't like the food for lunch and dinner where he lives but usually eats breakfast and lunch.  GI Concerns: none  Cardiac concerns: denies concern, BP is elevated  Neurological concerns: no concerns  Other medical concerns: dry eyes  Current Substance Use:  Alcohol:denies  Other drugs:denies  Caffeine:no change  Nicotine: none  Past Medical History:   Past Medical History:   Diagnosis Date     Autism spectrum disorder 7/16/2015     GERD (gastroesophageal reflux disease)      Gout      Hearing loss      Hyperlipemia     on atorvastatin     Hypertension      Hypotestosteronism      IGT (impaired glucose tolerance)      OCD (obsessive compulsive disorder)     on cymbalta     Osteoarthritis, hip, bilateral      Vitamin D deficiency     resolved 2/2013     Patient Active Problem List   Diagnosis     Gout     Hypertension     IGT (impaired glucose tolerance)     GERD (gastroesophageal reflux disease)     Hypotestosteronism     Neoplasm of uncertain behavior of skin     AK (actinic keratosis)     History of nonmelanoma skin cancer     Osteoarthritis of right hip     OA (osteoarthritis) of hip     Fall     Facial laceration     Autism spectrum disorder     Depression, unspecified depression type     Vitamin D deficiency     Loss of weight     ACP (advance care planning)     Other obsessive-compulsive disorder     Other specified anxiety disorders       Allergies:   Allergies   Allergen Reactions     Penicillins Rash and Other (See Comments)     He believes he had a  "positive skin test for PCN allergy at the time of a dog bite, about 1960.          Current Medications     Current Outpatient Prescriptions Ordered in [facility]   Medication Sig Dispense Refill     lisinopril (PRINIVIL/ZESTRIL) 40 MG tablet Take 1 tablet (40 mg) by mouth daily 100 tablet 1     atorvastatin (LIPITOR) 20 MG tablet Take 1 tablet (20 mg) by mouth daily 100 tablet 1     allopurinol (ZYLOPRIM) 100 MG tablet Take 1 tablet (100 mg) by mouth daily 100 tablet 1     omeprazole (PRILOSEC) 40 MG capsule Take 1 capsule (40 mg) by mouth daily 90 capsule 3     omeprazole (PRILOSEC) 40 MG capsule Take 1 capsule (40 mg) by mouth daily Reported by family       QUEtiapine (SEROQUEL) 25 MG tablet Take one tablet by mouth every evening with supper 90 tablet 1     FLUoxetine (PROZAC) 20 MG capsule Take 2 capsules (40 mg) by mouth every morning Discontinue order for 3 capsules daily 180 capsule 1     acetaminophen 500 MG CAPS Take 500 mg by mouth 2 times daily       cholecalciferol (VITAMIN D) 1000 UNIT tablet Take 2 tablets (2,000 Units) by mouth daily 100 tablet 3     furosemide (LASIX) 20 MG tablet Take 1 tablet (20 mg) by mouth daily 100 tablet 3     aspirin 325 MG EC tablet Take 1 tablet (325 mg) by mouth daily 90 tablet 3     No current Epic-ordered facility-administered medications on file.         Mental Status Exam     Appearance:  Casually dressed and Well groomed  Behavior/relationship to examiner/demeanor:  Cooperative  Orientation: Oriented to person, place, time and situation  Psychomotor: slowed  Speech Rate:  Slowed  Speech Spontaneity:  Hard of hearing  And often did not respond because of this  Mood:  \"depressed\"  Affect:  Appropriate/mood-congruent and Anxious/Nervous  Thought Process (Associations):  Goal directed  Thought Content:  no overt psychosis, denies suicidal ideation, intent or thoughts, patient does not appear to be responding to internal stimuli, No violent ideation and No homicidal " ideation  Abnormal Perception:  None  Attention/Concentration:  Difficult due to hearing impairment  Insight:  Good  Judgment:  Good      Results     Vital signs: /88  Pulse 74  Wt 86.6 kg (191 lb)  BMI 29.91 kg/m2    Laboratory Data:  no new data    Assessment & Plan      Sharon Baer is seen today for follow up and reports he is feeling depressed with passive SI. This may be due to difficulty with vision and hearing resulting in more isolation and decrease structure.  He will be following up with optimology    Diagnosis  Autism, Spectrum, Anxiety,  OCD, other type, Depression Unspecified    Plan:  Medication: Continue current medication and add Wellbutrin SR 100mg in the morning  OTC Recommendations: none  Lab Orders:  none  Referrals: [facility]. Nicolette will call to schedule.  Release of Information: per symptoms  Future Treatment Considerations:per symptoms  Return for Follow Up:6 weeks   The risks, benefits, alternatives and side effects have been discussed and are understood by the patient. The patient understands the risks of using street drugs or alcohol. There are no medical contraindications, the patient agrees to treatment, and has the capacity to do so. The patient understands to call 911 or come to the nearest ED if life threatening or urgent symptoms present.  In addition time was spent counseling the patient and/or coordinating care regarding review of social and occupational functioning.  In addition patient was counseled on health and wellness practices to augment medication treatment of symptoms. See note for details.    Dyan Mathis, APRN CNS 3/29/2018

## 2018-04-05 ENCOUNTER — OFFICE VISIT (OUTPATIENT)
Dept: PSYCHOLOGY | Facility: CLINIC | Age: 83
End: 2018-04-05
Payer: COMMERCIAL

## 2018-04-05 DIAGNOSIS — F42.8 OTHER OBSESSIVE-COMPULSIVE DISORDER: Primary | ICD-10-CM

## 2018-04-05 PROCEDURE — 90834 PSYTX W PT 45 MINUTES: CPT | Performed by: SOCIAL WORKER

## 2018-04-05 ASSESSMENT — ANXIETY QUESTIONNAIRES
IF YOU CHECKED OFF ANY PROBLEMS ON THIS QUESTIONNAIRE, HOW DIFFICULT HAVE THESE PROBLEMS MADE IT FOR YOU TO DO YOUR WORK, TAKE CARE OF THINGS AT HOME, OR GET ALONG WITH OTHER PEOPLE: VERY DIFFICULT
5. BEING SO RESTLESS THAT IT IS HARD TO SIT STILL: NOT AT ALL
7. FEELING AFRAID AS IF SOMETHING AWFUL MIGHT HAPPEN: SEVERAL DAYS
3. WORRYING TOO MUCH ABOUT DIFFERENT THINGS: SEVERAL DAYS
6. BECOMING EASILY ANNOYED OR IRRITABLE: MORE THAN HALF THE DAYS
2. NOT BEING ABLE TO STOP OR CONTROL WORRYING: SEVERAL DAYS
GAD7 TOTAL SCORE: 6
1. FEELING NERVOUS, ANXIOUS, OR ON EDGE: SEVERAL DAYS

## 2018-04-05 ASSESSMENT — PATIENT HEALTH QUESTIONNAIRE - PHQ9: 5. POOR APPETITE OR OVEREATING: NOT AT ALL

## 2018-04-05 NOTE — MR AVS SNAPSHOT
MRN:[id number]                      After Visit Summary   4/5/2018    Sharon Baer    MRN: [id number]           Visit Information        Provider Department      4/5/2018 2:30 PM Loi Prescott LICSW [facility]      Your next 10 appointments already scheduled     Apr 24, 2018  1:00 PM CDT   Return Visit with Loi EVANGELINA DANNI Prescott   [facility] ([facility]    [address] [id number] [phone]            May 10, 2018  3:15 PM CDT   Adult Med Follow UP with TINY Knox CNS   Psychiatry Clinic ([facility])    [place] [id number]  [facility] [address] [phone]              MyChart Information     [employer] gives you secure access to your electronic health record. If you see a primary care provider, you can also send messages to your care team and make appointments. If you have questions, please call your primary care clinic.  If you do not have a primary care provider, please call [phone] and they will assist you.        Care EveryWhere ID     This is your Care EveryWhere ID. This could be used by other organizations to access your [place] medical records  [id number]        Equal Access to Services     ESTELITA WAGNER: Jd baugho Soluis, waaxda luqadaha, qaybta kaalmada adeegyada, fahad wagner. So [facility] [phone].    ATENCIÓN: Si habla español, tiene a delacruz disposición servicios gratuitos de asistencia lingüística. Llame al [phone].    We comply with applicable federal civil rights laws and [place] laws. We do not discriminate on the basis of race, color, national origin, age, disability, sex, sexual orientation, or gender identity.
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
## 2018-04-06 ASSESSMENT — PATIENT HEALTH QUESTIONNAIRE - PHQ9: SUM OF ALL RESPONSES TO PHQ QUESTIONS 1-9: 12

## 2018-04-06 ASSESSMENT — ANXIETY QUESTIONNAIRES: GAD7 TOTAL SCORE: 6

## 2018-04-24 ENCOUNTER — OFFICE VISIT (OUTPATIENT)
Dept: PSYCHOLOGY | Facility: CLINIC | Age: 83
End: 2018-04-24
Payer: COMMERCIAL

## 2018-04-24 DIAGNOSIS — F42.8 OTHER OBSESSIVE-COMPULSIVE DISORDER: Primary | ICD-10-CM

## 2018-04-24 PROCEDURE — 90834 PSYTX W PT 45 MINUTES: CPT | Performed by: SOCIAL WORKER
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
## 2018-04-24 NOTE — MR AVS SNAPSHOT
MRN:[id number]                      After Visit Summary   4/24/2018    Sharon Baer    MRN: [id number]           Visit Information        Provider Department      4/24/2018 1:00 PM Loi Prescott LICSW [facility]      Your next 10 appointments already scheduled     May 10, 2018  3:15 PM CDT   Adult Med Follow UP with TINY Knox CNS   Psychiatry Clinic ([facility])    [place] [id number]  [address] [address] [phone]            May 22, 2018  1:00 PM CDT   Return Visit with DANNI Davidson   [facility] ([facility])    [address] [place] [id number] [phone]              MyChart Information     [facility] gives you secure access to your electronic health record. If you see a primary care provider, you can also send messages to your care team and make appointments. If you have questions, please call your primary care clinic.  If you do not have a primary care provider, please call [phone] and they will assist you.        Care EveryWhere ID     This is your Care EveryWhere ID. This could be used by other organizations to access your [facility] medical records  [id number]        Equal Access to Services     ESTELITA WAGNER: Jd baugho Soluis, waaxda luqadaha, qaybta kaalmada adeegyada, fahad wagner. So [facility] [phone].    ATENCIÓN: Si habla español, tiene a delacruz disposición servicios gratuitos de asistencia lingüística. Llame al [phone].    We comply with applicable federal civil rights laws and [place] laws. We do not discriminate on the basis of race, color, national origin, age, disability, sex, sexual orientation, or gender identity.

## 2018-05-01 ENCOUNTER — TELEPHONE (OUTPATIENT)
Dept: PSYCHIATRY | Facility: CLINIC | Age: 83
End: 2018-05-01

## 2018-05-01 NOTE — TELEPHONE ENCOUNTER
"Email message from patients sister Josephine:    Good morning, Dyan,    Since we last saw you, Reilly is doing MUCH better; I've come to realize that these bouts with anxiety do seem to come in cycles, sometimes triggered by events that, unfortunately, often escape my attention.    During Reilly's last appointment with you, we talked some about the failing daily care-services at his current assisted living residence which, in the meantime, have gone from bad to worse. There is a growing exodus of both residents and employees. We, too, have decided it's not the right place for Reilly; he has now been (conditionally) accepted at [facility], an assisted living facility connected to the [facility]. Together with Reilly and two other family members we've made three visits, each time feeling more confident that this will be an o.k place for him. It's small and has an exceptional record of employee retention, both important issues for Reilly. A move will not be easy for him but he, himself, has said that it sounds \"kind of exciting.\"    As is customary, they require a doctor's report. I have given them the name and contact info of Dr. Castano but, because you are the one most familiar with Reilly's situation and whom he sees most frequently, I have listed you as primary contact. Since you have seen him within the past six months, they need only a) a recommendation from you that confirms his ability to live in an assisted care facility with no significant qualifications, and b) a list of his current medications. I have been open with them about Reilly's anxiety issues and that he is dependent on antidepressants.    If you need anything more from my end, just let me know. Your report can be faxed to Letty at [phone].    As ever, we are so grateful to have you as Reilly's main go-to person. He has not had many of those in his life. Because of you, I think he finally believes that he is not a terrible person. That " is a true victory.    Josephine Paty  [phone]    FYI:  [place] [address] [id number] [place] [address]          [email or web address]          Sent message back to Josephine letting her know that I will need DAVID signed by patient in order to send this information. May chose to schedule an appointment and then I can get the DAVID and write letter during that time.  Dyan KIMBROUGH, APRN

## 2018-05-08 ENCOUNTER — OFFICE VISIT (OUTPATIENT)
Dept: PSYCHIATRY | Facility: CLINIC | Age: 83
End: 2018-05-08
Attending: CLINICAL NURSE SPECIALIST
Payer: MEDICARE

## 2018-05-08 ENCOUNTER — TELEPHONE (OUTPATIENT)
Dept: PSYCHIATRY | Facility: CLINIC | Age: 83
End: 2018-05-08

## 2018-05-08 VITALS
SYSTOLIC BLOOD PRESSURE: 165 MMHG | HEART RATE: 71 BPM | WEIGHT: 189.8 LBS | DIASTOLIC BLOOD PRESSURE: 82 MMHG | BODY MASS INDEX: 29.72 KG/M2

## 2018-05-08 DIAGNOSIS — F84.0 AUTISM SPECTRUM DISORDER: Primary | ICD-10-CM

## 2018-05-08 DIAGNOSIS — F42.8 OTHER OBSESSIVE-COMPULSIVE DISORDER: ICD-10-CM

## 2018-05-08 DIAGNOSIS — F41.8 OTHER SPECIFIED ANXIETY DISORDERS: ICD-10-CM

## 2018-05-08 DIAGNOSIS — F32.A DEPRESSION, UNSPECIFIED DEPRESSION TYPE: ICD-10-CM

## 2018-05-08 PROCEDURE — G0463 HOSPITAL OUTPT CLINIC VISIT: HCPCS | Mod: ZF

## 2018-05-08 ASSESSMENT — PAIN SCALES - GENERAL: PAINLEVEL: NO PAIN (0)

## 2018-05-08 NOTE — TELEPHONE ENCOUNTER
"ON 5/8/2018 Monica Newman/POA signed an DAVID authorizing the release of a letter (re: \"appropriate for assisted lliving\") from Dyan Mathis at [facility] to Letty at [facility].  Per Dyan Mathis she faxed the DAVID and letter to Letty at fax number [phone].  I sent the DAVID to scanning and held a copy in [facility] until scanning is complete/confirmed.Tamera Kang/JASON    "

## 2018-05-08 NOTE — LETTER
May 8, 2018      TO: Sharon Baer  [address]  [facility] [address]       Mr. Baer has been a patient of mine since 5/26/2016. He is stable on his current medication which are listed to separately. He is treated for anxiety with obsessive features.   He is not a danger to himself or others and is appropriate for assisted living.   Please contact me if any further information is needed.      Sincerely,        Dyan FRANKS CNS

## 2018-05-08 NOTE — MR AVS SNAPSHOT
After Visit Summary   5/8/2018    Sharon Baer    MRN: [id number]           Patient Information     Date Of Birth          6/10/1931        Visit Information        Provider Department      5/8/2018 8:45 AM Dyan Mathis APRN [facility]        Today's Diagnoses     Autism spectrum disorder    -  1    Depression, unspecified depression type        Other obsessive-compulsive disorder        Other specified anxiety disorders           Follow-ups after your visit        Follow-up notes from your care team     Return in about 8 weeks (around 7/3/2018).      Your next 10 appointments already scheduled     May 22, 2018  1:00 PM CDT   Return Visit with Loi Prescott [facility] ([facility])    [address] [place] [address] [phone]              Who to contact     Please call your clinic at [phone] to:    Ask questions about your health    Make or cancel appointments    Discuss your medicines    Learn about your test results    Speak to your doctor            Additional Information About Your Visit        MyChart Information     [facility] gives you secure access to your electronic health record. If you see a primary care provider, you can also send messages to your care team and make appointments. If you have questions, please call your primary care clinic.  If you do not have a primary care provider, please call [phone] and they will assist you.      [facility] is an electronic gateway that provides easy, online access to your medical records. With [facility], you can request a clinic appointment, read your test results, renew a prescription or communicate with your care team.     To access your existing account, please contact your [facility] or call [phone] for assistance.        Care EveryWhere ID     This is your Care EveryWhere ID. This could be used by other  organizations to access your [place] medical records  [id number]        Your Vitals Were     Pulse BMI (Body Mass Index)                71 29.72 kg/m2           Blood Pressure from Last 3 Encounters:   05/08/18 165/82   03/29/18 181/88   12/06/17 198/84    Weight from Last 3 Encounters:   05/08/18 86.1 kg (189 lb 12.8 oz)   03/29/18 86.6 kg (191 lb)   12/06/17 87.5 kg (193 lb)              Today, you had the following     No orders found for display       Primary Care Provider Office Phone # Fax #    Neo Marcelo Castano [phone]       [address] [place] [address]        Equal Access to Services     ESTELITA HARE : Jd Cazares, waaxda luqadaha, qaybta kaalmada adeegyada, fahad awgner. So [facility] [phone].    ATENCIÓN: Si habla español, tiene a delacruz disposición servicios gratuitos de asistencia lingüística. [facility] [phone].    We comply with applicable federal civil rights laws and [place] laws. We do not discriminate on the basis of race, color, national origin, age, disability, sex, sexual orientation, or gender identity.            Thank you!     Thank you for choosing [facility]  for your care. Our goal is always to provide you with excellent care. Hearing back from our patients is one way we can continue to improve our services. Please take a few minutes to complete the written survey that you may receive in the mail after your visit with us. Thank you!             Your Updated Medication List - Protect others around you: Learn how to safely use, store and throw away your medicines at www.disposemymeds.org.          This list is accurate as of 5/8/18  2:45 PM.  Always use your most recent med list.                   Brand Name Dispense Instructions for use Diagnosis    acetaminophen 500 MG Caps      Take 500 mg by mouth 2 times daily    Primary osteoarthritis of right hip       allopurinol 100 MG tablet     ZYLOPRIM    100 tablet    Take 1 tablet (100 mg) by mouth daily    Routine health maintenance       aspirin 325 MG EC tablet     90 tablet    Take 1 tablet (325 mg) by mouth daily    Routine health maintenance       atorvastatin 20 MG tablet    LIPITOR    100 tablet    Take 1 tablet (20 mg) by mouth daily    Routine health maintenance       buPROPion 100 MG 12 hr tablet    WELLBUTRIN SR    90 tablet    Take 1 tablet (100 mg) by mouth every morning    Depression, unspecified depression type       cholecalciferol 1000 UNIT tablet    vitamin D3    100 tablet    Take 2 tablets (2,000 Units) by mouth daily    Routine health maintenance, IGT (impaired glucose tolerance), Gastroesophageal reflux disease without esophagitis       FLUoxetine 20 MG capsule    PROzac    180 capsule    Take 2 capsules (40 mg) by mouth every morning Discontinue order for 3 capsules daily    Depression, unspecified depression type, Other obsessive-compulsive disorder       furosemide 20 MG tablet    LASIX    100 tablet    Take 1 tablet (20 mg) by mouth daily    Routine health maintenance, IGT (impaired glucose tolerance), Gastroesophageal reflux disease without esophagitis       lisinopril 40 MG tablet    PRINIVIL/ZESTRIL    100 tablet    Take 1 tablet (40 mg) by mouth daily    Essential hypertension       * omeprazole 40 MG capsule    priLOSEC     Take 1 capsule (40 mg) by mouth daily Reported by family    Gastroesophageal reflux disease without esophagitis, Routine health maintenance, IGT (impaired glucose tolerance)       * omeprazole 40 MG capsule    priLOSEC    90 capsule    Take 1 capsule (40 mg) by mouth daily    Gastroesophageal reflux disease without esophagitis, Routine health maintenance, IGT (impaired glucose tolerance)       QUEtiapine 25 MG tablet    SEROquel    90 tablet    Take one tablet by mouth every evening with supper    Obsessive-compulsive disorder, unspecified type       * Notice:  This list has 2 medication(s) that are  the same as other medications prescribed for you. Read the directions carefully, and ask your doctor or other care provider to review them with you.

## 2018-05-08 NOTE — LETTER
May 8, 2018    [facility]  [address] [place] [address]  Fax: [phone]      RE: Sharon Baer  [address]  [facility] [address]       Mr. Baer has been a patient of mine since 5/26/2016. He is stable on his current medication which are listed to separately. He is treated for anxiety with obsessive features.   He is not a danger to himself or others and is appropriate for assisted living.   Please contact me if any further information is needed.      Sincerely,        Dyan FRANKS CNS

## 2018-05-08 NOTE — PROGRESS NOTES
Outpatient Psychiatry Progress Note     Provider: TINY Knox CNS  Date: 2018  Service:  Medication follow up with counseling.   Patient Identification: Sharon Baer  : 6/10/1931   MRN: [id number]    Sharon Baer is a 86 year old year old male who presents for ongoing psychiatric care.  Sharon Baer was last seen in clinic on 3/29/18.   At that time,   Assessment & Plan       Sharon Baer is seen today for follow up and reports he is feeling depressed with passive SI. This may be due to difficulty with vision and hearing resulting in more isolation and decrease structure.  He will be following up with [facility]     Diagnosis  Autism, Spectrum, Anxiety,  OCD, other type, Depression Unspecified     Plan:  Medication: Continue current medication and add Wellbutrin SR 100mg in the morning  OTC Recommendations: none  Lab Orders:  none  Referrals: [facility]. Nicolette will call to schedule.  Release of Information: per symptoms  Future Treatment Considerations:per symptoms  Return for Follow Up:6 weeks      ____________________________________________________________________________________________________________________________________________    My Chart message from Josephine:  Good morning, Dyan,    Since we last saw you, Reilly is doing MUCH better; I've come to realize that these bouts with anxiety do seem to come in cycles, sometimes triggered by events that, unfortunately, often escape my attention.    During Reilly's last appointment with you, we talked some about the failing daily care-services at his current assisted living residence which, in the meantime, have gone from bad to worse. There is a growing exodus of both residents and employees. We, too, have decided it's not the right place for Reilly; he has now been (conditionally) accepted at [facility], an assisted living facility connected to the [facility] organization.  "Together with Reilly and two other family members we've made three visits, each time feeling more confident that this will be an o.k place for him. It's small and has an exceptional record of employee retention, both important issues for Reilly. A move will not be easy for him but he, himself, has said that it sounds \"kind of exciting.\"    As is customary, they require a doctor's report. I have given them the name and contact info of Dr. Castano but, because you are the one most familiar with Reilly's situation and whom he sees most frequently, I have listed you as primary contact. Since you have seen him within the past six months, they need only a) a recommendation from you that confirms his ability to live in an assisted care facility with no significant qualifications, and b) a list of his current medications. I have been open with them about Reilly's anxiety issues and that he is dependent on antidepressants.    If you need anything more from my end, just let me know. Your report can be faxed to Letty at [phone].    As ever, we are so grateful to have you as Reilly's main go-to person. He has not had many of those in his life. Because of you, I think he finally believes that he is not a terrible person. That is a true victory.    Josephine Newman  [phone]    I:  [address] [address]          [email or web address]          Sent message back to Josephine letting her know that I will need DAVID signed by patient in order to send this information. May chose to schedule an appointment and then I can get the DAVID and write letter during that time.  Dyan Mathis CNS, APRN  ________________________________________________________________________________________________________________________________________________________________________________________________________      05/08/2018   Seen with his sisterMaribel Burton has seen an " improvement with the the addition of Wellbutrin.  He has followed up with scott and will be going back again. Was told it is normal for his age to have sore eyes.  During the appointment Reilly appears to hear better and is more responsive than at previous appointments.    Today Sharon reports he will be moving at the end of the month. He is not really excited about this and thinks thinks will be about the same.    Side effects of medication include: none known  Psychiatric Review of Systems:  Depression: In the last 2 weeks per PHQ 9 several days anhedonia, feeling depressed, fatigue, feelings of failure, concentration problems, restless/lethargy.  Passive thoughts of death but denies SI, plan or intent.   Anxiety : some increase due to move.   Jackie na   Psychosis  na.   ADHD na    Review of Medical Systems:  Sleep: stable  Energy: mild  Concentration: mild  Appetite: stable  GI Concerns: none  Cardiac concerns: none  Neurological concerns: none  Other medical concerns: difficulty seeing clearly  Current Substance Use:  Alcohol:denies  Other drugs:none  Caffeine:not reviewed  Nicotine: not reviewed  Past Medical History:   Past Medical History:   Diagnosis Date     Autism spectrum disorder 7/16/2015     GERD (gastroesophageal reflux disease)      Gout      Hearing loss      Hyperlipemia     on atorvastatin     Hypertension      Hypotestosteronism      IGT (impaired glucose tolerance)      OCD (obsessive compulsive disorder)     on cymbalta     Osteoarthritis, hip, bilateral      Vitamin D deficiency     resolved 2/2013     Patient Active Problem List   Diagnosis     Gout     Hypertension     IGT (impaired glucose tolerance)     GERD (gastroesophageal reflux disease)     Hypotestosteronism     Neoplasm of uncertain behavior of skin     AK (actinic keratosis)     History of nonmelanoma skin cancer     Osteoarthritis of right hip     OA (osteoarthritis) of hip     Fall     Facial laceration     Autism spectrum  disorder     Depression, unspecified depression type     Vitamin D deficiency     Loss of weight     ACP (advance care planning)     Other obsessive-compulsive disorder     Other specified anxiety disorders       Allergies:   Allergies   Allergen Reactions     Penicillins Rash and Other (See Comments)     He believes he had a positive skin test for PCN allergy at the time of a dog bite, about 1960.          Current Medications     Current Outpatient Prescriptions Ordered in [facility]   Medication Sig Dispense Refill     acetaminophen 500 MG CAPS Take 500 mg by mouth 2 times daily       allopurinol (ZYLOPRIM) 100 MG tablet Take 1 tablet (100 mg) by mouth daily 100 tablet 1     aspirin 325 MG EC tablet Take 1 tablet (325 mg) by mouth daily 90 tablet 3     atorvastatin (LIPITOR) 20 MG tablet Take 1 tablet (20 mg) by mouth daily 100 tablet 1     buPROPion (WELLBUTRIN SR) 100 MG 12 hr tablet Take 1 tablet (100 mg) by mouth every morning 90 tablet 1     cholecalciferol (VITAMIN D) 1000 UNIT tablet Take 2 tablets (2,000 Units) by mouth daily 100 tablet 3     FLUoxetine (PROZAC) 20 MG capsule Take 2 capsules (40 mg) by mouth every morning Discontinue order for 3 capsules daily 180 capsule 1     furosemide (LASIX) 20 MG tablet Take 1 tablet (20 mg) by mouth daily 100 tablet 3     lisinopril (PRINIVIL/ZESTRIL) 40 MG tablet Take 1 tablet (40 mg) by mouth daily 100 tablet 1     omeprazole (PRILOSEC) 40 MG capsule Take 1 capsule (40 mg) by mouth daily Reported by family       omeprazole (PRILOSEC) 40 MG capsule Take 1 capsule (40 mg) by mouth daily 90 capsule 3     QUEtiapine (SEROQUEL) 25 MG tablet Take one tablet by mouth every evening with supper 90 tablet 1     No current Epic-ordered facility-administered medications on file.         Mental Status Exam     Appearance:  Casually dressed and Adequately groomed  Behavior/relationship to examiner/demeanor:  Cooperative  Orientation: Oriented to person, place, time and  "situation  Psychomotor: mild restless which is not new  Speech Rate:  Normal  Speech Spontaneity:  Mild latency, poverty but improved  Mood:  \"okay\"  Affect:  Appropriate/mood-congruent and Anxious/Nervous  Thought Process (Associations):  Goal directed and Circumstantial  Thought Content:  no overt psychosis, denies suicidal ideation, intent or thoughts and patient does not appear to be responding to internal stimuli  Abnormal Perception:  Due to autism symptoms  Attention/Concentration:  Fair  Insight:  Adequate  Judgment:  Good      Results     Vital signs: /82  Pulse 71  Wt 86.1 kg (189 lb 12.8 oz)  BMI 29.72 kg/m2    Laboratory Data:  no new data    Assessment & Plan      Sharon Baer is seen today for follow up and reports he is aware of a change in mood since Wellbutrin was added but Josephine reports he is more talkative and responsive. No change in BP with addition of Wellbutrin.  Reilly will be moving to [facility] at the end of this month. He has some anxiety about this but is not overwhelmed.   Agrees with plan to continue current medications.    Diagnosis  Autism, Spectrum, Anxiety,  OCD, other type, Depression Unspecified    Plan:  Medication: Continue current medications.  OTC Recommendations: none  Lab Orders:  none  Referrals: none  Release of Information: [facility] Letty.  Letter faxed to [phone] today with list of medications.  Future Treatment Considerations:per symptoms  Return for Follow Up:8 weeks   The risks, benefits, alternatives and side effects have been discussed and are understood by the patient. The patient understands the risks of using street drugs or alcohol. There are no medical contraindications, the patient agrees to treatment, and has the capacity to do so. The patient understands to call 911 or come to the nearest ED if life threatening or urgent symptoms present.  In addition time was spent counseling the patient and/or coordinating care " regarding review of social and occupational functioning.  In addition patient was counseled on health and wellness practices to augment medication treatment of symptoms. See note for details.    Dyan Mathis, APRN CNS 5/8/2018

## 2018-05-08 NOTE — NURSING NOTE
Chief Complaint   Patient presents with     Recheck Medication     Depression    Patient unaware of pharmacy and medication list - Provider will review during visit

## 2018-05-09 ASSESSMENT — PATIENT HEALTH QUESTIONNAIRE - PHQ9: SUM OF ALL RESPONSES TO PHQ QUESTIONS 1-9: 7

## 2018-05-15 ENCOUNTER — TELEPHONE (OUTPATIENT)
Dept: INTERNAL MEDICINE | Facility: CLINIC | Age: 83
End: 2018-05-15

## 2018-05-15 NOTE — TELEPHONE ENCOUNTER
[facility] Call Center    Phone Message    May a detailed message be left on voicemail: yes    Reason for Call: Other: Monica is calling on behalf of the PT.  She is wondering if office notes from the PT's last visit with Dr. Castano are ok to use for the PT to move into assited living.  He is moving in on June 1st and there were no apptointments available before that date with Dr. Castano.  Please follow up with Monica at [phone].     Action Taken: Message routed to:  [facility]

## 2018-05-16 NOTE — TELEPHONE ENCOUNTER
Spoke sister Monica regarding patient needing an appointment for assisted living admit.  Due to availability appointment made with   On 5/23/18 at 1335.Shravan Silva LPN at 3:38 PM on 5/16/2018

## 2018-05-22 ENCOUNTER — OFFICE VISIT (OUTPATIENT)
Dept: PSYCHOLOGY | Facility: CLINIC | Age: 83
End: 2018-05-22
Payer: COMMERCIAL

## 2018-05-22 DIAGNOSIS — F42.8 OTHER OBSESSIVE-COMPULSIVE DISORDER: Primary | ICD-10-CM

## 2018-05-22 PROCEDURE — 90834 PSYTX W PT 45 MINUTES: CPT | Performed by: SOCIAL WORKER

## 2018-05-22 ASSESSMENT — ANXIETY QUESTIONNAIRES
IF YOU CHECKED OFF ANY PROBLEMS ON THIS QUESTIONNAIRE, HOW DIFFICULT HAVE THESE PROBLEMS MADE IT FOR YOU TO DO YOUR WORK, TAKE CARE OF THINGS AT HOME, OR GET ALONG WITH OTHER PEOPLE: SOMEWHAT DIFFICULT
7. FEELING AFRAID AS IF SOMETHING AWFUL MIGHT HAPPEN: SEVERAL DAYS
GAD7 TOTAL SCORE: 5
2. NOT BEING ABLE TO STOP OR CONTROL WORRYING: SEVERAL DAYS
3. WORRYING TOO MUCH ABOUT DIFFERENT THINGS: SEVERAL DAYS
6. BECOMING EASILY ANNOYED OR IRRITABLE: SEVERAL DAYS
1. FEELING NERVOUS, ANXIOUS, OR ON EDGE: SEVERAL DAYS
5. BEING SO RESTLESS THAT IT IS HARD TO SIT STILL: NOT AT ALL

## 2018-05-22 ASSESSMENT — PATIENT HEALTH QUESTIONNAIRE - PHQ9: 5. POOR APPETITE OR OVEREATING: NOT AT ALL

## 2018-05-22 NOTE — MR AVS SNAPSHOT
MRN:[id number]                      After Visit Summary   5/22/2018    Sharon Baer    MRN: [id number]           Visit Information        Provider Department      5/22/2018 1:00 PM Loi Prescott LICSW [facility]      Your next 10 appointments already scheduled     May 23, 2018  1:35 PM CDT   (Arrive by 1:20 PM)   Return Visit with Cristo Burch MD   [facility] ([facility])    [address] [address] [phone]            Jun 19, 2018  1:00 PM CDT   Return Visit with DANNI Davidson   [facility] ([facility])    [address] [place] [address] [phone]              MyChart Information     [facility] gives you secure access to your electronic health record. If you see a primary care provider, you can also send messages to your care team and make appointments. If you have questions, please call your primary care clinic.  If you do not have a primary care provider, please call [phone] and they will assist you.        Care EveryWhere ID     This is your Care EveryWhere ID. This could be used by other organizations to access your [place] medical records  [id number]        Equal Access to Services     ESTELITA HARE : Jd peraza Soluis, waaxda luqadaha, qaybta kaalmada adeegyada, fahad wagner. So [facility] [phone].    ATENCIÓN: Si habla español, tiene a delacruz disposición servicios gratuitos de asistencia lingüística. Llame al [phone].    We comply with applicable federal civil rights laws and [place] laws. We do not discriminate on the basis of race, color, national origin, age, disability, sex, sexual orientation, or gender identity.
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
## 2018-05-23 ENCOUNTER — OFFICE VISIT (OUTPATIENT)
Dept: FAMILY MEDICINE | Facility: CLINIC | Age: 83
End: 2018-05-23
Payer: COMMERCIAL

## 2018-05-23 VITALS
HEART RATE: 74 BPM | DIASTOLIC BLOOD PRESSURE: 89 MMHG | WEIGHT: 188.8 LBS | BODY MASS INDEX: 29.56 KG/M2 | SYSTOLIC BLOOD PRESSURE: 155 MMHG

## 2018-05-23 DIAGNOSIS — N18.9 CHRONIC KIDNEY DISEASE, UNSPECIFIED CKD STAGE: ICD-10-CM

## 2018-05-23 DIAGNOSIS — R73.09 ELEVATED GLUCOSE: Primary | ICD-10-CM

## 2018-05-23 DIAGNOSIS — R73.09 ELEVATED GLUCOSE: ICD-10-CM

## 2018-05-23 LAB
ALBUMIN SERPL-MCNC: 3.4 G/DL (ref 3.4–5)
ALP SERPL-CCNC: 105 U/L (ref 40–150)
ALT SERPL W P-5'-P-CCNC: 12 U/L (ref 0–70)
ANION GAP SERPL CALCULATED.3IONS-SCNC: 7 MMOL/L (ref 3–14)
AST SERPL W P-5'-P-CCNC: 14 U/L (ref 0–45)
BILIRUB SERPL-MCNC: 0.2 MG/DL (ref 0.2–1.3)
BUN SERPL-MCNC: 19 MG/DL (ref 7–30)
CALCIUM SERPL-MCNC: 9.1 MG/DL (ref 8.5–10.1)
CHLORIDE SERPL-SCNC: 115 MMOL/L (ref 94–109)
CO2 SERPL-SCNC: 21 MMOL/L (ref 20–32)
CREAT SERPL-MCNC: 1.32 MG/DL (ref 0.66–1.25)
ERYTHROCYTE [DISTWIDTH] IN BLOOD BY AUTOMATED COUNT: 14.8 % (ref 10–15)
GFR SERPL CREATININE-BSD FRML MDRD: 51 ML/MIN/1.7M2
GLUCOSE SERPL-MCNC: 91 MG/DL (ref 70–99)
HBA1C MFR BLD: 6.5 % (ref 0–5.6)
HCT VFR BLD AUTO: 44.3 % (ref 40–53)
HGB BLD-MCNC: 13.6 G/DL (ref 13.3–17.7)
MCH RBC QN AUTO: 27.5 PG (ref 26.5–33)
MCHC RBC AUTO-ENTMCNC: 30.7 G/DL (ref 31.5–36.5)
MCV RBC AUTO: 90 FL (ref 78–100)
PLATELET # BLD AUTO: 181 10E9/L (ref 150–450)
POTASSIUM SERPL-SCNC: 4.6 MMOL/L (ref 3.4–5.3)
PROT SERPL-MCNC: 7.8 G/DL (ref 6.8–8.8)
RBC # BLD AUTO: 4.94 10E12/L (ref 4.4–5.9)
SODIUM SERPL-SCNC: 143 MMOL/L (ref 133–144)
WBC # BLD AUTO: 7.8 10E9/L (ref 4–11)

## 2018-05-23 RX ORDER — ASPIRIN 325 MG
325 TABLET, DELAYED RELEASE (ENTERIC COATED) ORAL DAILY
Status: ON HOLD | COMMUNITY
End: 2019-08-15

## 2018-05-23 ASSESSMENT — PAIN SCALES - GENERAL: PAINLEVEL: NO PAIN (0)

## 2018-05-23 ASSESSMENT — ANXIETY QUESTIONNAIRES: GAD7 TOTAL SCORE: 5

## 2018-05-23 ASSESSMENT — PATIENT HEALTH QUESTIONNAIRE - PHQ9: SUM OF ALL RESPONSES TO PHQ QUESTIONS 1-9: 8

## 2018-05-23 NOTE — MR AVS SNAPSHOT
After Visit Summary   5/23/2018    Sharon Baer    MRN: [id number]           Patient Information     Date Of Birth          6/10/1931        Visit Information        Provider Department      5/23/2018 1:35 PM Cristo Burch MD [facility]        Today's Diagnoses     Elevated glucose    -  1    Chronic kidney disease, unspecified CKD stage          Care Instructions    Please go to the lab on the first floor before you leave today.               Follow-ups after your visit        Your next 10 appointments already scheduled     May 23, 2018  2:45 PM CDT   LAB with  LAB   [facility] ([facility])    [address]  1st Floor  [facility] [address] [phone]           Please do not eat 10-12 hours before your appointment if you are coming in fasting for labs on lipids, cholesterol, or glucose (sugar). This does not apply to pregnant women. Water, hot tea and black coffee (with nothing added) are okay. Do not drink other fluids, diet soda or chew gum.            Jun 19, 2018  1:00 PM CDT   Return Visit with DANNI Davidson   [facility] ([facility])    [address] [address] [phone]              Future tests that were ordered for you today     Open Future Orders        Priority Expected Expires Ordered    CBC with platelets Routine 5/23/2018 6/6/2018 5/23/2018    Comprehensive metabolic panel Routine 5/23/2018 6/6/2018 5/23/2018    Hemoglobin A1c Routine 5/23/2018 6/6/2018 5/23/2018            Who to contact     Please call your clinic at [phone] to:    Ask questions about your health    Make or cancel appointments    Discuss your medicines    Learn about your test results    Speak to your doctor            Additional Information About Your Visit        MyChart Information     [employer] gives you secure access to your electronic health record. If  you see a primary care provider, you can also send messages to your care team and make appointments. If you have questions, please call your primary care clinic.  If you do not have a primary care provider, please call [phone] and they will assist you.      [employer] is an electronic gateway that provides easy, online access to your medical records. With [employer], you can request a clinic appointment, read your test results, renew a prescription or communicate with your care team.     To access your existing account, please contact your [facility] or call [phone] for assistance.        Care EveryWhere ID     This is your Care EveryWhere ID. This could be used by other organizations to access your [facility] medical records  [id number]        Your Vitals Were     Pulse BMI (Body Mass Index)                74 29.56 kg/m2           Blood Pressure from Last 3 Encounters:   05/23/18 155/89   10/25/17 157/87   09/22/17 155/86    Weight from Last 3 Encounters:   05/23/18 85.6 kg (188 lb 12.8 oz)   10/25/17 86.3 kg (190 lb 3.2 oz)   09/22/17 90.1 kg (198 lb 9.6 oz)                 Today's Medication Changes          These changes are accurate as of 5/23/18  2:21 PM.  If you have any questions, ask your nurse or doctor.               These medicines have changed or have updated prescriptions.        Dose/Directions    aspirin 81 MG tablet   This may have changed:  Another medication with the same name was removed. Continue taking this medication, and follow the directions you see here.   Changed by:  Cristo Burch MD        Dose:  81 mg   Take 81 mg by mouth daily   Refills:  0       omeprazole 40 MG capsule   Commonly known as:  priLOSEC   This may have changed:  Another medication with the same name was removed. Continue taking this medication, and follow the directions you see here.   Used for:  Gastroesophageal reflux disease without esophagitis, Routine health maintenance, IGT  (impaired glucose tolerance)   Changed by:  Cristo Burch MD        Dose:  40 mg   Take 1 capsule (40 mg) by mouth daily   Quantity:  90 capsule   Refills:  3                Primary Care Provider Office Phone # Fax #    Neo Marcelo Castano [phone]       [address] [place] [address]  [facility] [place] [address]        Equal Access to Services     [facility] AH: Hadii aad ku hadasho Soomaali, waaxda luqadaha, qaybta kaalmada adeegyada, waxay idiin hayaan adeeg kharash la'suzannen ah. So [facility] [phone].    ATENCIÓN: Si habla español, tiene a delacruz disposición servicios gratuitos de asistencia lingüística. Llame al [phone].    We comply with applicable federal civil rights laws and [place] laws. We do not discriminate on the basis of race, color, national origin, age, disability, sex, sexual orientation, or gender identity.            Thank you!     Thank you for choosing [facility]  for your care. Our goal is always to provide you with excellent care. Hearing back from our patients is one way we can continue to improve our services. Please take a few minutes to complete the written survey that you may receive in the mail after your visit with us. Thank you!             Your Updated Medication List - Protect others around you: Learn how to safely use, store and throw away your medicines at www.disposemymeds.org.          This list is accurate as of 5/23/18  2:21 PM.  Always use your most recent med list.                   Brand Name Dispense Instructions for use Diagnosis    acetaminophen 500 MG Caps      Take 500 mg by mouth 2 times daily    Primary osteoarthritis of right hip       allopurinol 100 MG tablet    ZYLOPRIM    100 tablet    Take 1 tablet (100 mg) by mouth daily    Routine health maintenance       aspirin 81 MG tablet      Take 81 mg by mouth daily        atorvastatin 20 MG tablet    LIPITOR    100 tablet    Take 1 tablet (20 mg) by mouth daily    Routine health  maintenance       buPROPion 100 MG 12 hr tablet    WELLBUTRIN SR    90 tablet    Take 1 tablet (100 mg) by mouth every morning    Depression, unspecified depression type       cholecalciferol 1000 UNIT tablet    vitamin D3    100 tablet    Take 2 tablets (2,000 Units) by mouth daily    Routine health maintenance, IGT (impaired glucose tolerance), Gastroesophageal reflux disease without esophagitis       FLUoxetine 20 MG capsule    PROzac    180 capsule    Take 2 capsules (40 mg) by mouth every morning Discontinue order for 3 capsules daily    Depression, unspecified depression type, Other obsessive-compulsive disorder       furosemide 20 MG tablet    LASIX    100 tablet    Take 1 tablet (20 mg) by mouth daily    Routine health maintenance, IGT (impaired glucose tolerance), Gastroesophageal reflux disease without esophagitis       lisinopril 40 MG tablet    PRINIVIL/ZESTRIL    100 tablet    Take 1 tablet (40 mg) by mouth daily    Essential hypertension       omeprazole 40 MG capsule    priLOSEC    90 capsule    Take 1 capsule (40 mg) by mouth daily    Gastroesophageal reflux disease without esophagitis, Routine health maintenance, IGT (impaired glucose tolerance)       QUEtiapine 25 MG tablet    SEROquel    90 tablet    Take one tablet by mouth every evening with supper    Obsessive-compulsive disorder, unspecified type

## 2018-05-23 NOTE — PROGRESS NOTES
[facility]   Cristo Burch MD  05/23/2018      Chief Complaint:   Physical       History of Present Illness:   Sharon Baer is a 86 year old male with a history of depression, GERD, hypertension and autism spectrum disorder who presents for a physical. Today, the patient would like a check up. He is going to move from one elderly home to an assisted living complex. In order to move, he needs a physicians note that updates them on the state of the patient's care. There are no forms to be filled out by the physician. He will be moving in about a week to this new facility. The patient has no acute symptoms to address today.     Here with sister.    Other concerns discussed:  New shingles vaccine - the patient was told to check with insurance about coverage of this      Review of Systems:   Pertinent items are noted in HPI, remainder of complete ROS is negative.      Active Medications:     Current Outpatient Prescriptions:      acetaminophen 500 MG CAPS, Take 500 mg by mouth 2 times daily, Disp: , Rfl:      allopurinol (ZYLOPRIM) 100 MG tablet, Take 1 tablet (100 mg) by mouth daily, Disp: 100 tablet, Rfl: 1     aspirin 325 MG EC tablet, Take 1 tablet (325 mg) by mouth daily (Patient taking differently: Take 81 mg by mouth daily ), Disp: 90 tablet, Rfl: 3     atorvastatin (LIPITOR) 20 MG tablet, Take 1 tablet (20 mg) by mouth daily, Disp: 100 tablet, Rfl: 1     buPROPion (WELLBUTRIN SR) 100 MG 12 hr tablet, Take 1 tablet (100 mg) by mouth every morning, Disp: 90 tablet, Rfl: 1     cholecalciferol (VITAMIN D) 1000 UNIT tablet, Take 2 tablets (2,000 Units) by mouth daily, Disp: 100 tablet, Rfl: 3     FLUoxetine (PROZAC) 20 MG capsule, Take 2 capsules (40 mg) by mouth every morning Discontinue order for 3 capsules daily, Disp: 180 capsule, Rfl: 1     furosemide (LASIX) 20 MG tablet, Take 1 tablet (20 mg) by mouth daily, Disp: 100 tablet, Rfl: 3     lisinopril (PRINIVIL/ZESTRIL) 40 MG tablet,  Take 1 tablet (40 mg) by mouth daily, Disp: 100 tablet, Rfl: 1     omeprazole (PRILOSEC) 40 MG capsule, Take 1 capsule (40 mg) by mouth daily, Disp: 90 capsule, Rfl: 3     QUEtiapine (SEROQUEL) 25 MG tablet, Take one tablet by mouth every evening with supper, Disp: 90 tablet, Rfl: 1      Allergies:   Penicillins      Past Medical History:  Autism spectrum disorder  GERD  Gout  Hearing loss  Hyperlipidemia  Hypertension  Hypotestosteronism  Impaired glucose tolerance  Obsessive compulsive disorder  Osteoarthritis  Vitamin D deficiency  Neoplasm of uncertain behavior of skin  AK (actinitic keratosis)  History of non melanoma skin cancer  Osteoarthritis of right hip  Facial laceration  Depression  Loss of weight     Past Surgical History:  Bilateral cataract surgery  Bilateral ear surgery  Hernia repair, inguinal   Suprapubic prostatectomy    Family History:   Skin cancer - sister  Myocardial infarction - maternal uncle      Social History:   Tobacco Use: former smoker, 1 pack/day, 20 years  Alcohol Use: none  PCP: Neo Castano        Physical Exam:   /89  Pulse 74  Wt 85.6 kg (188 lb 12.8 oz)  BMI 29.56 kg/m2   Constitutional: Alert. In no distress.  Head: Normocephalic. No masses, lesions, tenderness or abnormalities.  ENT: No neck nodes or sinus tenderness.  Cardiovascular: RRR. No murmurs, clicks, gallops, or rub.  Respiratory: Clear to auscultation bilaterally, no wheezes or crackles.  Gastrointestinal: Abdomen soft. Non-tender. BS normal. No masses or organomegaly.  Musculoskeletal: Extremities normal. No gross deformities noted. Normal muscle tone. No edema.   Skin: No suspicious lesions. No rashes.  Neurologic: Gait normal. Reflexes normal and symmetric. Sensation grossly WNL.  Psychiatric: Mentation appears normal. Normal affect.   Hematologic/Lymphatic/Immunologic: Normal cervical lymph nodes.      Assessment and Plan:  Elevated glucose  Labs ordered today. Last lab was in September.  -  Hemoglobin A1c    Chronic kidney disease, unspecified CKD stage  Labs ordered today, Last lab was in September.  - CBC with platelets  - Comprehensive metabolic panel     Move to Assisted Living  The patient has no interval complaints since last seen here. He sees psychiatrist regularly. Notes reviewed. Shingles vaccine was discussed. The patient brought no papers for us to fill out today. We printed an updated list of medications.     Htn: good control     Follow-up: As needed.         Scribe Disclosure:   I, Dorota Altman, am serving as a scribe to document services personally performed by Cristo Burch MD at this visit, based upon the provider's statements to me. All documentation has been reviewed by the aforementioned provider prior to being entered into the official medical record.     Portions of this medical record were completed by a scribe. UPON MY REVIEW AND AUTHENTICATION BY ELECTRONIC SIGNATURE, this confirms (a) I performed the applicable clinical services, and (b) the record is accurate.   Cristo Burch MD

## 2018-05-23 NOTE — NURSING NOTE
Chief Complaint   Patient presents with     Physical     Assessment needed for assisted living.        Catie Kerr LPN at 1:43 PM on May 23, 2018    Rooming Note  Blood Pressure   BP Readings from Last 1 Encounters:   05/23/18 (!) 163/91    Single BP recheck started, 1:44 PM (3 minutes)

## 2018-06-19 ENCOUNTER — OFFICE VISIT (OUTPATIENT)
Dept: PSYCHOLOGY | Facility: CLINIC | Age: 83
End: 2018-06-19
Payer: COMMERCIAL

## 2018-06-19 DIAGNOSIS — F42.8 OTHER OBSESSIVE-COMPULSIVE DISORDER: Primary | ICD-10-CM

## 2018-06-19 PROCEDURE — 90834 PSYTX W PT 45 MINUTES: CPT | Performed by: SOCIAL WORKER

## 2018-06-19 ASSESSMENT — ANXIETY QUESTIONNAIRES
GAD7 TOTAL SCORE: 7
2. NOT BEING ABLE TO STOP OR CONTROL WORRYING: SEVERAL DAYS
1. FEELING NERVOUS, ANXIOUS, OR ON EDGE: SEVERAL DAYS
7. FEELING AFRAID AS IF SOMETHING AWFUL MIGHT HAPPEN: SEVERAL DAYS
6. BECOMING EASILY ANNOYED OR IRRITABLE: SEVERAL DAYS
IF YOU CHECKED OFF ANY PROBLEMS ON THIS QUESTIONNAIRE, HOW DIFFICULT HAVE THESE PROBLEMS MADE IT FOR YOU TO DO YOUR WORK, TAKE CARE OF THINGS AT HOME, OR GET ALONG WITH OTHER PEOPLE: SOMEWHAT DIFFICULT
5. BEING SO RESTLESS THAT IT IS HARD TO SIT STILL: SEVERAL DAYS
3. WORRYING TOO MUCH ABOUT DIFFERENT THINGS: SEVERAL DAYS

## 2018-06-19 ASSESSMENT — PATIENT HEALTH QUESTIONNAIRE - PHQ9: 5. POOR APPETITE OR OVEREATING: SEVERAL DAYS
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
## 2018-06-19 NOTE — MR AVS SNAPSHOT
MRN:[id number]                      After Visit Summary   6/19/2018    Sharon Baer    MRN: [id number]           Visit Information        Provider Department      6/19/2018 1:00 PM Loi Prescott LICSW [facility] Generic      Your next 10 appointments already scheduled     Jul 24, 2018  2:00 PM CDT   Return Visit with Loi EVANGELINA SamDANNI may   [facility] ([facility])    [address] [place] [id number] [phone]              MyChart Information     [employer] gives you secure access to your electronic health record. If you see a primary care provider, you can also send messages to your care team and make appointments. If you have questions, please call your primary care clinic.  If you do not have a primary care provider, please call [phone] and they will assist you.        Care EveryWhere ID     This is your Care EveryWhere ID. This could be used by other organizations to access your [facility] medical records  [id number]        Equal Access to Services     ESTELITA HARE : Hadii june baugho Soluis, waaxda luqadaha, qaybta kaalmada adeegyasaran, fahad wagner. So [facility] [phone].    ATENCIÓN: Si habla español, tiene a delacruz disposición servicios gratuitos de asistencia lingüística. Llame al [phone].    We comply with applicable federal civil rights laws and [place] laws. We do not discriminate on the basis of race, color, national origin, age, disability, sex, sexual orientation, or gender identity.

## 2018-06-20 ASSESSMENT — ANXIETY QUESTIONNAIRES: GAD7 TOTAL SCORE: 7

## 2018-06-20 ASSESSMENT — PATIENT HEALTH QUESTIONNAIRE - PHQ9: SUM OF ALL RESPONSES TO PHQ QUESTIONS 1-9: 8

## 2018-07-24 ENCOUNTER — OFFICE VISIT (OUTPATIENT)
Dept: PSYCHOLOGY | Facility: CLINIC | Age: 83
End: 2018-07-24
Payer: COMMERCIAL

## 2018-07-24 ENCOUNTER — TELEPHONE (OUTPATIENT)
Dept: PSYCHIATRY | Facility: CLINIC | Age: 83
End: 2018-07-24

## 2018-07-24 DIAGNOSIS — F42.9 OBSESSIVE-COMPULSIVE DISORDER, UNSPECIFIED TYPE: ICD-10-CM

## 2018-07-24 DIAGNOSIS — F42.8 OTHER OBSESSIVE-COMPULSIVE DISORDER: ICD-10-CM

## 2018-07-24 DIAGNOSIS — F42.8 OTHER OBSESSIVE-COMPULSIVE DISORDER: Primary | ICD-10-CM

## 2018-07-24 DIAGNOSIS — F32.A DEPRESSION, UNSPECIFIED DEPRESSION TYPE: ICD-10-CM

## 2018-07-24 PROCEDURE — 90834 PSYTX W PT 45 MINUTES: CPT | Performed by: SOCIAL WORKER

## 2018-07-24 NOTE — MR AVS SNAPSHOT
MRN:[id number]                      After Visit Summary   7/24/2018    Sharon Baer    MRN: [id number]           Visit Information        Provider Department      7/24/2018 2:00 PM Loi Prescott LICSW [facility] Generic      Your next 10 appointments already scheduled     Aug 28, 2018  3:00 PM CDT   Return Visit with Loi EVANGELINA SamDANNI may   [facility] ([facility])    [address] [place] [id number] [phone]              MyChart Information     [employer] gives you secure access to your electronic health record. If you see a primary care provider, you can also send messages to your care team and make appointments. If you have questions, please call your primary care clinic.  If you do not have a primary care provider, please call [phone] and they will assist you.        Care EveryWhere ID     This is your Care EveryWhere ID. This could be used by other organizations to access your [facility] medical records  [id number]        Equal Access to Services     ESTELITA HARE : Hadii aad ku hadasho Soluis, waaxda luqadaha, qaybta kaalmada adeegyasaran, fahad wagner. So [facility] [phone].    ATENCIÓN: Si habla español, tiene a delacruz disposición servicios gratuitos de asistencia lingüística. Llame al [phone].    We comply with applicable federal civil rights laws and [place] laws. We do not discriminate on the basis of race, color, national origin, age, disability, sex, sexual orientation, or gender identity.

## 2018-07-24 NOTE — TELEPHONE ENCOUNTER
-Rec'd fax from [facility] (fax: [phone])  -Pt is requesting new prescriptions through their pharmacy  -Will notify LUIS E Corea, TINY and seek approval to refill medications for 90 d/s
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
## 2018-07-25 RX ORDER — BUPROPION HYDROCHLORIDE 100 MG/1
100 TABLET, EXTENDED RELEASE ORAL EVERY MORNING
Qty: 90 TABLET | Refills: 0 | Status: SHIPPED | OUTPATIENT
Start: 2018-07-25 | End: 2018-11-07

## 2018-07-25 RX ORDER — QUETIAPINE FUMARATE 25 MG/1
TABLET, FILM COATED ORAL
Qty: 90 TABLET | Refills: 0 | Status: SHIPPED | OUTPATIENT
Start: 2018-07-25 | End: 2018-10-15

## 2018-07-31 ENCOUNTER — TELEPHONE (OUTPATIENT)
Dept: PSYCHIATRY | Facility: CLINIC | Age: 83
End: 2018-07-31

## 2018-07-31 NOTE — TELEPHONE ENCOUNTER
-Incoming fax from [facility]  -Seeking approval to refill omeprazole 40 mg capsule  -Returned fax to pharmacy denying refill as we are not rx'ing this medication  -Asked that request be forwarded to pt's PCP

## 2018-08-01 DIAGNOSIS — K21.9 GASTROESOPHAGEAL REFLUX DISEASE WITHOUT ESOPHAGITIS: ICD-10-CM

## 2018-08-01 DIAGNOSIS — Z00.00 ROUTINE HEALTH MAINTENANCE: ICD-10-CM

## 2018-08-01 DIAGNOSIS — R73.02 IGT (IMPAIRED GLUCOSE TOLERANCE): ICD-10-CM

## 2018-08-01 NOTE — TELEPHONE ENCOUNTER
[facility] Call Center    Phone Message    May a detailed message be left on voicemail: yes    Reason for Call: Medication Refill Request    Has the patient contacted the pharmacy for the refill? Yes   Name of medication being requested: omeprazole (PRILOSEC) 40 MG capsule   Provider who prescribed the medication: Dr. Castano   Pharmacy: [facility] - [place], [place] - [address]  Date medication is needed: 08/01/2018         Action Taken: Message routed to:  [facility] ([facility]): gabriela [facility]

## 2018-08-02 RX ORDER — OMEPRAZOLE 40 MG/1
40 CAPSULE, DELAYED RELEASE ORAL DAILY
Qty: 90 CAPSULE | Refills: 2 | Status: SHIPPED | OUTPATIENT
Start: 2018-08-02 | End: 2019-04-29

## 2018-08-20 ENCOUNTER — MEDICAL CORRESPONDENCE (OUTPATIENT)
Dept: HEALTH INFORMATION MANAGEMENT | Facility: CLINIC | Age: 83
End: 2018-08-20

## 2018-08-28 ENCOUNTER — OFFICE VISIT (OUTPATIENT)
Dept: PSYCHOLOGY | Facility: CLINIC | Age: 83
End: 2018-08-28
Payer: COMMERCIAL

## 2018-08-28 DIAGNOSIS — F42.8 OTHER OBSESSIVE-COMPULSIVE DISORDER: Primary | ICD-10-CM

## 2018-08-28 PROCEDURE — 90834 PSYTX W PT 45 MINUTES: CPT | Performed by: SOCIAL WORKER

## 2018-08-28 ASSESSMENT — ANXIETY QUESTIONNAIRES
IF YOU CHECKED OFF ANY PROBLEMS ON THIS QUESTIONNAIRE, HOW DIFFICULT HAVE THESE PROBLEMS MADE IT FOR YOU TO DO YOUR WORK, TAKE CARE OF THINGS AT HOME, OR GET ALONG WITH OTHER PEOPLE: SOMEWHAT DIFFICULT
7. FEELING AFRAID AS IF SOMETHING AWFUL MIGHT HAPPEN: SEVERAL DAYS
3. WORRYING TOO MUCH ABOUT DIFFERENT THINGS: NOT AT ALL
6. BECOMING EASILY ANNOYED OR IRRITABLE: SEVERAL DAYS
1. FEELING NERVOUS, ANXIOUS, OR ON EDGE: SEVERAL DAYS
GAD7 TOTAL SCORE: 5
2. NOT BEING ABLE TO STOP OR CONTROL WORRYING: SEVERAL DAYS
5. BEING SO RESTLESS THAT IT IS HARD TO SIT STILL: SEVERAL DAYS

## 2018-08-28 ASSESSMENT — PATIENT HEALTH QUESTIONNAIRE - PHQ9: 5. POOR APPETITE OR OVEREATING: NOT AT ALL
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
## 2018-08-28 NOTE — MR AVS SNAPSHOT
MRN:[id number]                      After Visit Summary   8/28/2018    Sharon Baer    MRN: [id number]           Visit Information        Provider Department      8/28/2018 3:00 PM Loi Prescott [facility]      MyChart Information     MyChart gives you secure access to your electronic health record. If you see a primary care provider, you can also send messages to your care team and make appointments. If you have questions, please call your primary care clinic.  If you do not have a primary care provider, please call [phone] and they will assist you.        Care EveryWhere ID     This is your Care EveryWhere ID. This could be used by other organizations to access your [place] medical records  [id number]        Equal Access to Services     ESTELITA HARE : Jd Cazares, wabarbarada lashawn, qagrant kaalmasaran mao, fahad wagner. So [facility] [phone].    ATENCIÓN: Si habla español, tiene a delacruz disposición servicios gratuitos de asistencia lingüística. Llame al [phone].    We comply with applicable federal civil rights laws and [place] laws. We do not discriminate on the basis of race, color, national origin, age, disability, sex, sexual orientation, or gender identity.

## 2018-08-29 ASSESSMENT — ANXIETY QUESTIONNAIRES: GAD7 TOTAL SCORE: 5

## 2018-08-29 ASSESSMENT — PATIENT HEALTH QUESTIONNAIRE - PHQ9: SUM OF ALL RESPONSES TO PHQ QUESTIONS 1-9: 14

## 2018-09-25 ENCOUNTER — OFFICE VISIT (OUTPATIENT)
Dept: PSYCHOLOGY | Facility: CLINIC | Age: 83
End: 2018-09-25
Payer: COMMERCIAL

## 2018-09-25 DIAGNOSIS — F42.8 OTHER OBSESSIVE-COMPULSIVE DISORDER: Primary | ICD-10-CM

## 2018-09-25 PROCEDURE — 90834 PSYTX W PT 45 MINUTES: CPT | Performed by: SOCIAL WORKER

## 2018-09-25 NOTE — MR AVS SNAPSHOT
MRN:[id number]                      After Visit Summary   9/25/2018    Sharon Baer    MRN: [id number]           Visit Information        Provider Department      9/25/2018 1:00 PM Loi Prescott LICSW [facility]      Your next 10 appointments already scheduled     Oct 18, 2018 12:30 PM CDT   New Visit with Cristo Espinoza DPM   [facility]    [address] [facility] [address] [phone]            Nov 13, 2018  2:00 PM CST   Return Visit with Loi EVANGELINA DANNI Prescott   [facility] ([facility])    [address] [facility] [address] [phone]              MyChart Information     [employer] gives you secure access to your electronic health record. If you see a primary care provider, you can also send messages to your care team and make appointments. If you have questions, please call your primary care clinic.  If you do not have a primary care provider, please call [phone] and they will assist you.        Care EveryWhere ID     This is your Care EveryWhere ID. This could be used by other organizations to access your [place] medical records  [id number]        Equal Access to Services     ESTELITA HARE : Jd peraza Soluis, waaxda luqadaha, qaybta kaalmada ademichael, fahad awgner. So [facility] [phone].    ATENCIÓN: Si habla español, tiene a delacruz disposición servicios gratuitos de asistencia lingüística. Llame al [phone].    We comply with applicable federal civil rights laws and [place] laws. We do not discriminate on the basis of race, color, national origin, age, disability, sex, sexual orientation, or gender identity.
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
## 2018-10-15 DIAGNOSIS — F42.8 OTHER OBSESSIVE-COMPULSIVE DISORDER: ICD-10-CM

## 2018-10-15 DIAGNOSIS — F42.9 OBSESSIVE-COMPULSIVE DISORDER, UNSPECIFIED TYPE: ICD-10-CM

## 2018-10-15 DIAGNOSIS — F32.A DEPRESSION, UNSPECIFIED DEPRESSION TYPE: ICD-10-CM

## 2018-10-15 RX ORDER — QUETIAPINE FUMARATE 25 MG/1
TABLET, FILM COATED ORAL
Qty: 30 TABLET | Refills: 0 | Status: SHIPPED | OUTPATIENT
Start: 2018-10-15 | End: 2018-11-07

## 2018-10-16 NOTE — TELEPHONE ENCOUNTER
Medication requested: FLUoxetine (PROZAC) 20 MG capsule         Last refilled: not given  Qty: not given    Medication requested: QUEtiapine (SEROQUEL) 25 MG tablet  Last refilled: not given  Qty: not given        Last seen: 5/8/18  RTC: 8 weeks  Cancel: 0  No-show: 0  Next appt: not scheduled    Refill decision:   30 day harshad refill sent to the pharmacy - including instructions for patient to call the clinic and schedule an appointment.

## 2018-11-07 ENCOUNTER — OFFICE VISIT (OUTPATIENT)
Dept: PSYCHIATRY | Facility: CLINIC | Age: 83
End: 2018-11-07
Attending: CLINICAL NURSE SPECIALIST
Payer: COMMERCIAL

## 2018-11-07 DIAGNOSIS — F41.8 OTHER SPECIFIED ANXIETY DISORDERS: ICD-10-CM

## 2018-11-07 DIAGNOSIS — F32.A DEPRESSION, UNSPECIFIED DEPRESSION TYPE: ICD-10-CM

## 2018-11-07 DIAGNOSIS — F84.0 AUTISM SPECTRUM DISORDER: Primary | ICD-10-CM

## 2018-11-07 DIAGNOSIS — F42.8 OTHER OBSESSIVE-COMPULSIVE DISORDER: ICD-10-CM

## 2018-11-07 DIAGNOSIS — F42.9 OBSESSIVE-COMPULSIVE DISORDER, UNSPECIFIED TYPE: ICD-10-CM

## 2018-11-07 RX ORDER — BUPROPION HYDROCHLORIDE 100 MG/1
100 TABLET, EXTENDED RELEASE ORAL EVERY MORNING
Qty: 90 TABLET | Refills: 1 | Status: SHIPPED | OUTPATIENT
Start: 2018-11-07 | End: 2019-01-02

## 2018-11-07 RX ORDER — QUETIAPINE FUMARATE 25 MG/1
TABLET, FILM COATED ORAL
Qty: 90 TABLET | Refills: 1 | Status: SHIPPED | OUTPATIENT
Start: 2018-11-07 | End: 2019-01-02

## 2018-11-07 ASSESSMENT — PATIENT HEALTH QUESTIONNAIRE - PHQ9: SUM OF ALL RESPONSES TO PHQ QUESTIONS 1-9: 5

## 2018-11-07 NOTE — PROGRESS NOTES
Outpatient Psychiatry Progress Note     Provider: TINY Knox CNS  Date: 2018  Service:  Medication follow up with counseling.   Patient Identification: Sharon Baer  : 6/10/1931   MRN: [id number]    Sharon Baer is a 87 year old year old male who presents for ongoing psychiatric care.  Sharon Baer was last seen in clinic on 18.   At that time,   Assessment & Plan       Sharon Baer is seen today for follow up and reports he is aware of a change in mood since Wellbutrin was added but Josephine reports he is more talkative and responsive. No change in BP with addition of Wellbutrin.  Reilly will be moving to [facility] at the end of this month. He has some anxiety about this but is not overwhelmed.   Agrees with plan to continue current medications.     Diagnosis  Autism, Spectrum, Anxiety,  OCD, other type, Depression Unspecified     Plan:  Medication: Continue current medications.  OTC Recommendations: none  Lab Orders:  none  Referrals: none  Release of Information: [facility].  Letter faxed to [phone] today with list of medications.  Future Treatment Considerations:per symptoms  Return for Follow Up:8 weeks      ____________________________________________________________________________________________________________________________________________    2018   Seen with  Josephine. Arrived late today.  Today Sharon reports he moved the end of May.  Feels about same as where he was before . Mood is about the same. Obsessive thoughts might worse but not really bothersome.  Nephew visits once a week.  He attend exercise on Friday from 9:30 to 2:30 and likes this.    He had surgery due to mold from fitting for hearing aid being logded in his middle ear in the September. He should not have had mold used since he has a history of mastoiditis. Even with new hearing aid he has difficulty.  Josephine notes that she has not noticed any  decline in mood.  She feels the place he is living now is better for him but the hearing difficulty causes isolation in addition to his symptoms.  Side effects of medication include: none reported  Psychiatric Review of Systems:  Depression: In the last 2 weeks per PHQ-9 score:   PHQ-9 SCORE 11/7/2018   Total Score -   Total Score 5       Anxiety : mild increase  Jackie na   Psychosis  na.   ADHD na    Review of Medical Systems:  Sleep: denies concerns  Energy: mild  Concentration: stable  Appetite: stable  GI Concerns: none  Cardiac concerns: none  Neurological concerns: no new concerns  Other medical concerns: no new concerns  Current Substance Use:  Alcohol:none  Other drugs:none  Caffeine:no change  Nicotine: none  Past Medical History:   Past Medical History:   Diagnosis Date     Autism spectrum disorder 7/16/2015     GERD (gastroesophageal reflux disease)      Gout      Hearing loss      Hyperlipemia     on atorvastatin     Hypertension      Hypotestosteronism      IGT (impaired glucose tolerance)      OCD (obsessive compulsive disorder)     on cymbalta     Osteoarthritis, hip, bilateral      Vitamin D deficiency     resolved 2/2013     Patient Active Problem List   Diagnosis     Gout     Hypertension     IGT (impaired glucose tolerance)     GERD (gastroesophageal reflux disease)     Hypotestosteronism     Neoplasm of uncertain behavior of skin     AK (actinic keratosis)     History of nonmelanoma skin cancer     Osteoarthritis of right hip     OA (osteoarthritis) of hip     Fall     Facial laceration     Autism spectrum disorder     Depression, unspecified depression type     Vitamin D deficiency     Loss of weight     ACP (advance care planning)     Other obsessive-compulsive disorder     Other specified anxiety disorders       Allergies:   Allergies   Allergen Reactions     Penicillins Rash and Other (See Comments)     He believes he had a positive skin test for PCN allergy at the time of a dog bite, about  "1960.          Current Medications     Current Outpatient Prescriptions Ordered in [facility]   Medication Sig Dispense Refill     acetaminophen 500 MG CAPS Take 500 mg by mouth 2 times daily       allopurinol (ZYLOPRIM) 100 MG tablet Take 1 tablet (100 mg) by mouth daily 100 tablet 1     aspirin 81 MG tablet Take 81 mg by mouth daily       atorvastatin (LIPITOR) 20 MG tablet Take 1 tablet (20 mg) by mouth daily 100 tablet 1     buPROPion (WELLBUTRIN SR) 100 MG 12 hr tablet Take 1 tablet (100 mg) by mouth every morning 90 tablet 0     cholecalciferol (VITAMIN D) 1000 UNIT tablet Take 2 tablets (2,000 Units) by mouth daily 100 tablet 3     FLUoxetine (PROZAC) 20 MG capsule Take 2 capsules (40 mg) by mouth every morning 60 capsule 0     furosemide (LASIX) 20 MG tablet Take 1 tablet (20 mg) by mouth daily 100 tablet 3     lisinopril (PRINIVIL/ZESTRIL) 40 MG tablet Take 1 tablet (40 mg) by mouth daily 100 tablet 1     omeprazole (PRILOSEC) 40 MG capsule Take 1 capsule (40 mg) by mouth daily 90 capsule 2     QUEtiapine (SEROQUEL) 25 MG tablet Take one tablet by mouth every evening with supper 30 tablet 0     No current Epic-ordered facility-administered medications on file.         Mental Status Exam     Appearance:  Casually dressed and Adequately groomed  Behavior/relationship to examiner/demeanor:  Cooperative  Orientation: Oriented to person, place, time and situation  Psychomotor: slowed  Speech Rate:  Normal  Speech Spontaneity:  Latency and poverty  Mood:  \"a ltttle worse I think\"  Affect:  Anxious/Nervous  Thought Process (Associations):  Circumstantial  Thought Content:  no overt psychosis, denies suicidal ideation, intent or thoughts and patient does not appear to be responding to internal stimuli  Abnormal Perception:  None  Attention/Concentration:  Normal  Insight:  Adequate  Judgment:  Adequate for safety      Results     Vital signs: There were no vitals taken for this visit.    Laboratory Data:  no new " data    Assessment & Plan      Sharon Baer is seen today for follow up with his sister Josephine and reports he might be a little worse.  Communication is difficulty due to his hearing impairment. Josephine has not noticed a decline. He is a little more active where he is living now but hearing impairment causes difficulty in socialization.    Diagnosis  Encounter Diagnoses   Name Primary?     Autism spectrum disorder Yes     Depression, unspecified depression type      Other specified anxiety disorders        Plan:  Medication: Continue current medications  OTC Recommendations: none  Lab Orders:  none  Referrals: none  Release of Information: none  Future Treatment Considerations:per followup  Return for Follow Up:8 weeks   The risks, benefits, alternatives and side effects have been discussed and are understood by the patient. The patient understands the risks of using street drugs or alcohol. There are no medical contraindications, the patient agrees to treatment, and has the capacity to do so. The patient understands to call 911 or come to the nearest ED if life threatening or urgent symptoms present.  In addition time was spent counseling the patient and/or coordinating care regarding review of social and occupational functioning.  In addition patient was counseled on health and wellness practices to augment medication treatment of symptoms. See note for details.    Dyan Mathis, APRN CNS 11/7/2018

## 2018-11-07 NOTE — NURSING NOTE
"Patient taken to treatment room before rooming/vitals done today.Tamera Kang/JASON  Rooming delayed due to \"Active Shooter\" drill      "

## 2018-11-07 NOTE — MR AVS SNAPSHOT
After Visit Summary   11/7/2018    Sharon Baer    MRN: [id number]           Patient Information     Date Of Birth          6/10/1931        Visit Information        Provider Department      11/7/2018 10:15 AM Dyan Mathis APRN [facility]        Today's Diagnoses     Autism spectrum disorder    -  1    Depression, unspecified depression type        Other specified anxiety disorders        Other obsessive-compulsive disorder        Obsessive-compulsive disorder, unspecified type           Follow-ups after your visit        Follow-up notes from your care team     Return in about 8 weeks (around 1/2/2019).      Your next 10 appointments already scheduled     Dec 20, 2018 12:30 PM CST   Return Visit with Loi Prescott, [facility] ([facility])    [address] [place] [address] [phone]            Jan 02, 2019  3:15 PM CST   Adult Med Follow UP with TINY Knox CNS   Psychiatry Clinic ([facility])    [place] [id number] [address] [address] [phone]              Who to contact     Please call your clinic at [phone] to:    Ask questions about your health    Make or cancel appointments    Discuss your medicines    Learn about your test results    Speak to your doctor            Additional Information About Your Visit        MyChart Information     [employer] gives you secure access to your electronic health record. If you see a primary care provider, you can also send messages to your care team and make appointments. If you have questions, please call your primary care clinic.  If you do not have a primary care provider, please call [phone] and they will assist you.      [employer] is an electronic gateway that provides easy, online access to your medical records. With [employer], you can request a clinic appointment, read your  test results, renew a prescription or communicate with your care team.     To access your existing account, please contact your [facility] or call [phone] for assistance.        Care EveryWhere ID     This is your Care EveryWhere ID. This could be used by other organizations to access your [facility] medical records  [id number]         Blood Pressure from Last 3 Encounters:   05/23/18 155/89   05/08/18 165/82   03/29/18 181/88    Weight from Last 3 Encounters:   05/23/18 85.6 kg (188 lb 12.8 oz)   05/08/18 86.1 kg (189 lb 12.8 oz)   03/29/18 86.6 kg (191 lb)              Today, you had the following     No orders found for display         Where to get your medicines      [facility] [place], [place] - [address] AT [address] to [place] [address] [address] [facility] [address]     Phone:  [phone]     buPROPion 100 MG 12 hr tablet    FLUoxetine 20 MG capsule    QUEtiapine 25 MG tablet          Primary Care Provider Office Phone # Fax #    Neo Castano [phone]       [address] [facility] [address]        Equal Access to Services     ESTELITA HARE : Hadii june ku hadasho Soomaali, waaxda luqadaha, qaybta kaalmada adeegyada, waxay bernadettein ysabel wagner. So [facility] [phone].    ATENCIÓN: Si habla español, tiene a delacruz disposición servicios gratuitos de asistencia lingüística. Llame al [phone].    We comply with applicable federal civil rights laws and [place] laws. We do not discriminate on the basis of race, color, national origin, age, disability, sex, sexual orientation, or gender identity.            Thank you!     Thank you for choosing [facility]  for your care. Our goal is always to provide you with excellent care. Hearing back from our patients is one way we can continue to improve our services. Please take a few minutes to complete the  written survey that you may receive in the mail after your visit with us. Thank you!             Your Updated Medication List - Protect others around you: Learn how to safely use, store and throw away your medicines at www.disposemymeds.org.          This list is accurate as of 11/7/18 11:59 PM.  Always use your most recent med list.                   Brand Name Dispense Instructions for use Diagnosis    acetaminophen 500 MG Caps      Take 500 mg by mouth 2 times daily    Primary osteoarthritis of right hip       allopurinol 100 MG tablet    ZYLOPRIM    100 tablet    Take 1 tablet (100 mg) by mouth daily    Routine health maintenance       aspirin 81 MG tablet    ASA     Take 81 mg by mouth daily        atorvastatin 20 MG tablet    LIPITOR    100 tablet    Take 1 tablet (20 mg) by mouth daily    Routine health maintenance       buPROPion 100 MG 12 hr tablet    WELLBUTRIN SR    90 tablet    Take 1 tablet (100 mg) by mouth every morning    Depression, unspecified depression type       FLUoxetine 20 MG capsule    PROzac    60 capsule    Take 2 capsules (40 mg) by mouth every morning    Depression, unspecified depression type, Other obsessive-compulsive disorder       furosemide 20 MG tablet    LASIX    100 tablet    Take 1 tablet (20 mg) by mouth daily    Routine health maintenance, IGT (impaired glucose tolerance), Gastroesophageal reflux disease without esophagitis       omeprazole 40 MG DR capsule    priLOSEC    90 capsule    Take 1 capsule (40 mg) by mouth daily    Gastroesophageal reflux disease without esophagitis, Routine health maintenance, IGT (impaired glucose tolerance)       QUEtiapine 25 MG tablet    SEROquel    90 tablet    Take one tablet by mouth every evening with supper    Obsessive-compulsive disorder, unspecified type       vitamin D3 1000 units (25 mcg) tablet    CHOLECALCIFEROL    100 tablet    Take 2 tablets (2,000 Units) by mouth daily    Routine health maintenance, IGT (impaired glucose  tolerance), Gastroesophageal reflux disease without esophagitis

## 2018-11-09 DIAGNOSIS — I10 ESSENTIAL HYPERTENSION: ICD-10-CM

## 2018-11-13 ENCOUNTER — OFFICE VISIT (OUTPATIENT)
Dept: PSYCHOLOGY | Facility: CLINIC | Age: 83
End: 2018-11-13
Payer: COMMERCIAL

## 2018-11-13 DIAGNOSIS — F32.A DEPRESSION, UNSPECIFIED DEPRESSION TYPE: ICD-10-CM

## 2018-11-13 DIAGNOSIS — F42.8 OTHER OBSESSIVE-COMPULSIVE DISORDER: Primary | ICD-10-CM

## 2018-11-13 PROCEDURE — 90834 PSYTX W PT 45 MINUTES: CPT | Performed by: SOCIAL WORKER

## 2018-11-13 ASSESSMENT — ANXIETY QUESTIONNAIRES
6. BECOMING EASILY ANNOYED OR IRRITABLE: SEVERAL DAYS
GAD7 TOTAL SCORE: 7
1. FEELING NERVOUS, ANXIOUS, OR ON EDGE: SEVERAL DAYS
3. WORRYING TOO MUCH ABOUT DIFFERENT THINGS: SEVERAL DAYS
IF YOU CHECKED OFF ANY PROBLEMS ON THIS QUESTIONNAIRE, HOW DIFFICULT HAVE THESE PROBLEMS MADE IT FOR YOU TO DO YOUR WORK, TAKE CARE OF THINGS AT HOME, OR GET ALONG WITH OTHER PEOPLE: SOMEWHAT DIFFICULT
5. BEING SO RESTLESS THAT IT IS HARD TO SIT STILL: SEVERAL DAYS
7. FEELING AFRAID AS IF SOMETHING AWFUL MIGHT HAPPEN: SEVERAL DAYS
2. NOT BEING ABLE TO STOP OR CONTROL WORRYING: MORE THAN HALF THE DAYS

## 2018-11-13 ASSESSMENT — PATIENT HEALTH QUESTIONNAIRE - PHQ9
SUM OF ALL RESPONSES TO PHQ QUESTIONS 1-9: 8
5. POOR APPETITE OR OVEREATING: NOT AT ALL

## 2018-11-13 NOTE — PROGRESS NOTES
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                                           "

## 2018-11-13 NOTE — MR AVS SNAPSHOT
MRN:[id number]                      After Visit Summary   11/13/2018    Sharon Baer    MRN: [id number]           Visit Information        Provider Department      11/13/2018 2:00 PM Loi Prescott LICSW [facility] Generic      Your next 10 appointments already scheduled     Dec 20, 2018 12:30 PM CST   Return Visit with Loi EVANGELINA BridgetpanfiloDANNI mya   [facility] ([facility])    [address] [facility] [id number] [phone]            Jan 02, 2019  3:15 PM CST   Adult Med Follow UP with TINY Knox CNS   Psychiatry Clinic ([facility])    [place] [id number]  [address] [address] [phone]              MyChart Information     [email or web address] gives you secure access to your electronic health record. If you see a primary care provider, you can also send messages to your care team and make appointments. If you have questions, please call your primary care clinic.  If you do not have a primary care provider, please call [phone] and they will assist you.        Care EveryWhere ID     This is your Care EveryWhere ID. This could be used by other organizations to access your [facility] medical records  [id number]        Equal Access to Services     ESTELITA HARE : Hadii june baugho Solauraali, waaxda luqadaha, qaybta kaalmada adeegyada, fahad wagner. So [facility] [phone].    ATENCIÓN: Si habla español, tiene a delacruz disposición servicios gratuitos de asistencia lingüística. Llame al [phone].    We comply with applicable federal civil rights laws and [place] laws. We do not discriminate on the basis of race, color, national origin, age, disability, sex, sexual orientation, or gender identity.

## 2018-11-14 RX ORDER — LISINOPRIL 40 MG/1
40 TABLET ORAL DAILY
Qty: 90 TABLET | Refills: 1 | Status: SHIPPED | OUTPATIENT
Start: 2018-11-14 | End: 2019-04-29

## 2018-11-14 ASSESSMENT — ANXIETY QUESTIONNAIRES: GAD7 TOTAL SCORE: 7

## 2018-12-07 ENCOUNTER — TELEPHONE (OUTPATIENT)
Dept: PSYCHIATRY | Facility: CLINIC | Age: 83
End: 2018-12-07

## 2018-12-07 DIAGNOSIS — F41.8 OTHER SPECIFIED ANXIETY DISORDERS: ICD-10-CM

## 2018-12-07 DIAGNOSIS — F32.A DEPRESSION, UNSPECIFIED DEPRESSION TYPE: Primary | ICD-10-CM

## 2018-12-07 DIAGNOSIS — F42.8 OTHER OBSESSIVE-COMPULSIVE DISORDER: ICD-10-CM

## 2018-12-07 RX ORDER — FLUOXETINE 40 MG/1
40 CAPSULE ORAL DAILY
Qty: 90 CAPSULE | Refills: 0 | Status: SHIPPED | OUTPATIENT
Start: 2018-12-07 | End: 2019-01-02

## 2018-12-07 NOTE — TELEPHONE ENCOUNTER
Dyan Mathis APRN CNS   You 28 minutes ago (2:34 PM)                 I sent new order to [facility] mail order for 90 capsules of the 40mg. Could you also call his sister Josephine Newman and let her know as she picks up the medication and I think fills his medication keeper thing.   Thank you,   Dyan (Routing comment)         Writer called sister Monica at [phone].  Writer informed Monica of the medication change.   Monica was instructed to call the clinic back if she had any questions.

## 2018-12-07 NOTE — TELEPHONE ENCOUNTER
"Writer received a Prescription Clarification Request from [facility]. Per the request, \"Fluoxetine 20 mg Capsules form is on back order. Fluoxetine 10 mg and 40 mg capsules are available.\" Writer routed a message to TINY Corea CNP.  "

## 2019-01-01 ENCOUNTER — OFFICE VISIT (OUTPATIENT)
Dept: OTOLARYNGOLOGY | Facility: CLINIC | Age: 84
End: 2019-01-01
Payer: COMMERCIAL

## 2019-01-01 ENCOUNTER — OFFICE VISIT (OUTPATIENT)
Dept: OPHTHALMOLOGY | Facility: CLINIC | Age: 84
End: 2019-01-01
Attending: OPTOMETRIST
Payer: COMMERCIAL

## 2019-01-01 ENCOUNTER — NURSING HOME VISIT (OUTPATIENT)
Dept: GERIATRICS | Facility: CLINIC | Age: 84
End: 2019-01-01
Payer: MEDICARE

## 2019-01-01 ENCOUNTER — NURSING HOME VISIT (OUTPATIENT)
Dept: GERIATRICS | Facility: CLINIC | Age: 84
End: 2019-01-01
Payer: COMMERCIAL

## 2019-01-01 ENCOUNTER — TELEPHONE (OUTPATIENT)
Dept: PSYCHIATRY | Facility: CLINIC | Age: 84
End: 2019-01-01

## 2019-01-01 VITALS
DIASTOLIC BLOOD PRESSURE: 73 MMHG | BODY MASS INDEX: 29.61 KG/M2 | RESPIRATION RATE: 16 BRPM | SYSTOLIC BLOOD PRESSURE: 154 MMHG | OXYGEN SATURATION: 96 % | TEMPERATURE: 96.3 F | HEART RATE: 66 BPM | WEIGHT: 195.4 LBS | HEIGHT: 68 IN

## 2019-01-01 VITALS
WEIGHT: 191 LBS | SYSTOLIC BLOOD PRESSURE: 156 MMHG | HEART RATE: 69 BPM | TEMPERATURE: 98.1 F | HEIGHT: 64 IN | RESPIRATION RATE: 19 BRPM | DIASTOLIC BLOOD PRESSURE: 66 MMHG | BODY MASS INDEX: 32.61 KG/M2

## 2019-01-01 VITALS
DIASTOLIC BLOOD PRESSURE: 80 MMHG | HEART RATE: 78 BPM | HEIGHT: 60 IN | TEMPERATURE: 97.6 F | RESPIRATION RATE: 20 BRPM | BODY MASS INDEX: 37.73 KG/M2 | OXYGEN SATURATION: 94 % | WEIGHT: 192.2 LBS | SYSTOLIC BLOOD PRESSURE: 122 MMHG

## 2019-01-01 VITALS — SYSTOLIC BLOOD PRESSURE: 124 MMHG | DIASTOLIC BLOOD PRESSURE: 67 MMHG | HEART RATE: 68 BPM | TEMPERATURE: 97.4 F

## 2019-01-01 DIAGNOSIS — I48.91 ATRIAL FIBRILLATION, UNSPECIFIED TYPE (H): ICD-10-CM

## 2019-01-01 DIAGNOSIS — C18.3 MALIGNANT NEOPLASM OF HEPATIC FLEXURE (H): ICD-10-CM

## 2019-01-01 DIAGNOSIS — H70.003: Primary | ICD-10-CM

## 2019-01-01 DIAGNOSIS — C18.9 ADENOCARCINOMA OF COLON (H): ICD-10-CM

## 2019-01-01 DIAGNOSIS — Z51.5 HOSPICE CARE PATIENT: ICD-10-CM

## 2019-01-01 DIAGNOSIS — F41.9 ANXIETY AND DEPRESSION: ICD-10-CM

## 2019-01-01 DIAGNOSIS — H02.834 DERMATOCHALASIS OF BOTH UPPER EYELIDS: ICD-10-CM

## 2019-01-01 DIAGNOSIS — F42.8 OTHER OBSESSIVE-COMPULSIVE DISORDER: ICD-10-CM

## 2019-01-01 DIAGNOSIS — H61.23 BILATERAL IMPACTED CERUMEN: ICD-10-CM

## 2019-01-01 DIAGNOSIS — E78.5 HYPERLIPIDEMIA, UNSPECIFIED HYPERLIPIDEMIA TYPE: ICD-10-CM

## 2019-01-01 DIAGNOSIS — C18.3 MALIGNANT NEOPLASM OF HEPATIC FLEXURE (H): Primary | ICD-10-CM

## 2019-01-01 DIAGNOSIS — F03.90 DEMENTIA WITHOUT BEHAVIORAL DISTURBANCE, UNSPECIFIED DEMENTIA TYPE: ICD-10-CM

## 2019-01-01 DIAGNOSIS — H90.3 SENSORINEURAL HEARING LOSS (SNHL) OF BOTH EARS: ICD-10-CM

## 2019-01-01 DIAGNOSIS — I10 ESSENTIAL HYPERTENSION: ICD-10-CM

## 2019-01-01 DIAGNOSIS — F32.A ANXIETY AND DEPRESSION: ICD-10-CM

## 2019-01-01 DIAGNOSIS — F32.A DEPRESSION, UNSPECIFIED DEPRESSION TYPE: ICD-10-CM

## 2019-01-01 DIAGNOSIS — H02.831 DERMATOCHALASIS OF BOTH UPPER EYELIDS: ICD-10-CM

## 2019-01-01 DIAGNOSIS — H70.13 CHRONIC MASTOIDITIS OF BOTH SIDES: ICD-10-CM

## 2019-01-01 DIAGNOSIS — Z96.1 PSEUDOPHAKIA OF BOTH EYES: ICD-10-CM

## 2019-01-01 DIAGNOSIS — H43.813 VITREOUS DETACHMENT OF BOTH EYES: Primary | ICD-10-CM

## 2019-01-01 DIAGNOSIS — D62 ACUTE BLOOD LOSS ANEMIA: ICD-10-CM

## 2019-01-01 DIAGNOSIS — H91.93 BILATERAL HEARING LOSS, UNSPECIFIED HEARING LOSS TYPE: Primary | ICD-10-CM

## 2019-01-01 DIAGNOSIS — H35.412 LATTICE DEGENERATION OF LEFT RETINA: ICD-10-CM

## 2019-01-01 LAB — INTERPRETATION ECG - MUSE: NORMAL

## 2019-01-01 PROCEDURE — 99308 SBSQ NF CARE LOW MDM 20: CPT | Performed by: INTERNAL MEDICINE

## 2019-01-01 PROCEDURE — G0463 HOSPITAL OUTPT CLINIC VISIT: HCPCS | Mod: ZF

## 2019-01-01 PROCEDURE — 99309 SBSQ NF CARE MODERATE MDM 30: CPT | Mod: GW | Performed by: NURSE PRACTITIONER

## 2019-01-01 RX ORDER — PANTOPRAZOLE SODIUM 20 MG/1
20 TABLET, DELAYED RELEASE ORAL DAILY
Start: 2019-01-01 | End: 2020-01-01

## 2019-01-01 RX ORDER — ACETAMINOPHEN 500 MG
1000 TABLET ORAL 2 TIMES DAILY
COMMUNITY
End: 2020-01-01

## 2019-01-01 RX ORDER — IPRATROPIUM BROMIDE AND ALBUTEROL SULFATE 2.5; .5 MG/3ML; MG/3ML
1 SOLUTION RESPIRATORY (INHALATION) EVERY 4 HOURS PRN
COMMUNITY
End: 2020-01-01

## 2019-01-01 RX ORDER — GUAIFENESIN AND DEXTROMETHORPHAN HYDROBROMIDE 100; 10 MG/5ML; MG/5ML
5 SOLUTION ORAL EVERY 4 HOURS PRN
COMMUNITY
End: 2020-01-01

## 2019-01-01 ASSESSMENT — PATIENT HEALTH QUESTIONNAIRE - PHQ9: SUM OF ALL RESPONSES TO PHQ QUESTIONS 1-9: 9

## 2019-01-01 ASSESSMENT — TONOMETRY
OD_IOP_MMHG: 20
IOP_METHOD: TONOPEN
OS_IOP_MMHG: 20

## 2019-01-01 ASSESSMENT — REFRACTION_WEARINGRX
OD_SPHERE: -1.50
OD_CYLINDER: +1.50
OS_CYLINDER: +2.50
OS_SPHERE: -1.25
OS_AXIS: 165
OD_AXIS: 015
SPECS_TYPE: BIFOCAL
OD_ADD: +2.50
OS_ADD: +2.50

## 2019-01-01 ASSESSMENT — CONF VISUAL FIELD
OS_NORMAL: 1
OD_NORMAL: 1

## 2019-01-01 ASSESSMENT — MIFFLIN-ST. JEOR
SCORE: 1530.83
SCORE: 1325.81
SCORE: 1447.37

## 2019-01-01 ASSESSMENT — VISUAL ACUITY
OD_CC: 20/40
METHOD: SNELLEN - LINEAR
OS_CC: 20/40
CORRECTION_TYPE: GLASSES

## 2019-01-01 ASSESSMENT — EXTERNAL EXAM - RIGHT EYE: OD_EXAM: NORMAL

## 2019-01-01 ASSESSMENT — PAIN SCALES - GENERAL: PAINLEVEL: NO PAIN (0)

## 2019-01-01 ASSESSMENT — SLIT LAMP EXAM - LIDS
COMMENTS: DERMATOCHALASIS
COMMENTS: DERMATOCHALASIS

## 2019-01-01 ASSESSMENT — EXTERNAL EXAM - LEFT EYE: OS_EXAM: NORMAL

## 2019-01-02 ENCOUNTER — OFFICE VISIT (OUTPATIENT)
Dept: PSYCHIATRY | Facility: CLINIC | Age: 84
End: 2019-01-02
Attending: CLINICAL NURSE SPECIALIST
Payer: COMMERCIAL

## 2019-01-02 VITALS
DIASTOLIC BLOOD PRESSURE: 88 MMHG | HEART RATE: 84 BPM | WEIGHT: 192.4 LBS | SYSTOLIC BLOOD PRESSURE: 150 MMHG | BODY MASS INDEX: 30.13 KG/M2

## 2019-01-02 DIAGNOSIS — F32.A DEPRESSION, UNSPECIFIED DEPRESSION TYPE: ICD-10-CM

## 2019-01-02 DIAGNOSIS — F42.9 OBSESSIVE-COMPULSIVE DISORDER, UNSPECIFIED TYPE: ICD-10-CM

## 2019-01-02 DIAGNOSIS — F42.8 OTHER OBSESSIVE-COMPULSIVE DISORDER: ICD-10-CM

## 2019-01-02 DIAGNOSIS — F41.8 OTHER SPECIFIED ANXIETY DISORDERS: ICD-10-CM

## 2019-01-02 PROCEDURE — G0463 HOSPITAL OUTPT CLINIC VISIT: HCPCS | Mod: ZF

## 2019-01-02 RX ORDER — QUETIAPINE FUMARATE 25 MG/1
TABLET, FILM COATED ORAL
Qty: 90 TABLET | Refills: 1 | Status: SHIPPED | OUTPATIENT
Start: 2019-01-02 | End: 2019-08-01

## 2019-01-02 RX ORDER — FLUOXETINE 40 MG/1
40 CAPSULE ORAL DAILY
Qty: 90 CAPSULE | Refills: 1 | Status: SHIPPED | OUTPATIENT
Start: 2019-01-02 | End: 2019-08-01

## 2019-01-02 RX ORDER — BUPROPION HYDROCHLORIDE 100 MG/1
100 TABLET, EXTENDED RELEASE ORAL EVERY MORNING
Qty: 90 TABLET | Refills: 1 | Status: SHIPPED | OUTPATIENT
Start: 2019-01-02 | End: 2019-04-29

## 2019-01-02 ASSESSMENT — PAIN SCALES - GENERAL: PAINLEVEL: NO PAIN (0)

## 2019-01-02 ASSESSMENT — PATIENT HEALTH QUESTIONNAIRE - PHQ9: SUM OF ALL RESPONSES TO PHQ QUESTIONS 1-9: 0

## 2019-01-02 NOTE — PROGRESS NOTES
Outpatient Psychiatry Progress Note     Provider: TINY Knox CNS  Date: 2019  Service:  Medication follow up with counseling.   Patient Identification: Sharon Baer  : 6/10/1931   MRN: [id number]    Sharon Baer is a 87 year old year old male who presents for ongoing psychiatric care.  Sharon Baer was last seen in clinic on 18.   At that time,   Assessment & Plan       Sharon Baer is seen today for follow up with his sister Josephine and reports he might be a little worse.  Communication is difficulty due to his hearing impairment. Josephine has not noticed a decline. He is a little more active where he is living now but hearing impairment causes difficulty in socialization.     Diagnosis       Encounter Diagnoses   Name Primary?     Autism spectrum disorder Yes     Depression, unspecified depression type       Other specified anxiety disorders           Plan:  Medication: Continue current medications  OTC Recommendations: none  Lab Orders:  none  Referrals: none  Release of Information: none  Future Treatment Considerations:per followup  Return for Follow Up:8 weeks      ____________________________________________________________________________________________________________________________________________    2019   Seen with Rosemarie his sisters's daughter in law and sister Josephine. He has been able to hear since he had problems due to the molding used for the earring aid form. He has not found the new hearing aids helpful.  It is difficult to conduct the interview but with writing down my questions it was fairly easy for him to read them and answer.  Side effects of medication include: none known  Psychiatric Review of Systems:  Depression: In the last 2 weeks per PHQ-9 score:   PHQ-9 SCORE 2019   PHQ-9 Total Score -   PHQ-9 Total Score 0       Anxiety : reports stable  Jackie na   Psychosis  na.   ADHD na    Review of Medical Systems:  Sleep: per  Josephine is sleeping more.   Energy: stable  Concentration: stable  Appetite: maybe decreased  GI Concerns: none  Cardiac concerns: none  Neurological concerns: none  Other medical concerns: no new concerns  Current Substance Use:  Alcohol:none  Other drugs:none  Caffeine:not reviewed   Nicotine: none  Past Medical History:   Past Medical History:   Diagnosis Date     Autism spectrum disorder 7/16/2015     GERD (gastroesophageal reflux disease)      Gout      Hearing loss      Hyperlipemia     on atorvastatin     Hypertension      Hypotestosteronism      IGT (impaired glucose tolerance)      OCD (obsessive compulsive disorder)     on cymbalta     Osteoarthritis, hip, bilateral      Vitamin D deficiency     resolved 2/2013     Patient Active Problem List   Diagnosis     Gout     Hypertension     IGT (impaired glucose tolerance)     GERD (gastroesophageal reflux disease)     Hypotestosteronism     Neoplasm of uncertain behavior of skin     AK (actinic keratosis)     History of nonmelanoma skin cancer     Osteoarthritis of right hip     OA (osteoarthritis) of hip     Fall     Facial laceration     Autism spectrum disorder     Depression, unspecified depression type     Vitamin D deficiency     Loss of weight     ACP (advance care planning)     Other obsessive-compulsive disorder     Other specified anxiety disorders       Allergies:   Allergies   Allergen Reactions     Penicillins Rash and Other (See Comments)     He believes he had a positive skin test for PCN allergy at the time of a dog bite, about 1960.          Current Medications     Current Outpatient Medications Ordered in Epic   Medication Sig Dispense Refill     acetaminophen 500 MG CAPS Take 500 mg by mouth 2 times daily       allopurinol (ZYLOPRIM) 100 MG tablet Take 1 tablet (100 mg) by mouth daily 100 tablet 1     aspirin 81 MG tablet Take 81 mg by mouth daily       atorvastatin (LIPITOR) 20 MG tablet Take 1 tablet (20 mg) by mouth daily 100 tablet 1      "buPROPion (WELLBUTRIN SR) 100 MG 12 hr tablet Take 1 tablet (100 mg) by mouth every morning 90 tablet 1     cholecalciferol (VITAMIN D) 1000 UNIT tablet Take 2 tablets (2,000 Units) by mouth daily 100 tablet 3     FLUoxetine (PROZAC) 40 MG capsule Take 1 capsule (40 mg) by mouth daily 90 capsule 0     furosemide (LASIX) 20 MG tablet Take 1 tablet (20 mg) by mouth daily 100 tablet 3     lisinopril (PRINIVIL/ZESTRIL) 40 MG tablet Take 1 tablet (40 mg) by mouth daily 90 tablet 1     omeprazole (PRILOSEC) 40 MG capsule Take 1 capsule (40 mg) by mouth daily 90 capsule 2     QUEtiapine (SEROQUEL) 25 MG tablet Take one tablet by mouth every evening with supper 90 tablet 1     No current Epic-ordered facility-administered medications on file.         Mental Status Exam     Appearance:  Casually dressed and Adequately groomed  Behavior/relationship to examiner/demeanor:  Cooperative. He is unable to hear and so conversation was difficult  Orientation: Oriented to person, place, time and situation  Psychomotor: mild tremor  Speech Rate:  Rapid  Speech Spontaneity:  Normal  Mood:  \"okay\"  Affect:  Appropriate/mood-congruent and Anxious/Nervous  Thought Process (Associations):  Circumstantial  Thought Content:  no overt psychosis, denies suicidal ideation, intent or thoughts and patient does not appear to be responding to internal stimuli  Abnormal Perception:  None  Attention/Concentration:  Normal  Insight:  Good  Judgment:  Good      Results     Vital signs: /88   Pulse 84   Wt 87.3 kg (192 lb 6.4 oz)   BMI 30.13 kg/m      Laboratory Data:  no new results    Assessment & Plan      Sharon Baer is seen today for follow up with his sister Josephine and Josephine's daughter in law Mami.  The interview was difficult due to his difficulty hearing but was able to answer some questions when I wrote them down.  Overall he reports that his mood has been pretty good even with the extra isolation due to hearing deficit. He " agrees to continue current medications at this time.    Diagnosis  Encounter Diagnoses   Name Primary?     Depression, unspecified depression type      Other specified anxiety disorders      Other obsessive-compulsive disorder      Obsessive-compulsive disorder, unspecified type        Plan:  Medication: Continue current medications   OTC Recommendations: none  Lab Orders:  none  Referrals: none  Release of Information: none needed  Future Treatment Considerations:per symptoms  Return for Follow Up:6 months   The risks, benefits, alternatives and side effects have been discussed and are understood by the patient. The patient understands the risks of using street drugs or alcohol. There are no medical contraindications, the patient agrees to treatment, and has the capacity to do so. The patient understands to call 911 or come to the nearest ED if life threatening or urgent symptoms present.  In addition time was spent counseling the patient and/or coordinating care regarding review of social and occupational functioning.  In addition patient was counseled on health and wellness practices to augment medication treatment of symptoms. See note for details.    Dyan Mathis, APRN CNS 1/2/2019

## 2019-02-28 ENCOUNTER — OFFICE VISIT (OUTPATIENT)
Dept: PSYCHOLOGY | Facility: CLINIC | Age: 84
End: 2019-02-28
Payer: COMMERCIAL

## 2019-02-28 DIAGNOSIS — F42.8 OTHER OBSESSIVE-COMPULSIVE DISORDER: Primary | ICD-10-CM

## 2019-02-28 PROCEDURE — 90834 PSYTX W PT 45 MINUTES: CPT | Performed by: SOCIAL WORKER

## 2019-02-28 ASSESSMENT — ANXIETY QUESTIONNAIRES
3. WORRYING TOO MUCH ABOUT DIFFERENT THINGS: SEVERAL DAYS
GAD7 TOTAL SCORE: 4
6. BECOMING EASILY ANNOYED OR IRRITABLE: SEVERAL DAYS
2. NOT BEING ABLE TO STOP OR CONTROL WORRYING: NOT AT ALL
1. FEELING NERVOUS, ANXIOUS, OR ON EDGE: SEVERAL DAYS
7. FEELING AFRAID AS IF SOMETHING AWFUL MIGHT HAPPEN: SEVERAL DAYS
5. BEING SO RESTLESS THAT IT IS HARD TO SIT STILL: NOT AT ALL
IF YOU CHECKED OFF ANY PROBLEMS ON THIS QUESTIONNAIRE, HOW DIFFICULT HAVE THESE PROBLEMS MADE IT FOR YOU TO DO YOUR WORK, TAKE CARE OF THINGS AT HOME, OR GET ALONG WITH OTHER PEOPLE: SOMEWHAT DIFFICULT

## 2019-02-28 ASSESSMENT — PATIENT HEALTH QUESTIONNAIRE - PHQ9
5. POOR APPETITE OR OVEREATING: NOT AT ALL
SUM OF ALL RESPONSES TO PHQ QUESTIONS 1-9: 8

## 2019-02-28 NOTE — PROGRESS NOTES
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 - Date(s): 6-20-17, 12-19-17, 1-18-18, 4-24-18, 7-24-18, 11-13-18, 2-28-29    Intervention(s)  Therapist will teach and assign homework to determine time to accomplish good time to limit obsessive thoughts and evaluate it's effectiveness. .    Objective #C  Client will learn skills based onthe book \"Brainlock\" to manage OCD obsessions and compulsions.     Status: Continued - Date(s): 6-20-1, 12-19-17, 1-18-18, 4-24-18, 7-24-18, 11-13-18, 2-28-19    Intervention(s)  Therapist will teach 4 step process of relabeling. reattributing, refocusing and revaluing to diminish OCD intrusive thoughts.         Client has reviewed and agreed to the above plan.      Loi Prescott, [facility]  February 28, 2019                                           "

## 2019-03-01 ASSESSMENT — ANXIETY QUESTIONNAIRES: GAD7 TOTAL SCORE: 4

## 2019-03-25 NOTE — TELEPHONE ENCOUNTER
"Refill Req.   Received: Today   Message Contents   Elton Dimas Emily, RN   Phone Number: [phone]             Patients sister Josephine called with the following message for Dyan:     \"I am hoping you can call in prescription refills for my brother, Reilly. Dyan will know which prescriptions and where to send them she has all that on file. She can call me if she has any questions.\"      -Medications were refilled on 1/2/19 by Dyan Mathis for a 90 day supply with 1 refill.    -Returned phone call to pt's sister to follow-up. No answer. LVM requesting pt's sister to return phone call to writer regarding this request.  "

## 2019-03-26 NOTE — TELEPHONE ENCOUNTER
Spoke with pt's sister, Josephine. Explained that pt's medications were refilled on 1/2/19 by Dyan for a 90 day supply with 1 refill. Therefore, refills should be on file at the pharmacy. Upon further discussion, sister stated he is out of a medication, but that the medication may be one prescribed by pt's PCP. Sister is going to return to pt's house today to confirm which medication he is out of. She will return call to writer if there is anything needed in regards to the medications managed by Dyan Mathis.

## 2019-04-04 DIAGNOSIS — Z00.00 ROUTINE HEALTH MAINTENANCE: ICD-10-CM

## 2019-04-04 RX ORDER — ALLOPURINOL 100 MG/1
100 TABLET ORAL DAILY
Qty: 30 TABLET | Refills: 0 | Status: SHIPPED | OUTPATIENT
Start: 2019-04-04 | End: 2019-04-29

## 2019-04-04 NOTE — TELEPHONE ENCOUNTER
"   allopurinol (ZYLOPRIM) 100 MG tablet  Last Written Prescription Date:  2/28/18  Last Fill Quantity: 100   # refills: 1  Last Office Visit : 5/23/18  Future Office visit:  4/29/19    Babs ARROYO    \"Notes Recorded by Cristo Burch MD on 5/23/2018 at 3:24 PM  Very stable labs. I'd suggest a routine visit w/ Dr Castano in about six months.     30 tabs to pharmacy    Routed because:  Uric acid past due    "

## 2019-04-04 NOTE — TELEPHONE ENCOUNTER
[facility] Call Center    Phone Message    May a detailed message be left on voicemail: yes    Reason for Call: Medication Refill Request    Has the patient contacted the pharmacy for the refill? Yes   Name of medication being requested: allopurinol (ZYLOPRIM) 100 MG tablet  Provider who prescribed the medication: [facility]  Pharmacy: [facility] [address] in [facility].  Date medication is needed: asap all out of RX     Please call Rosemarie to let her know when complete.      Action Taken: Message routed to:  [facility] PCC

## 2019-04-08 ENCOUNTER — MEDICAL CORRESPONDENCE (OUTPATIENT)
Dept: HEALTH INFORMATION MANAGEMENT | Facility: CLINIC | Age: 84
End: 2019-04-08

## 2019-04-09 ENCOUNTER — TELEPHONE (OUTPATIENT)
Dept: INTERNAL MEDICINE | Facility: CLINIC | Age: 84
End: 2019-04-09

## 2019-04-09 NOTE — TELEPHONE ENCOUNTER
[facility] Call Center    Phone Message    May a detailed message be left on voicemail: yes    Reason for Call: Medication Question or concern regarding medication   Prescription Clarification  Name of Medication: (systane) artificial tears hes been taking 2x a day per lewis the assisted living nurse     Prescribing Provider: n/a   Pharmacy: [facility] [address] [place], [place] [address] [facility] [address] [phone]   What on the order needs clarification?   Lewis the assisted living nurse wanted to know if its ok for the patient to take this medication if so can a verbal order be done then they will send something to be faxed back to them at [phone]          Action Taken: Message routed to:  [facility] ken EDGAR  JL

## 2019-04-09 NOTE — TELEPHONE ENCOUNTER
Spoke to Steven to relay ok for Artifical tears per Dr. Castano. Radha Lebron, MCKAYLA 4/9/2019 1:00 PM

## 2019-04-16 ENCOUNTER — MEDICAL CORRESPONDENCE (OUTPATIENT)
Dept: HEALTH INFORMATION MANAGEMENT | Facility: CLINIC | Age: 84
End: 2019-04-16

## 2019-04-29 ENCOUNTER — OFFICE VISIT (OUTPATIENT)
Dept: INTERNAL MEDICINE | Facility: CLINIC | Age: 84
End: 2019-04-29
Payer: COMMERCIAL

## 2019-04-29 VITALS
HEART RATE: 61 BPM | OXYGEN SATURATION: 98 % | WEIGHT: 189 LBS | SYSTOLIC BLOOD PRESSURE: 160 MMHG | DIASTOLIC BLOOD PRESSURE: 82 MMHG | BODY MASS INDEX: 29.59 KG/M2

## 2019-04-29 DIAGNOSIS — E61.1 IRON DEFICIENCY: ICD-10-CM

## 2019-04-29 DIAGNOSIS — R73.02 IGT (IMPAIRED GLUCOSE TOLERANCE): ICD-10-CM

## 2019-04-29 DIAGNOSIS — K21.9 GASTROESOPHAGEAL REFLUX DISEASE WITHOUT ESOPHAGITIS: ICD-10-CM

## 2019-04-29 DIAGNOSIS — D64.9 ANEMIA, UNSPECIFIED TYPE: ICD-10-CM

## 2019-04-29 DIAGNOSIS — M62.82 NON-TRAUMATIC RHABDOMYOLYSIS: ICD-10-CM

## 2019-04-29 DIAGNOSIS — H91.93 BILATERAL HEARING LOSS, UNSPECIFIED HEARING LOSS TYPE: Primary | ICD-10-CM

## 2019-04-29 DIAGNOSIS — Z00.00 ROUTINE HEALTH MAINTENANCE: ICD-10-CM

## 2019-04-29 DIAGNOSIS — H91.93 BILATERAL HEARING LOSS, UNSPECIFIED HEARING LOSS TYPE: ICD-10-CM

## 2019-04-29 LAB
ANION GAP SERPL CALCULATED.3IONS-SCNC: 6 MMOL/L (ref 3–14)
ANISOCYTOSIS BLD QL SMEAR: SLIGHT
BASOPHILS # BLD AUTO: 0 10E9/L (ref 0–0.2)
BASOPHILS NFR BLD AUTO: 0.2 %
BUN SERPL-MCNC: 23 MG/DL (ref 7–30)
BURR CELLS BLD QL SMEAR: SLIGHT
CALCIUM SERPL-MCNC: 9.1 MG/DL (ref 8.5–10.1)
CHLORIDE SERPL-SCNC: 112 MMOL/L (ref 94–109)
CK SERPL-CCNC: 23 U/L (ref 30–300)
CO2 SERPL-SCNC: 25 MMOL/L (ref 20–32)
CREAT SERPL-MCNC: 1.14 MG/DL (ref 0.66–1.25)
DIFFERENTIAL METHOD BLD: ABNORMAL
EOSINOPHIL # BLD AUTO: 0 10E9/L (ref 0–0.7)
EOSINOPHIL NFR BLD AUTO: 0.1 %
ERYTHROCYTE [DISTWIDTH] IN BLOOD BY AUTOMATED COUNT: 16.1 % (ref 10–15)
ERYTHROCYTE [SEDIMENTATION RATE] IN BLOOD BY WESTERGREN METHOD: 9 MM/H (ref 0–20)
FERRITIN SERPL-MCNC: 16 NG/ML (ref 26–388)
GFR SERPL CREATININE-BSD FRML MDRD: 57 ML/MIN/{1.73_M2}
GLUCOSE SERPL-MCNC: 109 MG/DL (ref 70–99)
HCT VFR BLD AUTO: 37.5 % (ref 40–53)
HGB BLD-MCNC: 11 G/DL (ref 13.3–17.7)
IMM GRANULOCYTES # BLD: 0.2 10E9/L (ref 0–0.4)
IMM GRANULOCYTES NFR BLD: 1.3 %
IRON SATN MFR SERPL: 6 % (ref 15–46)
IRON SERPL-MCNC: 20 UG/DL (ref 35–180)
LYMPHOCYTES # BLD AUTO: 1.1 10E9/L (ref 0.8–5.3)
LYMPHOCYTES NFR BLD AUTO: 8.7 %
MCH RBC QN AUTO: 25.3 PG (ref 26.5–33)
MCHC RBC AUTO-ENTMCNC: 29.3 G/DL (ref 31.5–36.5)
MCV RBC AUTO: 86 FL (ref 78–100)
MONOCYTES # BLD AUTO: 0.3 10E9/L (ref 0–1.3)
MONOCYTES NFR BLD AUTO: 2.7 %
NEUTROPHILS # BLD AUTO: 10.9 10E9/L (ref 1.6–8.3)
NEUTROPHILS NFR BLD AUTO: 87 %
NRBC # BLD AUTO: 0 10*3/UL
NRBC BLD AUTO-RTO: 0 /100
OVALOCYTES BLD QL SMEAR: SLIGHT
PLATELET # BLD AUTO: 162 10E9/L (ref 150–450)
PLATELET # BLD EST: ABNORMAL 10*3/UL
POIKILOCYTOSIS BLD QL SMEAR: SLIGHT
POTASSIUM SERPL-SCNC: 5.1 MMOL/L (ref 3.4–5.3)
RBC # BLD AUTO: 4.35 10E12/L (ref 4.4–5.9)
RETICS # AUTO: 76.6 10E9/L (ref 25–95)
RETICS/RBC NFR AUTO: 1.8 % (ref 0.5–2)
SODIUM SERPL-SCNC: 143 MMOL/L (ref 133–144)
TIBC SERPL-MCNC: 361 UG/DL (ref 240–430)
TSH SERPL DL<=0.005 MIU/L-ACNC: 2.08 MU/L (ref 0.4–4)
WBC # BLD AUTO: 12.6 10E9/L (ref 4–11)

## 2019-04-29 RX ORDER — ATORVASTATIN CALCIUM 20 MG/1
20 TABLET, FILM COATED ORAL DAILY
Qty: 100 TABLET | Refills: 3 | Status: SHIPPED | OUTPATIENT
Start: 2019-04-29 | End: 2019-01-01

## 2019-04-29 RX ORDER — OMEPRAZOLE 40 MG/1
40 CAPSULE, DELAYED RELEASE ORAL DAILY
Qty: 90 CAPSULE | Refills: 3 | Status: SHIPPED | OUTPATIENT
Start: 2019-04-29 | End: 2019-08-19

## 2019-04-29 RX ORDER — ALLOPURINOL 100 MG/1
100 TABLET ORAL DAILY
Qty: 100 TABLET | Refills: 3 | Status: SHIPPED | OUTPATIENT
Start: 2019-04-29 | End: 2020-01-01

## 2019-04-29 ASSESSMENT — PAIN SCALES - GENERAL: PAINLEVEL: NO PAIN (0)

## 2019-04-29 NOTE — NURSING NOTE
Chief Complaint   Patient presents with     Medication Follow-up     pt is here to follow up on medications.      Lu Aguirre LPN at 6:10 PM on 4/29/2019.

## 2019-04-29 NOTE — PATIENT INSTRUCTIONS
[facility] Medication Refill Request Information:  * Please contact your pharmacy regarding ANY request for medication refills.  ** [facility] Prescription Fax = [phone]  * Please allow 3 business days for routine medication refills.  * Please allow 5 business days for controlled substance medication refills.     [facility] Test Result notification information:  *You will be notified with in 7-10 days of your appointment day regarding the results of your test.  If you are on MyChart you will be notified as soon as the provider has reviewed the results and signed off on them.    [facility]: [phone]

## 2019-04-30 ENCOUNTER — TELEPHONE (OUTPATIENT)
Dept: GASTROENTEROLOGY | Facility: CLINIC | Age: 84
End: 2019-04-30

## 2019-04-30 NOTE — PROGRESS NOTES
HPI  Reilly returns today with his niece for medication renewal.  He has been doing reasonably well at his new assisted living facility however he lost his left hearing aid has grown increasingly hard of hearing recently and has had difficulty communicating because of this.  He is having to communicate with the family using notes.  He has had past ENT and audiology evaluations but nothing recently and nothing here.  They elected to pursue this year.  Unfortunately Reilly sister who has looked after him for years has end-stage pancreatic cancer.  He was hospitalized at [facility] in March with influenza found to be anemic with rhabdomyolysis.  Is been no follow-up on the renal function the CK or the blood count since that time.  He denies any bleeding or bleeding history.  He states that he is continued to have problems with depression he is being followed by psychiatry for this as well as autism spectrum disorder and has recently been doing quite well in this regard.  He is off the Wellbutrin by their report and is also not taking the lisinopril based on the report from [facility].  Although his blood pressure is elevated today there is no indication that the blood pressures been elevated at the assisted living.  We elected to continue to monitor it there.  Otherwise he is been doing well.  Past Medical History:   Diagnosis Date     Autism spectrum disorder 7/16/2015     GERD (gastroesophageal reflux disease)      Gout      Hearing loss      Hyperlipemia     on atorvastatin     Hypertension      Hypotestosteronism      IGT (impaired glucose tolerance)      OCD (obsessive compulsive disorder)     on cymbalta     Osteoarthritis, hip, bilateral      Vitamin D deficiency     resolved 2/2013     Past Surgical History:   Procedure Laterality Date     bilateral cataract surgery       bilateral ear surgery       HERNIA REPAIR, INGUINAL RT/LT       SUPRAPUBIC PROSTATECTOMY       Family History   Problem Relation Age of Onset      Cancer Sister         skin     Myocardial Infarction Maternal Uncle 60     Social History     Socioeconomic History     Marital status: Single     Spouse name: None     Number of children: None     Years of education: None     Highest education level: None   Occupational History     Occupation: retired   Social Needs     Financial resource strain: None     Food insecurity:     Worry: None     Inability: None     Transportation needs:     Medical: None     Non-medical: None   Tobacco Use     Smoking status: Former Smoker     Packs/day: 1.00     Years: 20.00     Pack years: 20.00     Smokeless tobacco: Never Used   Substance and Sexual Activity     Alcohol use: No     Drug use: No     Sexual activity: None   Lifestyle     Physical activity:     Days per week: None     Minutes per session: None     Stress: None   Relationships     Social connections:     Talks on phone: None     Gets together: None     Attends [employer] witness service: None     Active member of club or organization: None     Attends meetings of clubs or organizations: None     Relationship status: None     Intimate partner violence:     Fear of current or ex partner: None     Emotionally abused: None     Physically abused: None     Forced sexual activity: None   Other Topics Concern      Service Not Asked     Blood Transfusions Not Asked     Caffeine Concern Not Asked     Occupational Exposure Not Asked     Hobby Hazards Not Asked     Sleep Concern Not Asked     Stress Concern Not Asked     Weight Concern Not Asked     Special Diet No     Back Care Not Asked     Exercise No     Bike Helmet Not Asked     Seat Belt Not Asked     Self-Exams Not Asked   Social History Narrative     None       Exam:  /82   Pulse 61   Wt 85.7 kg (189 lb)   SpO2 98%   BMI 29.59 kg/m    189 lbs 0 oz  The patient is alert, oriented with a clear sensorium, extremely hard of hearing  Ears show scarring and retraction on the left with minimal scarring on the right..    Skin shows no lesions or rashes and good turgor.   Head is normocephalic and atraumatic.    Neck shows no nodes, thyromegaly.     Lungs are clear.   Heart shows normal S1 and S2 without murmur or gallop.    Extremities show no edema.    ASSESSMENT  1 severe hearing loss needs further evaluation  2 hypertension needs follow-up  3 GERD stable on omeprazole  4 anemia related to iron deficiency needs colonoscopy and EGD  5 recent rhabdomyolysis will reassess  6 depression appears improved  7 autism spectrum disorder  8 gout in remission on allopurinol  9 impaired glucose tolerance    Plan  When I have him get audiology and ENT evaluation renewed his medications will follow up on the above labs and notify him of these results and follow-up.    This note was completed using Dragon voice recognition software.  Although reviewed after completion, some word and grammatical errors may occur.    Neo Castano MD  General Internal Medicine  [facility]  [phone]

## 2019-04-30 NOTE — TELEPHONE ENCOUNTER
FUTURE VISIT INFORMATION      FUTURE VISIT INFORMATION:    Date: 5/24/19    Time: 11:15 ENT  10:00 am Audiology    Location: [facility]  REFERRAL INFORMATION:    Referring provider:  Dr. Neo Castano    Referring providers clinic:  [facility]    Reason for visit/diagnosis  Hearing loss    RECORDS REQUESTED FROM:       Clinic name Comments Records Status Imaging Status   [facility] Office Visit/Referral-4/29/19-Dr. Castano [facility] Head-6/23/15  [facility] Head-5/29/19 [facility] PACS   [facility] CT Head-3/27/19 [facility] PACS                       4/30/19-Faxed request for Images to [facility].  PB    5/10/2019 -2:17 PM-Received images from [facility]. Archived to PACS PB

## 2019-05-07 ENCOUNTER — TELEPHONE (OUTPATIENT)
Dept: GASTROENTEROLOGY | Facility: CLINIC | Age: 84
End: 2019-05-07

## 2019-05-08 ENCOUNTER — TELEPHONE (OUTPATIENT)
Dept: GASTROENTEROLOGY | Facility: CLINIC | Age: 84
End: 2019-05-08

## 2019-05-20 ENCOUNTER — TELEPHONE (OUTPATIENT)
Dept: INTERNAL MEDICINE | Facility: CLINIC | Age: 84
End: 2019-05-20

## 2019-05-20 NOTE — TELEPHONE ENCOUNTER
[facility] Call Center    Phone Message    May a detailed message be left on voicemail: yes    Reason for Call: Other: Per Steven with Assistant Living is calling to give an update on Pt. Pt had fallen over the weekend, yesterday, 5/19/2019 going up the stairs. Steven states the Pt has no complaints on dizziness or pain. Steven states the only thing he is concerned with is Pt heart rate at rest is 51.   Steven also states Pt has No blurred Vision and pulse is regular. Steven states Pts apical palse was 51.     Action Taken: Message routed to:  [facility] ken

## 2019-05-23 DIAGNOSIS — H91.90 HEARING LOSS, UNSPECIFIED HEARING LOSS TYPE, UNSPECIFIED LATERALITY: Primary | ICD-10-CM

## 2019-05-24 ENCOUNTER — PRE VISIT (OUTPATIENT)
Dept: OTOLARYNGOLOGY | Facility: CLINIC | Age: 84
End: 2019-05-24

## 2019-05-24 ENCOUNTER — OFFICE VISIT (OUTPATIENT)
Dept: AUDIOLOGY | Facility: CLINIC | Age: 84
End: 2019-05-24
Payer: COMMERCIAL

## 2019-05-24 ENCOUNTER — OFFICE VISIT (OUTPATIENT)
Dept: OTOLARYNGOLOGY | Facility: CLINIC | Age: 84
End: 2019-05-24
Payer: COMMERCIAL

## 2019-05-24 VITALS
BODY MASS INDEX: 34.02 KG/M2 | DIASTOLIC BLOOD PRESSURE: 61 MMHG | HEART RATE: 64 BPM | SYSTOLIC BLOOD PRESSURE: 137 MMHG | HEIGHT: 63 IN | RESPIRATION RATE: 21 BRPM | TEMPERATURE: 97.4 F | WEIGHT: 192 LBS

## 2019-05-24 DIAGNOSIS — H70.13 CHRONIC MASTOIDITIS OF BOTH SIDES: ICD-10-CM

## 2019-05-24 DIAGNOSIS — H61.23 BILATERAL IMPACTED CERUMEN: ICD-10-CM

## 2019-05-24 DIAGNOSIS — H90.3 SENSORINEURAL HEARING LOSS, BILATERAL: Primary | ICD-10-CM

## 2019-05-24 DIAGNOSIS — H91.90 HEARING LOSS, UNSPECIFIED HEARING LOSS TYPE, UNSPECIFIED LATERALITY: ICD-10-CM

## 2019-05-24 DIAGNOSIS — H90.3 SENSORINEURAL HEARING LOSS (SNHL) OF BOTH EARS: Primary | ICD-10-CM

## 2019-05-24 ASSESSMENT — MIFFLIN-ST. JEOR: SCORE: 1447.16

## 2019-05-24 ASSESSMENT — PAIN SCALES - GENERAL: PAINLEVEL: NO PAIN (0)

## 2019-05-24 NOTE — PATIENT INSTRUCTIONS
1.  You were seen in the ENT Clinic today by Dr. Riley.  If you have any questions or concerns after your appointment, please call [phone]. Press option #1 for scheduling related needs. Press option #3 for Nurse advice.    2.  Please schedule an appointment for the following:   - Audiology - Hearing-Aid Consultation    3.  Plan is to return to clinic to see Dr. Riley in 6 months for routine ear cleaning.      Ana Kennedy LPN  [facility] Otolaryngology  [phone]    The patient presents with a history of severe sensorineural hearing loss. His ears and mastoid cavities are cleaned today. Based upon his Audiogram and Tympanogram and the recommendations of Audiology, he will be referred for power hearing aids. He will be seen again as needed for mastoid cavity cleaning.

## 2019-05-24 NOTE — PROGRESS NOTES
AUDIOLOGY REPORT    SUMMARY: Audiology visit completed. See audiogram for results.      RECOMMENDATIONS: Follow-up with ENT.    Elizabeth Waite, [facility] [place]  Licensed Audiologist  [place] License #[id number]

## 2019-05-24 NOTE — NURSING NOTE
"Chief Complaint   Patient presents with     Consult     hearing loss      Blood pressure 137/61, pulse 64, temperature 97.4  F (36.3  C), resp. rate 21, height 1.61 m (5' 3.39\"), weight 87.1 kg (192 lb).    Kofi Ibarra LPN    "

## 2019-05-24 NOTE — LETTER
5/24/2019       RE: Sharon Baer  [address]  [place] [address]     Dear Colleague,    Thank you for referring your patient, Sharon Baer, to the [facility] EAR NOSE AND THROAT at [facility]. Please see a copy of my visit note below.    The patient presents with a history of profound sensorineural hearing loss. He has a history of ear infections and ear surgery including canal wall down mastoidectomy proceduresThe patient reports a progressive hearing loss in both ears over many years. The patient denies sinusitis, rhinitis, facial pain, nasal obstruction or purulent nasal discharge. The patient denies chronic or recurrent tonsillitis, chronic or recurrent pharyngitis. The patient denies otalgia, otorrhea, eustachian tube dysfunction, ear infections, dizziness or tinnitus. His Audiogram and Tympanogram are reviewed with him and they demonstrate profound bilateral sensorineural hearing loss with 40% word recognition scores bilaterally and tympanograms are type A bilaterally with some negative pressure in the left ear.     This patient is seen in consultation at the request of Dr. Neo Castano.    All other systems were reviewed and they are either negative or they are not directly pertinent to this Otolaryngology examination.      Past Medical History:    Past Medical History:   Diagnosis Date     Autism spectrum disorder 7/16/2015     GERD (gastroesophageal reflux disease)      Gout      Hearing loss      Hyperlipemia     on atorvastatin     Hypertension      Hypotestosteronism      IGT (impaired glucose tolerance)      OCD (obsessive compulsive disorder)     on cymbalta     Osteoarthritis, hip, bilateral      Vitamin D deficiency     resolved 2/2013       Past Surgical History:    Past Surgical History:   Procedure Laterality Date     bilateral cataract surgery       bilateral ear surgery       HERNIA REPAIR, INGUINAL RT/LT       SUPRAPUBIC  PROSTATECTOMY         Medications:      Current Outpatient Medications:      acetaminophen 500 MG CAPS, Take 500 mg by mouth 2 times daily, Disp: , Rfl:      allopurinol (ZYLOPRIM) 100 MG tablet, Take 1 tablet (100 mg) by mouth daily, Disp: 100 tablet, Rfl: 3     aspirin 81 MG tablet, Take 81 mg by mouth daily, Disp: , Rfl:      atorvastatin (LIPITOR) 20 MG tablet, Take 1 tablet (20 mg) by mouth daily, Disp: 100 tablet, Rfl: 3     cholecalciferol (VITAMIN D) 1000 UNIT tablet, Take 2 tablets (2,000 Units) by mouth daily, Disp: 100 tablet, Rfl: 3     FLUoxetine (PROZAC) 40 MG capsule, Take 1 capsule (40 mg) by mouth daily, Disp: 90 capsule, Rfl: 1     omeprazole (PRILOSEC) 40 MG DR capsule, Take 1 capsule (40 mg) by mouth daily, Disp: 90 capsule, Rfl: 3     QUEtiapine (SEROQUEL) 25 MG tablet, Take one tablet by mouth every evening with supper, Disp: 90 tablet, Rfl: 1    Allergies:    Penicillins    Physical Examination:    The patient is a well developed, well nourished male in no apparent distress.  He is normocephalic, atraumatic with pupils equally round and reactive to light.    Oral Cavity Examination:  Normal mucosa with no masses or lesions  Nasal Examination: Normal mucosa with no masses or lesions  Ear Examination: Ear canals are impacted. The ear canals are cleaned of cerumen using an alligator forceps using a binocular microscope for visualization. The tympanic membranes and middle ear spaces normal.  Neurological Examination: Facial nerve function intact and symmetric  Integumentary Examination: No lesions on the skin of the head and neck  Neck Examination: No masses or lesions, no lymphadenopathy  Endocrine Examination: Normal thyroid examination    Assessment and Plan:    The patient presents with a history of severe sensorineural hearing loss. His ears and mastoid cavities are cleaned today. Based upon his Audiogram and Tympanogram and the recommendations of Audiology, he will be referred for power  hearing aids. He will be seen again as needed for mastoid cavity cleaning.     CC: Dr. Neo Castano    Again, thank you for allowing me to participate in the care of your patient.      Sincerely,    Ramírez Riley MD

## 2019-05-24 NOTE — PROGRESS NOTES
The patient presents with a history of profound sensorineural hearing loss. He has a history of ear infections and ear surgery including canal wall down mastoidectomy proceduresThe patient reports a progressive hearing loss in both ears over many years. The patient denies sinusitis, rhinitis, facial pain, nasal obstruction or purulent nasal discharge. The patient denies chronic or recurrent tonsillitis, chronic or recurrent pharyngitis. The patient denies otalgia, otorrhea, eustachian tube dysfunction, ear infections, dizziness or tinnitus. His Audiogram and Tympanogram are reviewed with him and they demonstrate profound bilateral sensorineural hearing loss with 40% word recognition scores bilaterally and tympanograms are type A bilaterally with some negative pressure in the left ear.     This patient is seen in consultation at the request of Dr. Neo Castano.    All other systems were reviewed and they are either negative or they are not directly pertinent to this Otolaryngology examination.      Past Medical History:    Past Medical History:   Diagnosis Date     Autism spectrum disorder 7/16/2015     GERD (gastroesophageal reflux disease)      Gout      Hearing loss      Hyperlipemia     on atorvastatin     Hypertension      Hypotestosteronism      IGT (impaired glucose tolerance)      OCD (obsessive compulsive disorder)     on cymbalta     Osteoarthritis, hip, bilateral      Vitamin D deficiency     resolved 2/2013       Past Surgical History:    Past Surgical History:   Procedure Laterality Date     bilateral cataract surgery       bilateral ear surgery       HERNIA REPAIR, INGUINAL RT/LT       SUPRAPUBIC PROSTATECTOMY         Medications:      Current Outpatient Medications:      acetaminophen 500 MG CAPS, Take 500 mg by mouth 2 times daily, Disp: , Rfl:      allopurinol (ZYLOPRIM) 100 MG tablet, Take 1 tablet (100 mg) by mouth daily, Disp: 100 tablet, Rfl: 3     aspirin 81 MG tablet, Take 81 mg by mouth  daily, Disp: , Rfl:      atorvastatin (LIPITOR) 20 MG tablet, Take 1 tablet (20 mg) by mouth daily, Disp: 100 tablet, Rfl: 3     cholecalciferol (VITAMIN D) 1000 UNIT tablet, Take 2 tablets (2,000 Units) by mouth daily, Disp: 100 tablet, Rfl: 3     FLUoxetine (PROZAC) 40 MG capsule, Take 1 capsule (40 mg) by mouth daily, Disp: 90 capsule, Rfl: 1     omeprazole (PRILOSEC) 40 MG DR capsule, Take 1 capsule (40 mg) by mouth daily, Disp: 90 capsule, Rfl: 3     QUEtiapine (SEROQUEL) 25 MG tablet, Take one tablet by mouth every evening with supper, Disp: 90 tablet, Rfl: 1    Allergies:    Penicillins    Physical Examination:    The patient is a well developed, well nourished male in no apparent distress.  He is normocephalic, atraumatic with pupils equally round and reactive to light.    Oral Cavity Examination:  Normal mucosa with no masses or lesions  Nasal Examination: Normal mucosa with no masses or lesions  Ear Examination: Ear canals are impacted. The ear canals are cleaned of cerumen using an alligator forceps using a binocular microscope for visualization. The tympanic membranes and middle ear spaces normal.  Neurological Examination: Facial nerve function intact and symmetric  Integumentary Examination: No lesions on the skin of the head and neck  Neck Examination: No masses or lesions, no lymphadenopathy  Endocrine Examination: Normal thyroid examination    Assessment and Plan:    The patient presents with a history of severe sensorineural hearing loss. His ears and mastoid cavities are cleaned today. Based upon his Audiogram and Tympanogram and the recommendations of Audiology, he will be referred for power hearing aids. He will be seen again as needed for mastoid cavity cleaning.     CC: Dr. Neo Castano

## 2019-06-27 ENCOUNTER — TELEPHONE (OUTPATIENT)
Dept: AUDIOLOGY | Facility: CLINIC | Age: 84
End: 2019-06-27

## 2019-06-27 NOTE — TELEPHONE ENCOUNTER
I spoke with patient's niece, Rosemarie, regarding hearing aid benefits. I explained that Reilly does not have benefits through our clinic but does have benefits through [employer] and provided them with the phone number. I let Rosemarie know that we would still be happy to see Reilly here to discuss hearing aids, however it would be a self-pay cost for the hearing aids.    Elizabeth Waite, [facility]  Licensed Audiologist  [place] License #[id number]

## 2019-08-01 ENCOUNTER — OFFICE VISIT (OUTPATIENT)
Dept: PSYCHIATRY | Facility: CLINIC | Age: 84
End: 2019-08-01
Attending: CLINICAL NURSE SPECIALIST
Payer: COMMERCIAL

## 2019-08-01 VITALS
BODY MASS INDEX: 33.39 KG/M2 | HEART RATE: 84 BPM | WEIGHT: 190.8 LBS | DIASTOLIC BLOOD PRESSURE: 63 MMHG | SYSTOLIC BLOOD PRESSURE: 150 MMHG

## 2019-08-01 DIAGNOSIS — F42.9 OBSESSIVE-COMPULSIVE DISORDER, UNSPECIFIED TYPE: ICD-10-CM

## 2019-08-01 DIAGNOSIS — F32.A DEPRESSION, UNSPECIFIED DEPRESSION TYPE: Primary | ICD-10-CM

## 2019-08-01 DIAGNOSIS — F42.8 OTHER OBSESSIVE-COMPULSIVE DISORDER: ICD-10-CM

## 2019-08-01 DIAGNOSIS — F41.8 OTHER SPECIFIED ANXIETY DISORDERS: ICD-10-CM

## 2019-08-01 DIAGNOSIS — F84.0 AUTISM SPECTRUM DISORDER: ICD-10-CM

## 2019-08-01 PROCEDURE — G0463 HOSPITAL OUTPT CLINIC VISIT: HCPCS | Mod: ZF

## 2019-08-01 RX ORDER — FLUOXETINE 40 MG/1
40 CAPSULE ORAL DAILY
Qty: 90 CAPSULE | Refills: 1 | Status: SHIPPED | OUTPATIENT
Start: 2019-08-01 | End: 2019-08-01

## 2019-08-01 RX ORDER — QUETIAPINE FUMARATE 25 MG/1
TABLET, FILM COATED ORAL
Qty: 90 TABLET | Refills: 2 | Status: SHIPPED | OUTPATIENT
Start: 2019-08-01 | End: 2020-01-01

## 2019-08-01 RX ORDER — FLUOXETINE 40 MG/1
40 CAPSULE ORAL DAILY
Qty: 90 CAPSULE | Refills: 2 | Status: ON HOLD | OUTPATIENT
Start: 2019-08-01 | End: 2019-08-09

## 2019-08-01 RX ORDER — QUETIAPINE FUMARATE 25 MG/1
TABLET, FILM COATED ORAL
Qty: 90 TABLET | Refills: 1 | Status: SHIPPED | OUTPATIENT
Start: 2019-08-01 | End: 2019-08-01

## 2019-08-01 ASSESSMENT — PAIN SCALES - GENERAL: PAINLEVEL: NO PAIN (0)

## 2019-08-01 NOTE — PROGRESS NOTES
Outpatient Psychiatry Progress Note     Provider: TINY Knox CNS  Date: 2019  Service:  Medication follow up with counseling.   Patient Identification: Sharon Baer  : 6/10/1931   MRN: [id number]    Sharon Baer is a 88 year old year old male who presents for ongoing psychiatric care.  Sharon Baer was last seen in clinic on 19.   At that time,   Assessment & Plan       Sharon Baer is seen today for follow up with his sister Josephine and Joesphine's daughter in law Mami.  The interview was difficult due to his difficulty hearing but was able to answer some questions when I wrote them down.  Overall he reports that his mood has been pretty good even with the extra isolation due to hearing deficit. He agrees to continue current medications at this time.     Diagnosis       Encounter Diagnoses   Name Primary?     Depression, unspecified depression type       Other specified anxiety disorders       Other obsessive-compulsive disorder       Obsessive-compulsive disorder, unspecified type           Plan:  Medication: Continue current medications   OTC Recommendations: none  Lab Orders:  none  Referrals: none  Release of Information: none needed  Future Treatment Considerations:per symptoms  Return for Follow Up:6 months      ____________________________________________________________________________________________________________________________________________    2019  Today Reilly is seen with grand daughter Rosemarie. He now has headphones connected to a microphone and if people speak directly into the microphone he is able to have a conversation. Still has limitations in being able to hear and answer questions. Rosemarie reports that he is not as good at lip reading as he use to be. He is able to talk about his some of his interests such as comic books.  He did not talk about his sister, Josephine, who passed away in May and had been accompanying him to his appointments  and involved in his care at the care center he lives in.   He states that he has been worse and for about a year has been seeing witches or people who are not there. He is aware that they are not actually there.  He has visits from his nephew but doesn't interact much with the other residents where he lives.    Eats breakfast and supper. Nurses are giving him medications.  Rosemarie reports that in general his mood seems stable. He was hospitalized at [facility] after falling in March due to fainting. It was thought to be caused by having the flu.   Side effects of medication include: none known  Psychiatric Review of Systems:  Depression: In the last 2 weeks per PHQ-9 score:   PHQ-9 SCORE 2/28/2019   PHQ-9 Total Score -   PHQ-9 Total Score 8       Anxiety : appears stable  Jackie na   Psychosis  na.   ADHD na    Review of Medical Systems:  Sleep: reports it is ok. Naps some during the day  Energy: low  Concentration: no apparent change  Appetite: appears stable  GI Concerns: none  Cardiac concerns: none  Neurological concerns: fall after fainting in March but no symptoms since then  Other medical concerns: no new concerns  Current Substance Use:  Alcohol:denies  Other drugs:none  Caffeine:not reviewed  Nicotine: none  Past Medical History:   Past Medical History:   Diagnosis Date     Autism spectrum disorder 7/16/2015     GERD (gastroesophageal reflux disease)      Gout      Hearing loss      Hyperlipemia     on atorvastatin     Hypertension      Hypotestosteronism      IGT (impaired glucose tolerance)      OCD (obsessive compulsive disorder)     on cymbalta     Osteoarthritis, hip, bilateral      Vitamin D deficiency     resolved 2/2013     Patient Active Problem List   Diagnosis     Gout     Hypertension     IGT (impaired glucose tolerance)     GERD (gastroesophageal reflux disease)     Hypotestosteronism     Neoplasm of uncertain behavior of skin     AK (actinic keratosis)     History of nonmelanoma skin cancer      "Osteoarthritis of right hip     OA (osteoarthritis) of hip     Fall     Facial laceration     Autism spectrum disorder     Depression, unspecified depression type     Vitamin D deficiency     Loss of weight     ACP (advance care planning)     Other obsessive-compulsive disorder     Other specified anxiety disorders       Allergies:   Allergies   Allergen Reactions     Penicillins Rash and Other (See Comments)     He believes he had a positive skin test for PCN allergy at the time of a dog bite, about 1960.          Current Medications     Current Outpatient Medications Ordered in Epic   Medication Sig Dispense Refill     acetaminophen 500 MG CAPS Take 500 mg by mouth 2 times daily       allopurinol (ZYLOPRIM) 100 MG tablet Take 1 tablet (100 mg) by mouth daily 100 tablet 3     aspirin 81 MG tablet Take 81 mg by mouth daily       atorvastatin (LIPITOR) 20 MG tablet Take 1 tablet (20 mg) by mouth daily 100 tablet 3     cholecalciferol (VITAMIN D) 1000 UNIT tablet Take 2 tablets (2,000 Units) by mouth daily 100 tablet 3     FLUoxetine (PROZAC) 40 MG capsule Take 1 capsule (40 mg) by mouth daily 90 capsule 1     omeprazole (PRILOSEC) 40 MG DR capsule Take 1 capsule (40 mg) by mouth daily 90 capsule 3     QUEtiapine (SEROQUEL) 25 MG tablet Take one tablet by mouth every evening with supper 90 tablet 1     No current Epic-ordered facility-administered medications on file.         Mental Status Exam     Appearance:  Casually dressed and Adequately groomed  Behavior/relationship to examiner/demeanor: Hard of hearing, Cooperative  Orientation: Oriented to person, place, time and situation  Psychomotor: slowed  Speech Rate:  Normal  Speech Spontaneity:  Latency but is hard of hearing  Mood:  \"more depressed\"  Affect:  Appropriate/mood-congruent  Thought Process (Associations):  Circumstantial  Thought Content:  no overt psychosis, denies suicidal ideation, intent or thoughts and patient does not appear to be responding to " internal stimuli  Abnormal Perception:  None  Attention/Concentration:  Normal  Insight:  Adequate  Judgment:  Good      Results     Vital signs: BP (!) 150/63   Pulse 84   Wt 86.5 kg (190 lb 12.8 oz)   BMI 33.39 kg/m      Laboratory Data:  reviewed data from PCP and hospitalization. No concerns with psych medications    Assessment & Plan      Sharon Baer is seen today for follow up and reports he has been more depressed and also that at times,but that this has been occurring for over a year, he sees people that he knew, such as a brother and also at times dark shadows that looks like a witch. He knows that these are not real, he doesn't have conversations with them. This may be due to being alone in his apartment rather than psychosis. Will continue to monitor.   At this time continue current medication. Since Reilly has hearing difficulty and is not able to provide much information, can schedule for one year but Rosemarie will call or schedule Reilly earlier if any concerns.    Diagnosis  Encounter Diagnoses   Name Primary?     Depression, unspecified depression type Yes     Other specified anxiety disorders      Other obsessive-compulsive disorder      Obsessive-compulsive disorder, unspecified type      Autism spectrum disorder        Plan:  Medication: no change  OTC Recommendations: none  Lab Orders:  none  Referrals: none  Release of Information: none  Future Treatment Considerations:per symptoms  Return for Follow Up: 1 year but family will call or schedule earlier if needed.    The risks, benefits, alternatives and side effects have been discussed and are understood by the patient. The patient understands the risks of using street drugs or alcohol. There are no medical contraindications, the patient agrees to treatment, and has the capacity to do so. The patient understands to call 911 or come to the nearest ED if life threatening or urgent symptoms present.  In addition time was spent counseling the  patient and/or coordinating care regarding review of social and occupational functioning.  In addition patient was counseled on health and wellness practices to augment medication treatment of symptoms. See note for details.    Dyan Mathis, APRN CNS 8/1/2019

## 2019-08-01 NOTE — PATIENT INSTRUCTIONS
Thank you for coming to the PSYCHIATRY CLINIC.    Lab Testing:  If you had lab testing today and your results are reassuring or normal they will be mailed to you or sent through [employer] within 7 days.   If the lab tests need quick action we will call you with the results.  The phone number we will call with results is # [phone] (home) . If this is not the best number please call our clinic and change the number.    Medication Refills:  If you need any refills please call your pharmacy and they will contact us. Our fax number for refills is [phone]. Please allow three business for refill processing.   If you need to  your refill at a new pharmacy, please contact the new pharmacy directly. The new pharmacy will help you get your medications transferred.     Scheduling:  If you have any concerns about today's visit or wish to schedule another appointment please call our office during normal business hours [phone] (8-5:00 M-F)    Contact Us:  Please call [phone] during business hours (8-5:00 M-F).  If after clinic hours, or on the weekend, please call  [phone].    Financial Assistance [phone]  [facility] Billing [phone]  [place] Billing Office, [facility]: [phone]  [facility] Billing [phone]  Medical Records [phone]      MENTAL HEALTH CRISIS NUMBERS:  [facility]:   [facility] - [phone]   Crisis Residence [facility] - [phone]   Walk-In Counseling [facility] [phone]   COPE 24/7 [place] Mobile Team for Adults - [[phone]]; Child - [[phone]]        [facility]:   [facility] - [phone]   Walk-in counseling [facility] - [phone]   Walk-in counseling [facility] - [phone]   Crisis Residence [facility] - [phone]   Urgent Care Adult Mental Health:   --Drop-in, 24/7 crisis line, and [facility] Mobile Team  [[phone]]    CRISIS TEXT LINE: Text [phone] from anywhere, anytime, any crisis 24/7;    OR SEE www.crisistextline.org     Poison Control Center - [phone]    CHILD: [facility] needs assessment team - [phone]     [facility] - [phone]; or [facility] Lifeline - [phone]    If you have a medical emergency please call 911or go to the nearest ER.                    _____________________________________________    Again thank you for choosing PSYCHIATRY CLINIC and please let us know how we can best partner with you to improve you and your family's health.  You may be receiving a survey in the mail regarding this appointment. We would love to have your feedback, both positive and negative, so please fill out the survey and return it using the provided envelope. The survey is done by an external company, so your answers are anonymous.

## 2019-08-05 ENCOUNTER — TELEPHONE (OUTPATIENT)
Dept: PSYCHIATRY | Facility: CLINIC | Age: 84
End: 2019-08-05

## 2019-08-05 NOTE — TELEPHONE ENCOUNTER
Returned phone call to Steven, the nurse with pt's GH. Per Steven, the pt has been doing pretty well from a behavioral standpoint. However, there have been two recent episodes where the pt has gotten up in the middle of the night (usually around midnight) with plans to leave his facility, telling staff that he doesn't live there. EMS was called during the first occurrence. During these occurrences pt presents confused. Outside of these occurrences, the pt is alert and fully oriented.     The pt's sister passed away about 1-1.5 months ago. She was heavily involved in the care of the pt and had been responsible for medication management. Before sister passed away, she had been preparing pt's medications and pt was responsible for taking them, which he was not very compliant with.     In March the pt was hospitalized due to falling. When he was discharged home the GH took over medication management and administration. At that time, orders from [facility] were for Prozac 30mg daily, which the pt has been getting since at least April. Dr. Castano (PCP) has been prescribing this dose.    The  is uncertain of when the pt's last physical was. Writer encouraged GH to relay concerns to PCP and potentially get him in for an appointment. Writer will also confirm with provider whether the pt should increase fluoxetine to 40mg. Instructed the GH to contact 911 if they are at all concerned for pt's safety.

## 2019-08-05 NOTE — TELEPHONE ENCOUNTER
"----- Message from Ene Oro sent at 8/5/2019  1:17 PM CDT -----  Contact: [phone]  Steven calling from [facility], [phone]    Update on behaviors - pt has been exhibiting confusion, attempting to leave the building, which he's never done before and has become a safety risk. Has his backpack packed and says he's \"ready to go.\" No aggression or mood changes, but affect is kind of flat. They just wanted Dyan to be aware of it and see if she has any recommendations.  "

## 2019-08-06 ENCOUNTER — TELEPHONE (OUTPATIENT)
Dept: INTERNAL MEDICINE | Facility: CLINIC | Age: 84
End: 2019-08-06

## 2019-08-06 DIAGNOSIS — D64.9 ANEMIA, UNSPECIFIED TYPE: Primary | ICD-10-CM

## 2019-08-06 NOTE — TELEPHONE ENCOUNTER
Spoke to Steven to relay that provider would like a CBC drawn. Faxed lab order over to [phone]. Steven will fax results over to [facility] once he receives results. Radha Lebron LPN 8/6/2019 3:32 PM

## 2019-08-06 NOTE — TELEPHONE ENCOUNTER
Spoke to Steven who states that the patient is having a change of behavior of exit seeking, vitals are stable and normal baseline. Steven states that the patient usually exit seeks at night. Steven states that the patient does not complain of any pain or any symptoms of anything and states that the patient is not any more confused that normal. Steven contacted patients psychiatrist who stated to contact PCP. Steven is wondering if patient needs to come into clinic to be seen or if there is any tests that PCP would like or recommendations PCP may have.      Patients niece who takes care of appointments now.  Rosemarie: [phone]  Radha Lebron LPN 8/6/2019 12:10 PM      Given his previous anemia and iron deficiency he should have a F/U CBC  JL

## 2019-08-06 NOTE — TELEPHONE ENCOUNTER
[facility]    Phone Message    May a detailed message be left on voicemail: yes    Reason for Call: Other: .  reporting behaviors - wishing to speak to a nurse.  Possibly need to schedule a physical depending on nurse advise. If scheduling is needed/reccomended please call [phone] to assist.    Action Taken: Message routed to:  [facility] ken

## 2019-08-07 ENCOUNTER — TELEPHONE (OUTPATIENT)
Dept: INTERNAL MEDICINE | Facility: CLINIC | Age: 84
End: 2019-08-07

## 2019-08-07 NOTE — TELEPHONE ENCOUNTER
Health Call Center    Phone Message    May a detailed message be left on voicemail: no    Reason for Call: Other: Pt's care facility wanted to notify Dr. Castano they can't complete the CBC until tomorrow as ordered by him. Please call for any questions..     Action Taken: Message routed to:  [facility]

## 2019-08-08 ENCOUNTER — APPOINTMENT (OUTPATIENT)
Dept: CT IMAGING | Facility: CLINIC | Age: 84
DRG: 375 | End: 2019-08-08
Attending: EMERGENCY MEDICINE
Payer: COMMERCIAL

## 2019-08-08 ENCOUNTER — TRANSFERRED RECORDS (OUTPATIENT)
Dept: HEALTH INFORMATION MANAGEMENT | Facility: CLINIC | Age: 84
End: 2019-08-08

## 2019-08-08 ENCOUNTER — APPOINTMENT (OUTPATIENT)
Dept: GENERAL RADIOLOGY | Facility: CLINIC | Age: 84
DRG: 375 | End: 2019-08-08
Attending: EMERGENCY MEDICINE
Payer: COMMERCIAL

## 2019-08-08 ENCOUNTER — HOSPITAL ENCOUNTER (INPATIENT)
Facility: CLINIC | Age: 84
LOS: 8 days | Discharge: NURSING, REHAB OR LONG-TERM CARE | DRG: 375 | End: 2019-08-16
Attending: EMERGENCY MEDICINE | Admitting: INTERNAL MEDICINE
Payer: COMMERCIAL

## 2019-08-08 DIAGNOSIS — C18.3 MALIGNANT NEOPLASM OF HEPATIC FLEXURE (H): ICD-10-CM

## 2019-08-08 DIAGNOSIS — R06.02 SHORTNESS OF BREATH: ICD-10-CM

## 2019-08-08 DIAGNOSIS — F32.A DEPRESSION, UNSPECIFIED DEPRESSION TYPE: ICD-10-CM

## 2019-08-08 DIAGNOSIS — K92.2 GASTROINTESTINAL HEMORRHAGE, UNSPECIFIED GASTROINTESTINAL HEMORRHAGE TYPE: ICD-10-CM

## 2019-08-08 DIAGNOSIS — R41.82 ALTERED MENTAL STATUS, UNSPECIFIED ALTERED MENTAL STATUS TYPE: ICD-10-CM

## 2019-08-08 DIAGNOSIS — I10 ESSENTIAL HYPERTENSION: ICD-10-CM

## 2019-08-08 DIAGNOSIS — I48.20 CHRONIC ATRIAL FIBRILLATION (H): Primary | ICD-10-CM

## 2019-08-08 DIAGNOSIS — F51.04 PSYCHOPHYSIOLOGICAL INSOMNIA: ICD-10-CM

## 2019-08-08 DIAGNOSIS — D64.9 ACUTE ANEMIA: ICD-10-CM

## 2019-08-08 DIAGNOSIS — D64.9 ANEMIA, UNSPECIFIED TYPE: ICD-10-CM

## 2019-08-08 LAB
ABO + RH BLD: NORMAL
ABO + RH BLD: NORMAL
ALBUMIN SERPL-MCNC: 3.1 G/DL (ref 3.4–5)
ALBUMIN UR-MCNC: NEGATIVE MG/DL
ALP SERPL-CCNC: 62 U/L (ref 40–150)
ALT SERPL W P-5'-P-CCNC: 11 U/L (ref 0–70)
ANION GAP SERPL CALCULATED.3IONS-SCNC: 11 MMOL/L (ref 3–14)
APPEARANCE UR: CLEAR
APTT PPP: 23 SEC (ref 22–37)
AST SERPL W P-5'-P-CCNC: 9 U/L (ref 0–45)
BASOPHILS # BLD AUTO: 0 10E9/L (ref 0–0.2)
BASOPHILS NFR BLD AUTO: 0.2 %
BILIRUB SERPL-MCNC: 0.3 MG/DL (ref 0.2–1.3)
BILIRUB UR QL STRIP: NEGATIVE
BLD GP AB SCN SERPL QL: NORMAL
BLD PROD TYP BPU: NORMAL
BLD UNIT ID BPU: 0
BLD UNIT ID BPU: 0
BLOOD BANK CMNT PATIENT-IMP: NORMAL
BLOOD PRODUCT CODE: NORMAL
BLOOD PRODUCT CODE: NORMAL
BPU ID: NORMAL
BPU ID: NORMAL
BUN SERPL-MCNC: 30 MG/DL (ref 7–30)
CALCIUM SERPL-MCNC: 8.4 MG/DL (ref 8.5–10.1)
CHLORIDE SERPL-SCNC: 112 MMOL/L (ref 94–109)
CO2 SERPL-SCNC: 18 MMOL/L (ref 20–32)
COLOR UR AUTO: YELLOW
CREAT SERPL-MCNC: 1.44 MG/DL (ref 0.66–1.25)
DIFFERENTIAL METHOD BLD: ABNORMAL
EOSINOPHIL # BLD AUTO: 0 10E9/L (ref 0–0.7)
EOSINOPHIL NFR BLD AUTO: 0 %
ERYTHROCYTE [DISTWIDTH] IN BLOOD BY AUTOMATED COUNT: 19.2 % (ref 10–15)
GFR SERPL CREATININE-BSD FRML MDRD: 43 ML/MIN/{1.73_M2}
GLUCOSE SERPL-MCNC: 173 MG/DL (ref 70–99)
GLUCOSE UR STRIP-MCNC: NEGATIVE MG/DL
HCT VFR BLD AUTO: 22.4 % (ref 40–53)
HGB BLD-MCNC: 5.4 G/DL (ref 13.3–17.7)
HGB UR QL STRIP: NEGATIVE
IMM GRANULOCYTES # BLD: 0.2 10E9/L (ref 0–0.4)
IMM GRANULOCYTES NFR BLD: 1.6 %
INR PPP: 1.07 (ref 0.86–1.14)
INTERPRETATION ECG - MUSE: NORMAL
IRON SATN MFR SERPL: 3 % (ref 15–46)
IRON SERPL-MCNC: 10 UG/DL (ref 35–180)
KETONES UR STRIP-MCNC: NEGATIVE MG/DL
LDH SERPL L TO P-CCNC: 223 U/L (ref 85–227)
LEUKOCYTE ESTERASE UR QL STRIP: NEGATIVE
LYMPHOCYTES # BLD AUTO: 0.6 10E9/L (ref 0.8–5.3)
LYMPHOCYTES NFR BLD AUTO: 5.7 %
MAGNESIUM SERPL-MCNC: 2.3 MG/DL (ref 1.6–2.3)
MCH RBC QN AUTO: 17.7 PG (ref 26.5–33)
MCHC RBC AUTO-ENTMCNC: 24.1 G/DL (ref 31.5–36.5)
MCV RBC AUTO: 73 FL (ref 78–100)
MONOCYTES # BLD AUTO: 0.1 10E9/L (ref 0–1.3)
MONOCYTES NFR BLD AUTO: 1.1 %
NEUTROPHILS # BLD AUTO: 9.8 10E9/L (ref 1.6–8.3)
NEUTROPHILS NFR BLD AUTO: 91.4 %
NITRATE UR QL: NEGATIVE
NRBC # BLD AUTO: 0.1 10*3/UL
NRBC BLD AUTO-RTO: 1 /100
NUM BPU REQUESTED: 2
PH UR STRIP: 5 PH (ref 5–7)
PHOSPHATE SERPL-MCNC: 3.3 MG/DL (ref 2.5–4.5)
PLATELET # BLD AUTO: 297 10E9/L (ref 150–450)
POTASSIUM SERPL-SCNC: 4.3 MMOL/L (ref 3.4–5.3)
PROT SERPL-MCNC: 6.4 G/DL (ref 6.8–8.8)
RBC # BLD AUTO: 3.05 10E12/L (ref 4.4–5.9)
RETICS # AUTO: 80.2 10E9/L (ref 25–95)
RETICS/RBC NFR AUTO: 2.6 % (ref 0.5–2)
SODIUM SERPL-SCNC: 142 MMOL/L (ref 133–144)
SOURCE: NORMAL
SP GR UR STRIP: 1.02 (ref 1–1.03)
SPECIMEN EXP DATE BLD: NORMAL
TIBC SERPL-MCNC: 334 UG/DL (ref 240–430)
TRANSFUSION STATUS PATIENT QL: NORMAL
TROPONIN I SERPL-MCNC: <0.015 UG/L (ref 0–0.04)
UROBILINOGEN UR STRIP-MCNC: NORMAL MG/DL (ref 0–2)
WBC # BLD AUTO: 10.7 10E9/L (ref 4–11)

## 2019-08-08 PROCEDURE — 71046 X-RAY EXAM CHEST 2 VIEWS: CPT

## 2019-08-08 PROCEDURE — 99223 1ST HOSP IP/OBS HIGH 75: CPT | Mod: AI | Performed by: INTERNAL MEDICINE

## 2019-08-08 PROCEDURE — 25000128 H RX IP 250 OP 636: Performed by: PHYSICIAN ASSISTANT

## 2019-08-08 PROCEDURE — 85045 AUTOMATED RETICULOCYTE COUNT: CPT | Performed by: EMERGENCY MEDICINE

## 2019-08-08 PROCEDURE — 81003 URINALYSIS AUTO W/O SCOPE: CPT | Performed by: EMERGENCY MEDICINE

## 2019-08-08 PROCEDURE — 84100 ASSAY OF PHOSPHORUS: CPT | Performed by: EMERGENCY MEDICINE

## 2019-08-08 PROCEDURE — 12000001 ZZH R&B MED SURG/OB UMMC

## 2019-08-08 PROCEDURE — P9016 RBC LEUKOCYTES REDUCED: HCPCS | Performed by: EMERGENCY MEDICINE

## 2019-08-08 PROCEDURE — 40000611 ZZHCL STATISTIC MORPHOLOGY W/INTERP HEMEPATH TC 85060: Performed by: PHYSICIAN ASSISTANT

## 2019-08-08 PROCEDURE — 83735 ASSAY OF MAGNESIUM: CPT | Performed by: EMERGENCY MEDICINE

## 2019-08-08 PROCEDURE — 80053 COMPREHEN METABOLIC PANEL: CPT | Performed by: EMERGENCY MEDICINE

## 2019-08-08 PROCEDURE — 93010 ELECTROCARDIOGRAM REPORT: CPT | Mod: Z6 | Performed by: EMERGENCY MEDICINE

## 2019-08-08 PROCEDURE — 83010 ASSAY OF HAPTOGLOBIN QUANT: CPT | Performed by: EMERGENCY MEDICINE

## 2019-08-08 PROCEDURE — 86901 BLOOD TYPING SEROLOGIC RH(D): CPT | Performed by: EMERGENCY MEDICINE

## 2019-08-08 PROCEDURE — 83550 IRON BINDING TEST: CPT | Performed by: EMERGENCY MEDICINE

## 2019-08-08 PROCEDURE — 85610 PROTHROMBIN TIME: CPT | Performed by: EMERGENCY MEDICINE

## 2019-08-08 PROCEDURE — 86923 COMPATIBILITY TEST ELECTRIC: CPT | Performed by: EMERGENCY MEDICINE

## 2019-08-08 PROCEDURE — 84484 ASSAY OF TROPONIN QUANT: CPT | Performed by: EMERGENCY MEDICINE

## 2019-08-08 PROCEDURE — 86900 BLOOD TYPING SEROLOGIC ABO: CPT | Performed by: EMERGENCY MEDICINE

## 2019-08-08 PROCEDURE — 70450 CT HEAD/BRAIN W/O DYE: CPT

## 2019-08-08 PROCEDURE — 99207 ZZC APP CREDIT; MD BILLING SHARED VISIT: CPT | Performed by: PHYSICIAN ASSISTANT

## 2019-08-08 PROCEDURE — 85730 THROMBOPLASTIN TIME PARTIAL: CPT | Performed by: EMERGENCY MEDICINE

## 2019-08-08 PROCEDURE — 99285 EMERGENCY DEPT VISIT HI MDM: CPT | Mod: 25 | Performed by: EMERGENCY MEDICINE

## 2019-08-08 PROCEDURE — C9113 INJ PANTOPRAZOLE SODIUM, VIA: HCPCS | Performed by: PHYSICIAN ASSISTANT

## 2019-08-08 PROCEDURE — 83010 ASSAY OF HAPTOGLOBIN QUANT: CPT | Performed by: PHYSICIAN ASSISTANT

## 2019-08-08 PROCEDURE — 85025 COMPLETE CBC W/AUTO DIFF WBC: CPT | Performed by: EMERGENCY MEDICINE

## 2019-08-08 PROCEDURE — 93005 ELECTROCARDIOGRAM TRACING: CPT | Performed by: EMERGENCY MEDICINE

## 2019-08-08 PROCEDURE — 36430 TRANSFUSION BLD/BLD COMPNT: CPT | Performed by: EMERGENCY MEDICINE

## 2019-08-08 PROCEDURE — 99291 CRITICAL CARE FIRST HOUR: CPT | Mod: 25 | Performed by: EMERGENCY MEDICINE

## 2019-08-08 PROCEDURE — 83540 ASSAY OF IRON: CPT | Performed by: EMERGENCY MEDICINE

## 2019-08-08 PROCEDURE — 83615 LACTATE (LD) (LDH) ENZYME: CPT | Performed by: EMERGENCY MEDICINE

## 2019-08-08 PROCEDURE — 86850 RBC ANTIBODY SCREEN: CPT | Performed by: EMERGENCY MEDICINE

## 2019-08-08 RX ORDER — ONDANSETRON 4 MG/1
4 TABLET, ORALLY DISINTEGRATING ORAL EVERY 6 HOURS PRN
Status: DISCONTINUED | OUTPATIENT
Start: 2019-08-08 | End: 2019-08-16 | Stop reason: HOSPADM

## 2019-08-08 RX ORDER — BISACODYL 5 MG
15 TABLET, DELAYED RELEASE (ENTERIC COATED) ORAL DAILY PRN
Status: DISCONTINUED | OUTPATIENT
Start: 2019-08-08 | End: 2019-08-16 | Stop reason: HOSPADM

## 2019-08-08 RX ORDER — FLUOXETINE 10 MG/1
10 CAPSULE ORAL DAILY
COMMUNITY
Start: 2019-05-31 | End: 2019-08-19

## 2019-08-08 RX ORDER — AMOXICILLIN 250 MG
1 CAPSULE ORAL 2 TIMES DAILY
Status: DISCONTINUED | OUTPATIENT
Start: 2019-08-08 | End: 2019-08-09

## 2019-08-08 RX ORDER — PREDNISONE 10 MG/1
20 TABLET ORAL DAILY
Status: ON HOLD | COMMUNITY
Start: 2019-05-31 | End: 2019-08-14

## 2019-08-08 RX ORDER — ACETAMINOPHEN 325 MG/1
650 TABLET ORAL EVERY 4 HOURS PRN
Status: DISCONTINUED | OUTPATIENT
Start: 2019-08-08 | End: 2019-08-16 | Stop reason: HOSPADM

## 2019-08-08 RX ORDER — BISACODYL 5 MG
5 TABLET, DELAYED RELEASE (ENTERIC COATED) ORAL DAILY PRN
Status: DISCONTINUED | OUTPATIENT
Start: 2019-08-08 | End: 2019-08-16 | Stop reason: HOSPADM

## 2019-08-08 RX ORDER — NALOXONE HYDROCHLORIDE 0.4 MG/ML
.1-.4 INJECTION, SOLUTION INTRAMUSCULAR; INTRAVENOUS; SUBCUTANEOUS
Status: DISCONTINUED | OUTPATIENT
Start: 2019-08-08 | End: 2019-08-16 | Stop reason: HOSPADM

## 2019-08-08 RX ORDER — METOPROLOL SUCCINATE 50 MG/1
50 TABLET, EXTENDED RELEASE ORAL DAILY
COMMUNITY
Start: 2019-06-09 | End: 2019-08-19

## 2019-08-08 RX ORDER — ONDANSETRON 2 MG/ML
4 INJECTION INTRAMUSCULAR; INTRAVENOUS EVERY 6 HOURS PRN
Status: DISCONTINUED | OUTPATIENT
Start: 2019-08-08 | End: 2019-08-16 | Stop reason: HOSPADM

## 2019-08-08 RX ORDER — LIDOCAINE 40 MG/G
CREAM TOPICAL
Status: DISCONTINUED | OUTPATIENT
Start: 2019-08-08 | End: 2019-08-16 | Stop reason: HOSPADM

## 2019-08-08 RX ORDER — BISACODYL 5 MG
10 TABLET, DELAYED RELEASE (ENTERIC COATED) ORAL DAILY PRN
Status: DISCONTINUED | OUTPATIENT
Start: 2019-08-08 | End: 2019-08-16 | Stop reason: HOSPADM

## 2019-08-08 RX ORDER — AMOXICILLIN 250 MG
2 CAPSULE ORAL 2 TIMES DAILY
Status: DISCONTINUED | OUTPATIENT
Start: 2019-08-08 | End: 2019-08-09

## 2019-08-08 RX ADMIN — PANTOPRAZOLE SODIUM 40 MG: 40 INJECTION, POWDER, FOR SOLUTION INTRAVENOUS at 19:37

## 2019-08-08 ASSESSMENT — ACTIVITIES OF DAILY LIVING (ADL)
RETIRED_COMMUNICATION: 2-->DIFFICULTY UNDERSTANDING (NOT RELATED TO LANGUAGE BARRIER)
TRANSFERRING: 1-->ASSISTIVE EQUIPMENT
FALL_HISTORY_WITHIN_LAST_SIX_MONTHS: YES
DRESS: 0-->INDEPENDENT
WHICH_OF_THE_ABOVE_FUNCTIONAL_RISKS_HAD_A_RECENT_ONSET_OR_CHANGE?: COGNITION
TOILETING: 1-->ASSISTIVE EQUIPMENT
SWALLOWING: 0-->SWALLOWS FOODS/LIQUIDS WITHOUT DIFFICULTY
ADLS_ACUITY_SCORE: 23
RETIRED_EATING: 0-->INDEPENDENT
COGNITION: 2 - DIFFICULTY WITH ORGANIZING THOUGHTS
NUMBER_OF_TIMES_PATIENT_HAS_FALLEN_WITHIN_LAST_SIX_MONTHS: 3
BATHING: 1-->ASSISTIVE EQUIPMENT
AMBULATION: 1-->ASSISTIVE EQUIPMENT

## 2019-08-08 ASSESSMENT — MIFFLIN-ST. JEOR
SCORE: 1462.88
SCORE: 1504.07

## 2019-08-08 NOTE — H&P
[facility], [place]    History and Physical - Hospitalist Service, [facility]       Date of Admission:  8/8/2019    Assessment & Plan   Sharon Baer is a 88 year old male admitted on 8/8/2019. He has PMH of HTN, HLD, GERD, hearing loss, and Gout who presents to the ED for evaluation of abnormal labs. Patient admitted to Medicine for further evaluation and care of Anemia.     # Acute/Chronic Blood Loss Anemia  # GIANLUCA:   Presented with ~ 2 month hx of progressive fatigue, dyspnea on exertion, pallor, and intermittent BRBPR. PTA labs revealing Hgb 5.6 (11.0- 4/29/19), MCV 73.  Is maintained on daily ASA and serotonin specific reuptake inhibitor which increases risk of GIB. No reports of previous GIB. No Etoh, hx of PUD, H.pylori or ETOH use. BP stable. No tachycardia, though beta blocked. Concern for a/c GIB in setting of ASA/SSRI. Will further evaluate as below:  - Type and screen  - Transfuse 2 U PRBC  - 2 Large Bore IV's  - Protonix IV BID  - Iron studies, Haptoglobin, LDH, peripheral smear-pending  - GI consult placed. Please discuss with in am or sooner if with hemodynamic instability or with overt bleeding  - NPO w/meds  - Tele  - Hold ASA  - Transfuse for Hgb less than 7.0 or with hemodynamically significant bleeding    # Mild SAMEERA on CKD III: Cr 1.44 on admission. BL Cr appears ~ 1.14-1.30. UA negative   - Avoid hypotension, dehydration, nephrotoxic medications  - BMP in am   - Continue to closely monitor     # Protein Calorie Malnutrition: In setting of chronic illness. Albumin 3.1, Total protein 6.4.  - Nutrition consult placed  - Add Mg, Phos      ###Chronic Medical:     # HLD: PTA statin.  Hold    # HTN: BP stable on admission. PTA metoprolol.  - Hold    # Gout: PTA allopurinol  - Hold    # OCD: PTA fluoxetine and seroquel.   - Hold tonight    # GERD: No PTA medications        Diet: CLD until MN  DVT Prophylaxis: Pneumatic Compression Devices  Wang Catheter: not  present  Code Status: Full Code    Disposition Plan   Expected discharge: 2 - 3 days, recommended to prior living arrangement once hemoglobin stable.  Entered: Karo Bond PA-C 08/08/2019, 4:43 PM     The patient's care was discussed with the Attending Physician, Dr. Fitch.    Karo Bond PA-C  Internal Medicine Hospitalist Service  [facility]  Pager: [phone]    Please see sticky note for cross cover information  ______________________________________________________________________    Chief Complaint   Progressive fatigue, AGUILA, Anemia    History is obtained from the patient and EMR    History of Present Illness   Sharon Baer is a 88 year old male who presents to the ED for evaluation of Anemia from labs obtained by PCP. Also reports ~ 2 month hx of progressive fatigue AGUILA, sob, pallor. No PND, orthopnea, LE edema, or hx of chronic cardiac disease.     Patient/patients niece (at bedside) reports intermittent BRBPR for past 2 months. No previous hx of GIB. No recent changes to medications, trauma, orthostatic sx, fever, chills, HA, vision changes.    Currently, patient w/o acute complaints. We discussed POC as outlined above and patient/patients Niece agreeable.    Patient does not endorse current: headaches, changes in vision, chest pain, palpitations, upper respiratory symptoms of rhinorrhea or congestion, cough, sputum production, wheezing, abdominal pain, nausea, emesis, constipation, diarrhea, dysuria, edema, rashes, weakness, focal neurologic deficits, recent travel.        Review of Systems    The 10 point Review of Systems is negative other than noted in the HPI or here.     Past Medical History    I have reviewed this patient's medical history and updated it with pertinent information if needed.   Past Medical History:   Diagnosis Date     Autism spectrum disorder 7/16/2015     GERD (gastroesophageal reflux disease)      Gout      Hearing loss      Hyperlipemia      on atorvastatin     Hypertension      Hypotestosteronism      IGT (impaired glucose tolerance)      OCD (obsessive compulsive disorder)     on cymbalta     Osteoarthritis, hip, bilateral      Vitamin D deficiency     resolved 2/2013       Past Surgical History   I have reviewed this patient's surgical history and updated it with pertinent information if needed.  Past Surgical History:   Procedure Laterality Date     bilateral cataract surgery       bilateral ear surgery       HERNIA REPAIR, INGUINAL RT/LT       SUPRAPUBIC PROSTATECTOMY         Social History   I have reviewed this patient's social history and updated it with pertinent information if needed.  Social History     Tobacco Use     Smoking status: Former Smoker     Packs/day: 1.00     Years: 20.00     Pack years: 20.00     Smokeless tobacco: Never Used   Substance Use Topics     Alcohol use: No     Drug use: No       Family History   I have reviewed this patient's family history and updated it with pertinent information if needed.   Family History   Problem Relation Age of Onset     Cancer Sister         skin     Myocardial Infarction Maternal Uncle 60       Prior to Admission Medications   Prior to Admission Medications   Prescriptions Last Dose Informant Patient Reported? Taking?   FLUoxetine (PROZAC) 10 MG capsule   Yes No   FLUoxetine (PROZAC) 20 MG capsule   Yes No   FLUoxetine (PROZAC) 40 MG capsule   No No   Sig: Take 1 capsule (40 mg) by mouth daily   QUEtiapine (SEROQUEL) 25 MG tablet   No No   Sig: Take one tablet by mouth every evening with supper   acetaminophen 500 MG CAPS   Yes No   Sig: Take 500 mg by mouth 2 times daily   allopurinol (ZYLOPRIM) 100 MG tablet   No No   Sig: Take 1 tablet (100 mg) by mouth daily   aspirin 81 MG tablet   Yes No   Sig: Take 81 mg by mouth daily   atorvastatin (LIPITOR) 20 MG tablet   No No   Sig: Take 1 tablet (20 mg) by mouth daily   cholecalciferol (VITAMIN D) 1000 UNIT tablet   No No   Sig: Take 2  tablets (2,000 Units) by mouth daily   metoprolol succinate ER (TOPROL-XL) 50 MG 24 hr tablet   Yes No   omeprazole (PRILOSEC) 40 MG DR capsule   No No   Sig: Take 1 capsule (40 mg) by mouth daily   predniSONE (DELTASONE) 10 MG tablet   Yes No      Facility-Administered Medications: None     Allergies   Allergies   Allergen Reactions     Penicillins Rash and Other (See Comments)     He believes he had a positive skin test for PCN allergy at the time of a dog bite, about 1960.       Physical Exam   Vital Signs: Temp: 99  F (37.2  C) Temp src: Oral BP: 130/66 Pulse: 72 Heart Rate: 70 Resp: 15 SpO2: 94 % O2 Device: None (Room air)    Weight: 200 lbs 0 oz      Physical Exam   Constitutional: Elderly male lying in bed. [place]. Headphone w/speaker used for communication. Niece at bedside. Pallor.   Well nourished, well developed, resting comfortably   HEENT:   Head: Normocephalic and atraumatic.   Eyes: Conjunctivae are normal. Pupils are equal, round, and reactive to light.  Pharynx has no erythema or exudate, mucous membranes are moist  Neck:   No adenopathy, no bony tenderness  Cardiovascular: Regular rate and rhythm without murmurs or gallops  Pulmonary/Chest: Clear to auscultation bilaterally, with no wheezes or retractions. No respiratory distress.  GI: Soft with good bowel sounds.  Non-tender, non-distended, with no guarding, no rebound, no peritoneal signs.   Back:  No bony or CVA tenderness   Musculoskeletal:  No edema or clubbing   Skin: Skin is warm and dry. No rash noted to exposed skin areas .  Neurological: Alert and oriented to person  Psychiatric: Deficits in memory apparent       Data   Data reviewed today: I reviewed all medications, new labs and imaging results over the last 24 hours. I personally reviewed recent imaging impressions. Daily labs, progress notes, EKG.     Recent Labs   Lab 08/08/19  1414   WBC 10.7   HGB 5.4*   MCV 73*      INR 1.07      POTASSIUM 4.3   CHLORIDE 112*   CO2 18*    BUN 30   CR 1.44*   ANIONGAP 11   UAMNG 8.4*   *   ALBUMIN 3.1*   PROTTOTAL 6.4*   BILITOTAL 0.3   ALKPHOS 62   ALT 11   AST 9   TROPI <0.015

## 2019-08-08 NOTE — ED PROVIDER NOTES
History     Chief Complaint   Patient presents with     Abnormal Labs     HPI  Sharon Baer is a 88 year old male with a past medical history of surgery on his mastoids/being hard of hearing who presents with SOB and reported Hgb 5.9.  Per family member in the room, the outpatient labs were done because the patient had been increasingly confused recently, prompting evaluation for urinary tract infection.  The labs found a white blood cell count of 18 and hemoglobin of 5.9, so the patient was instructed to come to the emergency department for further evaluation.  The patient denies any fevers or chills.  He denies any cough or chest pain.  He does report shortness of breath, particularly with exertion, which his family member (niece, Rosemarie, his DPOA) at bedside has noticed as well.  The patient denies any history of heart disease, however family member at bedside does think that the patient may have had a history of stroke, as he did experience an abrupt decline in his mental functioning recently.  No focal deficits have been noted.  The patient's takes medication for depression as well.  The patient's only other surgical history includes tonsillectomy.  The patient is allergic to penicillin.  The patient denies being on any blood thinning medications.  He has not noted any GI bleeding or had any injuries recently.  Family member at bedside has noticed the patient seems more pale than usual recently.    I have reviewed the Medications, Allergies, Past Medical and Surgical History, and Social History in the Epic system.    Review of Systems  Review Of Systems  General: No fevers or chills  Skin: No rash or diaphoresis, positive for pallor  Eyes: No eye redness or discharge  Ears/Nose/Throat: No rhinorrhea or nasal congestion  Respiratory: No cough, positive for SOB  Cardiovascular: No chest pain or palpitations  Gastrointestinal: No nausea, vomiting, or diarrhea  Genitourinary: No urinary frequency,  "hematuria, or dysuria  Musculoskeletal: No arthralgias or myalgias  Neurologic: No numbness or weakness, positive for confusion  Psychiatric: Positive for history of depression, no SI  Hematologic/Lymphatic/Immunologic: No leg swelling, no easy bruising/bleeding  Endocrine: No polyuria/polydypsia      Physical Exam   BP: (!) 140/47  Pulse: 73  Temp: 98.6  F (37  C)  Resp: 16  Height: 165.1 cm (5' 5\")  Weight: 90.7 kg (200 lb)  SpO2: 99 %      Physical Exam    Physical Exam:   General: Well nourished, well developed, NAD  HEENT: EOMI, anicteric. Pale conjunctivae. NCAT, MMM. Hard of hearing, pt using assistive device  Neck: no jugular venous distension, supple, nl ROM  Cardiac: Regular rate and rhythm. No murmurs, rubs, or gallops. Normal S1, S2.  Intact peripheral pulses  Pulm: CTAB, no stridor, wheezes, rales, mild bilateral rhonchi  Abd: Soft, obese, nontender, nondistended.  No masses palpated.  Rectal: Normal tone, no gross blood, no mass or lesion, guaiac positive stool, stool is brown in color    Skin: Warm and dry to the touch.  No rash. Pallor is present  Extremities: No LE edema, no cyanosis, w/w/p  Neuro: Alert and oriented x 4, no facial droop, CN II-XII intact, strength 5/5 all 4 extremities, sensation intact throughout, steady gait, no nystagmus, coordination normal as tested          ED Course        Procedures             EKG Interpretation:      Interpreted by Caty Murillo  Time reviewed: 1423  Symptoms at time of EKG: SOB   Rhythm: normal sinus   Rate: normal  Axis: left  Ectopy: none  Conduction: normal  LVH present  ST Segments/ T Waves: No ST-T wave changes  Q Waves: none  Comparison to prior: Unchanged from 6/23/15    Clinical Impression: abnormal EKG          Critical Care time:  was 35 minutes for this patient excluding procedures.             Labs Ordered and Resulted from Time of ED Arrival Up to the Time of Departure from the ED - No data to display         Assessments & Plan (with " Medical Decision Making)   The pt is an 88 yom who presents with SOB. DDx includes anemia, pneumonia. Pt had outpatient labs showing anemia and leukocytosis. Patient does not have wheezing or history of asthma or COPD.  Ddx for leukocytosis includes UTI, pna. Pt has been more confused which may be 2/2 UTI. Will obtain head CT to help rule out intracranial event.     Head CT shows chronic findings only. CXR shows atelectasis. UA is pending. Pt denies any urinary symptoms recently. WBC count today is normal.     Hgb is low at 5.4, 2 U PRBCs ordered. Pt consented. Stool Guaiac is positive, anemia may be 2/2 GI blood loss.     I have reviewed the nursing notes.    I have reviewed the findings, diagnosis, plan and need for follow up with the patient.  Patient discussed with internal medicine service at 1630, Dr. Garay, to be admitted to their service for further management. Plan was discussed with patient and their family who understand and agree with plan.        Medication List      There are no discharge medications for this visit.         Final diagnoses:   Anemia, unspecified type   Gastrointestinal hemorrhage, unspecified gastrointestinal hemorrhage type   Shortness of breath   Altered mental status, unspecified altered mental status type       8/8/2019   [facility], [place], EMERGENCY DEPARTMENT     Caty Murillo MD  08/08/19 9530

## 2019-08-08 NOTE — ED TRIAGE NOTES
Patient presents ambulatory with use of walker from assisted living facility with abnormal labs and shortness of breath. WBC 18.8 and hemoglobin 5.9.

## 2019-08-08 NOTE — TELEPHONE ENCOUNTER
Suzette Nelson Emily, BARRY   Phone Number: [phone]             Steven, calling from [facility], would like a call back to get new Orders for patient Fluoxetine 40 mg.     Thanks!        Writer received call from  staff stating Steven missed a phone call from someone in the clinic this morning, but there was no message. Appears BARRY Del Rosario was wanting to confirm fluoxetine dose. Will reach out to provider to see if she called Steven and to confirm daily dose of Prozac, as note is unsigned. Appears the dose was increased to 40 mg when reviewing the med tab.

## 2019-08-08 NOTE — ED NOTES
[facility], [place]   ED Nurse to Floor Handoff     Sharon aBer is a 88 year old male who speaks English and lives with others,  in an assisted living  They arrived in the ED by car from home    ED Chief Complaint: Abnormal Labs    ED Dx;   Final diagnoses:   Anemia, unspecified type   Gastrointestinal hemorrhage, unspecified gastrointestinal hemorrhage type   Shortness of breath   Altered mental status, unspecified altered mental status type         Needed?: No    Allergies:   Allergies   Allergen Reactions     Penicillins Rash and Other (See Comments)     He believes he had a positive skin test for PCN allergy at the time of a dog bite, about 1960.   .  Past Medical Hx:   Past Medical History:   Diagnosis Date     Autism spectrum disorder 7/16/2015     GERD (gastroesophageal reflux disease)      Gout      Hearing loss      Hyperlipemia     on atorvastatin     Hypertension      Hypotestosteronism      IGT (impaired glucose tolerance)      OCD (obsessive compulsive disorder)     on cymbalta     Osteoarthritis, hip, bilateral      Vitamin D deficiency     resolved 2/2013      Baseline Mental status: other autism?  Current Mental Status changes: other confused at times    Infection present or suspected this encounter: yes urinary and cultures pending  Sepsis suspected: No  Isolation type: No active isolations     Activity level - Baseline/Home:  Independent  Activity Level - Current:   Unable to Assess    Bariatric equipment needed?: No    In the ED these meds were given: Medications - No data to display    Drips running?  Yes    Home pump  No    Current LDAs  Peripheral IV 08/08/19 Right Lower forearm (Active)   Site Assessment WDL 8/8/2019  2:14 PM   Line Status Saline locked 8/8/2019  2:14 PM   Phlebitis Scale 0-->no symptoms 8/8/2019  2:14 PM   Number of days: 0       Labs results:   Labs Ordered and Resulted from Time of ED Arrival Up to the Time of Departure from  the ED   CBC WITH PLATELETS DIFFERENTIAL - Abnormal; Notable for the following components:       Result Value    RBC Count 3.05 (*)     Hemoglobin 5.4 (*)     Hematocrit 22.4 (*)     MCV 73 (*)     MCH 17.7 (*)     MCHC 24.1 (*)     RDW 19.2 (*)     Nucleated RBCs 1 (*)     Absolute Neutrophil 9.8 (*)     Absolute Lymphocytes 0.6 (*)     All other components within normal limits   COMPREHENSIVE METABOLIC PANEL - Abnormal; Notable for the following components:    Chloride 112 (*)     Carbon Dioxide 18 (*)     Glucose 173 (*)     Creatinine 1.44 (*)     GFR Estimate 43 (*)     GFR Estimate If Black 50 (*)     Calcium 8.4 (*)     Albumin 3.1 (*)     Protein Total 6.4 (*)     All other components within normal limits   INR   PARTIAL THROMBOPLASTIN TIME   TROPONIN I   UA MACROSCOPIC WITH REFLEX TO MICRO AND CULTURE   PERIPHERAL IV CATHETER   CARDIAC CONTINUOUS MONITORING   ABO/RH TYPE AND SCREEN   RED BLOOD CELL PREPARE ORDER UNIT   BLOOD COMPONENT   BLOOD COMPONENT       Imaging Studies:   Recent Results (from the past 24 hour(s))   XR Chest 2 Views    Narrative    Exam: XR CHEST 2 VW, 8/8/2019 3:06 PM    Indication: SOB    Comparison: Chest x-ray 11/17/2015    Findings:   PA and lateral views of the chest. Low lung volumes. Streaky left  basilar opacity. No significant pleural effusion or pneumothorax.  Cardiomediastinal silhouette is within normal limits. Upper abdomen is  unremarkable. No acute osseous abnormality.      Impression    Impression: Streaky left basilar opacity favors atelectasis.    I have personally reviewed the examination and initial interpretation  and I agree with the findings.    LINDSAY AG MD   CT Head w/o Contrast    Narrative    CT HEAD W/O CONTRAST 8/8/2019 3:17 PM    Provided History: Altered level of consciousness (LOC), unexplained    Comparison: Head CT 6/23/2015.    Technique: Using multidetector thin collimation helical acquisition  technique, axial, coronal and sagittal CT  "images from the skull base  to the vertex were obtained without intravenous contrast.     Findings:    No intracranial hemorrhage, mass effect, or midline shift. The  ventricles are proportionate to the cerebral sulci. Encephalomalacia  from old infarct in the right posterior frontal and parietal lobe.  Diffuse mild generalized cerebral parenchymal volume loss. Diffuse  hypoattenuation in bilateral periventricular white matter, likely  representing chronic small vessel ischemic disease. The basal cisterns  are patent.    The visualized paranasal sinuses are clear. The mastoid air cells are  clear.       Impression    Impression:   1. No acute intracranial pathology.  2. 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  3. Chronic small vessel ischemic disease and mild generalized cerebral  atrophy.    STEVEN STONE MD       Recent vital signs:   /63   Pulse 66   Temp 99.2  F (37.3  C)   Resp 20   Ht 1.651 m (5' 5\")   Wt 90.7 kg (200 lb)   SpO2 97%   BMI 33.28 kg/m      [place] Coma Scale Score: 15 (08/08/19 1414)       Cardiac Rhythm: Normal Sinus  Pt needs tele? No  Skin/wound Issues: None    Code Status: Full Code    Pain control: pt had none    Nausea control: pt had none    Abnormal labs/tests/findings requiring intervention: Hgb 5.4    Family present during ED course? Yes   Family Comments/Social Situation comments: niece POA    Tasks needing completion: 2nd unit of blood, UA/UC    Pt arrives with increased confusion and SOB. Reportedly recently diagnosed with UTI. HGB found to be low on outside labs. Hgb 5.4 in ED, 1st unit blood infusing. Answers orientation questions correctly, but is slow to respond. Hx possible stroke. [place], uses pocket talker.     Arabella Sosa RN  [phone] [facility]    "

## 2019-08-08 NOTE — LETTER
Health Information Management Services               Recipient:    [facility] Admissions      Sender:    KLARISSA De La Fuente, LGSW   5B   Pager [phone]  Phone [phone]        Date: August 16, 2019  Patient Name:  Sharon Baer  Routing Message:    discharge orders for Reilly Baer        The documents accompanying this notice contain confidential information belonging to the sender.  This information is intended only for the use of the individual or entity named above.  The authorized recipient of this information is prohibited from disclosing this information to any other party and is required to destroy the information after its stated need has been fulfilled, unless otherwise required by state law.      If you are not the intended recipient, you are hereby notified that any disclosure, copy, distribution or action taken in reliance on the contents of these documents is strictly prohibited.  If you have received this document in error, please return it by fax to [phone] with a note on the cover sheet explaining why you are returning it (e.g. not your patient, not your provider, etc.).  If you need further assistance, please call [facility] Centralized Transcription at [phone].  Documents may also be returned by mail to [employer], , [address], [place], [place] [address].

## 2019-08-09 ENCOUNTER — APPOINTMENT (OUTPATIENT)
Dept: PHYSICAL THERAPY | Facility: CLINIC | Age: 84
DRG: 375 | End: 2019-08-09
Attending: PHYSICIAN ASSISTANT
Payer: COMMERCIAL

## 2019-08-09 ENCOUNTER — APPOINTMENT (OUTPATIENT)
Dept: CARDIOLOGY | Facility: CLINIC | Age: 84
DRG: 375 | End: 2019-08-09
Attending: PEDIATRICS
Payer: COMMERCIAL

## 2019-08-09 ENCOUNTER — APPOINTMENT (OUTPATIENT)
Dept: OCCUPATIONAL THERAPY | Facility: CLINIC | Age: 84
DRG: 375 | End: 2019-08-09
Attending: PHYSICIAN ASSISTANT
Payer: COMMERCIAL

## 2019-08-09 LAB
ANION GAP SERPL CALCULATED.3IONS-SCNC: 7 MMOL/L (ref 3–14)
BUN SERPL-MCNC: 24 MG/DL (ref 7–30)
CALCIUM SERPL-MCNC: 8.4 MG/DL (ref 8.5–10.1)
CHLORIDE SERPL-SCNC: 115 MMOL/L (ref 94–109)
CO2 SERPL-SCNC: 22 MMOL/L (ref 20–32)
COPATH REPORT: NORMAL
CREAT SERPL-MCNC: 1.25 MG/DL (ref 0.66–1.25)
ERYTHROCYTE [DISTWIDTH] IN BLOOD BY AUTOMATED COUNT: 19.1 % (ref 10–15)
ERYTHROCYTE [DISTWIDTH] IN BLOOD BY AUTOMATED COUNT: 19.5 % (ref 10–15)
GFR SERPL CREATININE-BSD FRML MDRD: 51 ML/MIN/{1.73_M2}
GLUCOSE SERPL-MCNC: 87 MG/DL (ref 70–99)
HAPTOGLOB SERPL-MCNC: 175 MG/DL (ref 35–175)
HCT VFR BLD AUTO: 27.4 % (ref 40–53)
HCT VFR BLD AUTO: 28.1 % (ref 40–53)
HGB BLD-MCNC: 7.3 G/DL (ref 13.3–17.7)
HGB BLD-MCNC: 7.6 G/DL (ref 13.3–17.7)
MCH RBC QN AUTO: 20.6 PG (ref 26.5–33)
MCH RBC QN AUTO: 20.6 PG (ref 26.5–33)
MCHC RBC AUTO-ENTMCNC: 26.6 G/DL (ref 31.5–36.5)
MCHC RBC AUTO-ENTMCNC: 27 G/DL (ref 31.5–36.5)
MCV RBC AUTO: 76 FL (ref 78–100)
MCV RBC AUTO: 77 FL (ref 78–100)
PLATELET # BLD AUTO: 207 10E9/L (ref 150–450)
PLATELET # BLD AUTO: 235 10E9/L (ref 150–450)
POTASSIUM SERPL-SCNC: 4.2 MMOL/L (ref 3.4–5.3)
RBC # BLD AUTO: 3.54 10E12/L (ref 4.4–5.9)
RBC # BLD AUTO: 3.69 10E12/L (ref 4.4–5.9)
SODIUM SERPL-SCNC: 144 MMOL/L (ref 133–144)
TROPONIN I SERPL-MCNC: <0.015 UG/L (ref 0–0.04)
WBC # BLD AUTO: 9.3 10E9/L (ref 4–11)
WBC # BLD AUTO: 9.6 10E9/L (ref 4–11)

## 2019-08-09 PROCEDURE — 84484 ASSAY OF TROPONIN QUANT: CPT | Performed by: PEDIATRICS

## 2019-08-09 PROCEDURE — 12000001 ZZH R&B MED SURG/OB UMMC

## 2019-08-09 PROCEDURE — 36415 COLL VENOUS BLD VENIPUNCTURE: CPT | Performed by: PHYSICIAN ASSISTANT

## 2019-08-09 PROCEDURE — 25000128 H RX IP 250 OP 636: Performed by: PEDIATRICS

## 2019-08-09 PROCEDURE — 85027 COMPLETE CBC AUTOMATED: CPT | Performed by: PHYSICIAN ASSISTANT

## 2019-08-09 PROCEDURE — 25000132 ZZH RX MED GY IP 250 OP 250 PS 637: Performed by: INTERNAL MEDICINE

## 2019-08-09 PROCEDURE — 99233 SBSQ HOSP IP/OBS HIGH 50: CPT | Performed by: PEDIATRICS

## 2019-08-09 PROCEDURE — 40000264 ECHOCARDIOGRAM COMPLETE

## 2019-08-09 PROCEDURE — 93005 ELECTROCARDIOGRAM TRACING: CPT

## 2019-08-09 PROCEDURE — 97535 SELF CARE MNGMENT TRAINING: CPT | Mod: GO

## 2019-08-09 PROCEDURE — C9113 INJ PANTOPRAZOLE SODIUM, VIA: HCPCS | Performed by: PHYSICIAN ASSISTANT

## 2019-08-09 PROCEDURE — 25000132 ZZH RX MED GY IP 250 OP 250 PS 637: Performed by: PEDIATRICS

## 2019-08-09 PROCEDURE — 25500064 ZZH RX 255 OP 636: Performed by: INTERNAL MEDICINE

## 2019-08-09 PROCEDURE — 97161 PT EVAL LOW COMPLEX 20 MIN: CPT | Mod: GP

## 2019-08-09 PROCEDURE — 80048 BASIC METABOLIC PNL TOTAL CA: CPT | Performed by: PHYSICIAN ASSISTANT

## 2019-08-09 PROCEDURE — 97116 GAIT TRAINING THERAPY: CPT | Mod: GP

## 2019-08-09 PROCEDURE — 97530 THERAPEUTIC ACTIVITIES: CPT | Mod: GP

## 2019-08-09 PROCEDURE — 25800030 ZZH RX IP 258 OP 636: Performed by: PEDIATRICS

## 2019-08-09 PROCEDURE — 93306 TTE W/DOPPLER COMPLETE: CPT | Mod: 26 | Performed by: INTERNAL MEDICINE

## 2019-08-09 PROCEDURE — 97165 OT EVAL LOW COMPLEX 30 MIN: CPT | Mod: GO

## 2019-08-09 PROCEDURE — 40000873 ZZH CANCELLED SURGERY UP TO 15 MINS: Performed by: INTERNAL MEDICINE

## 2019-08-09 PROCEDURE — 25000128 H RX IP 250 OP 636: Performed by: PHYSICIAN ASSISTANT

## 2019-08-09 RX ORDER — BISACODYL 5 MG
10 TABLET, DELAYED RELEASE (ENTERIC COATED) ORAL ONCE
Status: DISCONTINUED | OUTPATIENT
Start: 2019-08-10 | End: 2019-08-14

## 2019-08-09 RX ORDER — FLUOXETINE 10 MG/1
30 CAPSULE ORAL DAILY
Status: DISCONTINUED | OUTPATIENT
Start: 2019-08-09 | End: 2019-08-16 | Stop reason: HOSPADM

## 2019-08-09 RX ORDER — METHYLPREDNISOLONE SODIUM SUCCINATE 125 MG/2ML
125 INJECTION, POWDER, LYOPHILIZED, FOR SOLUTION INTRAMUSCULAR; INTRAVENOUS
Status: DISCONTINUED | OUTPATIENT
Start: 2019-08-09 | End: 2019-08-14

## 2019-08-09 RX ORDER — HYDRALAZINE HYDROCHLORIDE 20 MG/ML
10 INJECTION INTRAMUSCULAR; INTRAVENOUS EVERY 6 HOURS PRN
Status: DISCONTINUED | OUTPATIENT
Start: 2019-08-09 | End: 2019-08-16 | Stop reason: HOSPADM

## 2019-08-09 RX ORDER — SODIUM CHLORIDE 9 MG/ML
INJECTION, SOLUTION INTRAVENOUS CONTINUOUS
Status: DISCONTINUED | OUTPATIENT
Start: 2019-08-09 | End: 2019-08-12

## 2019-08-09 RX ORDER — SIMETHICONE
LIQUID (ML) MISCELLANEOUS PRN
Status: DISCONTINUED | OUTPATIENT
Start: 2019-08-09 | End: 2019-08-09 | Stop reason: HOSPADM

## 2019-08-09 RX ORDER — POLYETHYLENE GLYCOL 3350 17 G/17G
17 POWDER, FOR SOLUTION ORAL 2 TIMES DAILY
Status: DISCONTINUED | OUTPATIENT
Start: 2019-08-09 | End: 2019-08-16 | Stop reason: HOSPADM

## 2019-08-09 RX ORDER — QUETIAPINE FUMARATE 25 MG/1
25 TABLET, FILM COATED ORAL
Status: DISCONTINUED | OUTPATIENT
Start: 2019-08-09 | End: 2019-08-16 | Stop reason: HOSPADM

## 2019-08-09 RX ORDER — BISACODYL 5 MG
10 TABLET, DELAYED RELEASE (ENTERIC COATED) ORAL ONCE
Status: DISCONTINUED | OUTPATIENT
Start: 2019-08-09 | End: 2019-08-14

## 2019-08-09 RX ORDER — DIPHENHYDRAMINE HYDROCHLORIDE 50 MG/ML
50 INJECTION INTRAMUSCULAR; INTRAVENOUS
Status: DISCONTINUED | OUTPATIENT
Start: 2019-08-09 | End: 2019-08-14

## 2019-08-09 RX ADMIN — SODIUM CHLORIDE: 9 INJECTION, SOLUTION INTRAVENOUS at 18:38

## 2019-08-09 RX ADMIN — FLUOXETINE 30 MG: 10 CAPSULE ORAL at 17:52

## 2019-08-09 RX ADMIN — IRON SUCROSE 200 MG: 20 INJECTION, SOLUTION INTRAVENOUS at 17:48

## 2019-08-09 RX ADMIN — QUETIAPINE FUMARATE 25 MG: 25 TABLET ORAL at 17:52

## 2019-08-09 RX ADMIN — HUMAN ALBUMIN MICROSPHERES AND PERFLUTREN 6 ML: 10; .22 INJECTION, SOLUTION INTRAVENOUS at 16:00

## 2019-08-09 RX ADMIN — PANTOPRAZOLE SODIUM 40 MG: 40 INJECTION, POWDER, FOR SOLUTION INTRAVENOUS at 05:58

## 2019-08-09 RX ADMIN — PANTOPRAZOLE SODIUM 40 MG: 40 INJECTION, POWDER, FOR SOLUTION INTRAVENOUS at 17:33

## 2019-08-09 ASSESSMENT — ACTIVITIES OF DAILY LIVING (ADL)
ADLS_ACUITY_SCORE: 23

## 2019-08-09 ASSESSMENT — MIFFLIN-ST. JEOR: SCORE: 1463.88

## 2019-08-09 NOTE — PHARMACY-ADMISSION MEDICATION HISTORY
Admission medication history interview status for the 8/8/2019 admission is complete. See Epic admission navigator for allergy information, pharmacy, prior to admission medications and immunization status.     Medication history interview sources:  med list from assisted living, niece of patient    Changes made to PTA medication list (reason)  Added:   Systane eye drops  Benzocaine throat lozenges  Deleted:   fluoxetine 40 mg (duplicate rx)  Changed:   Aspirin 81 mg --->325 mg  Prednisone 10 mg 1 tab --->2 tabs daily    Additional medication history information (including reliability of information, actions taken by pharmacist):  - Was unable to interview patient due to altered mental status. Of note, he does not manage his medications, set up by assisted living facility. Reviewed medication list sent over by assisted living facility and confirmed with patient's niece that the list sent over is accurate and what he is actually taking.   - Unable to determine usage of PRN meds based off of med list and niece did not know.     Prior to Admission medications    Medication Sig Last Dose Taking? Auth Provider   acetaminophen 500 MG CAPS Take 500 mg by mouth 2 times daily Past Week at Unknown time Yes Dyan Mathis APRN CNS   allopurinol (ZYLOPRIM) 100 MG tablet Take 1 tablet (100 mg) by mouth daily 8/8/2019 at 0715 Yes Neo Castano MD   aspirin (ASA) 325 MG EC tablet Take 325 mg by mouth daily  8/8/2019 at 0715 Yes Reported, Patient   atorvastatin (LIPITOR) 20 MG tablet Take 1 tablet (20 mg) by mouth daily 8/8/2019 at 0715 Yes Neo Castano MD   cholecalciferol (VITAMIN D) 1000 UNIT tablet Take 2 tablets (2,000 Units) by mouth daily 8/8/2019 at 0715 Yes Neo Castano MD   FLUoxetine (PROZAC) 10 MG capsule Take 10 mg by mouth daily Take with 20 mg tablet for a total of 30 mg daily. 8/8/2019 at 0715 Yes Reported, Patient   FLUoxetine (PROZAC) 20 MG capsule Take 20 mg by mouth  daily Take with 10 mg tablet for a total of 30 mg daily. 8/8/2019 at 0715 Yes Reported, Patient   metoprolol succinate ER (TOPROL-XL) 50 MG 24 hr tablet Take 50 mg by mouth daily  8/8/2019 at 0715 Yes Reported, Patient   omeprazole (PRILOSEC) 40 MG DR capsule Take 1 capsule (40 mg) by mouth daily 8/8/2019 at 0715 Yes Neo Castano MD   predniSONE (DELTASONE) 10 MG tablet Take 20 mg by mouth daily  8/8/2019 at 0715 Yes Reported, Patient   QUEtiapine (SEROQUEL) 25 MG tablet Take one tablet by mouth every evening with supper 8/8/2019 at 2000 Yes Dyan Mathis APRN CNS   benzocaine-menthol (CHLORASEPTIC) 6-10 MG lozenge Place 1 lozenge inside cheek every 2 hours as needed for moderate pain Unknown at Unknown time  Unknown, Entered By History   polyethylene glycol-propylene glycol (SYSTANE ULTRA) 0.4-0.3 % SOLN ophthalmic solution Place 1 drop into both eyes 2 times daily as needed for dry eyes Unknown at Unknown time  Unknown, Entered By History       Medication history completed by: Lynda Mendez  PharmD IV Student

## 2019-08-09 NOTE — PROGRESS NOTES
Care Coordinator Progress Note    Admission Date/Time:  8/8/2019  Attending MD:  Srinivas Fitch MD    Data  Chart reviewed, discussed with interdisciplinary team.   Patient was admitted for:    Anemia, unspecified type  Gastrointestinal hemorrhage, unspecified gastrointestinal hemorrhage type  Shortness of breath  Altered mental status, unspecified altered mental status type.      Concerns with insurance coverage for discharge needs: None.  Current Living Situation: Patient lives in an assisted living facility.  Support System: Supportive  Services Involved: Assisted Living  Transportation at Discharge: Family or friend will provide  Transportation to Medical Appointments:  - Name of caregiver: [facility] staff and neice  Barriers to Discharge: medical needs      Coordination of Care  [facility] staff manages patients medications. BARRY Harris reports  patient generally takes care of himself otherwise (independently dresses himself, brings himself down to meals, feeds himself etc).     [facility]:  [facility]  [facility]  Phone: [phone] (Steven  RN works Mon-Fri)  Fax: [phone]    They do not accept patient returns on the weekends   New medications to be prescribed to [facility]      POA:  BARRY Harris stated he sent copies of the POA paperwork with the ambulance drivers. POA is niece.    Plan  Anticipated Discharge Date:  Monday or after  Anticipated Discharge Plan:  [facility]      Torie Antonio RN, BSN, PHN  Medicine Care Coordinator  Desk Phone: [phone]  Pager: [phone]    To contact Weekend RNCC, dial [phone] and enter job code [phone] at prompt.   This pager can not be contacted by text page or outside line.

## 2019-08-09 NOTE — SUMMARY OF CARE
Pt arrived to  around 1630. Belongings include pocket talker, glasses, walker, shoes and clothing. Niece just arrived.

## 2019-08-09 NOTE — PLAN OF CARE
Discharge Planner PT 5B Evaluation  Patient plan for discharge: Assisted living facility   Current status: Pt encountered supine, agreeable to therapy. Pt requires assist for geovanny cares. Bed mobility of rolling IND, supine to sit Min Ax1, and scooting Min Ax1. Sit<>stand CGA up to 4WW with impulsivity to transfer without brakes locked. Ambulated CGA with 4WW with fast gait speed and unable to pathfind. Cognitive deficits noted and communicates with headset and microphone.   Barriers to return to prior living situation: medical status, fall risk, cognition  Recommendations for discharge: per mobility, Assisted living facility with HH PT/OT for safety evaluation vs TCU  Rationale for recommendations: Pt near baseline for mobility, however currently requires assist for self cares and ADLs, and impulsive during mobility impacting safety. Pt would benefit from IP PT services to address noted deficits and discharge to assisted living with HH PT/OT when medically stable for home safety evaluation and bADL assist. Cognitive status may impair Pt ability to safely return to assisted living facility, will defer to OT.        Entered by: Kerry Horn 08/09/2019 10:39 AM

## 2019-08-09 NOTE — PROGRESS NOTES
[facility], [place]    Medicine Progress Note - Hospitalist Service, Gold 8       Date of Admission:  8/8/2019  Assessment & Plan      Sharon Baer is a 88 year old male admitted on 8/8/2019. He has PMH of HTN, HLD, GERD, hearing loss, autism, afib, troponin elevations, and Gout who presents to the ED for evaluation of abnormal labs. Patient admitted to Medicine for further evaluation and care of Anemia. Found to have    # Acute/Chronic Blood Loss Anemia  # GIANLUCA:   Presented with ~ 2 month hx of progressive fatigue, dyspnea on exertion, pallor, and intermittent BRBPR. PTA labs revealing Hgb 5.6 (11.0- 4/29/19), MCV 73.  Is maintained on daily ASA  and takes prednisone for gout No reports of previous GIB. No Etoh, hx of PUD, H.pylori or ETOH use. BP stable. No tachycardia, though beta blocked. Concern for a/c GIB in setting of ASA and prednisone. S/p  2 U PRBC and hgb 7.6 8/9. Iron studies, Haptoglobin, LDH, peripheral smear-consistent with iron deficiency anemia.   - 2 Large Bore IV's  - Protonix IV BID  - GI consult placed. Please discuss with in am or sooner if with hemodynamic instability or with overt bleeding  - clears until MN for diet  - NPO w/meds after MN  - Tele  - Hold ASA  - hold prednisone  - IV iron daily x5 days  - Transfuse for Hgb less than 7.0 or with hemodynamically significant bleeding    # Afib  # Bradycardia Has happened in the past. Has also had afib with falls. He has had an elevated troponin in March/April at [facility], but no ST changes and no other EKG changes. Today he was bradycardic even after beta blocker has been held. The event today persisted longer than a standard vasovagal. He has not had afib for us.  No chest pain today with this episode.  ?sick sinus vs. Poor perfusion of SA node without infarction?  - EKG = sinus kathryn  - ECHO unremarkable  - electrolytes this AM unremarkable, repeat in AM  - telemetry overnight  - checking troponin and  trending  - will consider cardiology consult  - holding metoprolol    # Mild SAMEERA on CKD III: Cr 1.44 on admission. BL Cr appears ~ 1.14-1.30. UA negative. Improving   - Avoid hypotension, dehydration, nephrotoxic medications  - BMP in am   - Continue to closely monitor     # Protein Calorie Malnutrition: In setting of chronic illness. Albumin 3.1, Total protein 6.4.  - Nutrition consult placed    # HLD: PTA statin.Hold  # HTN: BP stable on admission. PTA metoprolol.- Hold  # Gout: PTA allopurinol- Hold  -  Prednisone - hold  # OCD: PTA fluoxetine and seroquel.   - restarting tonight  # GERD: No PTA medications        Diet: NPO per Anesthesia Guidelines for Procedure/Surgery Except for: Meds  Clear Liquid Diet    DVT Prophylaxis: VTE Prophylaxis contraindicated due to GI bleed  Wang Catheter: not present  Code Status: Full Code      Disposition Plan   Expected discharge: 2 - 3 days, recommended to uncertain if prior assisted living will be appropriate, will defer to OT once hemoglobin stable.  Entered: Lu Elliott MD 08/09/2019, 5:37 PM       The patient's care was discussed with the Patient and his family (Niece who is POA).    Lu Elliott MD  Hospitalist Service, [address], [place]  Pager: [phone]  Please see sticky note for cross cover information  ______________________________________________________________________    Interval History     Overnight he received a unit of packed red blood cells. He had no reaction to that.  He work with PT/OT. He has no complaints of pain or fatigue now.  Discussed with his niece that he is a little better than yesterday when his hemoglobin was very  Low, but that he is still fatigued.        Data reviewed today: I reviewed all medications, new labs and imaging results over the last 24 hours. I personally reviewed the EKG tracing showing normal sinus kathryn.    Physical Exam   Vital Signs: Temp: 96  F (35.6  C) Temp src: Oral  BP: 92/68 Pulse: 65 Heart Rate: 56 Resp: 16 SpO2: 97 % O2 Device: None (Room air)    Weight: 190 lbs 14.69 oz  Constitutional: Elderly male with Headphone w/speaker used for communication. Niece at bedside. Pallor. His up and walking with OT.    Head: Normocephalic and atraumatic.   Eyes: Conjunctivae are normal. Pupils are equal, round, and reactive to light.  Pharynx has no erythema or exudate, mucous membranes are moist  Cardiovascular: Regular rate and rhythm without murmurs or gallops  Pulmonary/Chest: CTAB. No respiratory distress.  GI: Soft with good bowel sounds.  Non-tender, non-distended, with no guarding, no rebound, no peritoneal signs.   Back:  No bony or CVA tenderness   Musculoskeletal:  No edema or clubbing   Skin: Skin is warm and dry. No rash noted to exposed skin areas .  Neurological: Alert and oriented to person  Psychiatric: Deficits in memory apparent        Data   Recent Labs   Lab 08/09/19  0548 08/09/19  0050 08/08/19  1414   WBC 9.3 9.6 10.7   HGB 7.6* 7.3* 5.4*   MCV 76* 77* 73*    235 297   INR  --   --  1.07     --  142   POTASSIUM 4.2  --  4.3   CHLORIDE 115*  --  112*   CO2 22  --  18*   BUN 24  --  30   CR 1.25  --  1.44*   ANIONGAP 7  --  11   UMANG 8.4*  --  8.4*   GLC 87  --  173*   ALBUMIN  --   --  3.1*   PROTTOTAL  --   --  6.4*   BILITOTAL  --   --  0.3   ALKPHOS  --   --  62   ALT  --   --  11   AST  --   --  9   TROPI  --   --  <0.015

## 2019-08-09 NOTE — PROGRESS NOTES
08/09/19 1000   Quick Adds   Type of Visit Initial Occupational Therapy Evaluation   Living Environment   Lives With facility resident   Living Arrangements assisted living   Home Accessibility wheelchair accessible   Transportation Anticipated agency;family or friend will provide  (niece available to A as need be)   Living Environment Comment Pt lives alone in an [facility] and receives A only with med management.   Self-Care   Usual Activity Tolerance good   Current Activity Tolerance fair   Regular Exercise No   Equipment Currently Used at Home grab bar, toilet;grab bar, tub/shower;walker, rolling;shower chair   Activity/Exercise/Self-Care Comment Previously IND with self-cares. Difficulty stating if he had a walk-in shower vs tub shower or a shower chair with niece providing clarification that pt has a walk-in shower and shower chair.   Functional Level   Ambulation 1-->assistive equipment   Transferring 1-->assistive equipment   Toileting 1-->assistive equipment  (grab bars as needed)   Bathing 1-->assistive equipment   Dressing 0-->independent   Eating 0-->independent   Communication 2-->difficulty speaking (not related to language barrier)  (Lime and pocket talker)   Swallowing 0-->swallows foods/liquids without difficulty   Cognition 2 - difficulty with organizing thoughts  (Per the past couple of months pt with increased cog difficul)   Fall history within last six months yes   Number of times patient has fallen within last six months 3   Which of the above functional risks had a recent onset or change? cognition;communication/speech;dressing;toileting;bathing;transferring;fall history   General Information   Onset of Illness/Injury or Date of Surgery - Date 08/08/19   Referring Physician Karo Bond PA-C   Additional Occupational Profile Info/Pertinent History of Current Problem Sharon Baer is a 88 year old male admitted on 8/8/2019. He has PMH of HTN, HLD, GERD, hearing loss, and Gout who  presents to the ED for evaluation of abnormal labs. Patient admitted to Medicine for further evaluation and care of Anemia.    Precautions/Limitations fall precautions   General Info Comments Up with A   Cognitive Status Examination   Orientation person;place   Level of Consciousness alert;confused   Follows Commands (Cognition) follows one step commands;75-90% accuracy;delayed response/completion;increased processing time needed;physical/tactile prompts required;repetition of directions required;verbal cues/prompting required   Organization/Problem Solving Problem solving impaired;Sequencing impaired   Executive Function Initiation impaired;Impulsive;Working memory impaired, decreased storage of information for performing tasks;Cognitive flexibility impaired;Planning ability impaired;Self awareness/monitoring impaired   Cognitive Comment Pt was a poor historian at times regarding prior living arrangement. Lack of awareness at times to safety and difficulty problem solving. Will need to further assess.   Visual Perception   Visual Perception Wears glasses   Sensory Examination   Sensory Quick Adds No deficits were identified   Pain Assessment   Patient Currently in Pain No   Range of Motion (ROM)   ROM Comment WFL   Strength   Strength Comments WFL   Transfer Skill: Sit to Stand   Level of Anthony: Sit/Stand contact guard   Transfer Skill: Toilet Transfer   Level of Anthony: Toilet contact guard   Lower Body Dressing   Level of Anthony: Dress Lower Body minimum assist (75% patients effort)   Physical Assist/Nonphysical Assist: Dress Lower Body verbal cues;1 person assist   Grooming   Level of Anthony: Grooming contact guard   Physical Assist/Nonphysical Assist: Grooming verbal cues   Instrumental Activities of Daily Living (IADL)   IADL Comments [facility] A with med management   Activities of Daily Living Analysis   Impairments Contributing to Impaired Activities of Daily Living cognition  "impaired;balance impaired   General Therapy Interventions   Planned Therapy Interventions ADL retraining;cognition;strengthening;transfer training   Clinical Impression   Criteria for Skilled Therapeutic Interventions Met yes, treatment indicated   OT Diagnosis Decreased IND and safety awareness to perform ADLs   Influenced by the following impairments cognition, balance   Assessment of Occupational Performance 3-5 Performance Deficits   Identified Performance Deficits dressing, bathing, toileting, transfers, g/h   Clinical Decision Making (Complexity) Low complexity   Therapy Frequency Daily   Predicted Duration of Therapy Intervention (days/wks) 1 week   Anticipated Discharge Disposition Long Term Care Facility   Risks and Benefits of Treatment have been explained. Yes   Patient, Family & other staff in agreement with plan of care Yes   Clinical Impression Comments Pt would benefit from continued skilled OT to increase safety and progress IND with ADLs   [facility] AM-PAC  \"6 Clicks\" Daily Activity Inpatient Short Form   1. Putting on and taking off regular lower body clothing? 3 - A Little   2. Bathing (including washing, rinsing, drying)? 3 - A Little   3. Toileting, which includes using toilet, bedpan or urinal? 3 - A Little   4. Putting on and taking off regular upper body clothing? 3 - A Little   5. Taking care of personal grooming such as brushing teeth? 3 - A Little   6. Eating meals? 4 - None   Daily Activity Raw Score (Score out of 24.Lower scores equate to lower levels of function) 19   Total Evaluation Time   Total Evaluation Time (Minutes) 5     "

## 2019-08-09 NOTE — PROGRESS NOTES
08/09/19 0954   Quick Adds   Type of Visit Initial PT Evaluation       Present no   Language English   Living Environment   Lives With facility resident   Living Arrangements assisted living   Home Accessibility wheelchair accessible   Transportation Anticipated agency;family or friend will provide  (Niece is able to provide transportation)   Living Environment Comment Pt lives alone in an assisted living facility with assist for medications. No stairs required to access unit. Niece and assist living facility able to provide transportation. PT/OT services available at living facility.    Self-Care   Usual Activity Tolerance good   Current Activity Tolerance moderate   Regular Exercise No   Equipment Currently Used at Home grab bar, toilet;grab bar, tub/shower;raised toilet;walker, rolling;wheelchair, manual;shower chair   Activity/Exercise/Self-Care Comment Pt reports previously able to perform self cares IND and walk around assisted living facility IND with 4WW. Pt has AE located in bathroom and has 4WW.    Functional Level Prior   Ambulation 1-->assistive equipment   Transferring 1-->assistive equipment   Toileting 1-->assistive equipment   Bathing 1-->assistive equipment   Communication 2-->difficulty understanding and speaking (not related to language barrier)  (Uses headset, simple answers to questions)   Swallowing 0-->swallows foods/liquids without difficulty   Cognition 2 - difficulty with organizing thoughts   Fall history within last six months yes   Number of times patient has fallen within last six months 3   Which of the above functional risks had a recent onset or change? cognition;communication/speech   Prior Functional Level Comment Pt previously used AE for mobility and ADLs. Pt has difficulty hearing and responding to questions, uses headset with microphone. Hx of falls, with 3 in past 6 months.   General Information   Onset of Illness/Injury or Date of Surgery - Date  08/09/19   Referring Physician Karo Bond PA-C   Patient/Family Goals Statement Improve strength   Pertinent History of Current Problem (include personal factors and/or comorbidities that impact the POC) Sharon Baer is a 88 year old male with a past medical history of surgery on his mastoids/being hard of hearing who presents with SOB and reported Hgb 5.9.  Per family member in the room, the outpatient labs were done because the patient had been increasingly confused recently, prompting evaluation for urinary tract infection.  The labs found a white blood cell count of 18 and hemoglobin of 5.9, so the patient was instructed to come to the emergency department for further evaluation.  The patient denies any fevers or chills.  He denies any cough or chest pain.  He does report shortness of breath, particularly with exertion, which his family member (niece, Rosemarie, his DPOA) at bedside has noticed as well.  The patient denies any history of heart disease, however family member at bedside does think that the patient may have had a history of stroke, as he did experience an abrupt decline in his mental functioning recently.  No focal deficits have been noted.  The patient's takes medication for depression as well.  The patient's only other surgical history includes tonsillectomy.  The patient is allergic to penicillin.  The patient denies being on any blood thinning medications.  He has not noted any GI bleeding or had any injuries recently.  Family member at bedside has noticed the patient seems more pale than usual recently.   Precautions/Limitations fall precautions   Weight-Bearing Status - LUE full weight-bearing   Weight-Bearing Status - RUE full weight-bearing   Weight-Bearing Status - LLE full weight-bearing   Weight-Bearing Status - RLE full weight-bearing   General Observations Headset with microphone   General Info Comments Activity order: up with assist   Cognitive Status Examination    Orientation person   Level of Consciousness alert   Follows Commands and Answers Questions 75% of the time   Personal Safety and Judgment impulsive   Memory impaired   Cognitive Comment Pt alert and oriented to his name. Difficulty answering questions, typically using simple 1-2 word responses. 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    Pain Assessment   Patient Currently in Pain No   Integumentary/Edema   Integumentary/Edema no deficits were identifed   Posture    Posture Not impaired   Range of Motion (ROM)   ROM Comment ROM WFL evidenced by functional mobility    Strength   Strength Comments MMT >3+/5 evidenced by functional mobility.    Bed Mobility   Bed Mobility Comments Previously IND bed mobility. Currently CGA rolling, Min Ax1 supine to sit.    Transfer Skills   Transfer Comments Previously Mod I transfers up to 4WW. Currently CGA sit<>stand up to 4WW, difficulty coordinating brakes.    Gait   Gait Comments Previously Mod I with 4WW. Currently CGA with 4WW with fast gait speed.    Balance   Balance Comments Current Pt impulsivity increases fall risk.    Sensory Examination   Sensory Perception no deficits were identified   Coordination   Coordination no deficits were identified   Muscle Tone   Muscle Tone no deficits were identified   General Therapy Interventions   Planned Therapy Interventions gait training;cognition;neuromuscular re-education;strengthening;home program guidelines;progressive activity/exercise   Clinical Impression   Criteria for Skilled Therapeutic Intervention yes, treatment indicated   PT Diagnosis Impaired functional mobility   Influenced by the following impairments Impaired cognition, impulsivity, weakness, balance deficit   Functional limitations due to impairments safe ambulation, accurate communication, Mod I mobility and transfers   Clinical  "Presentation Stable/Uncomplicated   Clinical Presentation Rationale Clinical reasoning and evaluation   Clinical Decision Making (Complexity) Low complexity   Therapy Frequency 5x/week   Predicted Duration of Therapy Intervention (days/wks) 1 week   Anticipated Discharge Disposition Other (see comments)  (Assisted living)   Risk & Benefits of therapy have been explained Yes   Patient, Family & other staff in agreement with plan of care Yes   Clinical Impression Comments Pt previously IND or Mod I with all self-cares and mobility with 4WW. Currently Pt has difficulty communicating and safely ambulating due to deficits in cognition, impuslivity, strength, and balance.   [facility] AM-PAC TM \"6 Clicks\"   2016, Trustees of [facility], under license to [employer].  All rights reserved.   6 Clicks Short Forms Basic Mobility Inpatient Short Form   [facility]-PAC  \"6 Clicks\" V.2 Basic Mobility Inpatient Short Form   1. Turning from your back to your side while in a flat bed without using bedrails? 4 - None   2. Moving from lying on your back to sitting on the side of a flat bed without using bedrails? 3 - A Little   3. Moving to and from a bed to a chair (including a wheelchair)? 4 - None   4. Standing up from a chair using your arms (e.g., wheelchair, or bedside chair)? 4 - None   5. To walk in hospital room? 4 - None   6. Climbing 3-5 steps with a railing? 3 - A Little   Basic Mobility Raw Score (Score out of 24.Lower scores equate to lower levels of function) 22   Total Evaluation Time   Total Evaluation Time (Minutes) 12     "

## 2019-08-09 NOTE — PLAN OF CARE
Patient has been npo all day, went down to have a egd however his heartrate low and test cancelled. Per patient does not feel dizzy or any different at all. Patient sat up in the chair all morning. Patient uses walker to walk and does well. No bowel movement today. Patient very hard of hearing will answer his question based on what he thinks you said using the pocket talker works well with him. Gold 8 meredith notified. That med rec is completed.

## 2019-08-09 NOTE — CONSULTS
GASTROENTEROLOGY CONSULTATION      Date of Admission: 8/8/2019       Date of Consult: 8/9/19          Chief Complaint:   We were asked by Karo Bond PA-C to evaluate this patient with severe anemia in the setting of possible GI bleed.          ASSESSMENT AND RECOMMENDATIONS:   Assessment:  Sharon Baer is a 88 year old male with a history of autism spectrum disorder, hypertension, hyperlipidemia, osteoarthritis, gout, hypotestostrone, obsessive compulsive disorder, depression, hx of suicidal ideation, hearing loss, inguinal hernia repair, suprapubic prostatectomy, GERD and now presenting with symptomatic anemia and hgb of 5.6.    #Acute on chronic anemia   #Iron deficiency   #Proximal esophageal dysphagia   #Bright red blood per rectum  #NSAID use  Patient admitted with 2 month hx of progressive fatigue, dyspnea on exertion, pallor and intermittent bright red blood per rectum. He also reports solid and dry food getting stuck in proximal esophagus. Baseline hgb 12.6 to 15.2, on admission hgb was 5.6, s/p x2 units of RBCs with appropriate response as hgb went up to 7.6. Microcytic anemia noted, MCV 76 and RDW 19.1. Ferritin was 16 on 4/29/19, and repeat iron studies from 8/8/19 was low as well (iron 10, saturation index 3, and binding capacity 334).  No EGD report found in care everywhere but the pathology result from 2010 indicated reflux esophagitis. He admits taking 2-3 tablets of Ibuprofen or Advil 2-3 times/month, and he is on daily 325mg of Aspirin .     Patient has multiple risk that can cause GI bleed, NSAID use and history of reflux esophagitis combined with dysphagia concerns for upper GI bleed s/t esophagitis, gastritis, and PUD. Given his age, history of tobacco use and comorbidities put him at risk of angiodysplasia and malignancy.  Hemorrhoidal bleed cannot be ruled out either. Glagow-blatchford score is at 10, scores ?6 are associated with >50% risk of needing endoscopic intervention. And  the original plan was to proceed with EGD today and possible colonoscopy tomorrow, but patient developed sinus bradycardia. Discussed with Dr. Elliott to evaluate the new sinus bradycardia before sedation.      Recommendations  --Further evaluation of new sinus bradycardia   --Clear liquid (clear boost and no red/purple beverages)  --If all cardiac evaluation including EKG, echo, labs and telemetry appear uneventful tomorrow, plan is to start 4 liters of Golytely on 8/10/19  --Continue IV PPI twice daily  --Avoid NSAIDs  --Monitor CBC and transfuse if hgb is less than 7  --Maintain 2 large bore IVs  --Accurate documentation of output  --Continue with Miralax BID  --GI will follow    Gastroenterology follow up recommendations: TBD    Patient care plan discussed with Dr. Potts, GI staff physician. Thank you for involving us in this patient's care. Please do not hesitate to contact the GI service with any questions or concerns.     Kiara Mei CNP  Department of Gastroenterology   -------------------------------------------------------------------------------------------------------------------   History is obtained from the patient and the medical record.          History of Present Illness:   Sharon Baer is a 88 year old male with a history of autism spectrum disorder, hypertension, hyperlipidemia, osteoarthritis, gout, hypotestostrone, obsessive compulsive disorder, depression, hx of suicidal ideation, hearing loss, inguinal hernia repair, suprapubic prostatectomy, GERD and now presenting with symptomatic anemia and hgb of 5.6.    Patient reports having 2 month hx of progressive fatigue, dyspnea on exertion, pallor and intermittent bright red blood per rectum. He used to have constipation on and off but recently stool has been soft. Red blood with brown stool is unpredictable, and patient is not sure if mixed with stool or covered with stool. All he noticed is red blood in the toilet bowl that  occurs in every couple of day. No abdominal pian, nausea, vomiting, pain with swallowing, vomiting blood, or black stools. He also reports solid and dry food getting stuck in proximal esophagus. He admits taking 2-3 tablets of Ibuprofen or Advil 2-3 times/month. He takes 1 pill daily for heartburn that he has been on for many years (record indicate 40mg of Omeprazole daily). Patient remembers having an EGD in the past but no colonoscopy previously. He denies weight loss. He has history of tobacco use but stopped many years ago. He only drinks beer or wine occasionally, maybe 3-5 times/year. No family hx of colorectal cancer. Sister some other cancer that he cannot remember.         Past Medical History:   Reviewed and edited as appropriate  Past Medical History:   Diagnosis Date     Autism spectrum disorder 7/16/2015     GERD (gastroesophageal reflux disease)      Gout      Hearing loss      Hyperlipemia     on atorvastatin     Hypertension      Hypotestosteronism      IGT (impaired glucose tolerance)      OCD (obsessive compulsive disorder)     on cymbalta     Osteoarthritis, hip, bilateral      Vitamin D deficiency     resolved 2/2013            Past Surgical History:   Reviewed and edited as appropriate   Past Surgical History:   Procedure Laterality Date     bilateral cataract surgery       bilateral ear surgery       HERNIA REPAIR, INGUINAL RT/LT       SUPRAPUBIC PROSTATECTOMY              Previous Endoscopy:   No results found for this or any previous visit.         Social History:   Reviewed and edited as appropriate  Social History     Socioeconomic History     Marital status: Single     Spouse name: Not on file     Number of children: Not on file     Years of education: Not on file     Highest education level: Not on file   Occupational History     Occupation: retired   Social Needs     Financial resource strain: Not on file     Food insecurity:     Worry: Not on file     Inability: Not on file      Transportation needs:     Medical: Not on file     Non-medical: Not on file   Tobacco Use     Smoking status: Former Smoker     Packs/day: 1.00     Years: 20.00     Pack years: 20.00     Smokeless tobacco: Never Used   Substance and Sexual Activity     Alcohol use: No     Drug use: No     Sexual activity: Not on file   Lifestyle     Physical activity:     Days per week: Not on file     Minutes per session: Not on file     Stress: Not on file   Relationships     Social connections:     Talks on phone: Not on file     Gets together: Not on file     Attends [employer] service: Not on file     Active member of club or organization: Not on file     Attends meetings of clubs or organizations: Not on file     Relationship status: Not on file     Intimate partner violence:     Fear of current or ex partner: Not on file     Emotionally abused: Not on file     Physically abused: Not on file     Forced sexual activity: Not on file   Other Topics Concern      Service Not Asked     Blood Transfusions Not Asked     Caffeine Concern Not Asked     Occupational Exposure Not Asked     Hobby Hazards Not Asked     Sleep Concern Not Asked     Stress Concern Not Asked     Weight Concern Not Asked     Special Diet No     Back Care Not Asked     Exercise No     Bike Helmet Not Asked     Seat Belt Not Asked     Self-Exams Not Asked   Social History Narrative     Not on file            Family History:   Reviewed and edited as appropriate  Family History   Problem Relation Age of Onset     Cancer Sister         skin     Myocardial Infarction Maternal Uncle 60           Allergies:   Reviewed and edited as appropriate     Allergies   Allergen Reactions     Penicillins Rash and Other (See Comments)     He believes he had a positive skin test for PCN allergy at the time of a dog bite, about 1960.            Medications:     Medications Prior to Admission   Medication Sig Dispense Refill Last Dose     acetaminophen 500 MG CAPS Take 500 mg  "by mouth 2 times daily   Not Taking     allopurinol (ZYLOPRIM) 100 MG tablet Take 1 tablet (100 mg) by mouth daily 100 tablet 3 Unknown     aspirin 81 MG tablet Take 81 mg by mouth daily   Taking     atorvastatin (LIPITOR) 20 MG tablet Take 1 tablet (20 mg) by mouth daily 100 tablet 3 Taking     cholecalciferol (VITAMIN D) 1000 UNIT tablet Take 2 tablets (2,000 Units) by mouth daily 100 tablet 3 Taking     FLUoxetine (PROZAC) 10 MG capsule         FLUoxetine (PROZAC) 20 MG capsule         FLUoxetine (PROZAC) 40 MG capsule Take 1 capsule (40 mg) by mouth daily 90 capsule 2      metoprolol succinate ER (TOPROL-XL) 50 MG 24 hr tablet         omeprazole (PRILOSEC) 40 MG DR capsule Take 1 capsule (40 mg) by mouth daily 90 capsule 3 Unknown     predniSONE (DELTASONE) 10 MG tablet         QUEtiapine (SEROQUEL) 25 MG tablet Take one tablet by mouth every evening with supper 90 tablet 2              Review of Systems:   A complete review of systems was performed and is negative except as noted in the HPI           Physical Exam:   BP (!) 143/58 (BP Location: Left arm)   Pulse 65   Temp 95.1  F (35.1  C) (Axillary)   Resp 18   Ht 1.651 m (5' 5\")   Wt 86.6 kg (190 lb 14.7 oz)   SpO2 100%   BMI 31.77 kg/m    Wt:   Wt Readings from Last 2 Encounters:   08/08/19 86.6 kg (190 lb 14.7 oz)   05/24/19 87.1 kg (192 lb)      Constitutional: Significantly hard of hearing. Patient is cooperative, pleasant, not dyspneic/diaphoretic, no acute distress  Eyes: Sclera anicteric/injected  Ears/nose/mouth/throat: Normal oropharynx without ulcers or exudate, mucus membranes moist, hearing intact  Neck: supple, thyroid normal size  CV: RRR. No edema in LE bilaterally  Respiratory: Unlabored breathing. Diminished lower lung lobes bilaterally.  Lymph: No axillary, submandibular, supraclavicular or inguinal lymphadenopathy  Abd: Obese, +bs, no hepatosplenomegaly, nontender, no peritoneal signs  Skin: warm, perfused, no jaundice  Neuro: AAO x " 3, No asterixis  Psych: Normal affect  MSK: No deformity noted          Data:   Labs and imaging below were independently reviewed and interpreted    BMP  Recent Labs   Lab 08/09/19  0548 08/08/19  1414    142   POTASSIUM 4.2 4.3   CHLORIDE 115* 112*   UMANG 8.4* 8.4*   CO2 22 18*   BUN 24 30   CR 1.25 1.44*   GLC 87 173*     CBC  Recent Labs   Lab 08/09/19  0548 08/09/19  0050 08/08/19  1414   WBC 9.3 9.6 10.7   RBC 3.69* 3.54* 3.05*   HGB 7.6* 7.3* 5.4*   HCT 28.1* 27.4* 22.4*   MCV 76* 77* 73*   MCH 20.6* 20.6* 17.7*   MCHC 27.0* 26.6* 24.1*   RDW 19.1* 19.5* 19.2*    235 297     INR  Recent Labs   Lab 08/08/19  1414   INR 1.07     LFTs  Recent Labs   Lab 08/08/19  1414   ALKPHOS 62   AST 9   ALT 11   BILITOTAL 0.3   PROTTOTAL 6.4*   ALBUMIN 3.1*      PANCNo lab results found in last 7 days.

## 2019-08-09 NOTE — PLAN OF CARE
Discharge Planner OT   Patient plan for discharge: Unable to state. Per raymond, pt will return to half-way with increased support or will discharge to a new facility who can provide increased support  Current status: OT caryl completed, tx initiated. Min A for LB dressing with VC throughout and increased effort by pt, SBA for standing g/h with VC for pt to turn off sink, difficulty problem solving throughout session, CGA for toilet transfer, VC throughout session to use 4WW brakes, use of pocket talker 2/2 to pt hearing loss  Barriers to return to prior living situation: cognition, falls and safety risk  Recommendations for discharge: Return to [facility] with increased cares for ADLs or discharge to new facility who can provide increased support for ADLs.   Rationale for recommendations: Per raymond, pt half-way was only providing A with med management. This date, pt is displaying difficulty with problem solving, initiation, and memory and will require increased A with ADLs upon discharge for safety. Will plan for cog screen tomorrow with pt most recent score in 2015 of 23/30 on the MoCA       Entered by: Candice Troncoso 08/09/2019 11:24 AM

## 2019-08-09 NOTE — OR NURSING
Pt to endoscopy for an EGD for GI bleed. Heart rate was 48-49 bpm when he arrived. Md's consulted, EKG was completed. Procedure was cancelled for safety reasons. Report was called to Pts floor nurse. Return to PCU

## 2019-08-09 NOTE — PLAN OF CARE
Pt alert but disoriented to time and situation. Pt arrived from ED. Pt has a GI bleed. Pt has autism and is [place]. Pt has personal pocket talker. Pt is up A1 with walker. 1U of RBCs given on unit. Plan for colonoscopy tomorrow. NPO at midnight. Pt has order for 2 large bore IVs. Pt took out 1 IV in ED. coband in place. Will continue to monitor and follow POC.

## 2019-08-10 ENCOUNTER — APPOINTMENT (OUTPATIENT)
Dept: PHYSICAL THERAPY | Facility: CLINIC | Age: 84
DRG: 375 | End: 2019-08-10
Payer: COMMERCIAL

## 2019-08-10 ENCOUNTER — APPOINTMENT (OUTPATIENT)
Dept: OCCUPATIONAL THERAPY | Facility: CLINIC | Age: 84
DRG: 375 | End: 2019-08-10
Payer: COMMERCIAL

## 2019-08-10 LAB
ANION GAP SERPL CALCULATED.3IONS-SCNC: 9 MMOL/L (ref 3–14)
BUN SERPL-MCNC: 15 MG/DL (ref 7–30)
CALCIUM SERPL-MCNC: 8.6 MG/DL (ref 8.5–10.1)
CHLORIDE SERPL-SCNC: 113 MMOL/L (ref 94–109)
CO2 SERPL-SCNC: 21 MMOL/L (ref 20–32)
CORTIS SERPL-MCNC: 16.3 UG/DL (ref 4–22)
CREAT SERPL-MCNC: 1.1 MG/DL (ref 0.66–1.25)
ERYTHROCYTE [DISTWIDTH] IN BLOOD BY AUTOMATED COUNT: 21 % (ref 10–15)
GFR SERPL CREATININE-BSD FRML MDRD: 60 ML/MIN/{1.73_M2}
GLUCOSE SERPL-MCNC: 100 MG/DL (ref 70–99)
HCT VFR BLD AUTO: 32.8 % (ref 40–53)
HGB BLD-MCNC: 8.5 G/DL (ref 13.3–17.7)
MCH RBC QN AUTO: 20.1 PG (ref 26.5–33)
MCHC RBC AUTO-ENTMCNC: 25.9 G/DL (ref 31.5–36.5)
MCV RBC AUTO: 78 FL (ref 78–100)
PLATELET # BLD AUTO: 257 10E9/L (ref 150–450)
POTASSIUM SERPL-SCNC: 4.1 MMOL/L (ref 3.4–5.3)
RBC # BLD AUTO: 4.22 10E12/L (ref 4.4–5.9)
SODIUM SERPL-SCNC: 142 MMOL/L (ref 133–144)
TROPONIN I SERPL-MCNC: 0.02 UG/L (ref 0–0.04)
WBC # BLD AUTO: 9.4 10E9/L (ref 4–11)

## 2019-08-10 PROCEDURE — 93010 ELECTROCARDIOGRAM REPORT: CPT | Performed by: INTERNAL MEDICINE

## 2019-08-10 PROCEDURE — 25000132 ZZH RX MED GY IP 250 OP 250 PS 637: Performed by: NURSE PRACTITIONER

## 2019-08-10 PROCEDURE — 80048 BASIC METABOLIC PNL TOTAL CA: CPT | Performed by: PEDIATRICS

## 2019-08-10 PROCEDURE — 84484 ASSAY OF TROPONIN QUANT: CPT | Performed by: PEDIATRICS

## 2019-08-10 PROCEDURE — 93005 ELECTROCARDIOGRAM TRACING: CPT

## 2019-08-10 PROCEDURE — 97535 SELF CARE MNGMENT TRAINING: CPT | Mod: GO

## 2019-08-10 PROCEDURE — 25000125 ZZHC RX 250: Performed by: INTERNAL MEDICINE

## 2019-08-10 PROCEDURE — 36415 COLL VENOUS BLD VENIPUNCTURE: CPT | Performed by: PEDIATRICS

## 2019-08-10 PROCEDURE — C9113 INJ PANTOPRAZOLE SODIUM, VIA: HCPCS | Performed by: PHYSICIAN ASSISTANT

## 2019-08-10 PROCEDURE — 25800030 ZZH RX IP 258 OP 636: Performed by: PEDIATRICS

## 2019-08-10 PROCEDURE — 40000556 ZZH STATISTIC PERIPHERAL IV START W US GUIDANCE

## 2019-08-10 PROCEDURE — 82533 TOTAL CORTISOL: CPT | Performed by: PEDIATRICS

## 2019-08-10 PROCEDURE — 12000001 ZZH R&B MED SURG/OB UMMC

## 2019-08-10 PROCEDURE — 85027 COMPLETE CBC AUTOMATED: CPT | Performed by: PEDIATRICS

## 2019-08-10 PROCEDURE — 97112 NEUROMUSCULAR REEDUCATION: CPT | Mod: GP

## 2019-08-10 PROCEDURE — 97530 THERAPEUTIC ACTIVITIES: CPT | Mod: GP

## 2019-08-10 PROCEDURE — 25000132 ZZH RX MED GY IP 250 OP 250 PS 637: Performed by: PEDIATRICS

## 2019-08-10 PROCEDURE — 25000128 H RX IP 250 OP 636: Performed by: PHYSICIAN ASSISTANT

## 2019-08-10 PROCEDURE — 25000128 H RX IP 250 OP 636: Performed by: PEDIATRICS

## 2019-08-10 PROCEDURE — 99233 SBSQ HOSP IP/OBS HIGH 50: CPT | Performed by: PEDIATRICS

## 2019-08-10 RX ORDER — BISACODYL 5 MG
10 TABLET, DELAYED RELEASE (ENTERIC COATED) ORAL DAILY
Status: COMPLETED | OUTPATIENT
Start: 2019-08-10 | End: 2019-08-11

## 2019-08-10 RX ORDER — AMLODIPINE BESYLATE 5 MG/1
5 TABLET ORAL DAILY
Status: DISCONTINUED | OUTPATIENT
Start: 2019-08-10 | End: 2019-08-16 | Stop reason: HOSPADM

## 2019-08-10 RX ORDER — METOPROLOL TARTRATE 1 MG/ML
5 INJECTION, SOLUTION INTRAVENOUS ONCE
Status: COMPLETED | OUTPATIENT
Start: 2019-08-10 | End: 2019-08-10

## 2019-08-10 RX ORDER — METOPROLOL TARTRATE 25 MG/1
25 TABLET, FILM COATED ORAL ONCE
Status: COMPLETED | OUTPATIENT
Start: 2019-08-10 | End: 2019-08-10

## 2019-08-10 RX ORDER — METOPROLOL SUCCINATE 50 MG/1
50 TABLET, EXTENDED RELEASE ORAL DAILY
Status: DISCONTINUED | OUTPATIENT
Start: 2019-08-11 | End: 2019-08-11

## 2019-08-10 RX ADMIN — METOPROLOL TARTRATE 25 MG: 25 TABLET ORAL at 23:12

## 2019-08-10 RX ADMIN — PANTOPRAZOLE SODIUM 40 MG: 40 INJECTION, POWDER, FOR SOLUTION INTRAVENOUS at 16:53

## 2019-08-10 RX ADMIN — FLUOXETINE 30 MG: 10 CAPSULE ORAL at 09:24

## 2019-08-10 RX ADMIN — METOPROLOL TARTRATE 5 MG: 5 INJECTION INTRAVENOUS at 02:58

## 2019-08-10 RX ADMIN — POLYETHYLENE GLYCOL 3350, SODIUM SULFATE ANHYDROUS, SODIUM BICARBONATE, SODIUM CHLORIDE, POTASSIUM CHLORIDE 2000 ML: 236; 22.74; 6.74; 5.86; 2.97 POWDER, FOR SOLUTION ORAL at 17:39

## 2019-08-10 RX ADMIN — AMLODIPINE BESYLATE 5 MG: 5 TABLET ORAL at 16:53

## 2019-08-10 RX ADMIN — QUETIAPINE FUMARATE 25 MG: 25 TABLET ORAL at 20:15

## 2019-08-10 RX ADMIN — POLYETHYLENE GLYCOL 3350 17 G: 17 POWDER, FOR SOLUTION ORAL at 20:15

## 2019-08-10 RX ADMIN — HYDRALAZINE HYDROCHLORIDE 10 MG: 20 INJECTION INTRAMUSCULAR; INTRAVENOUS at 02:18

## 2019-08-10 RX ADMIN — SODIUM CHLORIDE: 9 INJECTION, SOLUTION INTRAVENOUS at 09:30

## 2019-08-10 RX ADMIN — IRON SUCROSE 200 MG: 20 INJECTION, SOLUTION INTRAVENOUS at 16:53

## 2019-08-10 RX ADMIN — BISACODYL 10 MG: 5 TABLET, COATED ORAL at 15:27

## 2019-08-10 RX ADMIN — PANTOPRAZOLE SODIUM 40 MG: 40 INJECTION, POWDER, FOR SOLUTION INTRAVENOUS at 04:53

## 2019-08-10 ASSESSMENT — ACTIVITIES OF DAILY LIVING (ADL)
ADLS_ACUITY_SCORE: 23

## 2019-08-10 ASSESSMENT — MIFFLIN-ST. JEOR: SCORE: 1447.88

## 2019-08-10 NOTE — PROGRESS NOTES
[facility] [place]    Medicine Progress Note - Hospitalist Service, Gold 8       Date of Admission:  8/8/2019  Assessment & Plan      Sharon Baer is a 88 year old male admitted on 8/8/2019. He has PMH of HTN, HLD, GERD, hearing loss, autism, afib, troponin elevations, and Gout who present anemia and GIANLUCA with likely GI bleed and mass/hemorroid/prolapse on exam.  HGb responded well to 1U pRBC and is stable today without evidence of bleed.        # Acute/Chronic Blood Loss Anemia  # GIANLUCA:   Presented with ~ 2 month hx of progressive fatigue, dyspnea on exertion, pallor, and intermittent BRBPR. PTA labs revealing Hgb 5.6 (11.0- 4/29/19), MCV 73.  Is maintained on daily ASA  and takes prednisone for gout No reports of previous GIB. No Etoh, hx of PUD, H.pylori or ETOH use. BP stable. No tachycardia, though beta blocked. Concern for a/c GIB in setting of ASA and prednisone. S/p  2 U PRBC and hgb 7.6 8/9. Iron studies, Haptoglobin, LDH, peripheral smear-consistent with iron deficiency anemia.   - 2 Large Bore IV's  - Protonix IV BID  - GI consult placed  - plan for colonoscopy and EGD on Monday  - clears until MN on Sunday night   - NPO w/meds after MN on Sunday night  - Hold ASA  - hold prednisone  - IV iron daily x5 days (8/9 - 8/14)  - Transfuse for Hgb less than 7.0 or with hemodynamically significant bleeding    # Afib with h/o RVR  # Bradycardia   # HTN:   Has happened in the past. Has also had afib with falls. He has had an [place] [facility] changes and no other EKG changes. Today he was bradycardic even after beta blocker has been held. The event today persisted longer than a standard vasovagal. He has not had afib for us.  No chest pain today with this episode.  ?sick sinus vs. Poor perfusion of SA node without infarction?  EKG = sinus kathryn. ECHO unremarkable. Electrolytes unremarkable. Troponins normal.   - telemetry through colonoscopy  prep  - BMP daily while on colonoscopy prep with heart issues  - holding metoprolol  - starting amlodipine 5 mg    # Mild SAMEERA on CKD III: Cr 1.44 on admission. BL Cr appears ~ 1.14-1.30. UA negative. Improving   - Avoid hypotension, dehydration, nephrotoxic medications  - BMP daily while on colonoscopy prep with heart issues  - Continue to closely monitor     # Protein Calorie Malnutrition: In setting of chronic illness. Albumin 3.1, Total protein 6.4.  - Nutrition consult placed    # HLD: PTA statin.Hold    # Gout: PTA allopurinol- Hold  -  Prednisone - hold  # OCD: PTA fluoxetine and seroquel.   - restarting tonight  # GERD: No PTA medications        Diet: Clear Liquid Diet  NPO per Anesthesia Guidelines for Procedure/Surgery Except for: Meds    DVT Prophylaxis: VTE Prophylaxis contraindicated due to GI bleed  Wang Catheter: not present  Code Status: Full Code      Disposition Plan   Expected discharge: Tuesday? recommended to uncertain if prior assisted living will be appropriate, will defer to OT once hemoglobin stable.  Entered: Lu Elliott MD 08/10/2019, 3:39 PM       The patient's care was discussed with the Patient and his family (Niece who is POA).    Lu Elliott MD  Hospitalist Service, [address], [place]  Pager: [phone]  Please see sticky note for cross cover information  ______________________________________________________________________    Interval History     Has been NPO and without evidence of bleeding today.  Is happy to start to eat clears.  Working with PT/OT.       Data reviewed today: I reviewed all medications, new labs and imaging results over the last 24 hours. I personally reviewed the EKG tracing showing normal sinus kathryn.    Physical Exam   Vital Signs: Temp: 97.9  F (36.6  C) Temp src: Axillary BP: (!) 174/82 Pulse: 67 Heart Rate: 77 Resp: 18 SpO2: 99 % O2 Device: None (Room air)    Weight: 191 lbs 2.22 oz  Constitutional:  Elderly male with Headphone w/speaker used for communication. Niece at bedside. Pallor. His up and walking with OT.    Head: Normocephalic and atraumatic.   Eyes: Conjunctivae are normal. Pupils are equal, round, and reactive to light.  Pharynx has no erythema or exudate, mucous membranes are moist  Cardiovascular: Regular rate and rhythm without murmurs or gallops  Pulmonary/Chest: CTAB. No respiratory distress.  GI: Soft with good bowel sounds.  Non-tender, non-distended, with no guarding, no rebound, no peritoneal signs.   Back:  No bony or CVA tenderness   Musculoskeletal:  No edema or clubbing   Skin: Skin is warm and dry. No rash noted to exposed skin areas .  Neurological: Alert and oriented to person  Psychiatric: Deficits in memory apparent        Data   Recent Labs   Lab 08/10/19  1133 08/10/19  0037 08/09/19  1925 08/09/19  0548 08/09/19  0050 08/08/19  1414   WBC 9.4  --   --  9.3 9.6 10.7   HGB 8.5*  --   --  7.6* 7.3* 5.4*   MCV 78  --   --  76* 77* 73*     --   --  207 235 297   INR  --   --   --   --   --  1.07     --   --  144  --  142   POTASSIUM 4.1  --   --  4.2  --  4.3   CHLORIDE 113*  --   --  115*  --  112*   CO2 21  --   --  22  --  18*   BUN 15  --   --  24  --  30   CR 1.10  --   --  1.25  --  1.44*   ANIONGAP 9  --   --  7  --  11   UMANG 8.6  --   --  8.4*  --  8.4*   *  --   --  87  --  173*   ALBUMIN  --   --   --   --   --  3.1*   PROTTOTAL  --   --   --   --   --  6.4*   BILITOTAL  --   --   --   --   --  0.3   ALKPHOS  --   --   --   --   --  62   ALT  --   --   --   --   --  11   AST  --   --   --   --   --  9   TROPI  --  0.016 <0.015  --   --  <0.015

## 2019-08-10 NOTE — PLAN OF CARE
Shift:   VS: Temp: 97.5  F (36.4  C) Temp src: Oral BP: (!) 152/63   Heart Rate: 55 Resp: 18 SpO2: 97 % O2 Device: None (Room air)    Pain: Denies pain/nausea  Neuro: Alert but disoriented to time and situation. Pt is very hard of hearing. Bed alarm on for safety  Cardiac:   Tele sinus bradycardiac with HR in low 50s. Intermittent HTN, has prn hydralazine.  Respiratory: Tolerating RA. Denies SOB  GI/Diet/Appetite: On clear diet. Pt to remain NPO after midnight  :  Adequate UO  LDA's: PIV with NS at 75cc/hr.  Skin: No new deficit noted  Activity: Up with Ax1 and walker  Tests/Procedures:   Pertinent Labs/Lab Collection:      Plan: Continue with cares and update MD with any changes.

## 2019-08-10 NOTE — PLAN OF CARE
/82, Heart rate 50s this am but 70s the rest of the day.  Patient is alert and oriented to self. Patient's niece here this afternoon and she feels that patient is not processing information very well.  Pocket talker used for all conversations with patient.  He denies pain.  He gets up with assist of one and uses a walker.  He has been NPO all day for possible EGD.    Plan to treat elevated BP with PRN medication and keep patient informed of plans.  Bed alarm on for safety.

## 2019-08-10 NOTE — PLAN OF CARE
Discharge Planner OT   Patient plan for discharge: Did not state  Current status:     scored 7/30 on MoCA 8.1 indicating a severe cog deficit, Scored 0/5 visuospatial/executive, 3/3 naming, 1/6 attention, 0/3 language, 0/2 abstraction, 0/5 delayed recall, 3/6 orientation. Most recent MoCA in 2015 with score of 23/30.      Increased time required throughout and use of pocket talker (although pt stated he could hear th through pocket talker it is unsure if difficulty on MoCA is correlated some to pt being [place]), sig difficulty on all sections of the MoCA outside of naming and answering half of the orientation questions correctly, niece in agreement that pt will require a higher level of care upon discharge.     Barriers to return to prior living situation: cognition, medical status, falls risk  Recommendations for discharge:  Return to long-term with increased cares for ADLs or discharge to new facility who can provide increased support for ADLs  Rationale for recommendations: Pt will require increased support upon discharge with ADL/IADLs and functional mobility 2/2 to cognition       Entered by: Candice Troncoso 08/10/2019 2:50 PM

## 2019-08-10 NOTE — PLAN OF CARE
"/71 (BP Location: Left arm)   Pulse 67   Temp 97.5  F (36.4  C) (Oral)   Resp 18   Ht 1.651 m (5' 5\")   Wt 86.7 kg (191 lb 2.2 oz)   SpO2 96%   BMI 31.81 kg/m      Reason for admission: Acute Anemia.  Hx of hearing loss, HTN, HLD, GERD, autism, A-fib, elevated Trop, gout.  Vitals: Hypertensive.  Pt given PRN hydralazine.  Team paged.  One time dose of metoprolol given.  Activity: Up with A1 and walker.  Pain: Denies.  Neuro: Disoriented to time and situation.  Hearing loss makes communication difficult.  Cardiac: NSR  Respiratory: WDL.  GI/: Voiding spontaneously.  Diet: NPO at 0000   Lines: PIV x 3  Skin/Wounds: WDL.  Plan: EGD today.      Continue to monitor and follow POC    Matias Tolbert RN on 8/10/2019 at 6:40 AM    "

## 2019-08-10 NOTE — PLAN OF CARE
Discharge Planner PT   Patient plan for discharge: Patient unable to state  Current status: Patient agreeable to PT session. IND for bed mobility. Discussed regularly throughout session about locking 4WW breaks prior to transfers, poor retention of information. Patient ambulated ~400' with 4WW, SBA, given VCs for directions and path finding with great difficulty. Responds best to concise instructions. Participated in standing balance activities, tolerated well, showing some instability.Sitting in chair at end of session.   Barriers to return to prior living situation: Medical, increased need for assistance, safety with mobility related to ability to navigate environment and increased instability.  Recommendations for discharge: Return to [facility] with increased cares for ADLs or discharge to new facility who can provide increased support for ADLs.   Rationale for recommendations: To improve safety with functional mobility and ADLs       Entered by: Brit Aponte 08/10/2019 12:49 PM

## 2019-08-11 LAB
ANION GAP SERPL CALCULATED.3IONS-SCNC: 10 MMOL/L (ref 3–14)
BUN SERPL-MCNC: 14 MG/DL (ref 7–30)
CALCIUM SERPL-MCNC: 8 MG/DL (ref 8.5–10.1)
CHLORIDE SERPL-SCNC: 114 MMOL/L (ref 94–109)
CO2 SERPL-SCNC: 20 MMOL/L (ref 20–32)
CREAT SERPL-MCNC: 1.13 MG/DL (ref 0.66–1.25)
ERYTHROCYTE [DISTWIDTH] IN BLOOD BY AUTOMATED COUNT: 21.2 % (ref 10–15)
FOLATE SERPL-MCNC: 29.5 NG/ML
GFR SERPL CREATININE-BSD FRML MDRD: 58 ML/MIN/{1.73_M2}
GLUCOSE SERPL-MCNC: 104 MG/DL (ref 70–99)
HCT VFR BLD AUTO: 29.7 % (ref 40–53)
HGB BLD-MCNC: 7.8 G/DL (ref 13.3–17.7)
HGB BLD-MCNC: 8.4 G/DL (ref 13.3–17.7)
MAGNESIUM SERPL-MCNC: 2.2 MG/DL (ref 1.6–2.3)
MCH RBC QN AUTO: 20.9 PG (ref 26.5–33)
MCHC RBC AUTO-ENTMCNC: 26.3 G/DL (ref 31.5–36.5)
MCV RBC AUTO: 80 FL (ref 78–100)
PHOSPHATE SERPL-MCNC: 3.4 MG/DL (ref 2.5–4.5)
PLATELET # BLD AUTO: 203 10E9/L (ref 150–450)
POTASSIUM SERPL-SCNC: 3.8 MMOL/L (ref 3.4–5.3)
RBC # BLD AUTO: 3.73 10E12/L (ref 4.4–5.9)
SODIUM SERPL-SCNC: 144 MMOL/L (ref 133–144)
VIT B12 SERPL-MCNC: 528 PG/ML (ref 193–986)
WBC # BLD AUTO: 7.5 10E9/L (ref 4–11)

## 2019-08-11 PROCEDURE — 83735 ASSAY OF MAGNESIUM: CPT | Performed by: PHYSICIAN ASSISTANT

## 2019-08-11 PROCEDURE — 82607 VITAMIN B-12: CPT | Performed by: PHYSICIAN ASSISTANT

## 2019-08-11 PROCEDURE — 84100 ASSAY OF PHOSPHORUS: CPT | Performed by: PHYSICIAN ASSISTANT

## 2019-08-11 PROCEDURE — 25000128 H RX IP 250 OP 636: Performed by: PHYSICIAN ASSISTANT

## 2019-08-11 PROCEDURE — 25000128 H RX IP 250 OP 636: Performed by: PEDIATRICS

## 2019-08-11 PROCEDURE — 85018 HEMOGLOBIN: CPT | Performed by: PEDIATRICS

## 2019-08-11 PROCEDURE — 36415 COLL VENOUS BLD VENIPUNCTURE: CPT | Performed by: PHYSICIAN ASSISTANT

## 2019-08-11 PROCEDURE — 12000001 ZZH R&B MED SURG/OB UMMC

## 2019-08-11 PROCEDURE — 25000132 ZZH RX MED GY IP 250 OP 250 PS 637: Performed by: INTERNAL MEDICINE

## 2019-08-11 PROCEDURE — 40000556 ZZH STATISTIC PERIPHERAL IV START W US GUIDANCE

## 2019-08-11 PROCEDURE — 80048 BASIC METABOLIC PNL TOTAL CA: CPT | Performed by: PHYSICIAN ASSISTANT

## 2019-08-11 PROCEDURE — C9113 INJ PANTOPRAZOLE SODIUM, VIA: HCPCS | Performed by: PHYSICIAN ASSISTANT

## 2019-08-11 PROCEDURE — 99233 SBSQ HOSP IP/OBS HIGH 50: CPT | Performed by: PEDIATRICS

## 2019-08-11 PROCEDURE — 25000132 ZZH RX MED GY IP 250 OP 250 PS 637: Performed by: PEDIATRICS

## 2019-08-11 PROCEDURE — 82746 ASSAY OF FOLIC ACID SERUM: CPT | Performed by: PHYSICIAN ASSISTANT

## 2019-08-11 PROCEDURE — 25000132 ZZH RX MED GY IP 250 OP 250 PS 637: Performed by: NURSE PRACTITIONER

## 2019-08-11 PROCEDURE — 99222 1ST HOSP IP/OBS MODERATE 55: CPT | Mod: GC | Performed by: INTERNAL MEDICINE

## 2019-08-11 PROCEDURE — 85027 COMPLETE CBC AUTOMATED: CPT | Performed by: PHYSICIAN ASSISTANT

## 2019-08-11 PROCEDURE — 25800030 ZZH RX IP 258 OP 636: Performed by: PEDIATRICS

## 2019-08-11 RX ADMIN — QUETIAPINE FUMARATE 25 MG: 25 TABLET ORAL at 17:04

## 2019-08-11 RX ADMIN — AMLODIPINE BESYLATE 5 MG: 5 TABLET ORAL at 08:54

## 2019-08-11 RX ADMIN — FLUOXETINE 30 MG: 10 CAPSULE ORAL at 08:53

## 2019-08-11 RX ADMIN — DRONEDARONE 400 MG: 400 TABLET, FILM COATED ORAL at 17:04

## 2019-08-11 RX ADMIN — BISACODYL 10 MG: 5 TABLET, COATED ORAL at 08:54

## 2019-08-11 RX ADMIN — Medication 12.5 MG: at 09:08

## 2019-08-11 RX ADMIN — IRON SUCROSE 200 MG: 20 INJECTION, SOLUTION INTRAVENOUS at 17:04

## 2019-08-11 RX ADMIN — PANTOPRAZOLE SODIUM 40 MG: 40 INJECTION, POWDER, FOR SOLUTION INTRAVENOUS at 05:53

## 2019-08-11 RX ADMIN — POLYETHYLENE GLYCOL 3350 17 G: 17 POWDER, FOR SOLUTION ORAL at 08:54

## 2019-08-11 RX ADMIN — PANTOPRAZOLE SODIUM 40 MG: 40 INJECTION, POWDER, FOR SOLUTION INTRAVENOUS at 17:04

## 2019-08-11 RX ADMIN — SODIUM CHLORIDE: 9 INJECTION, SOLUTION INTRAVENOUS at 19:52

## 2019-08-11 RX ADMIN — POLYETHYLENE GLYCOL 3350, SODIUM SULFATE ANHYDROUS, SODIUM BICARBONATE, SODIUM CHLORIDE, POTASSIUM CHLORIDE 2000 ML: 236; 22.74; 6.74; 5.86; 2.97 POWDER, FOR SOLUTION ORAL at 08:55

## 2019-08-11 ASSESSMENT — ACTIVITIES OF DAILY LIVING (ADL)
ADLS_ACUITY_SCORE: 23

## 2019-08-11 ASSESSMENT — MIFFLIN-ST. JEOR: SCORE: 1434.67

## 2019-08-11 NOTE — CONSULTS
Electrophysiology Consultation Note   EP Attending:   Reason for consultation: Manage AF with sinus bradycardia periprocedurally  Provider requesting consultation: Dr. Lu Kirk Medicine Attending  Date of Service: 8/11/2019      HPI:   87yo m with autism and memory loss lives in assisted living and developed increased confusion. Found with Hgb 5.7 and admitted. Is to undergo EGD and colonoscopy Monday.  Has h/o PAF. He thinks he has felt palpitations at times, but nothing severe. He doesn't believe he's ever fainted. He has no pain or SOB right now. He has been on metoprolol for this and for HTN as an outpt. Given low BP, metoprolol was held at admission. HR was 70s at admission.  Last night, VS found a high heart rate with irregular rhythm. ECG found Afib/flutter with V rate 110s-130s. He didn't feel differently then. Later, VS found rhythm had again become regular and HR 50bpm. He felt unchanged and well. ECG found SB to 48bpm. He's had an echo while here.    Past Medical History:   Past Medical History:   Diagnosis Date     Autism spectrum disorder 7/16/2015     GERD (gastroesophageal reflux disease)      Gout      Hearing loss      Hyperlipemia     on atorvastatin     Hypertension      Hypotestosteronism      IGT (impaired glucose tolerance)      OCD (obsessive compulsive disorder)     on cymbalta     Osteoarthritis, hip, bilateral      Vitamin D deficiency     resolved 2/2013     Past Surgical History:   Past Surgical History:   Procedure Laterality Date     bilateral cataract surgery       bilateral ear surgery       HERNIA REPAIR, INGUINAL RT/LT       SUPRAPUBIC PROSTATECTOMY       Allergies: Per MAR     Allergies   Allergen Reactions     Penicillins Rash and Other (See Comments)     He believes he had a positive skin test for PCN allergy at the time of a dog bite, about 1960.     Medications:   Per MAR current outpatient cardiovascular medications include:   Medications Prior to  Admission   Medication Sig Dispense Refill Last Dose     acetaminophen 500 MG CAPS Take 500 mg by mouth 2 times daily   Past Week at Unknown time     allopurinol (ZYLOPRIM) 100 MG tablet Take 1 tablet (100 mg) by mouth daily 100 tablet 3 8/8/2019 at 0715     aspirin (ASA) 325 MG EC tablet Take 325 mg by mouth daily    8/8/2019 at 0715     atorvastatin (LIPITOR) 20 MG tablet Take 1 tablet (20 mg) by mouth daily 100 tablet 3 8/8/2019 at 0715     cholecalciferol (VITAMIN D) 1000 UNIT tablet Take 2 tablets (2,000 Units) by mouth daily 100 tablet 3 8/8/2019 at 0715     FLUoxetine (PROZAC) 10 MG capsule Take 10 mg by mouth daily Take with 20 mg tablet for a total of 30 mg daily.   8/8/2019 at 0715     FLUoxetine (PROZAC) 20 MG capsule Take 20 mg by mouth daily Take with 10 mg tablet for a total of 30 mg daily.   8/8/2019 at 0715     metoprolol succinate ER (TOPROL-XL) 50 MG 24 hr tablet Take 50 mg by mouth daily    8/8/2019 at 0715     omeprazole (PRILOSEC) 40 MG DR capsule Take 1 capsule (40 mg) by mouth daily 90 capsule 3 8/8/2019 at 0715     predniSONE (DELTASONE) 10 MG tablet Take 20 mg by mouth daily    8/8/2019 at 0715     QUEtiapine (SEROQUEL) 25 MG tablet Take one tablet by mouth every evening with supper 90 tablet 2 8/8/2019 at 2000     benzocaine-menthol (CHLORASEPTIC) 6-10 MG lozenge Place 1 lozenge inside cheek every 2 hours as needed for moderate pain   Unknown at Unknown time     polyethylene glycol-propylene glycol (SYSTANE ULTRA) 0.4-0.3 % SOLN ophthalmic solution Place 1 drop into both eyes 2 times daily as needed for dry eyes   Unknown at Unknown time     No current outpatient medications on file.     Current Facility-Administered Medications   Medication Dose Route Frequency     amLODIPine  5 mg Oral Daily     bisacodyl  10 mg Oral Once     bisacodyl  10 mg Oral Once     FLUoxetine  30 mg Oral Daily     iron sucrose (VENOFER) intermittent infusion  200 mg Intravenous Q24H     metoprolol tartrate  12.5  "mg Oral BID     pantoprazole (PROTONIX) IV  40 mg Intravenous Q12H     polyethylene glycol  2,000 mL Oral Daily     polyethylene glycol  17 g Oral BID     QUEtiapine  25 mg Oral Q24H     sodium chloride (PF)  10 mL Intravenous Once     sodium chloride (PF)  3 mL Intracatheter Q8H     Family History:   Family History   Problem Relation Age of Onset     Cancer Sister         skin     Myocardial Infarction Maternal Uncle 60     Social History:   Social History     Tobacco Use     Smoking status: Former Smoker     Packs/day: 1.00     Years: 20.00     Pack years: 20.00     Smokeless tobacco: Never Used   Substance Use Topics     Alcohol use: No       ROS:   Hard of hearing.  No notice of blood in stools or black tarry stools.  A comprehensive 10 point ROS was negative other than as mentioned in HPI.    Physical Examination:   VITALS: /56 (BP Location: Right arm)   Pulse (!) 49   Temp 96  F (35.6  C) (Oral)   Resp 16   Ht 1.651 m (5' 5\")   Wt 85.1 kg (187 lb 9.8 oz)   SpO2 99%   BMI 31.22 kg/m     MOUTH: Denture upper MP II  GENERAL APPEARANCE: AxO, NAD, pleasant  HEENT: NCAT, EOMI, MMM.   NECK: Supple. No JVD or bruit. Good carotid upstroke.   CHEST: CTAB   CARDIOVASCULAR: S1S2, Reg with slightly decreased ro, No m/r/g.   ABDOMEN: BS+, soft, No pulsatile masses or bruits.   EXTREMITIES: No pedal edema. Distal pulses intact.   NEURO: Grossly nonfocal.   PSYCH: Normal affect.  SKIN: Warm and dry.   Data:   Labs:  BMP  Recent Labs   Lab 08/11/19  0453 08/10/19  1133 08/09/19  0548 08/08/19  1414    142 144 142   POTASSIUM 3.8 4.1 4.2 4.3   CHLORIDE 114* 113* 115* 112*   UMANG 8.0* 8.6 8.4* 8.4*   CO2 20 21 22 18*   BUN 14 15 24 30   CR 1.13 1.10 1.25 1.44*   * 100* 87 173*     CBC  Recent Labs   Lab 08/11/19  0453 08/10/19  1133 08/09/19  0548 08/09/19  0050   WBC 7.5 9.4 9.3 9.6   RBC 3.73* 4.22* 3.69* 3.54*   HGB 7.8* 8.5* 7.6* 7.3*   HCT 29.7* 32.8* 28.1* 27.4*   MCV 80 78 76* 77*   MCH 20.9* " 20.1* 20.6* 20.6*   MCHC 26.3* 25.9* 27.0* 26.6*   RDW 21.2* 21.0* 19.1* 19.5*    257 207 235     INR  Recent Labs   Lab 08/08/19  1414   INR 1.07     No results found for: CKTOTAL, CKMB, TROPN  Cholesterol (mg/dL)   Date Value   08/22/2016 110   06/07/2016 137   05/30/2015 168     Cholesterol/HDL Ratio (no units)   Date Value   05/30/2015 4.4     HDL Cholesterol (mg/dL)   Date Value   08/22/2016 40   06/07/2016 39 (L)   05/30/2015 38 (L)     LDL Cholesterol Calculated (mg/dL)   Date Value   08/22/2016 38   06/07/2016 41   05/30/2015 84     ECG 8/10/19 AF/flutter rates   ECG 8/9/19 SB 50  QRS 98  not corrected    ECHO 8/9/19  LV normal EF 55-60% no significant valve disease    Assessment:   89yo m with sinus bradycardia and paroxysmal rapid Afib. Thankfully few sx. His beta blocker hasn't helped his rate in Afib, and his sinus rate is low normal, no not likely to tolerate more Bbl. Ok to stop metoprolol given this. As his LV is normal with nl EF, could use a rhythm med without sinus bradycardia. Options include dofetilide and dronederone. Will choose latter given availability here: dronederone 400mg BID.  We'll see him during the week to develop his outpt plan.  Not a candidate for anticoagulation right now given severe, sx anemia of unclear cause.  I disc'd with pt and his niece and her .  Developed plan with Dr Holley.    EP Recommendations:  -Stop metoprolol  -Start dronederone 400mg BID  -No anticoagulation  -We'll follow    The patient states understanding and is agreeable with plan.   Thank you for allowing us to participate in the care of this patient.     The patient was discussed w/ Dr. Holley.  The above note reflects our joint plan.    Guerita Kennedy MD  EP Fellow    I very much appreciated the opportunity to see and assess Mr Sharon Baer in the hospital with CV Fellow Dr Kennedy and discussed with Dr Elliott.     I agree with the note above which summarizes my findings  and current recommendations.  Please do not hesitate to contact my office if you have any questions or concerns.      Syed Holley MD  Cardiac Arrhythmia Service  [facility]  [phone]

## 2019-08-11 NOTE — PROGRESS NOTES
Medicine cross cover note -    Called re: tachycardia.  EKG obtained and shows atrial flutter.  Per prior discussion with day team will restart oral metoprolol.  If HR remains > 120 an hour after receiving oral metoprolol please page cross cover at #[phone] for consideration of IV metoprolol.

## 2019-08-11 NOTE — PLAN OF CARE
Afebrile.  /56, heart rate 49,  MD aware of heart rate.  Telemetry sinus kathryn.  Patient denies shortness of breath or feeling lightheaded.   He has been drinking golytely all day.  He has had several very watery stools.  Last stool was noted to be watery/yellow.  He denies pain.  He is very [place] and uses pocket talker for communication. He has been cooperative and engaged today.  Family visiting today.  Cardiology consult done.  Plan to continue with golytely until finished.  Bed and chair alarm on for safety.

## 2019-08-11 NOTE — CONSULTS
[facility]   Cardiology Consult Note  Date of Service: 8/11/2019    Patient: Sharon Baer  MRN: [id number]  Admission Date: 8/8/2019  Hospital Day # 3          ASSESSMENT:   88M h/o autism, Afib not on anticoagulation, gout and mood disorder p/w fatigue and AGUILA. Found to have acute blood loss anemia. Awaiting EGD/colonoscopy. From a CAD standpoint, pt is intermediate to low risk - he does not have a h/o CAD and TTE this admission was generally unremarkable. He is also undergoing a relatively low risk procedure. Thus it is unlikely that a ischemic w/u will better optimize pt for his GI procedure. In regards to pt's labile HR and Afib, we defer to our EP colleagues for optimization.     RECOMMEND:  1. No additional ischemic workup recommended presently  2. Recommend Cardiac EP consult for Afib    Patient was seen and discussed with attending physician, Dr. Andres Varela MD.     Matias Shea MD, PhD  Cardiology Fellow  Click to text page [phone]    -------------------------------------------------------------------------------------------------------------------------------------------------  REASON FOR CONSULT:  pre-procedure risk evaluation    History of Present Illness   Sharon Baer is a 88 year old male with a history of autism, gout, Afib not on anticoagulation presented with AGUILA. Pt was a difficult historian, thus history obtained primarily from chart review. Pt had endorsed AGUILA and intermittent BRBPR to his family (primarily niece LILIAN [place]). Denied chest pain or cough. Pt's family also noted he had become more confused lately. During the w/u of his confusion, pt was found to have a UTI, but also acute anemia with a Hgb of 5.7. This prompted pt's visit to [facility] ED and his subsequent admission to Medicine. GI was consulted for evaluation of pt's acute anemia. Pt's PTA Toprol XL 50mg qday was held initially due to his symptomatic anemia. This resulted in pt developing  Afib with RVR. After lopressor 12.5mg BID was restarted, pt then developed bradycardia. Due to concerns of pt's bradycardia and sedation for EGD/colonoscopy, cardiology was consulted for assistance with pre-procedure risk evaluation.     Past Medical History    I have reviewed this patient's medical history and updated it with pertinent information if needed.   Past Medical History:   Diagnosis Date     Autism spectrum disorder 7/16/2015     GERD (gastroesophageal reflux disease)      Gout      Hearing loss      Hyperlipemia     on atorvastatin     Hypertension      Hypotestosteronism      IGT (impaired glucose tolerance)      OCD (obsessive compulsive disorder)     on cymbalta     Osteoarthritis, hip, bilateral      Vitamin D deficiency     resolved 2/2013       Past Surgical History   I have reviewed this patient's surgical history and updated it with pertinent information if needed.  Past Surgical History:   Procedure Laterality Date     bilateral cataract surgery       bilateral ear surgery       HERNIA REPAIR, INGUINAL RT/LT       SUPRAPUBIC PROSTATECTOMY         Prior to Admission Medications   Prior to Admission Medications   Prescriptions Last Dose Informant Patient Reported? Taking?   FLUoxetine (PROZAC) 10 MG capsule 8/8/2019 at 0715  Yes Yes   Sig: Take 10 mg by mouth daily Take with 20 mg tablet for a total of 30 mg daily.   FLUoxetine (PROZAC) 20 MG capsule 8/8/2019 at 0715  Yes Yes   Sig: Take 20 mg by mouth daily Take with 10 mg tablet for a total of 30 mg daily.   QUEtiapine (SEROQUEL) 25 MG tablet 8/8/2019 at 2000  No Yes   Sig: Take one tablet by mouth every evening with supper   acetaminophen 500 MG CAPS Past Week at Unknown time  Yes Yes   Sig: Take 500 mg by mouth 2 times daily   allopurinol (ZYLOPRIM) 100 MG tablet 8/8/2019 at 0715  No Yes   Sig: Take 1 tablet (100 mg) by mouth daily   aspirin (ASA) 325 MG EC tablet 8/8/2019 at 0715  Yes Yes   Sig: Take 325 mg by mouth daily    atorvastatin  (LIPITOR) 20 MG tablet 8/8/2019 at 0715  No Yes   Sig: Take 1 tablet (20 mg) by mouth daily   benzocaine-menthol (CHLORASEPTIC) 6-10 MG lozenge Unknown at Unknown time  Yes No   Sig: Place 1 lozenge inside cheek every 2 hours as needed for moderate pain   cholecalciferol (VITAMIN D) 1000 UNIT tablet 8/8/2019 at 0715  No Yes   Sig: Take 2 tablets (2,000 Units) by mouth daily   metoprolol succinate ER (TOPROL-XL) 50 MG 24 hr tablet 8/8/2019 at 0715  Yes Yes   Sig: Take 50 mg by mouth daily    omeprazole (PRILOSEC) 40 MG DR capsule 8/8/2019 at 0715  No Yes   Sig: Take 1 capsule (40 mg) by mouth daily   polyethylene glycol-propylene glycol (SYSTANE ULTRA) 0.4-0.3 % SOLN ophthalmic solution Unknown at Unknown time  Yes No   Sig: Place 1 drop into both eyes 2 times daily as needed for dry eyes   predniSONE (DELTASONE) 10 MG tablet 8/8/2019 at 0715  Yes Yes   Sig: Take 20 mg by mouth daily       Facility-Administered Medications: None     Allergies   Allergies   Allergen Reactions     Penicillins Rash and Other (See Comments)     He believes he had a positive skin test for PCN allergy at the time of a dog bite, about 1960.       Social History   I have reviewed this patient's social history and updated it with pertinent information if needed. Sharon TEMPLE Landenjaquelin  reports that he has quit smoking. He has a 20.00 pack-year smoking history. He has never used smokeless tobacco. He reports that he does not drink alcohol or use drugs.    Family History   I have reviewed this patient's family history and updated it with pertinent information if needed.   Family History   Problem Relation Age of Onset     Cancer Sister         skin     Myocardial Infarction Maternal Uncle 60       Review of Systems   The 10 point Review of Systems is negative other than noted in the HPI or here.     Physical Exam   Temp:  [96  F (35.6  C)-99.5  F (37.5  C)] 96  F (35.6  C)  Pulse:  [] 49  Heart Rate:  [65-77] 65  Resp:  [16-18] 16  BP:  (126-174)/(49-82) 126/56  SpO2:  [96 %-99 %] 99 %  Vitals:    08/08/19 1826 08/09/19 1744 08/10/19 2100   Weight: 86.6 kg (190 lb 14.7 oz) 86.7 kg (191 lb 2.2 oz) 85.1 kg (187 lb 9.8 oz)      No intake/output data recorded.  Peripheral IV 08/08/19 Right Wrist (Active)   Site Assessment WDL except;Bleeding 8/10/2019 11:00 PM   Line Status Saline locked 8/10/2019 11:00 PM   Phlebitis Scale 0-->no symptoms 8/10/2019 11:00 PM   Infiltration Scale 0 8/10/2019 11:00 PM   Infiltration Site Treatment Method  None 8/10/2019 11:00 PM   Extravasation? No 8/10/2019 11:00 PM   Number of days: 3       Peripheral IV 08/10/19 Left Lower forearm (Active)   Site Assessment WDL 8/10/2019 11:00 PM   Line Status Infusing 8/10/2019 11:00 PM   Phlebitis Scale 0-->no symptoms 8/10/2019 11:00 PM   Infiltration Scale 0 8/10/2019 11:00 PM   Infiltration Site Treatment Method  None 8/10/2019 11:00 PM   Extravasation? No 8/10/2019 11:00 PM   Dressing Intervention New dressing  8/10/2019  1:02 AM   Number of days: 1       Peripheral IV 08/11/19 Right Upper forearm (Active)   Site Assessment WDL 8/11/2019  6:26 AM   Line Status Saline locked 8/11/2019  6:26 AM   Phlebitis Scale 0-->no symptoms 8/11/2019  6:26 AM   Infiltration Scale 0 8/11/2019  6:26 AM   Infiltration Site Treatment Method  None 8/11/2019  6:26 AM   Extravasation? No 8/11/2019  6:26 AM   Dressing Intervention New dressing  8/11/2019  6:26 AM   Number of days: 0     Vent Settings:  Resp: 16 SpO2: 99 % O2 Device: None (Room air)    Resp: 16    GENERAL: No acute distress  HEENT: EOMI, large white beard  NECK: Supple and without lymphadenopathy  CV: S1/S2 heard without murmur   RESPIRATORY: shallow breath sounds, no wheezes or crackles noted  GI: Soft, nontender and nondistended. No palpable organomegaly. Bowel sounds active  EXTREMITIES: No lower extremity edema  NEUROLOGIC: Alert and orientated x 3. CN II-XII grossly intact. No focal deficits noted.   MUSCULOSKELETAL: No joint  swelling or tenderness.   SKIN: No jaundice noted    Data   Data reviewed today:     Recent Labs   Lab 08/11/19  0453 08/10/19  1133 08/10/19  0037 08/09/19  1925 08/09/19  0548  08/08/19  1414   WBC 7.5 9.4  --   --  9.3   < > 10.7   HGB 7.8* 8.5*  --   --  7.6*   < > 5.4*   MCV 80 78  --   --  76*   < > 73*    257  --   --  207   < > 297   INR  --   --   --   --   --   --  1.07    142  --   --  144  --  142   POTASSIUM 3.8 4.1  --   --  4.2  --  4.3   CHLORIDE 114* 113*  --   --  115*  --  112*   CO2 20 21  --   --  22  --  18*   BUN 14 15  --   --  24  --  30   CR 1.13 1.10  --   --  1.25  --  1.44*   ANIONGAP 10 9  --   --  7  --  11   UMANG 8.0* 8.6  --   --  8.4*  --  8.4*   * 100*  --   --  87  --  173*   ALBUMIN  --   --   --   --   --   --  3.1*   PROTTOTAL  --   --   --   --   --   --  6.4*   BILITOTAL  --   --   --   --   --   --  0.3   ALKPHOS  --   --   --   --   --   --  62   ALT  --   --   --   --   --   --  11   AST  --   --   --   --   --   --  9   TROPI  --   --  0.016 <0.015  --   --  <0.015    < > = values in this interval not displayed.       No results found for this or any previous visit (from the past 24 hour(s)).

## 2019-08-11 NOTE — PROVIDER NOTIFICATION
Paged cross cover: HR on TELE up to 150's and is fluctuating. Other VSS. Pt witout distress or pain. Any interventions needed? Thanks.    - EKG ordered

## 2019-08-11 NOTE — PROGRESS NOTES
[facility], [place]    Medicine Progress Note - Hospitalist Service, Gold 8       Date of Admission:  8/8/2019  Assessment & Plan      Sharon Baer is a 88 year old male admitted on 8/8/2019. He has PMH of HTN, HLD, GERD, hearing loss, autism, afib, troponin elevations, and Gout who present anemia and GIANLUCA with likely GI bleed and mass/hemorroid/prolapse on exam.  HGb responded well to 1U pRBC and is stable today without evidence of bleed.      Changes today:  - continue with colonoscopy prep  - restarted metoprolol but at lower dose 12.5  - consulting cardiology and EP for better management of rhythm    # Acute/Chronic Blood Loss Anemia  # GIANLUCA:   Presented with ~ 2 month hx of progressive fatigue, dyspnea on exertion, pallor, and intermittent BRBPR. PTA labs revealing Hgb 5.6 (11.0- 4/29/19), MCV 73.  Is maintained on daily ASA  and takes prednisone for gout No reports of previous GIB. No Etoh, hx of PUD, H.pylori or ETOH use. BP stable. No tachycardia, though beta blocked. Concern for a/c GIB in setting of ASA and prednisone. S/p  2 U PRBC and hgb 7.6 8/9. Iron studies, Haptoglobin, LDH,-consistent with iron deficiency anemia.   - 2 Large Bore IV's  - Protonix IV BID  - GI consult placed  - plan for colonoscopy? and EGD? on Monday  - clears until MN on Sunday night   - NPO w/meds after MN on Sunday night  - Hold ASA  - hold prednisone  - IV iron daily x5 days (8/9 - 8/14)  - Transfuse for Hgb less than 7.0 or with hemodynamically significant bleeding    # Afib with h/o RVR  # Bradycardia   # HTN:   Has happened in the past. Has also had afib with falls. He has had an elevated troponin in March/April at [facility] [place], but no [place] changes and no other EKG changes. Today he was bradycardic even after beta blocker has been held. The event today persisted longer than a standard vasovagal. He has not had afib for us.  No chest pain today with this episode.  ?sick sinus vs. Poor perfusion of SA  node without infarction?  EKG = sinus kathryn. ECHO unremarkable. Electrolytes unremarkable. Troponins normal.  Afib with 's overnight off metoprolol  - telemetry through colonoscopy prep  - BMP daily while on colonoscopy prep with heart issues  - restarted metoprolol but at lower dose 12.5  - starting amlodipine 5 mg  - consulting cardiology and EP for better management of rhythm    # Mild SAMEERA on CKD III: Cr 1.44 on admission. BL Cr appears ~ 1.14-1.30. UA negative. Improving   - Avoid hypotension, dehydration, nephrotoxic medications  - BMP daily while on colonoscopy prep with heart issues  - Continue to closely monitor     # Protein Calorie Malnutrition: In setting of chronic illness. Albumin 3.1, Total protein 6.4.  - Nutrition consult placed    # HLD: PTA statin.Hold    # Gout: PTA allopurinol- Hold  -  Prednisone - hold  # OCD: PTA fluoxetine and seroquel.   - restarting tonight  # GERD: No PTA medications        Diet: Clear Liquid Diet  NPO per Anesthesia Guidelines for Procedure/Surgery Except for: Meds    DVT Prophylaxis: VTE Prophylaxis contraindicated due to GI bleed  Wang Catheter: not present  Code Status: DNR/DNI      Disposition Plan   Expected discharge: Tuesday? recommended to uncertain if prior assisted living will be appropriate, will defer to OT once hemoglobin stable.  Entered: Lu Elliott MD 08/11/2019, 2:14 PM       The patient's care was discussed with the Patient and his family (Niece who is POA).    Lu Elliott MD  Hospitalist Service, [address], [place]  Pager: [phone]  Please see sticky note for cross cover information  ______________________________________________________________________    Interval History     Has been NPO and without evidence of bleeding today.  Is happy to start to eat clears.  Working with PT/OT.       Data reviewed today: I reviewed all medications, new labs and imaging results over the last 24 hours. I  personally reviewed the EKG tracing showing normal sinus kathryn.    Physical Exam   Vital Signs: Temp: 96  F (35.6  C) Temp src: Oral BP: 126/56 Pulse: (!) 49 Heart Rate: 65 Resp: 16 SpO2: 99 % O2 Device: None (Room air)    Weight: 187 lbs 9.78 oz  Constitutional: Elderly male with Headphone w/speaker used for communication. Niece at bedside. Pallor. His up and walking with OT.    Head: Normocephalic and atraumatic.   Eyes: Conjunctivae are normal. Pupils are equal, round, and reactive to light.  Pharynx has no erythema or exudate, mucous membranes are moist  Cardiovascular: Regular rate and rhythm without murmurs or gallops  Pulmonary/Chest: CTAB. No respiratory distress.  GI: Soft with good bowel sounds.  Non-tender, non-distended, with no guarding, no rebound, no peritoneal signs.   Back:  No bony or CVA tenderness   Musculoskeletal:  No edema or clubbing   Skin: Skin is warm and dry. No rash noted to exposed skin areas .  Neurological: Alert and oriented to person  Psychiatric: Deficits in memory apparent        Data   Recent Labs   Lab 08/11/19  0453 08/10/19  1133 08/10/19  0037 08/09/19  1925 08/09/19  0548  08/08/19  1414   WBC 7.5 9.4  --   --  9.3   < > 10.7   HGB 7.8* 8.5*  --   --  7.6*   < > 5.4*   MCV 80 78  --   --  76*   < > 73*    257  --   --  207   < > 297   INR  --   --   --   --   --   --  1.07    142  --   --  144  --  142   POTASSIUM 3.8 4.1  --   --  4.2  --  4.3   CHLORIDE 114* 113*  --   --  115*  --  112*   CO2 20 21  --   --  22  --  18*   BUN 14 15  --   --  24  --  30   CR 1.13 1.10  --   --  1.25  --  1.44*   ANIONGAP 10 9  --   --  7  --  11   UMANG 8.0* 8.6  --   --  8.4*  --  8.4*   * 100*  --   --  87  --  173*   ALBUMIN  --   --   --   --   --   --  3.1*   PROTTOTAL  --   --   --   --   --   --  6.4*   BILITOTAL  --   --   --   --   --   --  0.3   ALKPHOS  --   --   --   --   --   --  62   ALT  --   --   --   --   --   --  11   AST  --   --   --   --   --   --  9    TROPI  --   --  0.016 <0.015  --   --  <0.015    < > = values in this interval not displayed.

## 2019-08-11 NOTE — PLAN OF CARE
Pt alert, oriented to self. BA on for safey as pt does not call appropriately. Intermittently uncooperative with cares. Up SBA to BR. [place], uses pocket talker. No c/o pain/nausea this shift. HR spiked to 150s on TELE, EKG obtained showing A-flutter. metoprolol started. High BPs but hydralazine parameters not met MDs aware. R PIV bleeding when flushed, will take out and order new PIV. NPO for EGD and colonoscopy Monday. Continue to monitor.

## 2019-08-12 ENCOUNTER — APPOINTMENT (OUTPATIENT)
Dept: CT IMAGING | Facility: CLINIC | Age: 84
DRG: 375 | End: 2019-08-12
Attending: PEDIATRICS
Payer: COMMERCIAL

## 2019-08-12 LAB
ANION GAP SERPL CALCULATED.3IONS-SCNC: 8 MMOL/L (ref 3–14)
BUN SERPL-MCNC: 10 MG/DL (ref 7–30)
CALCIUM SERPL-MCNC: 8 MG/DL (ref 8.5–10.1)
CHLORIDE SERPL-SCNC: 118 MMOL/L (ref 94–109)
CO2 SERPL-SCNC: 21 MMOL/L (ref 20–32)
COLONOSCOPY: NORMAL
CREAT SERPL-MCNC: 1.02 MG/DL (ref 0.66–1.25)
ERYTHROCYTE [DISTWIDTH] IN BLOOD BY AUTOMATED COUNT: 22.7 % (ref 10–15)
GFR SERPL CREATININE-BSD FRML MDRD: 65 ML/MIN/{1.73_M2}
GLUCOSE SERPL-MCNC: 93 MG/DL (ref 70–99)
HCT VFR BLD AUTO: 28.6 % (ref 40–53)
HGB BLD-MCNC: 7.5 G/DL (ref 13.3–17.7)
HGB BLD-MCNC: 8.5 G/DL (ref 13.3–17.7)
INTERPRETATION ECG - MUSE: NORMAL
MAGNESIUM SERPL-MCNC: 2.1 MG/DL (ref 1.6–2.3)
MCH RBC QN AUTO: 20.9 PG (ref 26.5–33)
MCHC RBC AUTO-ENTMCNC: 26.2 G/DL (ref 31.5–36.5)
MCV RBC AUTO: 80 FL (ref 78–100)
PHOSPHATE SERPL-MCNC: 2.5 MG/DL (ref 2.5–4.5)
PLATELET # BLD AUTO: 194 10E9/L (ref 150–450)
POTASSIUM SERPL-SCNC: 3.1 MMOL/L (ref 3.4–5.3)
POTASSIUM SERPL-SCNC: 3.5 MMOL/L (ref 3.4–5.3)
RBC # BLD AUTO: 3.59 10E12/L (ref 4.4–5.9)
SODIUM SERPL-SCNC: 146 MMOL/L (ref 133–144)
UPPER GI ENDOSCOPY: NORMAL
WBC # BLD AUTO: 6.1 10E9/L (ref 4–11)

## 2019-08-12 PROCEDURE — 36415 COLL VENOUS BLD VENIPUNCTURE: CPT | Performed by: INTERNAL MEDICINE

## 2019-08-12 PROCEDURE — 25000128 H RX IP 250 OP 636: Performed by: PEDIATRICS

## 2019-08-12 PROCEDURE — 83735 ASSAY OF MAGNESIUM: CPT | Performed by: PEDIATRICS

## 2019-08-12 PROCEDURE — 0DJ08ZZ INSPECTION OF UPPER INTESTINAL TRACT, VIA NATURAL OR ARTIFICIAL OPENING ENDOSCOPIC: ICD-10-PCS | Performed by: INTERNAL MEDICINE

## 2019-08-12 PROCEDURE — 43235 EGD DIAGNOSTIC BRUSH WASH: CPT | Performed by: INTERNAL MEDICINE

## 2019-08-12 PROCEDURE — 84100 ASSAY OF PHOSPHORUS: CPT | Performed by: PEDIATRICS

## 2019-08-12 PROCEDURE — 99233 SBSQ HOSP IP/OBS HIGH 50: CPT | Performed by: PEDIATRICS

## 2019-08-12 PROCEDURE — 71260 CT THORAX DX C+: CPT

## 2019-08-12 PROCEDURE — 25000128 H RX IP 250 OP 636: Performed by: PHYSICIAN ASSISTANT

## 2019-08-12 PROCEDURE — 12000001 ZZH R&B MED SURG/OB UMMC

## 2019-08-12 PROCEDURE — 25000132 ZZH RX MED GY IP 250 OP 250 PS 637: Performed by: NURSE PRACTITIONER

## 2019-08-12 PROCEDURE — 36415 COLL VENOUS BLD VENIPUNCTURE: CPT | Performed by: PEDIATRICS

## 2019-08-12 PROCEDURE — 45380 COLONOSCOPY AND BIOPSY: CPT | Performed by: INTERNAL MEDICINE

## 2019-08-12 PROCEDURE — 84132 ASSAY OF SERUM POTASSIUM: CPT | Performed by: INTERNAL MEDICINE

## 2019-08-12 PROCEDURE — C9113 INJ PANTOPRAZOLE SODIUM, VIA: HCPCS | Performed by: PHYSICIAN ASSISTANT

## 2019-08-12 PROCEDURE — 85018 HEMOGLOBIN: CPT | Performed by: PEDIATRICS

## 2019-08-12 PROCEDURE — 88342 IMHCHEM/IMCYTCHM 1ST ANTB: CPT | Performed by: INTERNAL MEDICINE

## 2019-08-12 PROCEDURE — 25000132 ZZH RX MED GY IP 250 OP 250 PS 637: Performed by: PEDIATRICS

## 2019-08-12 PROCEDURE — 99153 MOD SED SAME PHYS/QHP EA: CPT | Performed by: INTERNAL MEDICINE

## 2019-08-12 PROCEDURE — 25000128 H RX IP 250 OP 636: Performed by: RADIOLOGY

## 2019-08-12 PROCEDURE — 88305 TISSUE EXAM BY PATHOLOGIST: CPT | Performed by: INTERNAL MEDICINE

## 2019-08-12 PROCEDURE — 80048 BASIC METABOLIC PNL TOTAL CA: CPT | Performed by: PEDIATRICS

## 2019-08-12 PROCEDURE — 0DBL8ZX EXCISION OF TRANSVERSE COLON, VIA NATURAL OR ARTIFICIAL OPENING ENDOSCOPIC, DIAGNOSTIC: ICD-10-PCS | Performed by: INTERNAL MEDICINE

## 2019-08-12 PROCEDURE — G0500 MOD SEDAT ENDO SERVICE >5YRS: HCPCS | Performed by: INTERNAL MEDICINE

## 2019-08-12 PROCEDURE — 25800030 ZZH RX IP 258 OP 636: Performed by: PEDIATRICS

## 2019-08-12 PROCEDURE — 74177 CT ABD & PELVIS W/CONTRAST: CPT

## 2019-08-12 PROCEDURE — 25000132 ZZH RX MED GY IP 250 OP 250 PS 637: Performed by: INTERNAL MEDICINE

## 2019-08-12 PROCEDURE — 85027 COMPLETE CBC AUTOMATED: CPT | Performed by: PEDIATRICS

## 2019-08-12 PROCEDURE — 25000128 H RX IP 250 OP 636: Performed by: INTERNAL MEDICINE

## 2019-08-12 PROCEDURE — 88341 IMHCHEM/IMCYTCHM EA ADD ANTB: CPT | Performed by: INTERNAL MEDICINE

## 2019-08-12 RX ORDER — POTASSIUM CHLORIDE 750 MG/1
20-40 TABLET, EXTENDED RELEASE ORAL
Status: DISCONTINUED | OUTPATIENT
Start: 2019-08-12 | End: 2019-08-16 | Stop reason: HOSPADM

## 2019-08-12 RX ORDER — POTASSIUM CHLORIDE 1.5 G/1.58G
20-40 POWDER, FOR SOLUTION ORAL
Status: DISCONTINUED | OUTPATIENT
Start: 2019-08-12 | End: 2019-08-16 | Stop reason: HOSPADM

## 2019-08-12 RX ORDER — FENTANYL CITRATE 50 UG/ML
INJECTION, SOLUTION INTRAMUSCULAR; INTRAVENOUS PRN
Status: DISCONTINUED | OUTPATIENT
Start: 2019-08-12 | End: 2019-08-14

## 2019-08-12 RX ORDER — POTASSIUM CL/LIDO/0.9 % NACL 10MEQ/0.1L
10 INTRAVENOUS SOLUTION, PIGGYBACK (ML) INTRAVENOUS
Status: DISCONTINUED | OUTPATIENT
Start: 2019-08-12 | End: 2019-08-16 | Stop reason: HOSPADM

## 2019-08-12 RX ORDER — IOPAMIDOL 755 MG/ML
112 INJECTION, SOLUTION INTRAVASCULAR ONCE
Status: COMPLETED | OUTPATIENT
Start: 2019-08-12 | End: 2019-08-12

## 2019-08-12 RX ORDER — POTASSIUM CHLORIDE 29.8 MG/ML
20 INJECTION INTRAVENOUS
Status: DISCONTINUED | OUTPATIENT
Start: 2019-08-12 | End: 2019-08-16 | Stop reason: HOSPADM

## 2019-08-12 RX ORDER — POTASSIUM CHLORIDE 7.45 MG/ML
10 INJECTION INTRAVENOUS
Status: DISCONTINUED | OUTPATIENT
Start: 2019-08-12 | End: 2019-08-16 | Stop reason: HOSPADM

## 2019-08-12 RX ADMIN — IRON SUCROSE 200 MG: 20 INJECTION, SOLUTION INTRAVENOUS at 17:08

## 2019-08-12 RX ADMIN — QUETIAPINE FUMARATE 25 MG: 25 TABLET ORAL at 18:28

## 2019-08-12 RX ADMIN — DRONEDARONE 400 MG: 400 TABLET, FILM COATED ORAL at 18:28

## 2019-08-12 RX ADMIN — POLYETHYLENE GLYCOL 3350 17 G: 17 POWDER, FOR SOLUTION ORAL at 20:23

## 2019-08-12 RX ADMIN — POLYETHYLENE GLYCOL 3350, SODIUM SULFATE ANHYDROUS, SODIUM BICARBONATE, SODIUM CHLORIDE, POTASSIUM CHLORIDE 2000 ML: 236; 22.74; 6.74; 5.86; 2.97 POWDER, FOR SOLUTION ORAL at 04:37

## 2019-08-12 RX ADMIN — PANTOPRAZOLE SODIUM 40 MG: 40 INJECTION, POWDER, FOR SOLUTION INTRAVENOUS at 17:08

## 2019-08-12 RX ADMIN — POTASSIUM CHLORIDE 40 MEQ: 10 TABLET, EXTENDED RELEASE ORAL at 09:12

## 2019-08-12 RX ADMIN — AMLODIPINE BESYLATE 5 MG: 5 TABLET ORAL at 08:40

## 2019-08-12 RX ADMIN — DRONEDARONE 400 MG: 400 TABLET, FILM COATED ORAL at 08:40

## 2019-08-12 RX ADMIN — IOPAMIDOL 112 ML: 755 INJECTION, SOLUTION INTRAVENOUS at 14:34

## 2019-08-12 RX ADMIN — PANTOPRAZOLE SODIUM 40 MG: 40 INJECTION, POWDER, FOR SOLUTION INTRAVENOUS at 04:37

## 2019-08-12 RX ADMIN — POTASSIUM CHLORIDE 20 MEQ: 10 TABLET, EXTENDED RELEASE ORAL at 17:12

## 2019-08-12 RX ADMIN — FLUOXETINE 30 MG: 10 CAPSULE ORAL at 08:40

## 2019-08-12 RX ADMIN — SODIUM CHLORIDE: 9 INJECTION, SOLUTION INTRAVENOUS at 08:14

## 2019-08-12 ASSESSMENT — ACTIVITIES OF DAILY LIVING (ADL)
ADLS_ACUITY_SCORE: 22

## 2019-08-12 ASSESSMENT — MIFFLIN-ST. JEOR: SCORE: 1446.88

## 2019-08-12 NOTE — PLAN OF CARE
Time of care: 0374-9008  Neuro: AOx4. Confused occasionally.   Activity: Up ad tierney w/walker.   Vitals: VSS on RA.   LDAS: L & R PIV - CDI. Saline locked.   Cardiac: A-fib.   Respiratory: Some dyspnea on exertion.   GI/: Pt had BM x2 today - liquid/yellow-green. Urgency present w/voiding.  Skin: WDL  Pain: Denies.  Diet: Regular diet.  Plan: Pt tolerated endoscopy & colonoscopy well. Pt uses pocket talker to communicate. Niece was at bedside this A.M & supportive w/care. Continue to monitor & follow POC.

## 2019-08-12 NOTE — PLAN OF CARE
5B PT Cx: Pt off floor for CT scan. Per OT encounter, niece hesitant about Pt performing therapies today 2/2 to pt recent diagnosis and procedure this AM. Will reschedule.

## 2019-08-12 NOTE — OR NURSING
Upper endoscopy tolerated well with sedation given which was minimal.  No signs of bleeding or a source of previous bleeding.  Colonoscopy tolerated well.  Mass area seen at the hepatic flexure and was biopsied and tattooed.  Other polyps seen but not removed at this time.  Dr. Potts spoke with patient and niece post procedure telling them of procedure findings. He also discussed options for treatment and/or palliation.

## 2019-08-12 NOTE — OP NOTE
Gastroenterology Endoscopy Suite Brief Operative Note    Procedure:  Upper endoscopy   Post-operative diagnosis:  Normal EGD   Staff Physician:  Dr. Jamaal Potts   Fellow/Assistant(s):  Dina Vargas    Specimens:  Please see final procedure note for further details.   Findings:  Normal   Complications:  None.   Condition:  Stable   Recommendations  Diet:  NPO  PPI:  Stop PPI  Anti-coagulants/platelets:  N/A  Octreotide:  N/A  Discharge Planning:   Pending further evaluation, colonoscopy today, please see separate op note

## 2019-08-12 NOTE — PROGRESS NOTES
GASTROENTEROLOGY PROGRESS NOTE       Patient Summary   Sharon Baer is a 88 year old male with a past medical history of autism spectrum disorder, hypertension, hyperlipidemia, osteoarthritis, gout, hypotestostrone, obsessive compulsive disorder, depression, hx of suicidal ideation, hearing loss, inguinal hernia repair, suprapubic prostatectomy, GERD and now presenting with symptomatic anemia and hgb of 5.6.       ASSESSMENT AND RECOMMENDATIONS:   #Acute on chronic anemia   #Iron deficiency   #Proximal esophageal dysphagia   #Bright red blood per rectum  #NSAID use  Patient admitted with 2 month hx of progressive fatigue, dyspnea on exertion, pallor and intermittent bright red blood per rectum. He also reports solid and dry food getting stuck in proximal esophagus. Baseline hgb 12.6 to 15.2, on admission hgb was 5.6, s/p x2 units of RBCs with appropriate response as hgb went up to 7.6. Microcytic anemia noted, MCV 76 and RDW 19.1. Ferritin was 16 on 4/29/19, and repeat iron studies from 8/8/19 was low as well (iron 10, saturation index 3, and binding capacity 334).  No EGD report found in care everywhere but the pathology result from 2010 indicated reflux esophagitis. He admits taking 2-3 tablets of Ibuprofen or Advil 2-3 times/month, and he is on daily 325mg of Aspirin.      Patient has multiple risk that can cause GI bleed, NSAID use and history of reflux esophagitis combined with dysphagia concerns for upper GI bleed s/t esophagitis, gastritis, and PUD. Given his age, history of tobacco use and comorbidities put him at risk of angiodysplasia and malignancy.  Hemorrhoidal bleed cannot be ruled out either. Glagow-blatchford score on admission was at 10, scores ?6 are associated with >50% risk of needing endoscopic intervention.     Hgb is stable at 7.5 and last transfusion was on 8/9/19. Patient is having clear yellow stools and his last meal was on 8/8/19. Potassium is at 3.1 and primary team is  correcting with oral potassium. Plan is to proceed with EGD and colonoscopy today.      Recommendations  --NPO for EGD   --Continue with IV iron x5 days, and can transition to Ferric Maltol (oral iron with less GI side effects)  --Continue IV PPI twice daily  --Avoid NSAIDs  --Monitor CBC and transfuse if hgb is less than 7  --Maintain 2 large bore IVs  --Accurate documentation of output (color, characteristics and amount)  --GI will follow     Pt care plan discussed with Dr. Potts, GI staff physician. Thank you for involving us in this patient's care. Please do not hesitate to contact the GI service with any questions or concerns.    TINY Badillo [facility]  Department of Gastroenterology   P: [phone]  Subjective\events within the 24 hours:   Patient denies having chest pain, shortness breath and dizziness.     4 point ROS performed and negative unless noted above.           Medications:     Current Facility-Administered Medications   Medication     acetaminophen (TYLENOL) tablet 650 mg     amLODIPine (NORVASC) tablet 5 mg     bisacodyl (DULCOLAX) EC tablet 10 mg     bisacodyl (DULCOLAX) EC tablet 10 mg     bisacodyl (DULCOLAX) EC tablet 5 mg    Or     bisacodyl (DULCOLAX) EC tablet 10 mg    Or     bisacodyl (DULCOLAX) EC tablet 15 mg     diphenhydrAMINE (BENADRYL) injection 50 mg     dronedarone (MULTAQ) tablet 400 mg     EPINEPHrine (ADRENALIN) kit 0.3 mg     FLUoxetine (PROzac) capsule 30 mg     hydrALAZINE (APRESOLINE) injection 10 mg     iron sucrose (VENOFER) 200 mg in sodium chloride 0.9 % 100 mL intermittent infusion     lidocaine (LMX4) cream     lidocaine 1 % 0.1-1 mL     melatonin tablet 1 mg     methylPREDNISolone sodium succinate (solu-MEDROL) injection 125 mg     naloxone (NARCAN) injection 0.1-0.4 mg     ondansetron (ZOFRAN-ODT) ODT tab 4 mg    Or     ondansetron (ZOFRAN) injection 4 mg     pantoprazole (PROTONIX) 40 mg IV push injection     polyethylene glycol (MIRALAX/GLYCOLAX) Packet  "17 g     potassium chloride (KLOR-CON) Packet 20-40 mEq     potassium chloride 10 mEq in 100 mL intermittent infusion with 10 mg lidocaine     potassium chloride 10 mEq in 100 mL sterile water intermittent infusion (premix)     potassium chloride 20 mEq in 50 mL intermittent infusion     potassium chloride ER (K-DUR/KLOR-CON M) CR tablet 20-40 mEq     QUEtiapine (SEROquel) tablet 25 mg     ranitidine (ZANTAC) injection 50 mg     sodium chloride (PF) 0.9% PF flush 10 mL     sodium chloride (PF) 0.9% PF flush 3 mL     sodium chloride (PF) 0.9% PF flush 3 mL        Physical Exam   Blood pressure 124/61, pulse 79, temperature 98.1  F (36.7  C), temperature source Oral, resp. rate 16, height 1.651 m (5' 5\"), weight 83.8 kg (184 lb 11.2 oz), SpO2 98 %.    Constitutional: Significantly hard of hearing. Patient is cooperative, pleasant, not dyspneic/diaphoretic, no acute distress  Eyes: Sclera anicteric/injected  Ears/nose/mouth/throat: Normal oropharynx without ulcers or exudate, mucus membranes moist, hearing intact  Neck: supple, thyroid normal size  CV: RRR. No edema in LE bilaterally  Respiratory: Unlabored breathing. Diminished lower lung lobes bilaterally.  Lymph: No axillary, submandibular, supraclavicular or inguinal lymphadenopathy  Abd: Obese, +bs, no hepatosplenomegaly, nontender, no peritoneal signs  Skin: warm, perfused, no jaundice  Neuro: AAO x 3, No asterixis  Psych: Normal affect  MSK: No deformity noted     Data   Current Labs  CBC  Recent Labs   Lab 08/12/19  0501 08/11/19  1625 08/11/19  0453 08/10/19  1133 08/09/19  0548   WBC 6.1  --  7.5 9.4 9.3   RBC 3.59*  --  3.73* 4.22* 3.69*   HGB 7.5* 8.4* 7.8* 8.5* 7.6*   HCT 28.6*  --  29.7* 32.8* 28.1*   MCV 80  --  80 78 76*   MCH 20.9*  --  20.9* 20.1* 20.6*   MCHC 26.2*  --  26.3* 25.9* 27.0*   RDW 22.7*  --  21.2* 21.0* 19.1*     --  203 257 207     BMP  Recent Labs   Lab 08/12/19  0501 08/11/19  0453 08/10/19  1133 08/09/19  0548 08/08/19  1414 "   * 144 142 144 142   POTASSIUM 3.1* 3.8 4.1 4.2 4.3   CHLORIDE 118* 114* 113* 115* 112*   CO2 21 20 21 22 18*   ANIONGAP 8 10 9 7 11   GLC 93 104* 100* 87 173*   BUN 10 14 15 24 30   CR 1.02 1.13 1.10 1.25 1.44*   GFRESTIMATED 65 58* 60* 51* 43*   GFRESTBLACK 76 67 69 59* 50*   UMANG 8.0* 8.0* 8.6 8.4* 8.4*   MAG 2.1 2.2  --   --  2.3   PHOS 2.5 3.4  --   --  3.3      INR  Recent Labs   Lab 08/08/19  1414   INR 1.07     Liver panel  Recent Labs   Lab 08/08/19  1414   PROTTOTAL 6.4*   ALBUMIN 3.1*   BILITOTAL 0.3   ALKPHOS 62   AST 9   ALT 11     History of Present Illness:   Sharon Baer is a 88 year old male with a history of autism spectrum disorder, hypertension, hyperlipidemia, osteoarthritis, gout, hypotestostrone, obsessive compulsive disorder, depression, hx of suicidal ideation, hearing loss, inguinal hernia repair, suprapubic prostatectomy, GERD and now presenting with symptomatic anemia and hgb of 5.6.     Patient reports having 2 month hx of progressive fatigue, dyspnea on exertion, pallor and intermittent bright red blood per rectum. He used to have constipation on and off but recently stool has been soft. Red blood with brown stool is unpredictable, and patient is not sure if mixed with stool or covered with stool. All he noticed is red blood in the toilet bowl that occurs in every couple of day. No abdominal pian, nausea, vomiting, pain with swallowing, vomiting blood, or black stools. He also reports solid and dry food getting stuck in proximal esophagus. He admits taking 2-3 tablets of Ibuprofen or Advil 2-3 times/month. He takes 1 pill daily for heartburn that he has been on for many years (record indicate 40mg of Omeprazole daily). Patient remembers having an EGD in the past but no colonoscopy previously. He denies weight loss. He has history of tobacco use but stopped many years ago. He only drinks beer or wine occasionally, maybe 3-5 times/year. No family hx of colorectal cancer.  Sister some other cancer that he cannot remember.

## 2019-08-12 NOTE — PLAN OF CARE
Pt alert, oriented to self. BA on for safey as pt does not call appropriately. Pt cooperative and calm this shift. Up SBA to BRMaribel MEJIA, uses pocket talker. No c/o pain/nausea this shift. NPO for EGD and colonoscopy today. Continue to monitor.

## 2019-08-12 NOTE — PROGRESS NOTES
Care Coordinator Progress Note    Admission Date/Time:  8/8/2019  Attending MD:  Lu Elliott MD    Data  Chart reviewed, discussed with interdisciplinary team.   Patient was admitted for:    Anemia, unspecified type  Gastrointestinal hemorrhage, unspecified gastrointestinal hemorrhage type  Shortness of breath  Altered mental status, unspecified altered mental status type.    Coordination of Care  Chart reviewed, discussed with interdisciplinary team. Plan for patient to discharge when medically ready. Team concerned that patient may be below baseline for hygiene cares and other ADLs. Plan for OT to assess patient today. Reached out to BARRY Harris case manager to discuss patient issues. Steven stated patient was able to care for himself performing ADLs/hygiene. Said the [facility] is able to increase cares if needed. It would be a matter of charging patient/family for additional services.     [facility]:  [facility] ([facility]  [facility]  Phone: [phone] (Steven Doe RN works Mon-Fri)  Fax: [phone]    1440:  OT unable to assess patient at this time because patient going for CT scan. Colonoscopy completed today and suspicious for malignant mass.      Plan  Anticipated Discharge Date:  TBD   Anticipated Discharge Plan:  TBD    Regina Shah RNCC

## 2019-08-12 NOTE — OP NOTE
Gastroenterology Endoscopy Suite Brief Operative Note    Procedure:  Colonoscopy   Post-operative diagnosis:  R hepatic flexure mass, colon polyps, external hemorrhoids   Staff Physician:  Dr. Jamaal Potts   Fellow/Assistant(s):  Dina Vargas    Specimens:  Please see final procedure note for further details.   Findings:  A large semi-circumferential hepatic flexure mass (biopsied), 17 mm cecal polyp and 4-7 mm descending and sigmoid polyps (not resected), extrenal hemorrhoids   Complications:  None.   Condition:  Stable   Recommendations  Diet:  Return to previous diet  PPI:  N/A  Anti-coagulants/platelets:  N/A  Octreotide:  N/A  Discharge Planning:   Pending hospital course.  Follow with colon biopsies  Image abdomen and chest for staging of likely colon cancer  Trend Hgb, transfuse if <7  Consider surgery, oncology and palliative involvement pending pathology results   Further recommendations per GI inpatient team

## 2019-08-12 NOTE — PROGRESS NOTES
[facility], [facility] Progress Note - Hospitalist Service, Gold 8       Date of Admission:  8/8/2019  Assessment & Plan      Sharon Baer is a 88 year old male admitted on 8/8/2019. He has PMH of HTN, HLD, GERD, hearing loss, autism, afib, troponin elevations, and Gout who present anemia and GIANLUCA with likely GI bleed and mass/hemorroid/prolapse on exam.  HGb responded well to 1U pRBC.  Now stable without evidence of bleed.      Changes today:  - colonoscopy / EGD today  - OT to assess ability to do ADLs    # Acute/Chronic Blood Loss Anemia  # GIANLUCA:   Presented with ~ 2 month hx of progressive fatigue, dyspnea on exertion, pallor, and intermittent BRBPR. PTA labs revealing Hgb 5.6 (11.0- 4/29/19), MCV 73.  Is maintained on daily ASA  and takes prednisone for gout No reports of previous GIB. No Etoh, hx of PUD, H.pylori or ETOH use. BP stable. No tachycardia, though beta blocked. Concern for a/c GIB in setting of ASA and prednisone. S/p  2 U PRBC and hgb 7.6 8/9. Iron studies, Haptoglobin, LDH,-consistent with iron deficiency anemia.   - 2 Large Bore IV's  - Protonix IV BID  - GI consult placed  - plan for colonoscopy/EGD today  - clears until MN on Sunday night   - NPO w/meds after MN on Sunday night  - Hold ASA  - hold prednisone  - IV iron daily x5 days (8/9 - 8/14)  - Transfuse for Hgb less than 7.0 or with hemodynamically significant bleeding    # Afib with h/o RVR  # Bradycardia   # HTN:   Has happened in the past. Has also had afib with falls. He has had an elevated troponin in [place] [facility] but no [place] changes and no other EKG changes. Today he was bradycardic even after beta blocker has been held. The event today persisted longer than a standard vasovagal. He has not had afib for us.  No chest pain today with this episode.  ?sick sinus vs. Poor perfusion of SA node without infarction?  EKG = sinus kathryn. ECHO unremarkable. Electrolytes unremarkable. Troponins  normal.  Afib with 's overnight off metoprolol family and EP cardiology and general service all in agreement to medically manage if possible  - telemetry through colonoscopy prep  - BMP daily while on colonoscopy prep with heart issues  - stopped metoprolol but at lower dose 12.5  - started dronederone 400mg BID geovanny colonoscopy and EGD  - Appreciate EP cardiology's assistance with periprocedure medications and getting a home regimen  - continue amlodipine 5 mg (started in hospital)  - previously decision at [facility] was not to start warfarin for afib, can reassess after known outcome from likely GI bleeding    # Mild SAMEERA on CKD III: Cr 1.44 on admission. BL Cr appears ~ 1.14-1.30. UA negative. Improving   - Avoid hypotension, dehydration, nephrotoxic medications  - BMP daily while on colonoscopy prep with heart issues  - Continue to closely monitor     # Protein Calorie Malnutrition: In setting of chronic illness. Albumin 3.1, Total protein 6.4.  - Nutrition consult placed    # HLD: PTA statin.Hold    # Gout: PTA allopurinol- Hold  -  Prednisone - hold  # OCD: PTA fluoxetine and seroquel.   - restarting tonight  # GERD: No PTA medications        Diet: NPO per Anesthesia Guidelines for Procedure/Surgery Except for: Meds    DVT Prophylaxis: VTE Prophylaxis contraindicated due to GI bleed  Wang Catheter: not present  Code Status: DNR/DNI      Disposition Plan   Expected discharge: Tuesday? recommended to uncertain if prior assisted living will be appropriate, will defer to OT once hemoglobin stable.  Entered: Lu Elliott MD 08/12/2019, 10:42 AM       The patient's care was discussed with the Patient and his family (Niece who is POA).    Lu Elliott MD  Hospitalist Service, [address], [place]  Pager: [phone]  Please see sticky note for cross cover information  ______________________________________________________________________    Interval History     On Clear  liquids, ready for colonoscopy/EGD    Data reviewed today: I reviewed all medications, new labs and imaging results over the last 24 hours. I personally reviewed the EKG tracing showing normal sinus kathryn.    Physical Exam   Vital Signs: Temp: 98.1  F (36.7  C) Temp src: Oral BP: 124/61 Pulse: 79 Heart Rate: 107 Resp: 16 SpO2: 98 % O2 Device: None (Room air)    Weight: 184 lbs 11.2 oz  Constitutional: Elderly male with Headphone w/speaker used for communication. Niece at bedside. Pallor.  Head: Normocephalic and atraumatic.   Eyes: Conjunctivae are normal. Pupils are equal, round, and reactive to light.  Pharynx has no erythema or exudate, mucous membranes are moist  Cardiovascular: Regular rate and rhythm without murmurs or gallops  Pulmonary/Chest: CTAB. No respiratory distress.  GI: Soft with good bowel sounds.  Non-tender, non-distended, with no guarding, no rebound, no peritoneal signs.   Back:  No bony or CVA tenderness   Musculoskeletal:  No edema or clubbing   Skin: Skin is warm and dry. No rash noted to exposed skin areas .  Neurological: Alert and oriented to person  Psychiatric: Deficits in memory apparent        Data   Recent Labs   Lab 08/12/19  0501 08/11/19  1625 08/11/19  0453 08/10/19  1133 08/10/19  0037 08/09/19  1925  08/08/19  1414   WBC 6.1  --  7.5 9.4  --   --    < > 10.7   HGB 7.5* 8.4* 7.8* 8.5*  --   --    < > 5.4*   MCV 80  --  80 78  --   --    < > 73*     --  203 257  --   --    < > 297   INR  --   --   --   --   --   --   --  1.07   *  --  144 142  --   --    < > 142   POTASSIUM 3.1*  --  3.8 4.1  --   --    < > 4.3   CHLORIDE 118*  --  114* 113*  --   --    < > 112*   CO2 21  --  20 21  --   --    < > 18*   BUN 10  --  14 15  --   --    < > 30   CR 1.02  --  1.13 1.10  --   --    < > 1.44*   ANIONGAP 8  --  10 9  --   --    < > 11   UMANG 8.0*  --  8.0* 8.6  --   --    < > 8.4*   GLC 93  --  104* 100*  --   --    < > 173*   ALBUMIN  --   --   --   --   --   --   --  3.1*    PROTTOTAL  --   --   --   --   --   --   --  6.4*   BILITOTAL  --   --   --   --   --   --   --  0.3   ALKPHOS  --   --   --   --   --   --   --  62   ALT  --   --   --   --   --   --   --  11   AST  --   --   --   --   --   --   --  9   TROPI  --   --   --   --  0.016 <0.015  --  <0.015    < > = values in this interval not displayed.

## 2019-08-12 NOTE — PROGRESS NOTES
[facility], [place]    Medicine Progress Note - Hospitalist Service, Gold 8       Date of Admission:  8/8/2019  Assessment & Plan      Sharon Baer is a 88 year old male admitted on 8/8/2019. He has PMH of HTN, HLD, GERD, hearing loss, autism, afib, troponin elevations, and Gout who present anemia and GIANLUCA with likely GI bleed and mass/hemorroid/prolapse on exam.  HGb responded well to 1U pRBC.  Now stable without evidence of bleed, s/p EGD and colonoscopy 8/11, demonstrating colonic mass.      Changes today:  Consult onc, palliative  Discontinue tele  Change ppi to oral    # Acute/Chronic Blood Loss Anemia  # GIANLUCA:   # colonic mass, see below  Presented with ~ 2 month hx of progressive fatigue, dyspnea on exertion, pallor, and intermittent BRBPR.  Colonoscopy 8/12 with colonic mass concerning for malignancy, presumed source of iron deficiency anemia  - Protonix IV BID changed to po  - GI consult placed  - colonoscopy/EGD 8/11   - ASA to restart prior to discharge  - hold prednisone  - IV iron daily x5 days (8/9 - 8/14)  - Transfuse for Hgb less than 7.0 or with hemodynamically significant bleeding    # colonic mass:  Adenocarcinoma.  Appreciate input by oncology and palliative.  - will discuss goals of care and dispo with family: if curative approach, would likely need colectomy with ostomy.  If palliative only, would need to discuss symptoms of colonic obstruction.    - will consider surg onc consult based on the above  - for full workup, will need tri-phasic MRI of liver lesion    # Afib with h/o RVR  # Bradycardia   # HTN:   - metoprolol  lower dose 12.5  - started dronederone 400mg BID geovanny colonoscopy and EGD  - Appreciate EP cardiology's assistance with periprocedure medications and getting a home regimen  - continue amlodipine 5 mg (started in hospital)  - [place] [facility] [place] warfarin for afib, can reassess over time    # Mild SAMEERA on CKD III: Cr 1.44  on admission. BL Cr appears ~ 1.14-1.30. UA negative. Improving   - Avoid hypotension, dehydration, nephrotoxic medications  - BMP daily while on colonoscopy prep with heart issues  - Continue to closely monitor     # Protein Calorie Malnutrition: In setting of chronic illness. Albumin 3.1, Total protein 6.4.  - Nutrition consult placed    # HLD: PTA statin.Hold    # Gout: PTA allopurinol- Hold  -  Prednisone - hold  # OCD: PTA fluoxetine and seroquel.   - restarting tonight  # GERD: No PTA medications        Diet: Regular Diet Adult    DVT Prophylaxis: VTE Prophylaxis contraindicated due to GI bleed  Wang Catheter: not present  Code Status: DNR/DNI      Disposition Plan   Expected discharge: 1-2 days, recommended to uncertain if prior assisted living will be appropriate, will defer to OT once hemoglobin stable.  Entered: Mundo Hdz MD 08/13/2019, 3:50 PM       The patient's care was discussed with the Patient and his family (Niece who is POA).    Mundo Hdz MD  Hospitalist Service, [address], [place]  Pager: [phone]  Please see sticky note for cross cover information  ______________________________________________________________________    Interval History     No acute events.  Discussed cancer findings with family and patient.  No questions or concerns.  He denies abd pain, N/V/D, fevers, or other symptoms    Data reviewed today: I reviewed all medications, new labs and imaging results over the last 24 hours.     Physical Exam   Vital Signs: Temp: 98.1  F (36.7  C) Temp src: Oral BP: (!) 140/126 Pulse: 76 Heart Rate: 74 Resp: 13 SpO2: 100 % O2 Device: None (Room air)    Weight: 184 lbs 11.2 oz  Constitutional: Elderly male with Headphone w/speaker used for communication. Niece at bedside. Pallor.  Head: Normocephalic and atraumatic.   Eyes: Conjunctivae are normal. Pupils are equal, round, and reactive to light.  Pharynx has no erythema or exudate, mucous membranes are  moist  Cardiovascular: Regular rate and rhythm without murmurs or gallops  Pulmonary/Chest: CTAB. No respiratory distress.  GI: Soft with good bowel sounds.  Non-tender, non-distended, with no guarding, no rebound, no peritoneal signs.   Back:  No bony or CVA tenderness   Musculoskeletal:  No edema or clubbing   Skin: Skin is warm and dry. No rash noted to exposed skin areas .  Neurological: Alert and oriented to person  Psychiatric: Deficits in memory apparent

## 2019-08-12 NOTE — PLAN OF CARE
OT/5B: Cx. Pt to go to CT scan upon arrival and niece hesitant about pt performing therapies today 2/2 to pt recent diagnosis and procedure this AM. Will reschedule OT session. Please refer to OT notes on 8/9 and 8/10 for ADL/cog performance.

## 2019-08-13 ENCOUNTER — APPOINTMENT (OUTPATIENT)
Dept: OCCUPATIONAL THERAPY | Facility: CLINIC | Age: 84
DRG: 375 | End: 2019-08-13
Payer: COMMERCIAL

## 2019-08-13 LAB
ANION GAP SERPL CALCULATED.3IONS-SCNC: 6 MMOL/L (ref 3–14)
BUN SERPL-MCNC: 9 MG/DL (ref 7–30)
CALCIUM SERPL-MCNC: 7.9 MG/DL (ref 8.5–10.1)
CEA SERPL-MCNC: 1.8 UG/L (ref 0–2.5)
CHLORIDE SERPL-SCNC: 117 MMOL/L (ref 94–109)
CO2 SERPL-SCNC: 20 MMOL/L (ref 20–32)
COPATH REPORT: NORMAL
CREAT SERPL-MCNC: 1.11 MG/DL (ref 0.66–1.25)
ERYTHROCYTE [DISTWIDTH] IN BLOOD BY AUTOMATED COUNT: 24.5 % (ref 10–15)
GFR SERPL CREATININE-BSD FRML MDRD: 59 ML/MIN/{1.73_M2}
GLUCOSE SERPL-MCNC: 133 MG/DL (ref 70–99)
HCT VFR BLD AUTO: 29.2 % (ref 40–53)
HGB BLD-MCNC: 7.2 G/DL (ref 13.3–17.7)
MAGNESIUM SERPL-MCNC: 2.1 MG/DL (ref 1.6–2.3)
MCH RBC QN AUTO: 21.1 PG (ref 26.5–33)
MCHC RBC AUTO-ENTMCNC: 24.7 G/DL (ref 31.5–36.5)
MCV RBC AUTO: 85 FL (ref 78–100)
PHOSPHATE SERPL-MCNC: 1.6 MG/DL (ref 2.5–4.5)
PHOSPHATE SERPL-MCNC: 2.3 MG/DL (ref 2.5–4.5)
PLATELET # BLD AUTO: 167 10E9/L (ref 150–450)
POTASSIUM SERPL-SCNC: 3.5 MMOL/L (ref 3.4–5.3)
RBC # BLD AUTO: 3.42 10E12/L (ref 4.4–5.9)
SODIUM SERPL-SCNC: 144 MMOL/L (ref 133–144)
WBC # BLD AUTO: 8.6 10E9/L (ref 4–11)

## 2019-08-13 PROCEDURE — 12000001 ZZH R&B MED SURG/OB UMMC

## 2019-08-13 PROCEDURE — 80048 BASIC METABOLIC PNL TOTAL CA: CPT | Performed by: PEDIATRICS

## 2019-08-13 PROCEDURE — 99233 SBSQ HOSP IP/OBS HIGH 50: CPT | Performed by: INTERNAL MEDICINE

## 2019-08-13 PROCEDURE — 25000125 ZZHC RX 250: Performed by: INTERNAL MEDICINE

## 2019-08-13 PROCEDURE — 25000128 H RX IP 250 OP 636: Performed by: PEDIATRICS

## 2019-08-13 PROCEDURE — 83735 ASSAY OF MAGNESIUM: CPT | Performed by: PEDIATRICS

## 2019-08-13 PROCEDURE — 25800030 ZZH RX IP 258 OP 636: Performed by: PEDIATRICS

## 2019-08-13 PROCEDURE — 25000132 ZZH RX MED GY IP 250 OP 250 PS 637: Performed by: INTERNAL MEDICINE

## 2019-08-13 PROCEDURE — 85027 COMPLETE CBC AUTOMATED: CPT | Performed by: PEDIATRICS

## 2019-08-13 PROCEDURE — 84100 ASSAY OF PHOSPHORUS: CPT | Performed by: PEDIATRICS

## 2019-08-13 PROCEDURE — 40000982 ZZH STATISTICAL HAIKU-CANTO PHOTO

## 2019-08-13 PROCEDURE — 25000132 ZZH RX MED GY IP 250 OP 250 PS 637: Performed by: PEDIATRICS

## 2019-08-13 PROCEDURE — 40000141 ZZH STATISTIC PERIPHERAL IV START W/O US GUIDANCE

## 2019-08-13 PROCEDURE — 82378 CARCINOEMBRYONIC ANTIGEN: CPT | Performed by: PEDIATRICS

## 2019-08-13 PROCEDURE — 99223 1ST HOSP IP/OBS HIGH 75: CPT | Performed by: CLINICAL NURSE SPECIALIST

## 2019-08-13 PROCEDURE — 25800030 ZZH RX IP 258 OP 636: Performed by: INTERNAL MEDICINE

## 2019-08-13 PROCEDURE — 84100 ASSAY OF PHOSPHORUS: CPT | Performed by: INTERNAL MEDICINE

## 2019-08-13 PROCEDURE — 97535 SELF CARE MNGMENT TRAINING: CPT | Mod: GO

## 2019-08-13 PROCEDURE — 25000132 ZZH RX MED GY IP 250 OP 250 PS 637: Performed by: NURSE PRACTITIONER

## 2019-08-13 PROCEDURE — 36415 COLL VENOUS BLD VENIPUNCTURE: CPT | Performed by: PEDIATRICS

## 2019-08-13 RX ORDER — PANTOPRAZOLE SODIUM 40 MG/1
40 TABLET, DELAYED RELEASE ORAL
Status: DISCONTINUED | OUTPATIENT
Start: 2019-08-13 | End: 2019-08-16 | Stop reason: HOSPADM

## 2019-08-13 RX ADMIN — POLYETHYLENE GLYCOL 3350 17 G: 17 POWDER, FOR SOLUTION ORAL at 20:27

## 2019-08-13 RX ADMIN — DRONEDARONE 400 MG: 400 TABLET, FILM COATED ORAL at 08:51

## 2019-08-13 RX ADMIN — AMLODIPINE BESYLATE 5 MG: 5 TABLET ORAL at 08:53

## 2019-08-13 RX ADMIN — IRON SUCROSE 200 MG: 20 INJECTION, SOLUTION INTRAVENOUS at 18:19

## 2019-08-13 RX ADMIN — POLYETHYLENE GLYCOL 3350 17 G: 17 POWDER, FOR SOLUTION ORAL at 08:51

## 2019-08-13 RX ADMIN — DRONEDARONE 400 MG: 400 TABLET, FILM COATED ORAL at 17:21

## 2019-08-13 RX ADMIN — PANTOPRAZOLE SODIUM 40 MG: 40 TABLET, DELAYED RELEASE ORAL at 08:51

## 2019-08-13 RX ADMIN — QUETIAPINE FUMARATE 25 MG: 25 TABLET ORAL at 17:22

## 2019-08-13 RX ADMIN — FLUOXETINE 30 MG: 10 CAPSULE ORAL at 08:51

## 2019-08-13 RX ADMIN — POTASSIUM PHOSPHATE, MONOBASIC AND POTASSIUM PHOSPHATE, DIBASIC 20 MMOL: 224; 236 INJECTION, SOLUTION INTRAVENOUS at 11:56

## 2019-08-13 ASSESSMENT — ACTIVITIES OF DAILY LIVING (ADL)
ADLS_ACUITY_SCORE: 22

## 2019-08-13 ASSESSMENT — MIFFLIN-ST. JEOR: SCORE: 1492.88

## 2019-08-13 NOTE — PLAN OF CARE
Time of care: 9326-7721  Neuro: AOx4  Activity: Up w/SBA & walker.  Vitals: VSS on RA.   LDAS: R & L PIV - CDI. LPIV infusing phosphorus replacement. RPIV saline locked.    Cardiac: WDL  Respiratory: Occasional SOB.   GI/: No BM this shift. Pt able to void spontaneously.   Skin: WDL  Pain: Denies.  Diet: Regular diet, tolerating well.   Plan: Continue to monitor pt's HgB & s/s of bleeding. Anticipate follow up w/interdisciplinary team about next steps in care. Family @ bedside - active participants in pt's care plan. Continue to monitor & follow POC.

## 2019-08-13 NOTE — CONSULTS
Oncology Consultation    Reason for consult: Assess work-up of colon mass in 88-year-old gentleman with iron deficiency anemia    HPI patient was admitted on 8/8/2019 is 88 years old has a past medical history of hypertension GERD and high functioning autism he presented with anemia with iron deficiency and underwent transfusionAfter stabilization he underwent a colonoscopy on 8/11 was demonstrated a mass at the hepatic flexure.    A biopsy was performed which demonstrated adenocarcinoma moderately differentiated with a report of tumor buddings    Protein stain for MMR on the biopsy is to follow.    Medications were reviewed and consist of amlodipine stool softeners Benadryl, Prozac hydralazine      CT scan was performed that showed                                                                   Impression: In this 88-year-old with probable colon cancer at the  hepatic flexure:  1. Thickening of the colon at the hepatic flexure without associated  lymphadenopathy. No evidence of bowel obstruction.  2. A 1 cm hypoattenuating observation in the dome of right lobe of  liver. Indeterminant. If clinically appropriate, an MRI could be  obtained.  3. No other findings to suggest metastatic disease.        Impression this is an 88-year-old gentleman with a moderately differentiated invasive colon cancer at the hepatic flexure that has caused thickening of the colon at this site with no evidence of obstruction he has a small 1 cm hypoattenuating lesion at the dome of the right liver without any other evidence of 6 to suggest metastatic disease.    Right-sided colon cancer carries a worse prognosis than left sided colon cancer  There is a potential for obstruction even with a tumor at the hepatic flexure and therefore consideration of palliative hemicolectomy should be considered    There is no significant role for neoadjuvant chemotherapy at this time although should the liver metastasis be considered to be metastatic it  may be useful to consider a surgical resection of his metastatic site if it is feasible.  This consideration can be discussed with a colorectal surgery team here at [facility].    In short I think the potential for benefit from treating her primary tumor is is moderate to significant as it would have potentially curative potential as well as palliative potential by reducing the risk of obstruction.    If he does not wish to proceed with definitive surgical resection he may still require care for the potential of a diverting colostomy if appropriate.    I think these situations can be discussed with surgical colleagues. I recommend surgical consultation.     Dr Stephen Mckay will be the consultant tomorrow and may be able to add further information  I spent about 45 minutes evaluating the case, viewing the scans and discussing the plan and options with his niece Rosemarie and nephew Gabino.

## 2019-08-13 NOTE — PROGRESS NOTES
GASTROENTEROLOGY PROGRESS NOTE       Patient Summary   Sharon Baer is a 88 year old male with a past medical history of autism spectrum disorder, hypertension, hyperlipidemia, osteoarthritis, gout, hypotestostrone, obsessive compulsive disorder, depression, hx of suicidal ideation, hearing loss, inguinal hernia repair, suprapubic prostatectomy, GERD and now presenting with symptomatic anemia and hgb of 5.6.       ASSESSMENT AND RECOMMENDATIONS:   #Acute on chronic anemia   #Iron deficiency   #Proximal esophageal dysphagia   #Bright red blood per rectum  #NSAID use  Patient admitted with 2 month hx of progressive fatigue, dyspnea on exertion, pallor and intermittent bright red blood per rectum. He also reports solid and dry food getting stuck in proximal esophagus. Baseline hgb 12.6 to 15.2, on admission hgb was 5.6, s/p x2 units of RBCs with appropriate response as hgb went up to 7.6. Microcytic anemia noted, MCV 76 and RDW 19.1. Ferritin was 16 on 4/29/19, and repeat iron studies from 8/8/19 was low as well (iron 10, saturation index 3, and binding capacity 334).  No EGD report found in care everywhere but the pathology result from 2010 indicated reflux esophagitis. He admits taking 2-3 tablets of Ibuprofen or Advil 2-3 times/month, and he is on daily 325mg of Aspirin.      EGD from 8/12/19 was unremarkable; but colonoscopy completed at the same time showed a large semi-circumferential hepatic flexure mass, biopsied, 17mm cecal polyp, 4-7mm polyps in the descending and sigmoid colon, not resected; and extrenal hemorrhoids. Biopsy result came back with invasive moderately differentiated adenocarcinoma. CEA came back normal. Chest/abd/pelvic CT with contrast was reviewed, which reported thickening of the colon at the hepatic flexure without associated lymphadenopathy; and 1 cm hypoattenuating observation in the dome of right lobe of liver, indeterminant. Plan is to consult with surgery for possible liver  and colon resection.     Recommendations  --Diet as tolerated  --Consult with surgery for possible resection   --Discuss with surgery utility of triphasic MRI to further evaluate liver lesion  --Continue with IV iron x5 days total, and can transition to Ferric Maltol (oral iron with less GI side effects)  --Oral PPI once daily  --Avoid NSAIDs  --Monitor CBC and transfuse if hgb is less than 7  --Maintain 2 large bore IVs  --GI is signing off patient to primary team     Pt care plan discussed with Dr. Potts, GI staff physician. Thank you for involving us in this patient's care. Please do not hesitate to contact the GI service with any questions or concerns.    TINY Badillo [facility]  Department of Gastroenterology   P: [phone]  Subjective\events within the 24 hours:   Patient denies having chest pain, shortness breath and dizziness.     4 point ROS performed and negative unless noted above.           Medications:     Current Facility-Administered Medications   Medication     acetaminophen (TYLENOL) tablet 650 mg     amLODIPine (NORVASC) tablet 5 mg     bisacodyl (DULCOLAX) EC tablet 10 mg     bisacodyl (DULCOLAX) EC tablet 10 mg     bisacodyl (DULCOLAX) EC tablet 5 mg    Or     bisacodyl (DULCOLAX) EC tablet 10 mg    Or     bisacodyl (DULCOLAX) EC tablet 15 mg     diphenhydrAMINE (BENADRYL) injection 50 mg     dronedarone (MULTAQ) tablet 400 mg     EPINEPHrine (ADRENALIN) kit 0.3 mg     fentaNYL (PF) (SUBLIMAZE) injection     FLUoxetine (PROzac) capsule 30 mg     hydrALAZINE (APRESOLINE) injection 10 mg     iron sucrose (VENOFER) 200 mg in sodium chloride 0.9 % 100 mL intermittent infusion     lidocaine (LMX4) cream     lidocaine 1 % 0.1-1 mL     melatonin tablet 1 mg     methylPREDNISolone sodium succinate (solu-MEDROL) injection 125 mg     midazolam (VERSED) injection     naloxone (NARCAN) injection 0.1-0.4 mg     ondansetron (ZOFRAN-ODT) ODT tab 4 mg    Or     ondansetron (ZOFRAN) injection 4 mg      "pantoprazole (PROTONIX) EC tablet 40 mg     polyethylene glycol (MIRALAX/GLYCOLAX) Packet 17 g     potassium chloride (KLOR-CON) Packet 20-40 mEq     potassium chloride 10 mEq in 100 mL intermittent infusion with 10 mg lidocaine     potassium chloride 10 mEq in 100 mL sterile water intermittent infusion (premix)     potassium chloride 20 mEq in 50 mL intermittent infusion     potassium chloride ER (K-DUR/KLOR-CON M) CR tablet 20-40 mEq     potassium phosphate 15 mmol in D5W 250 mL intermittent infusion     potassium phosphate 20 mmol in D5W 250 mL intermittent infusion     potassium phosphate 20 mmol in D5W 500 mL intermittent infusion     potassium phosphate 25 mmol in D5W 500 mL intermittent infusion     QUEtiapine (SEROquel) tablet 25 mg     ranitidine (ZANTAC) injection 50 mg     sodium chloride (PF) 0.9% PF flush 10 mL     sodium chloride (PF) 0.9% PF flush 3 mL     sodium chloride (PF) 0.9% PF flush 3 mL        Physical Exam   Blood pressure 126/44, pulse 71, temperature 99.2  F (37.3  C), temperature source Oral, resp. rate 18, height 1.651 m (5' 5\"), weight 85 kg (187 lb 6.3 oz), SpO2 95 %.    Constitutional: Significantly hard of hearing. Patient is cooperative, pleasant, not dyspneic/diaphoretic, no acute distress  Eyes: Sclera anicteric/injected  Ears/nose/mouth/throat: Normal oropharynx without ulcers or exudate, mucus membranes moist, hearing intact  Neck: supple, thyroid normal size  CV: RRR. No edema in LE bilaterally  Respiratory: Unlabored breathing. Diminished lower lung lobes bilaterally.  Lymph: No axillary, submandibular, supraclavicular or inguinal lymphadenopathy  Abd: Obese, +bs, no hepatosplenomegaly, nontender, no peritoneal signs  Skin: warm, perfused, no jaundice  Neuro: AAO x 3, No asterixis  Psych: Normal affect  MSK: No deformity noted     Data   Current Labs  CBC  Recent Labs   Lab 08/13/19  0626 08/12/19  1819 08/12/19  0501 08/11/19  1625 08/11/19  0453 08/10/19  1133   WBC 8.6  --  " 6.1  --  7.5 9.4   RBC 3.42*  --  3.59*  --  3.73* 4.22*   HGB 7.2* 8.5* 7.5* 8.4* 7.8* 8.5*   HCT 29.2*  --  28.6*  --  29.7* 32.8*   MCV 85  --  80  --  80 78   MCH 21.1*  --  20.9*  --  20.9* 20.1*   MCHC 24.7*  --  26.2*  --  26.3* 25.9*   RDW 24.5*  --  22.7*  --  21.2* 21.0*     --  194  --  203 257     BMP  Recent Labs   Lab 08/13/19  0626 08/12/19  2055 08/12/19  0501 08/11/19  0453 08/10/19  1133  08/08/19  1414     --  146* 144 142   < > 142   POTASSIUM 3.5 3.5 3.1* 3.8 4.1   < > 4.3   CHLORIDE 117*  --  118* 114* 113*   < > 112*   CO2 20  --  21 20 21   < > 18*   ANIONGAP 6  --  8 10 9   < > 11   *  --  93 104* 100*   < > 173*   BUN 9  --  10 14 15   < > 30   CR 1.11  --  1.02 1.13 1.10   < > 1.44*   GFRESTIMATED 59*  --  65 58* 60*   < > 43*   GFRESTBLACK 68  --  76 67 69   < > 50*   UMANG 7.9*  --  8.0* 8.0* 8.6   < > 8.4*   MAG 2.1  --  2.1 2.2  --   --  2.3   PHOS 1.6*  --  2.5 3.4  --   --  3.3    < > = values in this interval not displayed.      INR  Recent Labs   Lab 08/08/19  1414   INR 1.07     Liver panel  Recent Labs   Lab 08/08/19  1414   PROTTOTAL 6.4*   ALBUMIN 3.1*   BILITOTAL 0.3   ALKPHOS 62   AST 9   ALT 11     History of Present Illness:   Sharon Baer is a 88 year old male with a history of autism spectrum disorder, hypertension, hyperlipidemia, osteoarthritis, gout, hypotestostrone, obsessive compulsive disorder, depression, hx of suicidal ideation, hearing loss, inguinal hernia repair, suprapubic prostatectomy, GERD and now presenting with symptomatic anemia and hgb of 5.6.     Patient reports having 2 month hx of progressive fatigue, dyspnea on exertion, pallor and intermittent bright red blood per rectum. He used to have constipation on and off but recently stool has been soft. Red blood with brown stool is unpredictable, and patient is not sure if mixed with stool or covered with stool. All he noticed is red blood in the toilet bowl that occurs in every  couple of day. No abdominal pian, nausea, vomiting, pain with swallowing, vomiting blood, or black stools. He also reports solid and dry food getting stuck in proximal esophagus. He admits taking 2-3 tablets of Ibuprofen or Advil 2-3 times/month. He takes 1 pill daily for heartburn that he has been on for many years (record indicate 40mg of Omeprazole daily). Patient remembers having an EGD in the past but no colonoscopy previously. He denies weight loss. He has history of tobacco use but stopped many years ago. He only drinks beer or wine occasionally, maybe 3-5 times/year. No family hx of colorectal cancer. Sister some other cancer that he cannot remember.

## 2019-08-13 NOTE — PROGRESS NOTES
Care Coordinator Progress Note    Admission Date/Time:  8/8/2019  Attending MD:  Mundo Hdz MD    Data  Chart reviewed, discussed with interdisciplinary team.   Patient was admitted for:    Anemia, unspecified type  Gastrointestinal hemorrhage, unspecified gastrointestinal hemorrhage type  Shortness of breath  Altered mental status, unspecified altered mental status type.    Coordination of Care   Chart reviewed, discussed with interdisciplinary team. Plan for oncology and palliative care to consult given patient's new colon cancer diagnosis.     Spoke with BARRY Harris from [facility] ([phone]) to discuss services. The [facility] is connected to both a TCU and SNF ([facility]). They do not have hospice services but have [facility] to [facility]. Steven spoke with the patient's niece, Rosemarie yesterday about patient's new diagnosis. Rosemarie mentioned palliative care, but would need to speak with entire family. Per Steven they also discussed transitioning the pt to TCU and/or SNF. The current [facility] only has 2 unlicensed staff and Steven was concerned if patient had future needs for pain management, the [facility] may not meet those needs. The patient was independent at baseline. Now needing SBA, walker as well as assist with ADL and hygiene. OT updated and will eval today. Team updated and will place palliative and oncology consult to give patient and family a good picture of all options.     11:40  BARRY Harris called requesting updated POLST if possible. Last one on file from 20163. Form printed and placed in patient's chart. Team will review this with patient today or tomorrow.     Plan  Anticipated Discharge Date: TBD  Anticipated Discharge Plan:  TBD, once OT, palliative and oncology consults completed.     Regina Shah RNCC

## 2019-08-13 NOTE — PROGRESS NOTES
GASTROENTEROLOGY PROGRESS NOTE       Patient Summary   Sharon Baer is a 88 year old male with a past medical history of autism spectrum disorder, hypertension, hyperlipidemia, osteoarthritis, gout, hypotestostrone, obsessive compulsive disorder, depression, hx of suicidal ideation, hearing loss, inguinal hernia repair, suprapubic prostatectomy, GERD and now presenting with symptomatic anemia and hgb of 5.6.       ASSESSMENT AND RECOMMENDATIONS:   #Acute on chronic anemia   #Iron deficiency   #Proximal esophageal dysphagia   #Bright red blood per rectum  #NSAID use  Patient admitted with 2 month hx of progressive fatigue, dyspnea on exertion, pallor and intermittent bright red blood per rectum. He also reports solid and dry food getting stuck in proximal esophagus. Baseline hgb 12.6 to 15.2, on admission hgb was 5.6, s/p x2 units of RBCs with appropriate response as hgb went up to 7.6. Microcytic anemia noted, MCV 76 and RDW 19.1. Ferritin was 16 on 4/29/19, and repeat iron studies from 8/8/19 was low as well (iron 10, saturation index 3, and binding capacity 334).  No EGD report found in care everywhere but the pathology result from 2010 indicated reflux esophagitis. He admits taking 2-3 tablets of Ibuprofen or Advil 2-3 times/month, and he is on daily 325mg of Aspirin.         Recommendations  --Diet as tolerated  --Consult with   --Discuss with surgery utility of triphasic MRI to further evaluate liver lesion  --Continue with IV iron x5 days, and can transition to Ferric Maltol (oral iron with less GI side effects)  --Oral PPI once daily  --Avoid NSAIDs  --Monitor CBC and transfuse if hgb is less than 7  --Maintain 2 large bore IVs  --GI      Pt care plan discussed with Dr. Potts, GI staff physician. Thank you for involving us in this patient's care. Please do not hesitate to contact the GI service with any questions or concerns.    TINY Badillo [facility]  Department of Gastroenterology   P:  [phone]  Subjective\events within the 24 hours:   Patient denies having chest pain, shortness breath and dizziness.     4 point ROS performed and negative unless noted above.           Medications:     Current Facility-Administered Medications   Medication     acetaminophen (TYLENOL) tablet 650 mg     amLODIPine (NORVASC) tablet 5 mg     bisacodyl (DULCOLAX) EC tablet 10 mg     bisacodyl (DULCOLAX) EC tablet 10 mg     bisacodyl (DULCOLAX) EC tablet 5 mg    Or     bisacodyl (DULCOLAX) EC tablet 10 mg    Or     bisacodyl (DULCOLAX) EC tablet 15 mg     diphenhydrAMINE (BENADRYL) injection 50 mg     dronedarone (MULTAQ) tablet 400 mg     EPINEPHrine (ADRENALIN) kit 0.3 mg     fentaNYL (PF) (SUBLIMAZE) injection     FLUoxetine (PROzac) capsule 30 mg     hydrALAZINE (APRESOLINE) injection 10 mg     iron sucrose (VENOFER) 200 mg in sodium chloride 0.9 % 100 mL intermittent infusion     lidocaine (LMX4) cream     lidocaine 1 % 0.1-1 mL     melatonin tablet 1 mg     methylPREDNISolone sodium succinate (solu-MEDROL) injection 125 mg     midazolam (VERSED) injection     naloxone (NARCAN) injection 0.1-0.4 mg     ondansetron (ZOFRAN-ODT) ODT tab 4 mg    Or     ondansetron (ZOFRAN) injection 4 mg     pantoprazole (PROTONIX) EC tablet 40 mg     polyethylene glycol (MIRALAX/GLYCOLAX) Packet 17 g     potassium chloride (KLOR-CON) Packet 20-40 mEq     potassium chloride 10 mEq in 100 mL intermittent infusion with 10 mg lidocaine     potassium chloride 10 mEq in 100 mL sterile water intermittent infusion (premix)     potassium chloride 20 mEq in 50 mL intermittent infusion     potassium chloride ER (K-DUR/KLOR-CON M) CR tablet 20-40 mEq     potassium phosphate 15 mmol in D5W 250 mL intermittent infusion     potassium phosphate 20 mmol in D5W 250 mL intermittent infusion     potassium phosphate 20 mmol in D5W 500 mL intermittent infusion     potassium phosphate 25 mmol in D5W 500 mL intermittent infusion     QUEtiapine (SEROquel)  "tablet 25 mg     ranitidine (ZANTAC) injection 50 mg     sodium chloride (PF) 0.9% PF flush 10 mL     sodium chloride (PF) 0.9% PF flush 3 mL     sodium chloride (PF) 0.9% PF flush 3 mL        Physical Exam   Blood pressure 126/44, pulse 71, temperature 99.2  F (37.3  C), temperature source Oral, resp. rate 18, height 1.651 m (5' 5\"), weight 85 kg (187 lb 6.3 oz), SpO2 95 %.    Constitutional: Significantly hard of hearing. Patient is cooperative, pleasant, not dyspneic/diaphoretic, no acute distress  Eyes: Sclera anicteric/injected  Ears/nose/mouth/throat: Normal oropharynx without ulcers or exudate, mucus membranes moist, hearing intact  Neck: supple, thyroid normal size  CV: RRR. No edema in LE bilaterally  Respiratory: Unlabored breathing. Diminished lower lung lobes bilaterally.  Lymph: No axillary, submandibular, supraclavicular or inguinal lymphadenopathy  Abd: Obese, +bs, no hepatosplenomegaly, nontender, no peritoneal signs  Skin: warm, perfused, no jaundice  Neuro: AAO x 3, No asterixis  Psych: Normal affect  MSK: No deformity noted     Data   Current Labs  CBC  Recent Labs   Lab 08/13/19  0626 08/12/19  1819 08/12/19  0501 08/11/19  1625 08/11/19  0453 08/10/19  1133   WBC 8.6  --  6.1  --  7.5 9.4   RBC 3.42*  --  3.59*  --  3.73* 4.22*   HGB 7.2* 8.5* 7.5* 8.4* 7.8* 8.5*   HCT 29.2*  --  28.6*  --  29.7* 32.8*   MCV 85  --  80  --  80 78   MCH 21.1*  --  20.9*  --  20.9* 20.1*   MCHC 24.7*  --  26.2*  --  26.3* 25.9*   RDW 24.5*  --  22.7*  --  21.2* 21.0*     --  194  --  203 257     BMP  Recent Labs   Lab 08/13/19  0626 08/12/19 2055 08/12/19  0501 08/11/19  0453 08/10/19  1133  08/08/19  1414     --  146* 144 142   < > 142   POTASSIUM 3.5 3.5 3.1* 3.8 4.1   < > 4.3   CHLORIDE 117*  --  118* 114* 113*   < > 112*   CO2 20  --  21 20 21   < > 18*   ANIONGAP 6  --  8 10 9   < > 11   *  --  93 104* 100*   < > 173*   BUN 9  --  10 14 15   < > 30   CR 1.11  --  1.02 1.13 1.10   < > " 1.44*   GFRESTIMATED 59*  --  65 58* 60*   < > 43*   GFRESTBLACK 68  --  76 67 69   < > 50*   UMANG 7.9*  --  8.0* 8.0* 8.6   < > 8.4*   MAG 2.1  --  2.1 2.2  --   --  2.3   PHOS 1.6*  --  2.5 3.4  --   --  3.3    < > = values in this interval not displayed.      INR  Recent Labs   Lab 08/08/19  1414   INR 1.07     Liver panel  Recent Labs   Lab 08/08/19  1414   PROTTOTAL 6.4*   ALBUMIN 3.1*   BILITOTAL 0.3   ALKPHOS 62   AST 9   ALT 11     History of Present Illness:   Sharon Baer is a 88 year old male with a history of autism spectrum disorder, hypertension, hyperlipidemia, osteoarthritis, gout, hypotestostrone, obsessive compulsive disorder, depression, hx of suicidal ideation, hearing loss, inguinal hernia repair, suprapubic prostatectomy, GERD and now presenting with symptomatic anemia and hgb of 5.6.     Patient reports having 2 month hx of progressive fatigue, dyspnea on exertion, pallor and intermittent bright red blood per rectum. He used to have constipation on and off but recently stool has been soft. Red blood with brown stool is unpredictable, and patient is not sure if mixed with stool or covered with stool. All he noticed is red blood in the toilet bowl that occurs in every couple of day. No abdominal pian, nausea, vomiting, pain with swallowing, vomiting blood, or black stools. He also reports solid and dry food getting stuck in proximal esophagus. He admits taking 2-3 tablets of Ibuprofen or Advil 2-3 times/month. He takes 1 pill daily for heartburn that he has been on for many years (record indicate 40mg of Omeprazole daily). Patient remembers having an EGD in the past but no colonoscopy previously. He denies weight loss. He has history of tobacco use but stopped many years ago. He only drinks beer or wine occasionally, maybe 3-5 times/year. No family hx of colorectal cancer. Sister some other cancer that he cannot remember.

## 2019-08-13 NOTE — PLAN OF CARE
A&O x2, disoriented to place and situation. VSS, denied pain. Pt is [place], uses pocket talker  Ambulate with standby assist & a walker. Pt has dyspnea on exertion. Good appetite and adequate oral intake. Urinary urgency. BM x1, brown stool, blood on tissue noted after BM, [facility] cover notified, with pt's colonoscopy and workup today, they are not concerned. L & R PIVs are saline locked, WDL. All scheduled medications administered.

## 2019-08-13 NOTE — PLAN OF CARE
Discharge Planner OT   Patient plan for discharge: Unable to report.  Current status: Extensive time spent this afternoon with patient and niece, Rosemarie. Patient did require physical assistance for safety with bed mobility, chair and toilet transfers this afternoon with verbal cueing necessary for safe hand placement on 4WW as well as grab bars.   Barriers to return to prior living situation: Cognition, endurance, medical status.  Recommendations for discharge: SNF (LTC connected to [facility] in which patient currently resides). Given patient's significant cognitive impairment (7/30 on the MoCA), activities of daily living are not being performed independently (needs continuous verbal cueing for thoroughness and accuracy). SBA to CGA also required for safety with bathroom transfers and navigation of environment.TCU does not seem appropriate at this time as it is highly unlikely patient will retain any new learning/education and have the ability to carry over information.   Rationale for recommendations: OT will continue to follow patient at a lower frequency to maintain functional abilities and strength.       Entered by: Janina Zepeda 08/13/2019 4:10 PM

## 2019-08-13 NOTE — PLAN OF CARE
DNR/DNI. Patient somewhat hypertensive and tachy overnight on Room air.   Patient is alert and  [place] which compromises his safety overnight due to his lack of awareness in surroundings when it comes to safety. His confusion factor is increased due to his  loss of hearing. He use pocket talker with communication and has autism. Pt  alert to self.  Patient slept between cares except for a few instances where he awakened and sat up up alert wanting to have some water and impulsively attempted to get out of bed. Patient  requires sba to maneuver safely within the room. Patient responds no when asked if he has any pain or nausea and requested 2 glasses of water overnight.   Fall precautions in place.

## 2019-08-13 NOTE — CONSULTS
[facility]  Palliative Care Consultation Note    Patient: Sharon Baer  Date of Admission:  8/8/2019    Requesting Clinician / Team: Mundo Hdz MD  Reason for consult: Goals of care    Recommendations:    Appreciate oncology consult and recommendations    Rosemarie (raymond) is his HCA    Will continue to follow for ongoing support of patient and family    Would be appropriate for palliative care clinic for ongoing goals of care discussions    These recommendations have been discussed with primary team.      Thank you for the opportunity to participate in the care of this patient and family. Our team: will continue to follow.     During regular M-F work hours -- if you are not sure who specifically to contact -- please contact us by sending a text page to our team consult pager at [phone].    After regular work hours and on weekends/holidays, you can call our answering service at [phone]. Also, who's on call for us is available in [employer].       Assessments:  Sharon Baer is a 88 year old male with a past medical history of hypertension, hyperlipidemia, GERD, gout, who presented to the ED for evaluation of abnormal labs and anemia. Found to have colon mass and biopsy showed adenocarcinoma.    Today, the patient was seen for:  Colon cancer       Prognosis, Goals, & Planning:      Functional Status just prior to hospitalization: 2 (Ambulatory and capable of all selfcare but unable to carry out any work activities; may need help with IADLs up and about > 50% of waking hours)      Prognosis, Goals, and/or Advance Care Planning were addressed today: Yes        Summary/Comments: met with Raymond- Rosemarie and family today. Discussed that they were told that he has colon cancer, family understands that we have not found evidence of metastasis. Family has not me with oncology yet to discuss options. Talked through doing surgery and concerns around his age and ability to  cope with this. Discussed also should they choose to not do surgery and not treat the cancer that I would be worried about eventually an obstruction, discussed the ways in which we provide symptom relief.    We did discuss the route of comfort focused treatment as well, discussed hospice which they are familiar with. Discussed needing a qualifying diagnoses for this, discussed those with metastatic cancers qualify, and we will need to know from the oncology perspective if his current state of his cancer would qualify for hospice should that be a route they choose. Discussed also option for palliative care as an option if he does not qualify for hospice and having a POLST for not re-hospitalizing as an option as well if he doesn't qualify for hospice.     Also discussed palliative care role should they decide to move forward with cancer directed treatments. Discussed options of also TCU moving forward and at the time of him getting discharged from TCU the team there will be able to make arrangements for next steps, whether that is back to his [facility] with out home care versus a LTC. Family is understandable concerned about how he will be cared for in the various options that are being presented.    Family feels his quality of life has gone down a lot, he does not really participate in the activities at the [facility]. Rosemarie mentions that the family is thinking of going the non-operative approach. There is also two nurses (one that work(ed) in hospice) as well who are helping Rosemarie with navigating this situation. Offered ongoing support in decision making as they gather more information.      Patient's decision making preferences: unable to assess          Patient has decision-making capacity today for complex decisions: Partial (needs assistance with complex decisions) He is oriented x3 and states he came to the hospital because he was sick.             I have concerns about the patient/family's health literacy today: No            Patient has a completed Health Care Directive: Yes, and on file.    Legally designated health care agent: noted that Monica was listed as first HCA however she is decesased, alternate is Rosemarie      Code status: DNR/DNI    Coping, Meaning, & Spirituality:   Mood, coping, and/or meaning in the context of serious illness were addressed today: Yes  Summary/Comments: family is coping appropriately given situation, Rosemarie mentions she is a teacher and is going to be going back to school next week.    Social:     Living situation: lives in [facility]    Key family / caregivers: niece and nephew local, two sisters that are out of state.    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 There also was a report that he has had progressive dyspnea on excertion, shortness of breath, and pallor. Intermittently he was also known to have BRBPR for the past two months.     Family reports he has a long history of mental health issues, nephew reports OCD and anxiety. His anxiety is often made worse when he socially isolates himself. They also feel that over the past two years he has gone down hill gradually. They not he has been hospitalized a few times in the past. He also found that he had an old stroke.    Palliative care consulted 8/13 for patient with newly diagnosed colon cancer, family not sure goals of care if they want to pursue treatment, comfort, or both.     Key Palliative Symptom Data:  # Pain severity the last 12 hours: none  # Dyspnea severity the last 12 hours: none  # Nausea severity the last 12 hours: none  # Anxiety severity the last 12 hours: none        ROS:  Comprehensive ROS is reviewed and is negative except as here & per HPI:      Past Medical  History:  Past Medical History:   Diagnosis Date     Autism spectrum disorder 7/16/2015     GERD (gastroesophageal reflux disease)      Gout      Hearing loss      Hyperlipemia     on atorvastatin     Hypertension      Hypotestosteronism      IGT (impaired glucose tolerance)      OCD (obsessive compulsive disorder)     on cymbalta     Osteoarthritis, hip, bilateral      Vitamin D deficiency     resolved 2/2013        Past Surgical History:  Past Surgical History:   Procedure Laterality Date     bilateral cataract surgery       bilateral ear surgery       COLONOSCOPY N/A 8/12/2019    Procedure: COLONOSCOPY, WITH POLYPECTOMY AND BIOPSY Area also Tattooed;  Surgeon: Jamaal Potts MD;  Location:  GI     ESOPHAGOSCOPY, GASTROSCOPY, DUODENOSCOPY (EGD), COMBINED N/A 8/12/2019    Procedure: ESOPHAGOGASTRODUODENOSCOPY (EGD);  Surgeon: Jamaal Potts MD;  Location:  GI     HERNIA REPAIR, INGUINAL RT/LT       SUPRAPUBIC PROSTATECTOMY           Family History:  Family History   Problem Relation Age of Onset     Cancer Sister         skin     Myocardial Infarction Maternal Uncle 60        Allergies:  Allergies   Allergen Reactions     Penicillins Rash and Other (See Comments)     He believes he had a positive skin test for PCN allergy at the time of a dog bite, about 1960.        Medications:  I have reviewed this patient's medication profile and medications from this hospitalization.   Noted scheduled meds are:  Fluoxetine 30 mg daily  Protonix 40 mg daily  Miralax BID  Quetiapine 25 mg daily in evening    Noted PRN meds are:  Acetaminophen PRN  Bisacodyl PRN  Benadryl PRN  Ondansetron PRN    Physical Exam:  Vital Signs: Temp: 99.7  F (37.6  C) Temp src: Oral BP: 126/48 Pulse: 78 Heart Rate: 95 Resp: 18 SpO2: 95 % O2 Device: None (Room air)    Weight: 187 lbs 6.26 oz     Constitutional: Awake, alert, cooperative, no apparent distress  Lungs: No increased work of breathing  Neurologic: Awake,  alert, oriented to name, place and time.   Neuropsychiatric: Normal affect  Skin: No rashes    Data reviewed:  Recent imaging reviewed, my comments on pertinents:   Impression: In this 88-year-old with probable colon cancer at the  hepatic flexure:  1. Thickening of the colon at the hepatic flexure without associated  lymphadenopathy. No evidence of bowel obstruction.  2. A 1 cm hypoattenuating observation in the dome of right lobe of  liver. Indeterminant. If clinically appropriate, an MRI could be  obtained.  3. No other findings to suggest metastatic disease.    Recent lab data reviewed, my comments on pertinents:   Creatinine 1.11  GFR 59  Hemoglobin 7.2  WBC 8.6  Platelets 167    [facility]  Palliative Care Consult Team  Pager: [phone]     Total time spent was 70 minutes,  >50% of time was spent counseling and/or coordination of care regarding goals of care.     45 min of that were spent face-to-face, in 1215, out 1300.

## 2019-08-14 ENCOUNTER — APPOINTMENT (OUTPATIENT)
Dept: OCCUPATIONAL THERAPY | Facility: CLINIC | Age: 84
DRG: 375 | End: 2019-08-14
Payer: COMMERCIAL

## 2019-08-14 ENCOUNTER — APPOINTMENT (OUTPATIENT)
Dept: PHYSICAL THERAPY | Facility: CLINIC | Age: 84
DRG: 375 | End: 2019-08-14
Payer: COMMERCIAL

## 2019-08-14 PROBLEM — C18.3 MALIGNANT NEOPLASM OF HEPATIC FLEXURE (H): Status: ACTIVE | Noted: 2019-08-14

## 2019-08-14 LAB
HGB BLD-MCNC: 7.3 G/DL (ref 13.3–17.7)
PHOSPHATE SERPL-MCNC: 2.5 MG/DL (ref 2.5–4.5)

## 2019-08-14 PROCEDURE — 25000132 ZZH RX MED GY IP 250 OP 250 PS 637: Performed by: PEDIATRICS

## 2019-08-14 PROCEDURE — 25800030 ZZH RX IP 258 OP 636: Performed by: INTERNAL MEDICINE

## 2019-08-14 PROCEDURE — 97750 PHYSICAL PERFORMANCE TEST: CPT | Mod: GP

## 2019-08-14 PROCEDURE — 12000001 ZZH R&B MED SURG/OB UMMC

## 2019-08-14 PROCEDURE — 25000132 ZZH RX MED GY IP 250 OP 250 PS 637: Performed by: INTERNAL MEDICINE

## 2019-08-14 PROCEDURE — 99233 SBSQ HOSP IP/OBS HIGH 50: CPT | Performed by: INTERNAL MEDICINE

## 2019-08-14 PROCEDURE — 84100 ASSAY OF PHOSPHORUS: CPT | Performed by: INTERNAL MEDICINE

## 2019-08-14 PROCEDURE — 36415 COLL VENOUS BLD VENIPUNCTURE: CPT | Performed by: INTERNAL MEDICINE

## 2019-08-14 PROCEDURE — 40000141 ZZH STATISTIC PERIPHERAL IV START W/O US GUIDANCE

## 2019-08-14 PROCEDURE — 85018 HEMOGLOBIN: CPT | Performed by: INTERNAL MEDICINE

## 2019-08-14 PROCEDURE — 97535 SELF CARE MNGMENT TRAINING: CPT | Mod: GO

## 2019-08-14 PROCEDURE — 40000802 ZZH SITE CHECK

## 2019-08-14 PROCEDURE — 25000125 ZZHC RX 250: Performed by: INTERNAL MEDICINE

## 2019-08-14 PROCEDURE — 97530 THERAPEUTIC ACTIVITIES: CPT | Mod: GP

## 2019-08-14 PROCEDURE — 25000132 ZZH RX MED GY IP 250 OP 250 PS 637: Performed by: NURSE PRACTITIONER

## 2019-08-14 PROCEDURE — 97116 GAIT TRAINING THERAPY: CPT | Mod: GP

## 2019-08-14 RX ORDER — AMLODIPINE BESYLATE 5 MG/1
5 TABLET ORAL DAILY
DISCHARGE
Start: 2019-08-15 | End: 2020-01-01

## 2019-08-14 RX ORDER — LORAZEPAM 0.5 MG/1
0.5 TABLET ORAL
Status: COMPLETED | OUTPATIENT
Start: 2019-08-14 | End: 2019-08-15

## 2019-08-14 RX ORDER — FLUOXETINE 10 MG/1
30 CAPSULE ORAL DAILY
DISCHARGE
Start: 2019-08-15 | End: 2020-01-01

## 2019-08-14 RX ORDER — POLYETHYLENE GLYCOL 3350 17 G/17G
17 POWDER, FOR SOLUTION ORAL 2 TIMES DAILY
DISCHARGE
Start: 2019-08-15 | End: 2020-01-01

## 2019-08-14 RX ORDER — PANTOPRAZOLE SODIUM 40 MG/1
40 TABLET, DELAYED RELEASE ORAL
DISCHARGE
Start: 2019-08-15 | End: 2019-01-01

## 2019-08-14 RX ADMIN — DRONEDARONE 400 MG: 400 TABLET, FILM COATED ORAL at 08:10

## 2019-08-14 RX ADMIN — PANTOPRAZOLE SODIUM 40 MG: 40 TABLET, DELAYED RELEASE ORAL at 08:10

## 2019-08-14 RX ADMIN — FLUOXETINE 30 MG: 10 CAPSULE ORAL at 08:10

## 2019-08-14 RX ADMIN — POLYETHYLENE GLYCOL 3350 17 G: 17 POWDER, FOR SOLUTION ORAL at 08:10

## 2019-08-14 RX ADMIN — AMLODIPINE BESYLATE 5 MG: 5 TABLET ORAL at 08:10

## 2019-08-14 RX ADMIN — DRONEDARONE 400 MG: 400 TABLET, FILM COATED ORAL at 17:42

## 2019-08-14 RX ADMIN — POTASSIUM PHOSPHATE, MONOBASIC AND POTASSIUM PHOSPHATE, DIBASIC 15 MMOL: 224; 236 INJECTION, SOLUTION INTRAVENOUS at 01:39

## 2019-08-14 RX ADMIN — QUETIAPINE FUMARATE 25 MG: 25 TABLET ORAL at 17:42

## 2019-08-14 ASSESSMENT — ACTIVITIES OF DAILY LIVING (ADL)
ADLS_ACUITY_SCORE: 23
ADLS_ACUITY_SCORE: 22
ADLS_ACUITY_SCORE: 23
ADLS_ACUITY_SCORE: 22
ADLS_ACUITY_SCORE: 23
ADLS_ACUITY_SCORE: 23

## 2019-08-14 ASSESSMENT — MIFFLIN-ST. JEOR: SCORE: 1492.88

## 2019-08-14 NOTE — PLAN OF CARE
Discharge Planner PT 5B  Patient plan for discharge: not discussed  Current status: Pt encountered supine and agreeable to therapy. Bed mobility supine<>sit and scooting with supervision and VC's for procedural steps in tasks. Sit<>stand transfer CGA-SBA up to 4WW. Pt impulsive and unsafe during stand>sit by walking towards a chair, letting go of 4WW, and quickly moving towards chair to sit. 5x STS assessment in 28.24 seconds indicated Pt at high risk for future falls. Ambulates SBA-CGA with 4WW with fast gait speed and wide LENA. Assist for geovanny cares needed.   Barriers to return to prior living situation: medical status, level of assist, cognition, impulsivity  Recommendations for discharge: LTC  Rationale for recommendations: Pt near baseline for functional mobility, however unsafe to perform without assist due to impulsivity and cognitive deficits. Pt requires higher level of assist with bADLs and mobility than current residence can provide. Continued IP PT to address noted deficits and discharge to LTC when medically stable.       Entered by: Kerry Horn 08/14/2019 3:10 PM

## 2019-08-14 NOTE — CONSULTS
Colon and Rectal Surgery Consultation Note  [facility]    Sharon Baer MRN# [id number]   Age: 88 year old YOB: 1931     Date of Admission:  8/8/2019    Reason for consult: New diagnosis of right colon cancer       Requesting physician: Dr. Mundo Hdz       Level of consult: Consult and follow for daily recommendations           Assessment:   89 y/o M with PMH of HTN, hearing loss, autism, OCD, depression, inguinal hernia, supra-pubic prostatectomy, on chronic steroids, resides in assisted living who was found to have a hgb of 5.7 associated with ~2 months of intermittent BRBPR, increased fatigue and AGUILA.  Noted to have sinus bradycardia and paroxysmal rapid afib upon admission.  Now s/p colonoscopy and EGD on 8/12 showed likely malignant partially obstructing tumor at hepatic flexure.  Pathology with adenocarcinoma, invasive, moderately differentiated with tumor budding. CRS consulted for possible curative vs palliatve colectomy vs diversion.  Most recent albumin is 3.1 from 8/8/2019.  Pt passing stools.         Recommendations:   - no indication for acute surgical intervention however pt may benefit from resection vs diversion in near future  - recommend Abdominal MRI as this may help clarify whether a right colectomy could be curative versus palliative.  We understand that pt may not tolerate this.  - If hgb is stable pt can discharge from a CRS perspective and follow up with Dr. Irwin in 1 week to further discuss surgical options, risks, benefits and give family additional time to discuss.  We would help coordinate this appt.   - greatly appreciate medical optimization for possible surgical intervention   - agree with keeping stools soft and regular with miralax    Did discuss with Rosemarie non-operative options (possible stent, or G-tube) vs operative intervention (likely laparoscopic right colectomy with or without an ostomy as pt is on steroids) and how an MRI may  "give more information as to whether surgery could be curative vs palliative.  Also discussed NSQIP surgical risks with an approximate 25-30% risk of complication, 5-10% risk of death, and since from a nursing facility, pt will for sure return to a facility, however, will very likely require more care. Rosemarie would like patient to see Dr. Irwin in clinic to have some time to talk to family.          History of Present Illness:   CC: new diagnosis of colon cancer    Pt presented to ED for evaluation of anemia that was discovered on lab tests from PCP, found to have a Hgb of 5.7.  Pt reports ~ 2months of progressive fatigue, AGUILA, SOB, pallor.  Pt and niece also report intermittent BRBPR for the last 2 months.  No previous history of GIB.  Pt does have intermittent constipation, but most recently stools have been soft.  Pt reports that everything is mixed and that all he notices is red blood in the toilet bowl that happens every few days.  Denies abdominal pain, nausea, vomiting, nor black stools.  No recent changes in medications.  Denies recent weight loss.  Does take a daily aspirin 325mg and advil or ibuprofen a few times per month.  Pt denies prior colonoscopy but has had a prior EGD for reflux esophagitis.      No known family history of colon cancer.      Currently pt denies abdominal pain, nausea, vomiting.  His response to why he is in the hospital is \"cause I have been sick.\"      Niece Rosemarie is not sure how long pt has been on prednisone.           Past Medical History:     Past Medical History:   Diagnosis Date     Autism spectrum disorder 7/16/2015     GERD (gastroesophageal reflux disease)      Gout      Hearing loss      Hyperlipemia     on atorvastatin     Hypertension      Hypotestosteronism      IGT (impaired glucose tolerance)      OCD (obsessive compulsive disorder)     on cymbalta     Osteoarthritis, hip, bilateral      Vitamin D deficiency     resolved 2/2013             Past Surgical " History:     Past Surgical History:   Procedure Laterality Date     bilateral cataract surgery       bilateral ear surgery       COLONOSCOPY N/A 8/12/2019    Procedure: COLONOSCOPY, WITH POLYPECTOMY AND BIOPSY Area also Tattooed;  Surgeon: Jamaal Potts MD;  Location:  GI     ESOPHAGOSCOPY, GASTROSCOPY, DUODENOSCOPY (EGD), COMBINED N/A 8/12/2019    Procedure: ESOPHAGOGASTRODUODENOSCOPY (EGD);  Surgeon: Jamaal Potts MD;  Location:  GI     HERNIA REPAIR, INGUINAL RT/LT       SUPRAPUBIC PROSTATECTOMY               Social History:     Social History     Socioeconomic History     Marital status: Single     Spouse name: Not on file     Number of children: Not on file     Years of education: Not on file     Highest education level: Not on file   Occupational History     Occupation: retired   Social Needs     Financial resource strain: Not on file     Food insecurity:     Worry: Not on file     Inability: Not on file     Transportation needs:     Medical: Not on file     Non-medical: Not on file   Tobacco Use     Smoking status: Former Smoker     Packs/day: 1.00     Years: 20.00     Pack years: 20.00     Smokeless tobacco: Never Used   Substance and Sexual Activity     Alcohol use: No     Drug use: No     Sexual activity: Not on file   Lifestyle     Physical activity:     Days per week: Not on file     Minutes per session: Not on file     Stress: Not on file   Relationships     Social connections:     Talks on phone: Not on file     Gets together: Not on file     Attends Shinto service: Not on file     Active member of club or organization: Not on file     Attends meetings of clubs or organizations: Not on file     Relationship status: Not on file     Intimate partner violence:     Fear of current or ex partner: Not on file     Emotionally abused: Not on file     Physically abused: Not on file     Forced sexual activity: Not on file   Other Topics Concern      Service Not Asked      Blood Transfusions Not Asked     Caffeine Concern Not Asked     Occupational Exposure Not Asked     Hobby Hazards Not Asked     Sleep Concern Not Asked     Stress Concern Not Asked     Weight Concern Not Asked     Special Diet No     Back Care Not Asked     Exercise No     Bike Helmet Not Asked     Seat Belt Not Asked     Self-Exams Not Asked   Social History Narrative     Not on file             Family History:     Family History   Problem Relation Age of Onset     Cancer Sister         skin     Myocardial Infarction Maternal Uncle 60             Allergies:      Allergies   Allergen Reactions     Penicillins Rash and Other (See Comments)     He believes he had a positive skin test for PCN allergy at the time of a dog bite, about 1960.             Medications:     No current facility-administered medications on file prior to encounter.   Current Outpatient Medications on File Prior to Encounter:  acetaminophen 500 MG CAPS Take 500 mg by mouth 2 times daily   allopurinol (ZYLOPRIM) 100 MG tablet Take 1 tablet (100 mg) by mouth daily   aspirin (ASA) 325 MG EC tablet Take 325 mg by mouth daily    atorvastatin (LIPITOR) 20 MG tablet Take 1 tablet (20 mg) by mouth daily   cholecalciferol (VITAMIN D) 1000 UNIT tablet Take 2 tablets (2,000 Units) by mouth daily   FLUoxetine (PROZAC) 10 MG capsule Take 10 mg by mouth daily Take with 20 mg tablet for a total of 30 mg daily.   FLUoxetine (PROZAC) 20 MG capsule Take 20 mg by mouth daily Take with 10 mg tablet for a total of 30 mg daily.   metoprolol succinate ER (TOPROL-XL) 50 MG 24 hr tablet Take 50 mg by mouth daily    omeprazole (PRILOSEC) 40 MG DR capsule Take 1 capsule (40 mg) by mouth daily   predniSONE (DELTASONE) 10 MG tablet Take 20 mg by mouth daily    QUEtiapine (SEROQUEL) 25 MG tablet Take one tablet by mouth every evening with supper   benzocaine-menthol (CHLORASEPTIC) 6-10 MG lozenge Place 1 lozenge inside cheek every 2 hours as needed for moderate pain  "  polyethylene glycol-propylene glycol (SYSTANE ULTRA) 0.4-0.3 % SOLN ophthalmic solution Place 1 drop into both eyes 2 times daily as needed for dry eyes             Review of Systems:      All other review of systems negative, except for what is mentioned above        Physical Exam:   /76 (BP Location: Right arm)   Pulse 69   Temp 97.8  F (36.6  C) (Oral)   Resp 16   Ht 1.651 m (5' 5\")   Wt 89.6 kg (197 lb 8.5 oz)   SpO2 94%   BMI 32.87 kg/m    General: Alert, interactive, NAD, eating pancakes.   Resp: normal resp effort  Cardiac: RRR  Abdomen: Soft, nontender, protuberant.  No masses palpated, no rebound or guarding.  Extremities: No obvious joint abnormalities  Skin: Warm and dry, no jaundice or rash  Neuro: alert. Oriented to self, year and month but not hospital.  CN 2-12 grossly intact.  Very [place].     Labs reviewed  Hgb now 7-8s.   Creat 1.11  Most recent albumin is 3.1 from 8/8/2019      Brenda Rosales PA-C   Colon and Rectal Surgery  [id number]    Pt seen and discussed with Fellow, Dr. Pino.       Staff Addendum:  Agree with the consultation H&P as documented by the housestaff. I was personally involved with the recommendations made by our service for this patient.  Sherry Irwin MD  Colon and Rectal Surgery Staff  [facility]       "

## 2019-08-14 NOTE — PLAN OF CARE
Pt is A&O x2, disoriented to place and time. VSS, denied pain. Pt is [place], uses pocket talker. Ambulate with standby assist & a walker. Pt has dyspnea on exertion. Good appetite and adequate oral intake. Urinary urgency. BM x2, brown stool, blood on tissue after BM. L PIV was infiltrate last shift, redness worsening, requested vascular consult to take picture of site. R PIV was infiltrated on this shift, warm pack applied, new R PIV was placed, saline locked & WDL. Phosphorus was replaced, ordered a recheck at 2230. All scheduled medications administered. Plan to have a colorectal surgery consult, family would like to be notified when the surgery team is here for consult Rosemarie Newman (Niece) [phone] or Gabino (nephew) [phone].

## 2019-08-14 NOTE — PROGRESS NOTES
Social Work Services Progress Note    Hospital Day: 6  Date of Initial Social Work Evaluation: Not yet completed   Collaborated with:  Pt's Rosemarie andrade, medical team, TCU    Data:  Pt is an 88 year old male admitted to [facility] on 8/8/2019 for acute anemia and was found to have a new dx of colon cancer while hospitalized.     PT/OT have recommended TCU placement upon discharge.     Intervention:  SW participated in interdisciplinary rounds this morning to assess for needs and discharge planning. Team expressed pt will be medically ready for discharge tomorrow to a TCU.    SUDHIR made a phone call to pt's Rosemarie andrade. Rosemarie [facility] and was not interested in any other facilities at this time. A prior insurance authorization will need to be completed by the accepting facility.    A referral was sent to:    1) [facility] (ph: [phone]; f: [phone])      Assessment:  Pt not seen for this intervention    Plan:    Anticipated Disposition:  Facility:  TCU    Barriers to d/c plan:  Medical stability; accepting TCU    Follow Up:  SW will remain available and continue to follow, assess for needs, and assist in discharge planning.     KLARISSA De La Fuente, MONSERRAT   5B   Pager [phone]  Phone [phone]    Addendum 1:30p: SUDHIR received a call from Anabel in admissions at [facility]. She was wondering what the therapy plans were for the pt. SUDHIR then realized that OT had recommended SNF and not TCU. This writer called Anabel and left a VM stating that the recommendations were for the pt to be placed in a SNF and not TCU. Currently waiting on a call back.    KLARISSA De La Fuente, MONSERRAT   5B   Pager [phone]  Phone [phone]

## 2019-08-14 NOTE — PLAN OF CARE
Pt alert and disoriented to place and time. VSS on RA. [place], uses pocket talker to communicate. Replaced phos this shift, recheck in AM. Family would like to be updated after team/surgery rounds today. Their numbers are included in previous note by Bernadette nursing student.

## 2019-08-14 NOTE — PLAN OF CARE
Time of care: 6868-1662  Neuro: AOx4; occasional confusion.  Activity: Up w/SBA.   Vitals: VSS on RA.?   LDAS: LPIV in place - CDI.   Cardiac: WDL?   Respiratory: Some dyspnea on exertion.   GI/: BM x1 this shift. Able to void spontaneously.   Skin: WDL   Pain: Denies.   Diet: Regular, tolerating well. NPO after 2pm for MRI @ 6pm.    Plan: Colorectal team will be in contact w/pt's family with updates. Pt HgB 7.3 today & no presence of blood in stools. Able to make needs known. Will continue to monitor & update MD with changes.

## 2019-08-14 NOTE — PROGRESS NOTES
"Sharon Baer is a 88 year old male patient.    Active Problems:    Acute anemia    Malignant neoplasm of hepatic flexure (H)    Blood pressure 95/52, pulse 73, temperature 98.6  F (37  C), temperature source Oral, resp. rate 16, height 1.651 m (5' 5\"), weight 89.6 kg (197 lb 8.5 oz), SpO2 95 %.    Subjective Feels comfortable, no pain     Objective: Alert and interactive    Assessment & Plan: I had a long talk today with Mr. Baer to help him understand the nature of his disease.  He seemed to be aware that he has cancer or at least something in his colon, but I'm not sure how good his understanding is beyond that.  I reviewed with him that at present the stage of his cancer is a little bit uncertain: he clearly has hepatic flexure tumor that is fairly advanced.  To my review of his scans he probably has some local adenopathy which is not problematic.  He has a solitary liver lesion that quite possibly represents a metastasis but that is not certain at present.  I think he is well enough that it would be very reasonable to consider resection of his primary with the intention of avoiding the near term morbidity of a bowel obstruction and perhaps eliminating the possibility of progressive disease. This would hopefully be a relatively nonmorbid procedure, although I'm a little concerned about whether it may be adherent to or even directly invading his liver.  Even if he has a solitary liver met I think it would be reasonable to resect the primary and we could easily ablate his liver metastasis at the same time with the potential for cure.  I am not sure he is in a state for which I would want to consider adjuvant treatment after, and certainly not if our intent is not curative, but even that might be a consideration.  I think it will be important to have some help making sure he has a good understanding of what is going on and can provide appropriate consent for all of this.  In the near term we need to get " him past the acute effects of his severe anemia and we already seem well on her way with that.    Edvin Mckay  8/14/2019    Visit time today >40 minutes, most spent on the above discussion.

## 2019-08-14 NOTE — PROGRESS NOTES
[facility] [place] Progress Note - Hospitalist Service, Gold 8       Date of Admission:  8/8/2019  Assessment & Plan      Sharon Baer is a 88 year old male admitted on 8/8/2019. He has PMH of HTN, HLD, GERD, hearing loss, autism, afib, troponin elevations, and Gout who present anemia and GIANLUCA with likely GI bleed and mass/hemorroid/prolapse on exam.  HGb responded well to 1U pRBC.  Now stable without evidence of bleed, s/p EGD and colonoscopy 8/11, demonstrating colonic mass.      Changes today:  Prep for discharge;   MRI today for eval of mets  Arranging follow up appointments    # Acute/Chronic Blood Loss Anemia  # GIANLUCA:   # colonic mass, see below  Presented with ~ 2 month hx of progressive fatigue, dyspnea on exertion, pallor, and intermittent BRBPR.  Colonoscopy 8/12 with colonic mass concerning for malignancy, presumed source of iron deficiency anemia  - Protonix IV BID  - ASA to restart prior to discharge  - Transfuse for Hgb less than 7.0 or with hemodynamically significant bleeding    # colonic mass:  Adenocarcinoma.  Appreciate input by oncology and palliative.  - will discuss goals of care and dispo with family: if curative approach, would likely need colectomy with ostomy.  If palliative only, would need to discuss symptoms of colonic obstruction.    Oncology, colorectal, and palliative following.  - plan:follow up with colorectal (Dr. Irwin in 1 week) to make decision about surgery or not.  - f/u with oncology if needed, colorectal can assist from clinic appointment  - f/u with palliative care    # Afib with h/o RVR  # Bradycardia   # HTN:   - metoprolol  dose 12.5mg  - started dronederone 400mg BID geovanny colonoscopy and EGD  - Appreciate EP cardiology's assistance with periprocedure medications and getting a home regimen  - continue amlodipine 5 mg (started in hospital)  - aspirin at discharge    # Mild SAMEERA on CKD III: Cr 1.44 on admission. BL Cr  appears ~ 1.14-1.30. UA negative. Improving   - Avoid hypotension, dehydration, nephrotoxic medications  - BMP daily while on colonoscopy prep with heart issues  - Continue to closely monitor     # Protein Calorie Malnutrition: In setting of chronic illness. Albumin 3.1, Total protein 6.4.  - Nutrition consult placed    # HLD: PTA statin.Hold    # Gout: PTA allopurinol- Hold  -  Prednisone - hold  # OCD: PTA fluoxetine and seroquel.   - restarting tonight  # GERD: No PTA medications        Diet: Regular Diet Adult  Room Service    DVT Prophylaxis: VTE Prophylaxis contraindicated due to GI bleed  Wang Catheter: not present  Code Status: DNR/DNI      Disposition Plan   Expected discharge: 1-2 days, recommended to TCU once hemoglobin stable.  Entered: Mundo Hdz MD 08/14/2019, 1:57 PM       The patient's care was discussed with the Patient and his family (Niece who is POA).    Mundo Hdz MD  Hospitalist Service, [address], [place]  Pager: [phone]  Please see sticky note for cross cover information  ______________________________________________________________________    Interval History     No acute events.   No questions or concerns.  He denies abd pain, N/V/D, fevers, or other symptoms    Data reviewed today: I reviewed all medications, new labs and imaging results over the last 24 hours.     Physical Exam   Vital Signs: Temp: 98.7  F (37.1  C) Temp src: Oral BP: 127/58 Pulse: 85 Heart Rate: 115 Resp: 16 SpO2: 95 % O2 Device: None (Room air)    Weight: 197 lbs 8.51 oz  Constitutional: Elderly male with Headphone w/speaker used for communication. Niece at bedside. Pallor.  Head: Normocephalic and atraumatic.   Eyes: Conjunctivae are normal. Pupils are equal, round, and reactive to light.  Pharynx has no erythema or exudate, mucous membranes are moist  Cardiovascular: Regular rate and rhythm without murmurs or gallops  Pulmonary/Chest: CTAB. No respiratory distress.  GI: Soft with  good bowel sounds.  Non-tender, non-distended, with no guarding, no rebound, no peritoneal signs.   Back:  No bony or CVA tenderness   Musculoskeletal:  No edema or clubbing   Skin: Skin is warm and dry. No rash noted to exposed skin areas .  Neurological: Alert and oriented to person  Psychiatric: Deficits in memory apparent

## 2019-08-15 ENCOUNTER — APPOINTMENT (OUTPATIENT)
Dept: MRI IMAGING | Facility: CLINIC | Age: 84
DRG: 375 | End: 2019-08-15
Attending: INTERNAL MEDICINE
Payer: COMMERCIAL

## 2019-08-15 LAB
CREAT SERPL-MCNC: 1.1 MG/DL (ref 0.66–1.25)
GFR SERPL CREATININE-BSD FRML MDRD: 60 ML/MIN/{1.73_M2}
HGB BLD-MCNC: 7.6 G/DL (ref 13.3–17.7)

## 2019-08-15 PROCEDURE — 74183 MRI ABD W/O CNTR FLWD CNTR: CPT

## 2019-08-15 PROCEDURE — 25000132 ZZH RX MED GY IP 250 OP 250 PS 637: Performed by: NURSE PRACTITIONER

## 2019-08-15 PROCEDURE — 25000132 ZZH RX MED GY IP 250 OP 250 PS 637: Performed by: INTERNAL MEDICINE

## 2019-08-15 PROCEDURE — 36415 COLL VENOUS BLD VENIPUNCTURE: CPT | Performed by: INTERNAL MEDICINE

## 2019-08-15 PROCEDURE — 25500064 ZZH RX 255 OP 636: Performed by: STUDENT IN AN ORGANIZED HEALTH CARE EDUCATION/TRAINING PROGRAM

## 2019-08-15 PROCEDURE — 40000982 ZZH STATISTICAL HAIKU-CANTO PHOTO

## 2019-08-15 PROCEDURE — 82565 ASSAY OF CREATININE: CPT | Performed by: INTERNAL MEDICINE

## 2019-08-15 PROCEDURE — 85018 HEMOGLOBIN: CPT | Performed by: INTERNAL MEDICINE

## 2019-08-15 PROCEDURE — A9585 GADOBUTROL INJECTION: HCPCS | Performed by: STUDENT IN AN ORGANIZED HEALTH CARE EDUCATION/TRAINING PROGRAM

## 2019-08-15 PROCEDURE — 12000001 ZZH R&B MED SURG/OB UMMC

## 2019-08-15 PROCEDURE — 99233 SBSQ HOSP IP/OBS HIGH 50: CPT | Performed by: INTERNAL MEDICINE

## 2019-08-15 PROCEDURE — 25000132 ZZH RX MED GY IP 250 OP 250 PS 637: Performed by: PEDIATRICS

## 2019-08-15 RX ORDER — GADOBUTROL 604.72 MG/ML
10 INJECTION INTRAVENOUS ONCE
Status: COMPLETED | OUTPATIENT
Start: 2019-08-15 | End: 2019-08-15

## 2019-08-15 RX ADMIN — DRONEDARONE 400 MG: 400 TABLET, FILM COATED ORAL at 08:26

## 2019-08-15 RX ADMIN — PANTOPRAZOLE SODIUM 40 MG: 40 TABLET, DELAYED RELEASE ORAL at 08:26

## 2019-08-15 RX ADMIN — LORAZEPAM 0.5 MG: 0.5 TABLET ORAL at 09:39

## 2019-08-15 RX ADMIN — FLUOXETINE 30 MG: 10 CAPSULE ORAL at 08:26

## 2019-08-15 RX ADMIN — DRONEDARONE 400 MG: 400 TABLET, FILM COATED ORAL at 18:05

## 2019-08-15 RX ADMIN — AMLODIPINE BESYLATE 5 MG: 5 TABLET ORAL at 09:06

## 2019-08-15 RX ADMIN — GADOBUTROL 10 ML: 604.72 INJECTION INTRAVENOUS at 11:25

## 2019-08-15 RX ADMIN — QUETIAPINE FUMARATE 25 MG: 25 TABLET ORAL at 18:05

## 2019-08-15 RX ADMIN — POLYETHYLENE GLYCOL 3350 17 G: 17 POWDER, FOR SOLUTION ORAL at 08:27

## 2019-08-15 ASSESSMENT — MIFFLIN-ST. JEOR: SCORE: 1466.88

## 2019-08-15 ASSESSMENT — ACTIVITIES OF DAILY LIVING (ADL)
ADLS_ACUITY_SCORE: 22
ADLS_ACUITY_SCORE: 23
ADLS_ACUITY_SCORE: 22
ADLS_ACUITY_SCORE: 24

## 2019-08-15 NOTE — PLAN OF CARE
Pt disoriented to place and time. Hgb 7.3 this am, no stools this shift. Pt had MRI this am with oral ativan given prior. Pt is up SBA with walker. L PIV S.L. Plan to tx to [facility] tomorrow at 9am with niece providing tx to facility. Will continue POC.

## 2019-08-15 NOTE — PROGRESS NOTES
"Social Work Services Progress Note    Hospital Day: 7  Date of Initial Social Work Evaluation: Not yet completed   Collaborated with:  Medical team, [facility] ([phone]; f: [phone]).     Data: Pt is an 88 year old male admitted to [facility] on 8/8/2019 for acute anemia and was found to have a new dx of colon cancer while hospitalized.      PT/OT were recommending TCU, however they are now recommending LTC. OT states in their notes, \"TCU does not seem appropriate at this time as it is highly unlikely patient will retain any new learning/education and have the ability to carry over information.\"    Intervention:  SW participated in interdisciplinary rounds this morning to assess for needs and discharge planning. Team expressed pt is medically ready for discharge.     SW has been in contact w/ [facility] admissions (ph: [phone]). Sweetie in admissions expressed they can accept the pt and that they would accept him at their TCU facility for now. Facility requests tomorrow morning discharge.     SW contacted pt's niece, Rosemarie. Rosemarie expressed she could be to [facility] by 9am to transport the pt to [facility] TCU.     Medical provider paged on discharge for tomorrow at 9am, bedside RN updated, [facility] aware of 9am discharge.      Assessment:  Pt not seen for this assessment    Plan:    Anticipated Disposition:  Facility:  LTC    Barriers to d/c plan:  Accepting bed    Follow Up:  SW will remain available and continue to follow, assess for needs, and assist in discharge planning.     KLARISSA De La Fuente, [facility]   5B   Pager [phone]  Phone [phone]        "

## 2019-08-15 NOTE — PLAN OF CARE
Pt alert and disoriented to place and time. VSS on RA. [place], uses pocket talker to communicate. Denies pain. MRI planned for today - pt reports claustrophobia and has PRN ativan available - day shift RN to find out time of procedure to give ativan at appropriate time. Pt's family is very involved and would like to be updated with changes. No other significant events. Continue to monitor.

## 2019-08-15 NOTE — PLAN OF CARE
Time: 1500 - 2300  Reason for admission: Pt admitted 8/8 with acute anemia and possible GIB.  VS: VSS on RA. Denied any pain this shift.   Activity: SBA with walker.   Neuros: Disoriented to time and situation. Pt is very [place] - pocket talker at bedside.   Cardiac: Denied any CP. No edema notes.    Respiratory: Denied any SOB. LS clear.   GI/: +gas/+BS. No BM this shift. Denied any abd pain, N/V. Adequate UO.   Diet: Regular diet - no appetite this shift.   Skin: Redness/raised areas on bilateral forearms from pervious IV infiltrations.   Lines: PIV to LFA SL.   Labs: hgb 7.3.  New changes this shift: No changes this shift. Pt slept off and on this shift without any concerns.   Plan: MRI planned for tomorrow at some point (they were unable to do this evening). Please give PRN Ativan before MRI.   Will continue to monitor & follow POC.

## 2019-08-15 NOTE — PROGRESS NOTES
[facility], [facility] Progress Note - Hospitalist Service, Gold 8       Date of Admission:  8/8/2019  Assessment & Plan      Sharon Baer is a 88 year old male admitted on 8/8/2019. He has PMH of HTN, HLD, GERD, hearing loss, autism, afib, troponin elevations, and Gout who present anemia and GIANLUCA with likely GI bleed and mass/hemorroid/prolapse on exam.  HGb responded well to 1U pRBC.  Now stable without evidence of bleed, s/p EGD and colonoscopy 8/11, demonstrating colonic mass.      Changes today:  Didn't tolerate MRI last night, repeat today  Awaiting placement     # Acute/Chronic Blood Loss Anemia  # GIANLUCA:   # colonic mass, see below  Presented with ~ 2 month hx of progressive fatigue, dyspnea on exertion, pallor, and intermittent BRBPR.  Colonoscopy 8/12 with colonic mass concerning for malignancy, presumed source of iron deficiency anemia  - Protonix BID  - ASA to restart prior to discharge  - Transfuse for Hgb less than 7.0 or with hemodynamically significant bleeding    # colonic mass:  Adenocarcinoma.  Appreciate input by oncology and palliative.  - will discuss goals of care and dispo with family: if curative approach, would likely need colectomy with ostomy.  If palliative only, would need to discuss symptoms of colonic obstruction.    Oncology, colorectal, and palliative following.  - plan:follow up with colorectal (Dr. Irwin in 1 week) to make decision about surgery or not.  - f/u with oncology if needed, colorectal can assist from clinic appointment  - f/u with palliative care    # Afib with h/o RVR  # Bradycardia   # HTN:   - metoprolol  dose 12.5mg  - started dronederone 400mg BID   - Appreciate EP cardiology's assistance with periprocedure medications and getting a home regimen  - continue amlodipine 5 mg (started in hospital)  - aspirin at discharge    # Mild SAMEERA on CKD III: Cr 1.44 on admission. BL Cr appears ~ 1.14-1.30. UA negative. Improving    - Avoid hypotension, dehydration, nephrotoxic medications  - BMP daily while on colonoscopy prep with heart issues  - Continue to closely monitor     # Protein Calorie Malnutrition: In setting of chronic illness. Albumin 3.1, Total protein 6.4.  - Nutrition consult placed    # HLD: PTA statin.restart at discharge    # Gout: PTA allopurinol- restart at discharge  -  Prednisone - hold, discuss with family    # OCD: PTA fluoxetine and seroquel.   - restarting tonight  # GERD: No PTA medications        Diet: Regular Diet Adult  Room Service  Advance Diet as Tolerated    DVT Prophylaxis: VTE Prophylaxis contraindicated due to GI bleed  Wang Catheter: not present  Code Status: DNR/DNI      Disposition Plan   Expected discharge: 1-2 days, recommended to TCU once placement available  Entered: Mundo Hdz MD 08/14/2019, 10:02 PM       The patient's care was discussed with the Patient and his family (Niece who is POA).    Mundo Hdz MD  Hospitalist Service, [address], [place]  Pager: [phone]  Please see sticky note for cross cover information  ______________________________________________________________________    Interval History     No acute events.   No questions or concerns.  He denies abd pain, N/V/D, fevers, or other symptoms    Data reviewed today: I reviewed all medications, new labs and imaging results over the last 24 hours.     Physical Exam   Vital Signs: Temp: 99  F (37.2  C) Temp src: Oral BP: 125/53 Pulse: 79 Heart Rate: 115 Resp: 16 SpO2: 97 % O2 Device: None (Room air)    Weight: 197 lbs 8.51 oz  Constitutional: Elderly male with Headphone w/speaker used for communication. Niece at bedside. Pallor.  Head: Normocephalic and atraumatic.   Eyes: Conjunctivae are normal. Pupils are equal, round, and reactive to light.  Pharynx has no erythema or exudate, mucous membranes are moist  Cardiovascular: Regular rate and rhythm without murmurs or gallops  Pulmonary/Chest: CTAB.  No respiratory distress.  GI: Soft with good bowel sounds.  Non-tender, non-distended, with no guarding, no rebound, no peritoneal signs.   Back:  No bony or CVA tenderness   Musculoskeletal:  No edema or clubbing   Skin: Skin is warm and dry. No rash noted to exposed skin areas .  Neurological: Alert and oriented to person  Psychiatric: Deficits in memory apparent

## 2019-08-15 NOTE — PROGRESS NOTES
Colorectal Surgery Progress Note  POD#7      Subjective:  Sleeping comfortably this  AM.  Stools marked in epic.     Vitals:  Vitals:    08/14/19 1414 08/14/19 1745 08/14/19 1925 08/15/19 0543   BP: 95/52  125/53 (!) 151/53   BP Location: Left arm  Right arm Right arm   Pulse: 73  79    Resp: 16  16 16   Temp: 98.6  F (37  C)  99  F (37.2  C) 97.6  F (36.4  C)   TempSrc: Oral  Oral Axillary   SpO2: 95%  97% 96%   Weight:  89.6 kg (197 lb 8.5 oz)     Height:         I/O:  I/O last 3 completed shifts:  In: 10 [I.V.:10]  Out: -     Physical Exam:  Gen: Awakens.  NAD.   Pulm: Non-labored breathing  Abd: Soft, non-distended, non tender  Ext:  Warm and well-perfused    BMP  Recent Labs   Lab 08/15/19  0505 08/14/19  0937 08/13/19  2251 08/13/19  0626 08/12/19  2055 08/12/19  0501 08/11/19  0453 08/10/19  1133  08/08/19  1414   NA  --   --   --  144  --  146* 144 142   < > 142   POTASSIUM  --   --   --  3.5 3.5 3.1* 3.8 4.1   < > 4.3   CHLORIDE  --   --   --  117*  --  118* 114* 113*   < > 112*   CO2  --   --   --  20  --  21 20 21   < > 18*   BUN  --   --   --  9  --  10 14 15   < > 30   CR 1.10  --   --  1.11  --  1.02 1.13 1.10   < > 1.44*   GLC  --   --   --  133*  --  93 104* 100*   < > 173*   MAG  --   --   --  2.1  --  2.1 2.2  --   --  2.3   PHOS  --  2.5 2.3* 1.6*  --  2.5 3.4  --   --  3.3    < > = values in this interval not displayed.     CBC  Recent Labs   Lab 08/15/19  0505 08/14/19  1259 08/13/19  0626 08/12/19  1819 08/12/19  0501  08/11/19  0453 08/10/19  1133   WBC  --   --  8.6  --  6.1  --  7.5 9.4   HGB 7.6* 7.3* 7.2* 8.5* 7.5*   < > 7.8* 8.5*   HCT  --   --  29.2*  --  28.6*  --  29.7* 32.8*   PLT  --   --  167  --  194  --  203 257    < > = values in this interval not displayed.         ASSESSMENT: This is a 88 year old male with autism, HTN, hearing loss, OCD, depression, h/o bilateral inguinal hernia repairs, h/o supra-pubic prostatectomy, on chronic steroids, resides in assisted living who was  found to have a hgb of 5.7 associated with 2 months of intermittent BRBPR, increased fatigue and AGUILA.  Noted to have sinus bradycardia and paroxysmal rapid afib also.  Now s/p colonoscopy on 8/12 and was found to have a partially obstructing adenocarcinoma of the hepatic flexure.  There is an indeterminate liver lesion.      - no acute surgical intervention  - appreciate MRI if tolerated to help determine whether surgery could potentially be curative vs palliative  - f/u in clinic in one week with Dr. Irwin to continue risk-benefit discussion of possible right colectomy vs diversion  - greatly appreciate continue medical optimization for possible surgery in near future  - agree with keeping stools soft and regular with miralax  - MRI reviewed  - have placed our clinic and discharge information.  Will coordinate outpatient follow up.   - we will sign off at this time.  Please page/call with questions/concerns.       Brenda Rosales PA-C   Colon and Rectal Surgery  [id number]     Patient was discussed with Dr. Pino

## 2019-08-16 VITALS
HEART RATE: 79 BPM | TEMPERATURE: 96.2 F | SYSTOLIC BLOOD PRESSURE: 140 MMHG | OXYGEN SATURATION: 94 % | BODY MASS INDEX: 31.96 KG/M2 | HEIGHT: 65 IN | RESPIRATION RATE: 16 BRPM | DIASTOLIC BLOOD PRESSURE: 43 MMHG | WEIGHT: 191.8 LBS

## 2019-08-16 PROCEDURE — 25000132 ZZH RX MED GY IP 250 OP 250 PS 637: Performed by: INTERNAL MEDICINE

## 2019-08-16 PROCEDURE — 25000132 ZZH RX MED GY IP 250 OP 250 PS 637: Performed by: NURSE PRACTITIONER

## 2019-08-16 PROCEDURE — 99233 SBSQ HOSP IP/OBS HIGH 50: CPT | Performed by: CLINICAL NURSE SPECIALIST

## 2019-08-16 PROCEDURE — 99239 HOSP IP/OBS DSCHRG MGMT >30: CPT | Performed by: INTERNAL MEDICINE

## 2019-08-16 PROCEDURE — 25000132 ZZH RX MED GY IP 250 OP 250 PS 637: Performed by: PEDIATRICS

## 2019-08-16 RX ADMIN — PANTOPRAZOLE SODIUM 40 MG: 40 TABLET, DELAYED RELEASE ORAL at 08:49

## 2019-08-16 RX ADMIN — AMLODIPINE BESYLATE 5 MG: 5 TABLET ORAL at 08:49

## 2019-08-16 RX ADMIN — POLYETHYLENE GLYCOL 3350 17 G: 17 POWDER, FOR SOLUTION ORAL at 08:49

## 2019-08-16 RX ADMIN — FLUOXETINE 30 MG: 10 CAPSULE ORAL at 08:49

## 2019-08-16 RX ADMIN — DRONEDARONE 400 MG: 400 TABLET, FILM COATED ORAL at 08:49

## 2019-08-16 ASSESSMENT — ACTIVITIES OF DAILY LIVING (ADL)
ADLS_ACUITY_SCORE: 23
ADLS_ACUITY_SCORE: 23
ADLS_ACUITY_SCORE: 24

## 2019-08-16 NOTE — PLAN OF CARE
Patient A&O x 2 this shift. Disoriented to place and time. VSS on RA. Patient up with SBA and walker. Voiding adequately and 1 BM this shift. Patient has pocket talker d/t being very hard of hearing. No c/o pain/N/V this shift. Plan for discharge at 0900 tomorrow morning to TCU with transportation provided by raymond Houston. Continue with current plan of care.

## 2019-08-16 NOTE — PROGRESS NOTES
[facility]  Palliative Care Daily Progress Note       Recommendations & Counseling       Goals of care: DNR/DNI, discharging to TCU    Family plans to discuss tonight and make decision regarding cares    Provided information re: Palliative Care Clinic and contact information    Encouraged family to continue discussion with the TCU team regarding goals of care            Assessments          Sharon Baer is a 88 year old male with a past medical history of hypertension, hyperlipidemia, GERD, gout, who presented to the ED for evaluation of abnormal labs and anemia. Found to have colon mass and biopsy showed adenocarcinoma.     Today, the patient was seen for:  Colon cancer     Prognosis, Goals, or Advance Care Planning was addressed today with: Yes.    Met with niece, Rosemarie and nephew, Gabino, today. Discussed with them goals of care today. Reilly's family is going to discuss this evening the options and make a decision. Provided guidance to family about the process to communicate their decision. Instructed them to inform their TCU care team the decision, and that team can help assist with an needed transitions. It seems that family is leaning toward hospice and discussed with them to think about hospice agencies that they would want to utilize. Rosemarie had questions today about insurance coverage and this would be better answered by the . Discussed doing a POLST after their discussion tomorrow.     Family had some questions today about the surgery route, discussed care needed with a stoma and what to expect. Discussed at times those with cognitive impairments having difficulty understanding reasons behind putting them through uncomfortable and painful interventions and at times results in agitation behaviors.    Did discuss based on the findings thus far, that we are presuming this cancer to be metastatic (lesion in the liver), and this he would qualify for hospice should his  goals of care align with the philosophy of hospice.     Mood, coping, and/or meaning in the context of serious illness were addressed today: Yes.  Summary/Comments: patient is calm, family seems anxious around decision making.            Interval History:     Chart review/discussion with unit or clinical team members:   Notes reviewed, no acute events, discharging to TCU today.    Per patient or family/caregivers today:  Family present and supportive  Patient is calm and comfortably resting in bed      Key Palliative Symptoms:  # Pain severity the last 12 hours: none  # Dyspnea severity the last 12 hours: none  # Nausea severity the last 12 hours: none  # Anxiety severity the last 12 hours: none    Patient is on opioids: bowels not assessed today.           Review of Systems:     Besides above ROS was reviewed and is unremarkable          Medications:     I have reviewed this patient's medication profile and medications during this hospitalization.           Physical Exam:   Vitals were reviewed  Temp: 96.2  F (35.7  C) Temp src: Axillary BP: (!) 140/43   Heart Rate: 72 Resp: 16 SpO2: 94 % O2 Device: None (Room air)      Intake/Output Summary (Last 24 hours) at 8/16/2019 1030  Last data filed at 8/15/2019 1805  Gross per 24 hour   Intake 256 ml   Output --   Net 256 ml     Constitutional: resting comfortably in bed, no apparent distress  Lungs: No increased work of breathing  Neurologic: KIRSTIE given patient resting in bed  Skin: No rashes               Data Reviewed:     Reviewed recent labs, comments:   Creatinine 1.10  GFR 60  Sodium 144  Potassium 3.5  Hemoglobin 7.6    [facility]  Palliative Care Consult Team  Pager: [phone]     Total time spent was 40 minutes,  >50% of time was spent counseling and/or coordination of care regarding goals of care.

## 2019-08-16 NOTE — PLAN OF CARE
Physical Therapy Discharge Summary    Reason for therapy discharge:    Discharged to transitional care facility.    Progress towards therapy goal(s). See goals on Care Plan in [facility] electronic health record for goal details.  Goals met    Therapy recommendation(s):    Continued therapy is recommended.  Rationale/Recommendations:  Cognitive deficit impairs safety with IND mobility and bADLs.

## 2019-08-16 NOTE — PLAN OF CARE
Pt alert and disoriented to place and time. VSS on RA. [place], uses pocket talker to communicate. Denies pain. Plan for discharge today at 9am. Diana Houston will be picking pt up to take to TCU. AVS complete, MD needs to sign discharge and place discharge order.

## 2019-08-16 NOTE — PROGRESS NOTES
Social Work Services Discharge Note      Patient Name:  Sharon Baer     Anticipated Discharge Date: 8/16/2019    Discharge Disposition:   TCU:  [facility]     RN to RN: [phone]    Following MD:  Mundo Hdz MD     Pre-Admission Screening (PAS) online form has been completed.  The Level of Care (LOC) is:  Determined  Confirmation Code is: [id number]  Patient/caregiver informed of referral to [facility] Line for Pre-Admission Screening for skilled nursing facility (SNF) placement and to expect a phone call post discharge from SNF.     Additional Services/Equipment Arranged:  n/a     Patient / Family response to discharge plan:  in agreement     Persons notified of above discharge plan:  Pt's raymond Houston, medical team, bedside RN, TCU    Staff Discharge Instructions:  SW will fax discharge orders and signed hard scripts for any controlled substances.    Please print a packet and send with patient.     CTS Handoff completed:  YES    Medicare Notice of Rights provided to the patient/family:  YES    Pt is a 88 year old male admitted to [facility] on 8/8/2019 for acute anemia and was fount to have a new dx of colon cancer while hospitalized.    PT/OT recommended TCU w/ possible placement for LTC. Pt originally from [facility].    Pt has been accepted to [facility] TCU. Pt will discharge from [facility] today at 9am and will be transported by his nieceRosemarie. Team has been updated.    KLARISSA De La Fuente, LGSW   5B   Pager [phone]  Phone [phone]

## 2019-08-16 NOTE — DISCHARGE SUMMARY
[facility], [place]  Hospitalist Discharge Summary       Date of Admission:  8/8/2019  Date of Discharge:  8/16/2019  Discharging Provider: Mundo Hdz MD  Discharge Team: Hospitalist Service, Gold 8    Discharge Diagnoses   #Acute/Chronic Blood Loss Anemia  #Adenocarcinoma of the colon, newly diagnosed  # Afib with h/o RVR  # Bradycardia   # HTN  # Mild SAMEERA on CKD III  #hyperlipidemia   # Protein Calorie Malnutrition    Important information:  Mr. Baer is hard of hearing, please use his headset and microphone.  His niece Rosemarie, [phone] is his spokesperson    Follow-ups Needed After Discharge   Follow-up Appointments     Adult [facility] [place]/[facility] Follow-up and recommended labs and tests      COLON AND RECTAL SURGERY DISCHARGE RECOMMENDATIONS:    NOTIFY  Please contact Yajaira Troncoso RN, Mami Caballero RN or Ronald Latham LPN at   [phone] for problems after discharge such as:  -Temperature > 101F, chills, rigors, dizziness  -Inability to tolerate diet, nausea or vomiting  -You stop passing gas, develop significant bloating, abdominal pain  -Have blood in stools/vomit  -Have severe diarrhea/constipation  -Any other questions or concerns.  - At nights (after 4:30pm), on weekends, or if urgent, call [phone]   and ask the  to speak with the on-call Colorectal Surgery resident   or fellow      FOLLOW-UP  1.  You will need to follow-up with Dr. Sherry Irwin in the   Colon and Rectal Surgery clinic in 1 week(s).  Please contact our clinic    (phone # [phone]) if you have not heard from our clinic in   3 business days afer discharge to schedule a follow-up appointment.      Appointments on [place] and/or [facility] (with [facility] [place] or [facility]   provider or service). Call [phone] if you haven't heard regarding   these appointments within 7 days of discharge.         Follow Up and recommended labs and tests      -Follow up with Nursing home physician.  The  following labs/tests are   recommended: hgb.  -follow up with Dr. Irwin from colorectal surgery, in approximately   1 week, to further discuss possible surgery  -Follow up with Dr. Mckay, of oncology, to discuss other possible cancer   treatments  -Follow up with palliative care, next available           Discharge Disposition   Discharged to short-term care facility  Condition at discharge: Stable    Hospital Course   Buffy is a 88 year old male admitted on 8/8/2019. He has PMH of HTN, HLD, GERD, hearing loss, autism, afib, troponin elevations, and Gout who present anemia and GIANLUCA with likely GI bleed and mass/hemorroid/prolapse on exam. , s/p EGD and colonoscopy 8/11, demonstrating right partly circumferential colonic mass, with liver lesion, unknown met vs cyst.  Path demonstrating adenoca.     # Acute/Chronic Blood Loss Anemia  # GIANLUCA:   # adenocarcinoma of the colon  Aspirin discontinued.  Seen by Colorectal Oncology, and palliative care.  Upcoming plan:  Family currently deciding about surgery: if curative approach, would likely need colectomy with ostomy.  If palliative only, would need to discuss symptoms of colonic obstruction.    - plan:follow up with colorectal (Dr. Irwin in 1 week) to make decision about surgery or not.  - f/u with oncology if needed, colorectal can assist from clinic appointment  - f/u with palliative care   - of note, has a lesion on liver, attempted MRI but didn't tolerate.    # Afib with h/o RVR  # Bradycardia   # HTN:   - metoprolol  dose 12.5mg, - started dronederone 400mg BID per EP cards.  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 stable     # hard of hearing:  Needs his headset and microphone to hear anything.  However, memory and cognition issues, so niece plays a role in all medical decisions    # Protein Calorie Malnutrition: In setting of chronic illness. Albumin  3.1, Total protein 6.4.  - Nutrition consult placed    # HLD: PTA statin.restart at discharge     # Gout: PTA allopurinol- restart at discharge  -  Prednisone - held during the admission, TCU can discuss with family     # OCD: PTA fluoxetine and seroquel.     Consultations This Hospital Stay   GI LUMINAL ADULT IP CONSULT  PHYSICAL THERAPY ADULT IP CONSULT  OCCUPATIONAL THERAPY ADULT IP CONSULT  MEDICATION HISTORY IP PHARMACY CONSULT  VASCULAR ACCESS CARE ADULT IP CONSULT  CARDIOLOGY GENERAL ADULT IP CONSULT  VASCULAR ACCESS CARE ADULT IP CONSULT  CARDIOLOGY GENERAL ADULT IP CONSULT  CARDIOLOGY ELECTROPHYSIOLOGY (EP) IP CONSULT  PALLIATIVE CARE ADULT IP CONSULT  ONCOLOGY ADULT IP CONSULT  VASCULAR ACCESS CARE ADULT IP CONSULT  COLORECTAL SURGERY ADULT IP CONSULT  VASCULAR ACCESS CARE ADULT IP CONSULT  VASCULAR ACCESS CARE ADULT IP CONSULT  OCCUPATIONAL THERAPY ADULT IP CONSULT  PHYSICAL THERAPY ADULT IP CONSULT    Code Status   DNR/DNI    Time Spent on this Encounter   IMundo, personally saw the patient today and spent greater than 30 minutes discharging this patient.       Mundo Hdz MD  [facility] [place]  ______________________________________________________________________    Physical Exam   Vital Signs: Temp: 96.2  F (35.7  C) Temp src: Axillary BP: (!) 140/43   Heart Rate: 72 Resp: 16 SpO2: 94 % O2 Device: None (Room air)    Weight: 191 lbs 12.8 oz  Head: Normocephalic and atraumatic.   Eyes: Conjunctivae are normal. Pupils are equal, round, and reactive to light.  Pharynx has no erythema or exudate, mucous membranes are moist  Cardiovascular: Regular rate and rhythm without murmurs or gallops  Pulmonary/Chest: CTAB. No respiratory distress.  GI: Soft with good bowel sounds.  Non-tender, non-distended, with no guarding, no rebound, no peritoneal signs.   Back:  No bony or CVA tenderness   Musculoskeletal:  No edema or clubbing   Skin: Skin is warm and dry. No rash noted to  exposed skin areas .  Neurological: Alert and oriented to person  Psychiatric: Deficits in memory apparent    Primary Care Physician   Neo Castano    Discharge Orders      General info for SNF    Length of Stay Estimate: Short Term Care: Estimated # of Days <30  Condition at Discharge: Improving  Level of care:skilled   Rehabilitation Potential: Good  Admission H&P remains valid and up-to-date: Yes  Recent Chemotherapy: N/A  Use Nursing Home Standing Orders: Yes     Mantoux instructions    Give two-step Mantoux (PPD) Per Facility Policy Yes     Follow Up and recommended labs and tests    -Follow up with Nursing home physician.  The following labs/tests are recommended: hgb.  -follow up with Dr. Iwrin from colorectal surgery, in approximately 1 week, to further discuss possible surgery  -Follow up with Dr. Mckay, of oncology, to discuss other possible cancer treatments  -Follow up with palliative care, next available     Activity - Up ad tierney     Reason for your hospital stay    Dear Sharon Baer    Your were hospitalized at [facility] with weakness and found to have low hemoglobin; this is a sign of bleeding.  We discovered that the cause was a tumor in your colon.  You are now stable and ready to be discharged to a transitional care unit to get stronger, prior to returning home, but if you develop fever, shortness of breath, light headedness, chest pain or signs of bleeding (black or bloody bowel movements) please seek medical attention.    Meds:  please stop the aspirin  Please take the dronederone twice a day (to help with the atrial fibrillation)    Please set up an appointment with: (our schedulers will assist in making these appointments)  1. follow up with Dr. Irwin from colorectal surgery, in approximately 1 week, to further discuss possible surgery  2. Follow up with Dr. Mckay, of oncology, to discuss other possible cancer treatments  3. Follow  up with palliative care, next available    It was a pleasure meeting with you today. Thank you for allowing me and my team the privilege of caring for you today. You are the reason we are here, and I truly hope we provided you with the excellent service you deserve. Please let us know if there is anything else we can do for you so that we can be sure you are leaving completely satisfied with your care experience.    Your hospital unit at the time of discharge is 5B so if you have any questions please call the hospital at [phone] and ask to talk to a nurse on 5B.    Take care!  Mundo Hdz MD  Department of Medicine  [facility]     Adult [facility] Follow-up and recommended labs and tests    COLON AND RECTAL SURGERY DISCHARGE RECOMMENDATIONS:    NOTIFY  Please contact Yajaira Troncoso RN, Mami Caballero RN or Ronald Latham LPN at [phone] for problems after discharge such as:  -Temperature > 101F, chills, rigors, dizziness  -Inability to tolerate diet, nausea or vomiting  -You stop passing gas, develop significant bloating, abdominal pain  -Have blood in stools/vomit  -Have severe diarrhea/constipation  -Any other questions or concerns.  - At nights (after 4:30pm), on weekends, or if urgent, call [phone] and ask the  to speak with the on-call Colorectal Surgery resident or fellow      FOLLOW-UP  1.  You will need to follow-up with Dr. Sherry Irwin in the Colon and Rectal Surgery clinic in 1 week(s).  Please contact our clinic scheduler (phone # [phone]) if you have not heard from our clinic in 3 business days afer discharge to schedule a follow-up appointment.      Appointments on [place] and/or [facility] (with [facility] or [facility] provider or service). Call [phone] if you haven't heard regarding these appointments within 7 days of discharge.     DNR/DNI     Occupational Therapy Adult Consult    Evaluate and treat as clinically indicated.    Reason:  deconditioned      Physical Therapy Adult Consult    Evaluate and treat as clinically indicated.    Reason:  deconditioned     Advance Diet as Tolerated    2Follow this diet upon discharge: Orders Placed This Encounter      Room Service      Regular Diet Adult         Discharge Medications   Current Discharge Medication List      START taking these medications    Details   amLODIPine (NORVASC) 5 MG tablet Take 1 tablet (5 mg) by mouth daily    Associated Diagnoses: Essential hypertension      dronedarone (MULTAQ) 400 MG TABS tablet Take 1 tablet (400 mg) by mouth 2 times daily (with meals)    Associated Diagnoses: Chronic atrial fibrillation (H)      melatonin 1 MG TABS tablet Take 1 tablet (1 mg) by mouth nightly as needed for sleep    Associated Diagnoses: Psychophysiological insomnia      pantoprazole (PROTONIX) 40 MG EC tablet Take 1 tablet (40 mg) by mouth every morning (before breakfast)    Associated Diagnoses: Acute anemia      polyethylene glycol (MIRALAX/GLYCOLAX) packet Take 17 g by mouth 2 times daily    Comments: Hold if loose stools  Associated Diagnoses: Malignant neoplasm of hepatic flexure (H)         CONTINUE these medications which have CHANGED    Details   !! FLUoxetine (PROZAC) 10 MG capsule Take 3 capsules (30 mg) by mouth daily    Associated Diagnoses: Depression, unspecified depression type       !! - Potential duplicate medications found. Please discuss with provider.      CONTINUE these medications which have NOT CHANGED    Details   acetaminophen 500 MG CAPS Take 500 mg by mouth 2 times daily    Associated Diagnoses: Primary osteoarthritis of right hip      allopurinol (ZYLOPRIM) 100 MG tablet Take 1 tablet (100 mg) by mouth daily  Qty: 100 tablet, Refills: 3    Associated Diagnoses: Routine health maintenance      atorvastatin (LIPITOR) 20 MG tablet Take 1 tablet (20 mg) by mouth daily  Qty: 100 tablet, Refills: 3    Associated Diagnoses: Routine health maintenance      !! FLUoxetine (PROZAC) 10 MG  capsule Take 10 mg by mouth daily Take with 20 mg tablet for a total of 30 mg daily.      metoprolol succinate ER (TOPROL-XL) 50 MG 24 hr tablet Take 50 mg by mouth daily       omeprazole (PRILOSEC) 40 MG DR capsule Take 1 capsule (40 mg) by mouth daily  Qty: 90 capsule, Refills: 3    Associated Diagnoses: Gastroesophageal reflux disease without esophagitis; Routine health maintenance; IGT (impaired glucose tolerance)      QUEtiapine (SEROQUEL) 25 MG tablet Take one tablet by mouth every evening with supper  Qty: 90 tablet, Refills: 2    Associated Diagnoses: Obsessive-compulsive disorder, unspecified type      benzocaine-menthol (CHLORASEPTIC) 6-10 MG lozenge Place 1 lozenge inside cheek every 2 hours as needed for moderate pain      polyethylene glycol-propylene glycol (SYSTANE ULTRA) 0.4-0.3 % SOLN ophthalmic solution Place 1 drop into both eyes 2 times daily as needed for dry eyes       !! - Potential duplicate medications found. Please discuss with provider.      STOP taking these medications       aspirin (ASA) 325 MG EC tablet Comments:   Reason for Stopping:         cholecalciferol (VITAMIN D) 1000 UNIT tablet Comments:   Reason for Stopping:         predniSONE (DELTASONE) 10 MG tablet Comments:   Reason for Stopping:             Allergies   Allergies   Allergen Reactions     Penicillins Rash and Other (See Comments)     He believes he had a positive skin test for PCN allergy at the time of a dog bite, about 1960.

## 2019-08-16 NOTE — TELEPHONE ENCOUNTER
RECORDS RECEIVED FROM: Internal - Hospital F/u appt    DATE RECEIVED: 8/21/19   NOTES STATUS DETAILS   OFFICE NOTE from referring provider  Internal ED note 8/8/19   OFFICE NOTE from other specialist   N/A    DISCHARGE SUMMARY from hospital  Internal ED note 8/8/19   DISCHARGE REPORT from the ER Internal    OPERATIVE REPORT  N/A    MEDICATION LIST Internal    LABS     PFC TESTING N/A    ANAL PAP N/A    BIOPSIES/PATHOLOGY RELATED TO DIAGNOSIS Internal 8/12/19    DIAGNOSTIC PROCEDURES     COLONOSCOPY Internal 8/12/19   UPPER ENDOSCOPY (EGD) Internal 8/12/19   FLEX SIGMOIDOSCOPY  N/A    ERCP N/A    IMAGING (DISC & REPORT)      CT  Internal CT Chest/ABD [place] 8/2/19   MRI N/A    XRAY N/A    ULTRASOUND (ENDOANAL/ENDORECTAL) N/A

## 2019-08-16 NOTE — PLAN OF CARE
Discharge Planner OT (late entry)  Patient plan for discharge: Did not report.  Current status: Despite being fatigued following previous PT session, patient agreeable to participate in later afternoon OT treatment. Little carry over evident in safe transfers and minimal assistance with verbal cueing required for toilet transfer. Verbal cueing needed to complete hand hygiene and patient tolerated standing at sink for further grooming/hygiene tasks with SBA.   Barriers to return to prior living situation: Cognition, strength, endurance, balance.  Recommendations for discharge: SNF (connected to half-way in which patient currently resides). Patient requires physical assistance and verbal cueing for completion of both ADL tasks and mobility transfers. It is of concern whether he would be able to safely care for himself in current BOBBI.   Rationale for recommendations: Will continue to follow patient at a low frequency for OT to progress functional independence and safety.       Entered by: Janina Zepeda 08/15/2019 8:02 PM

## 2019-08-16 NOTE — PLAN OF CARE
Occupational Therapy Discharge Summary    Reason for therapy discharge:    Discharged to long term care facility.    Progress towards therapy goal(s). See goals on Care Plan in [facility] electronic health record for goal details.  Goals met    Therapy recommendation(s):    No further therapy is recommended.

## 2019-08-16 NOTE — PLAN OF CARE
Pt discharged from unit at 1015. Pt's family providing transport to TCU; pt's belongings in tow. Report given to TCU. L PIV removed prior to departure. Paperwork sent with family. MD rounded. No further actions.

## 2019-08-16 NOTE — PROVIDER NOTIFICATION
Paged night cross cover at 1221: Pt plan for d/c at 9AM. Please complete discharge signature and order. Thanks.

## 2019-08-18 NOTE — PROGRESS NOTES
[facility]  PRIMARY CARE PROVIDER AND CLINIC:  Neo Castano MD, [address] [facility] / [facility] [address]  Chief Complaint   Patient presents with     Hospital F/U     [facility] Record Number:  [id number]  [facility] of [facility] where encounter took place[facility] [[id number]]    Sharon Baer  is a 88 year old  (6/10/1931), admitted to the above facility from  [facility]. Hospital stay 8/8/19 through 8/16/19..  Admitted to this facility for  rehab, medical management and nursing care.    HPI:    HPI information obtained from: facility chart records, facility staff, patient report, [facility] chart review and family/first contact - Rolf's report.   Brief Summary of Hospital Course:   Patient is an 88-year-old gentleman with PMH of HTN, HLD, reflux esophagitis, autism, OCD, GIANLUCA, CKD 3, A. fib, gout, hearing loss who presented with 2-month history of progressive fatigue, AGUILA, S OB, pallor, intermittent BRBPR; who was found to have acute on chronic anemia (Hgb 11.0 --> 5.4) s/p 2 pRBC --> 7.6 by discharge, thought 2/2 to right partly circumferential colonic mass with Bx showing adenocarcinoma with imaging showing metastatic lesion to liver.  MRI was attempted by patient did not tolerate.  Patient has no History of GIB except 2010 EGD showing reflux gastritis.  Patient had been hospitalized in March/April 2019 for Influenza and acute hypoxia and was treated with Prednisone which was to be concluded 4/4/19.  Unknown if this continued after this time but Prednisone was DC'd this hospital stay.    Hospitalization was c/b bradycardia on chronic pAfib. Metoprolol was reduced and (new) dronederone started per EP recommendation.  Patient also sustained SAMEERA on CKD (Creat 1.14 --> 1.44) which resolved to baseline by discharge.  Patient is to f/u with Colorectal MD on 8/21/19 for review of treatment options.    Patient was transferred to TCU for  strengthening, nursing cares, and medical management.      Updates on Status Since Skilled nursing Admission:   Patient is met today in his TCU room where he is confused to details but reports some SOB, occasional lightheadedness; denies pain, CP, abdominal pain, HA, heartburn, upset stomach.  He endorses 2-3 soft BMs/day and adequate sleep, fair appetite.  Nursing reports that patient was wandering last night late into the evening, entered other resident's rooms thinking it was his and had to be redirected back to his own room.  Nursing reported they will leave his door open tonight and put the entryway light on for help directing him back to his room.      Family was contacted after my visit with the patient today and noted that after meeting last Friday, they have elected that comfort-focused care will be their goal.  Hospice was agreed upon so order will be placed today. A care conference will be set up at the TCU so  and [facility] can assist with patient's transition to hospice.     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  PAST SURGICAL HISTORY:   has a past surgical history that includes hernia repair, inguinal rt/lt; bilateral ear surgery; bilateral cataract surgery; suprapubic prostatectomy; Esophagoscopy, gastroscopy, duodenoscopy (EGD), combined (N/A, 8/12/2019); and Colonoscopy (N/A, 8/12/2019).  FAMILY HISTORY: family history includes Cancer in his sister; Myocardial Infarction (age of onset: 60) in his maternal  uncle.  SOCIAL HISTORY:   reports that he has quit smoking. He has a 20.00 pack-year smoking history. He has never used smokeless tobacco. He reports that he does not drink alcohol or use drugs.    Post Discharge Medication Reconciliation Status: discharge medications reconciled and changed, per note/orders (see AVS)    Current Outpatient Medications   Medication Sig Dispense Refill     acetaminophen 500 MG CAPS Take 500 mg by mouth 2 times daily. May also take 1,000 mg 2 times daily as needed (pain/fever).       allopurinol (ZYLOPRIM) 100 MG tablet Take 1 tablet (100 mg) by mouth daily 100 tablet 3     amLODIPine (NORVASC) 5 MG tablet Take 1 tablet (5 mg) by mouth daily       atorvastatin (LIPITOR) 20 MG tablet Take 1 tablet (20 mg) by mouth daily 100 tablet 3     benzocaine-menthol (CHLORASEPTIC) 6-10 MG lozenge Place 1 lozenge inside cheek every 2 hours as needed for moderate pain       dronedarone (MULTAQ) 400 MG TABS tablet Take 1 tablet (400 mg) by mouth 2 times daily (with meals)       FLUoxetine (PROZAC) 10 MG capsule Take 3 capsules (30 mg) by mouth daily       melatonin 1 MG TABS tablet Take 1 tablet (1 mg) by mouth nightly as needed for sleep       pantoprazole (PROTONIX) 40 MG EC tablet Take 1 tablet (40 mg) by mouth every morning (before breakfast)       polyethylene glycol (MIRALAX/GLYCOLAX) packet Take 17 g by mouth 2 times daily       polyethylene glycol-propylene glycol (SYSTANE ULTRA) 0.4-0.3 % SOLN ophthalmic solution Place 1 drop into both eyes 2 times daily as needed for dry eyes       QUEtiapine (SEROQUEL) 25 MG tablet Take one tablet by mouth every evening with supper 90 tablet 2     ROS:  Limited secondary to cognitive impairment but today pt reports 10 point ROS of systems including Constitutional, Eyes, Respiratory, Cardiovascular, Gastroenterology, Genitourinary, Integumentary, Musculoskeletal, Psychiatric were all negative except for pertinent positives noted in my HPI.    Vitals:  BP  125/72   Pulse 56   Temp 98.2  F (36.8  C)   Resp 20   Wt 86.6 kg (191 lb)   SpO2 95%   BMI 31.78 kg/m    Exam:  GENERAL APPEARANCE:  Alert, in no distress, very [place] but has pocket talker which helps, confused to details  RESP:  respiratory effort and palpation of chest normal, auscultation of lungs clear, no respiratory distress  CV:  Palpation and auscultation of heart done , rate and rhythm regular, no murmur, no LE peripheral edema  ABDOMEN:  Mildly obese, normal bowel sounds, soft, nontender to palpation, KIRSTIE fully for hepatosplenomegaly or other masses  M/S:   Gait and station with w/c today for mobility, Digits and nails with arthritic changes, reduced muscle mass  SKIN:  Inspection and Palpation of skin and subcutaneous tissue with generalized bruising to UEs  PSYCH:  insight and judgement, memory with impairment, affect and mood normal, follows commands readily         Lab/Diagnostic data:  CBC RESULTS:   Recent Labs   Lab Test 08/15/19  0505 08/14/19  1259 08/13/19  0626 08/12/19  0501   WBC  --   --  8.6  --  6.1   RBC  --   --  3.42*  --  3.59*   HGB 7.6* 7.3* 7.2*   < > 7.5*   HCT  --   --  29.2*  --  28.6*   MCV  --   --  85  --  80   MCH  --   --  21.1*  --  20.9*   MCHC  --   --  24.7*  --  26.2*   RDW  --   --  24.5*  --  22.7*   PLT  --   --  167  --  194    < > = values in this interval not displayed.       Last Basic Metabolic Panel:  Recent Labs   Lab Test 08/15/19  0505 08/13/19  0626 08/12/19 2055 08/12/19  0501   NA  --  144  --  146*   POTASSIUM  --  3.5 3.5 3.1*   CHLORIDE  --  117*  --  118*   UMANG  --  7.9*  --  8.0*   CO2  --  20  --  21   BUN  --  9  --  10   CR 1.10 1.11  --  1.02   GLC  --  133*  --  93       Liver Function Studies -   Recent Labs   Lab Test 08/08/19  1414 05/23/18  1443   PROTTOTAL 6.4* 7.8   ALBUMIN 3.1* 3.4   BILITOTAL 0.3 0.2   ALKPHOS 62 105   AST 9 14   ALT 11 12       TSH   Date Value Ref Range Status   04/29/2019 2.08 0.40 - 4.00 mU/L Final   05/03/2016  1.91 0.40 - 4.00 mU/L Final   ]    Lab Results   Component Value Date    A1C 6.5 05/23/2018    A1C 6.6 09/22/2017         ASSESSMENT/PLAN:  Adenocarcinoma of colon (H)  Malignant neoplasm of hepatic flexure (H)  Liver Lesion (possible metastasis)   Presented with 2-month history of progressive fatigue, AGUILA, SLE, pallor, intermittent BRBPR; EGD & Colonoscopy 8/11/19 showing right partly circumferential colonic mass with liver lesion. Bx showing adenocarcinoma moderately differentiated with a report of tumor buddings (adenocarcinoma).   Colorectal Oncology and Palliative consulted  Abdomen MRI not tolerated  Colorectal Surgeryconsulted for possible curative vs palliative colectomy vs diversion (family has now decided on comfort-focus)   (new) Miralax 17 gm BID with goal for soft, regular BMs - EHR showing 2-3 BMs/day    Patient denies abdominal pain; had no grimace or reports of pain with palpation today.     PLAN:  - f/u with Dr Irwin on 8/21/19 DC'd by TCU HUC - can send note today to Dr Irwin for update and need for further f/u with family for palliative treatment options (monitoring for obstruction, etc).   - continue (new) Miralax 17 gm BID  With goal of regular BMs. If >3 BMs/day, will need to titrate med.   - Hospice consult       Acute blood loss anemia  Iron deficiency anemia, unspecified iron deficiency anemia type  Presented with 2-month history of progressive fatigue, AGUILA, SLE, pallor, intermittent BRBPR - see above  BL Hgb 12.6-15.2  Hgb 4/29/19 11.0  Presented with Hgb 5.4 --> s/p 2 units pRBC --> 7.6 by discharge  IV Venofer given in-patient   PTA ASA DC'd   No bleeding per nursing or patient.     PLAN:  - recheck Hgb for stability  - continue discontinue of ASA d/t GIB    Gastroesophageal reflux disease with esophagitis  Per History, 2010 EGD noting reflux esophagitis  Patient reported history of Ibuprofen 2-3 tabs a few times/month.   PTA Omeprazole 20 mg daily DC'd as duplicate with  (new) Pantoprazole 40 mg daily  Patient denies heartburn or upset stomach today     PLAN:  - PTA Omeprazole 20 mg daily DC'd  - (new) Pantoprazole 40 mg daily  - recommend no usage of Ibuprofen    Acute kidney injury superimposed on chronic kidney disease (H)  BL Creat 1.1-1.3  Last few BMPs:   4/29/19: BUN 23, Creat 1.14, GFR 57 (reference)  8/8/19: BUN 30, Creat 1.44, GFR 43  8/13/19: BUN 9, Creat 1.11, GFR 59  8/15/19: BUN x, Creat 1.10, GFR 60    Patient reported history of Ibuprofen 2-3 tabs a few times/month.   Is not on diuretics, ACEI    PLAN:  - Will avoid nephrotoxic agents (such as ACEI/ARB/NSAIDs, IV contrast) and mindful prescribing with renal dosing.     Protein-calorie malnutrition, unspecified severity (H)  In setting of acute and chronic illness  Albumin 3.1, Protein 6.4  Body mass index is 31.78 kg/m .    PLAN:  - Hospice consult today  - can discuss with family if nutritional supplements are desired.     Atrial fibrillation, chronic (H) - diagnosed March 2019  Bradycardia  Essential hypertension  hosp c/b bradycardia in setting BB therapy for pAfib and acute illness  EP consulted and PTA Metoprolol XL 50 mg daily --> decreased to 12.5 mg BID (although noted discharge Summary showing patient still to take 50 mg daily and EP MD note says to stop med).   (new) dronederone 400 mg BID started for rhythm control per EP   (new) amlodipine 5 mg daily started while in-patient.     BPs mostly 110-120s/60-70s  HRs 54-65, regular today   Patient denies CP, HA; endorses occasional lightheadedness     PLAN:  - discontinue PTA Metoprolol 50 mg XL daily  - continue (new) dronederone 400 mg BID started for rhythm control per EP   - continue (new) amlodipine 5 mg daily with parameters to hold for SBP <110   - monitor for need for BB addition, if needed recommend tartate 12.5 mg BID      Hyperlipidemia, unspecified hyperlipidemia type  Continues on PTA atorvastatin 20 mg daily.  Continue     Low grade fever  noted   In TCU, low grade fevers of .0  No leukocytosis while in-patient, last WBC 8.6    PLAN:  - PTA Tylenol 500 mg BID - continue  - add (new) Tylenol 1000 mg BID PRN for pain/fever  - monitor for s/s of infection    Obsessive-compulsive disorder, unspecified type  Autism spectrum disorder  Depression, unspecified depression type  On PTA fluoxetine 30 mg daily, seroquel 25 mg q PM  (new) Melatonin 1 mg q HS PRN    Patient reports adequate sleep  Nursing reports patient wandering last night - will attempt non-pharm interventions tonight for improved sleep/decreased wandering.     P{MARY:  - continue PTA fluoxetine, Seroquel  - continue (new) Melatonin 1 mg q HS PRN with addition parameters to not give after 0200.   - monitor for SNF PHQ9 results  - monitor for need to consult in-house psych    Gout, unspecified cause, unspecified chronicity, unspecified site  On PTA allopurinol 100 mg daily  Continue     Bilateral hearing loss, unspecified hearing loss type  Uses pocket talker (headset with microphone) for communication  Primary Care Provider referred patient to ENT PTA    Pocket talker used today for visit and was effective.     PLAN:  - f/u with Dr Ramírez Riley, ENT on 11/29/19 if patient/family desires    Generalized muscle weakness  Physical deconditioning  In setting of acute illness with new dx adenocarcinoma on chronic illnesses    PLAN:  - Physical Therapy, Occupational Therapy for strengthening, rehab, while being assessed for hospice.  - hospice consult       Orders written by provider at facility  1. Hgb recheck Tues, 8/20/19 Dx: anemia   2. Hospice Consult to eval & treat.   3. discontinue Metoprolol  4. Amlodipine parameters to hold for SBP <110  5. Tylenol 1000 mg BID PRN Dx: Pain, fever  6. Melatonin parameters: do not give after 0200.     Total time spent with patient visit at the skilled nursing facility was >40 min including patient visit, review of past records, phone call to patient contact  and coordination with nursing. Greater than 50% of total time spent with counseling and coordinating care due to coordinating care with nurse on admission orders, coordinating care with follow up labs and appointments and patient visit with discussion about symptoms, reassurance offered.  Time also spent in communication with pt's nephew, Gabino, regarding POC with hospice consult to be placed today and other medication changes as above, patient's wandering and non-pharm interventions that can be attempted first, etc. .    Electronically signed by:  TINY Hilario CNP

## 2019-08-19 ENCOUNTER — NURSING HOME VISIT (OUTPATIENT)
Dept: GERIATRICS | Facility: CLINIC | Age: 84
End: 2019-08-19
Payer: COMMERCIAL

## 2019-08-19 ENCOUNTER — TELEPHONE (OUTPATIENT)
Dept: SURGERY | Facility: CLINIC | Age: 84
End: 2019-08-19

## 2019-08-19 VITALS
TEMPERATURE: 98.2 F | HEART RATE: 56 BPM | BODY MASS INDEX: 31.78 KG/M2 | DIASTOLIC BLOOD PRESSURE: 72 MMHG | OXYGEN SATURATION: 95 % | RESPIRATION RATE: 20 BRPM | WEIGHT: 191 LBS | SYSTOLIC BLOOD PRESSURE: 125 MMHG

## 2019-08-19 DIAGNOSIS — R50.9 LOW GRADE FEVER: ICD-10-CM

## 2019-08-19 DIAGNOSIS — I48.20 ATRIAL FIBRILLATION, CHRONIC (H): ICD-10-CM

## 2019-08-19 DIAGNOSIS — D62 ACUTE BLOOD LOSS ANEMIA: ICD-10-CM

## 2019-08-19 DIAGNOSIS — K21.00 GASTROESOPHAGEAL REFLUX DISEASE WITH ESOPHAGITIS: ICD-10-CM

## 2019-08-19 DIAGNOSIS — N18.9 ACUTE KIDNEY INJURY SUPERIMPOSED ON CHRONIC KIDNEY DISEASE (H): ICD-10-CM

## 2019-08-19 DIAGNOSIS — R53.81 PHYSICAL DECONDITIONING: ICD-10-CM

## 2019-08-19 DIAGNOSIS — F84.0 AUTISM SPECTRUM DISORDER: ICD-10-CM

## 2019-08-19 DIAGNOSIS — N17.9 ACUTE KIDNEY INJURY SUPERIMPOSED ON CHRONIC KIDNEY DISEASE (H): ICD-10-CM

## 2019-08-19 DIAGNOSIS — E46 PROTEIN-CALORIE MALNUTRITION, UNSPECIFIED SEVERITY (H): ICD-10-CM

## 2019-08-19 DIAGNOSIS — M16.11 PRIMARY OSTEOARTHRITIS OF RIGHT HIP: ICD-10-CM

## 2019-08-19 DIAGNOSIS — C18.9 ADENOCARCINOMA OF COLON (H): Primary | ICD-10-CM

## 2019-08-19 DIAGNOSIS — I10 ESSENTIAL HYPERTENSION: ICD-10-CM

## 2019-08-19 DIAGNOSIS — H91.93 BILATERAL HEARING LOSS, UNSPECIFIED HEARING LOSS TYPE: ICD-10-CM

## 2019-08-19 DIAGNOSIS — F42.9 OBSESSIVE-COMPULSIVE DISORDER, UNSPECIFIED TYPE: ICD-10-CM

## 2019-08-19 DIAGNOSIS — M62.81 GENERALIZED MUSCLE WEAKNESS: ICD-10-CM

## 2019-08-19 DIAGNOSIS — M10.9 GOUT, UNSPECIFIED CAUSE, UNSPECIFIED CHRONICITY, UNSPECIFIED SITE: ICD-10-CM

## 2019-08-19 DIAGNOSIS — F32.A DEPRESSION, UNSPECIFIED DEPRESSION TYPE: ICD-10-CM

## 2019-08-19 DIAGNOSIS — K76.9 LIVER LESION: ICD-10-CM

## 2019-08-19 DIAGNOSIS — R00.1 BRADYCARDIA: ICD-10-CM

## 2019-08-19 DIAGNOSIS — E78.5 HYPERLIPIDEMIA, UNSPECIFIED HYPERLIPIDEMIA TYPE: ICD-10-CM

## 2019-08-19 DIAGNOSIS — D50.9 IRON DEFICIENCY ANEMIA, UNSPECIFIED IRON DEFICIENCY ANEMIA TYPE: ICD-10-CM

## 2019-08-19 DIAGNOSIS — C18.3 MALIGNANT NEOPLASM OF HEPATIC FLEXURE (H): ICD-10-CM

## 2019-08-19 PROCEDURE — 99310 SBSQ NF CARE HIGH MDM 45: CPT | Performed by: NURSE PRACTITIONER

## 2019-08-19 NOTE — Clinical Note
Sorry for the duplicate message. I am following Mr. Baer at the [facility].  TAWNY that family has elected hospice so an order was placed today.  [facility] canceled your upcoming appt on 8/21/19.  If you still need to speak to family about the POC, please call Gabino (nephew) at [phone] or I can help coordinate with them as well.  Just feel free to let me know; happy to help. Thank you!Parisa Martinez, [phone]

## 2019-08-19 NOTE — TELEPHONE ENCOUNTER
[facility] Call Center    Phone Message    May a detailed message be left on voicemail: yes    Reason for Call: Other: Violeta was calling to cancel 8/21/2019 appt. Per Violeta, the Pt's family is in the process of signing onto hospice care and wanted to cancel the appt. Please follow up with Violeta in regards to canceling the 8/21/2019 appt.      Action Taken: Message routed to:  [facility] Colon and Rect

## 2019-08-19 NOTE — LETTER
8/19/2019        RE: Sharon Baer  C/o Rosemarie Newman  [address]  [facility] [address]        [facility]  PRIMARY CARE PROVIDER AND CLINIC:  Neo Castano MD, [address] [facility] [address]  Chief Complaint   Patient presents with     Hospital F/U     [place] Medical Record Number:  [id number]  Place of Service where encounter took place[facility] [[id number]]    Sharon Baer  is a 88 year old  (6/10/1931), admitted to the above facility from  [facility]. Hospital stay 8/8/19 through 8/16/19..  Admitted to this facility for  rehab, medical management and nursing care.    HPI:    HPI information obtained from: facility chart records, facility staff, patient report, [facility] chart review and family/first contact - Rolf's report.   Brief Summary of Hospital Course:   Patient is an 88-year-old gentleman with PMH of HTN, HLD, reflux esophagitis, autism, OCD, GIANLUCA, CKD 3, A. fib, gout, hearing loss who presented with 2-month history of progressive fatigue, AGUILA, S OB, pallor, intermittent BRBPR; who was found to have acute on chronic anemia (Hgb 11.0 --> 5.4) s/p 2 pRBC --> 7.6 by discharge, thought 2/2 to right partly circumferential colonic mass with Bx showing adenocarcinoma with imaging showing metastatic lesion to liver.  MRI was attempted by patient did not tolerate.  Patient has no History of GIB except 2010 EGD showing reflux gastritis.  Patient had been hospitalized in March/April 2019 for Influenza and acute hypoxia and was treated with Prednisone which was to be concluded 4/4/19.  Unknown if this continued after this time but Prednisone was DC'd this hospital stay.    Hospitalization was c/b bradycardia on chronic pAfib. Metoprolol was reduced and (new) dronederone started per EP recommendation.  Patient also sustained SAMEERA on CKD (Creat 1.14 --> 1.44) which resolved to baseline by discharge.  Patient is to /u  with Colorectal MD on 8/21/19 for review of treatment options.    Patient was transferred to TCU for strengthening, nursing cares, and medical management.      Updates on Status Since Skilled nursing Admission:   Patient is met today in his TCU room where he is confused to details but reports some SOB, occasional lightheadedness; denies pain, CP, abdominal pain, HA, heartburn, upset stomach.  He endorses 2-3 soft BMs/day and adequate sleep, fair appetite.  Nursing reports that patient was wandering last night late into the evening, entered other resident's rooms thinking it was his and had to be redirected back to his own room.  Nursing reported they will leave his door open tonight and put the entryway light on for help directing him back to his room.      Family was contacted after my visit with the patient today and noted that after meeting last Friday, they have elected that comfort-focused care will be their goal.  Hospice was agreed upon so order will be placed today. A care conference will be set up at the TCU so  and [facility] can assist with patient's transition to hospice.     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  PAST SURGICAL HISTORY:   has a past surgical history that includes hernia repair, inguinal rt/lt; bilateral ear surgery; bilateral cataract surgery; suprapubic prostatectomy; Esophagoscopy, gastroscopy, duodenoscopy (EGD), combined (N/A, 8/12/2019); and Colonoscopy (N/A, 8/12/2019).  FAMILY HISTORY:  family history includes Cancer in his sister; Myocardial Infarction (age of onset: 60) in his maternal uncle.  SOCIAL HISTORY:   reports that he has quit smoking. He has a 20.00 pack-year smoking history. He has never used smokeless tobacco. He reports that he does not drink alcohol or use drugs.    Post Discharge Medication Reconciliation Status: discharge medications reconciled and changed, per note/orders (see AVS)    Current Outpatient Medications   Medication Sig Dispense Refill     acetaminophen 500 MG CAPS Take 500 mg by mouth 2 times daily. May also take 1,000 mg 2 times daily as needed (pain/fever).       allopurinol (ZYLOPRIM) 100 MG tablet Take 1 tablet (100 mg) by mouth daily 100 tablet 3     amLODIPine (NORVASC) 5 MG tablet Take 1 tablet (5 mg) by mouth daily       atorvastatin (LIPITOR) 20 MG tablet Take 1 tablet (20 mg) by mouth daily 100 tablet 3     benzocaine-menthol (CHLORASEPTIC) 6-10 MG lozenge Place 1 lozenge inside cheek every 2 hours as needed for moderate pain       dronedarone (MULTAQ) 400 MG TABS tablet Take 1 tablet (400 mg) by mouth 2 times daily (with meals)       FLUoxetine (PROZAC) 10 MG capsule Take 3 capsules (30 mg) by mouth daily       melatonin 1 MG TABS tablet Take 1 tablet (1 mg) by mouth nightly as needed for sleep       pantoprazole (PROTONIX) 40 MG EC tablet Take 1 tablet (40 mg) by mouth every morning (before breakfast)       polyethylene glycol (MIRALAX/GLYCOLAX) packet Take 17 g by mouth 2 times daily       polyethylene glycol-propylene glycol (SYSTANE ULTRA) 0.4-0.3 % SOLN ophthalmic solution Place 1 drop into both eyes 2 times daily as needed for dry eyes       QUEtiapine (SEROQUEL) 25 MG tablet Take one tablet by mouth every evening with supper 90 tablet 2     ROS:  Limited secondary to cognitive impairment but today pt reports 10 point ROS of systems including Constitutional, Eyes, Respiratory, Cardiovascular, Gastroenterology, Genitourinary, Integumentary,  Musculoskeletal, Psychiatric were all negative except for pertinent positives noted in my HPI.    Vitals:  /72   Pulse 56   Temp 98.2  F (36.8  C)   Resp 20   Wt 86.6 kg (191 lb)   SpO2 95%   BMI 31.78 kg/m     Exam:  GENERAL APPEARANCE:  Alert, in no distress, very [place] but has pocket talker which helps, confused to details  RESP:  respiratory effort and palpation of chest normal, auscultation of lungs clear, no respiratory distress  CV:  Palpation and auscultation of heart done , rate and rhythm regular, no murmur, no LE peripheral edema  ABDOMEN:  Mildly obese, normal bowel sounds, soft, nontender to palpation, KIRSTIE fully for hepatosplenomegaly or other masses  M/S:   Gait and station with w/c today for mobility, Digits and nails with arthritic changes, reduced muscle mass  SKIN:  Inspection and Palpation of skin and subcutaneous tissue with generalized bruising to UEs  PSYCH:  insight and judgement, memory with impairment, affect and mood normal, follows commands readily         Lab/Diagnostic data:  CBC RESULTS:   Recent Labs   Lab Test 08/15/19  0505 08/14/19 1259 08/13/19 0626 08/12/19  0501   WBC  --   --  8.6  --  6.1   RBC  --   --  3.42*  --  3.59*   HGB 7.6* 7.3* 7.2*   < > 7.5*   HCT  --   --  29.2*  --  28.6*   MCV  --   --  85  --  80   MCH  --   --  21.1*  --  20.9*   MCHC  --   --  24.7*  --  26.2*   RDW  --   --  24.5*  --  22.7*   PLT  --   --  167  --  194    < > = values in this interval not displayed.       Last Basic Metabolic Panel:  Recent Labs   Lab Test 08/15/19  0505 08/13/19 0626 08/12/19 2055 08/12/19  0501   NA  --  144  --  146*   POTASSIUM  --  3.5 3.5 3.1*   CHLORIDE  --  117*  --  118*   UMANG  --  7.9*  --  8.0*   CO2  --  20  --  21   BUN  --  9  --  10   CR 1.10 1.11  --  1.02   GLC  --  133*  --  93       Liver Function Studies -   Recent Labs   Lab Test 08/08/19  1414 05/23/18  1443   PROTTOTAL 6.4* 7.8   ALBUMIN 3.1* 3.4   BILITOTAL 0.3 0.2   ALKPHOS 62 105   AST  9 14   ALT 11 12       TSH   Date Value Ref Range Status   04/29/2019 2.08 0.40 - 4.00 mU/L Final   05/03/2016 1.91 0.40 - 4.00 mU/L Final   ]    Lab Results   Component Value Date    A1C 6.5 05/23/2018    A1C 6.6 09/22/2017         ASSESSMENT/PLAN:  Adenocarcinoma of colon (H)  Malignant neoplasm of hepatic flexure (H)  Liver Lesion (possible metastasis)   Presented with 2-month history of progressive fatigue, AGUILA, SLE, pallor, intermittent BRBPR; EGD & Colonoscopy 8/11/19 showing right partly circumferential colonic mass with liver lesion. Bx showing adenocarcinoma moderately differentiated with a report of tumor buddings (adenocarcinoma).   Colorectal Oncology and Palliative consulted  Abdomen MRI not tolerated  Colorectal Surgeryconsulted for possible curative vs palliat catherine colectomy vs diversion (family has now decided on comfort-focus)   (new) Miralax 17 gm BID with goal for soft, regular BMs - EHR showing 2-3 BMs/day    Patient denies abdominal pain; had no grimace or reports of pain with palpation today.     PLAN:  - f/u with Dr Irwin on 8/21/19 DC'd by TCU [facility] - can send note today to Dr Irwin for update and need for further f/u with family for palliative treatment options (monitoring for obstruction, etc).   - continue (new) Miralax 17 gm BID  With goal of regular BMs. If >3 BMs/day, will need to titrate med.   - Hospice consult       Acute blood loss anemia  Iron deficiency anemia, unspecified iron deficiency anemia type  Presented with 2-month history of progressive fatigue, AGUILA, SLE, pallor, intermittent BRBPR - see above  BL Hgb 12.6-15.2  Hgb 4/29/19 11.0  Presented with Hgb 5.4 --> s/p 2 units pRBC --> 7.6 by discharge  IV Venofer given in-patient   PTA ASA DC'd   No bleeding per nursing or patient.     PLAN:  - recheck Hgb for stability  - continue discontinue of ASA d/t GIB    Gastroesophageal reflux disease with esophagitis  Per History, 2010 EGD noting reflux esophagitis  Patient  reported history of Ibuprofen 2-3 tabs a few times/month.   PTA Omeprazole 20 mg daily DC'd as duplicate with (new) Pantoprazole 40 mg daily  Patient denies heartburn or upset stomach today     PLAN:  - PTA Omeprazole 20 mg daily DC'd  - (new) Pantoprazole 40 mg daily  - recommend no usage of Ibuprofen    Acute kidney injury superimposed on chronic kidney disease (H)  BL Creat 1.1-1.3  Last few BMPs:   4/29/19: BUN 23, Creat 1.14, GFR 57 (reference)  8/8/19: BUN 30, Creat 1.44, GFR 43  8/13/19: BUN 9, Creat 1.11, GFR 59  8/15/19: BUN x, Creat 1.10, GFR 60    Patient reported history of Ibuprofen 2-3 tabs a few times/month.   Is not on diuretics, ACEI    PLAN:  - Will avoid nephrotoxic agents (such as ACEI/ARB/NSAIDs, IV contrast) and mindful prescribing with renal dosing.     Protein-calorie malnutrition, unspecified severity (H)  In setting of acute and chronic illness  Albumin 3.1, Protein 6.4  Body mass index is 31.78 kg/m .    PLAN:  - Hospice consult today  - can discuss with family if nutritional supplements are desired.     Atrial fibrillation, chronic (H) - diagnosed March 2019  Bradycardia  Essential hypertension  hosp c/b bradycardia in setting BB therapy for pAfib and acute illness  EP consulted and PTA Metoprolol XL 50 mg daily --> decreased to 12.5 mg BID (although noted discharge Summary showing patient still to take 50 mg daily and EP MD note says to stop med).   (new) dronederone 400 mg BID started for rhythm control per EP   (new) amlodipine 5 mg daily started while in-patient.     BPs mostly 110-120s/60-70s  HRs 54-65, regular today   Patient denies CP, HA; endorses occasional lightheadedness     PLAN:  - discontinue PTA Metoprolol 50 mg XL daily  - continue (new) dronederone 400 mg BID started for rhythm control per EP   - continue (new) amlodipine 5 mg daily with parameters to hold for SBP <110   - monitor for need for BB addition, if needed recommend tartate 12.5 mg BID      Hyperlipidemia,  unspecified hyperlipidemia type  Continues on PTA atorvastatin 20 mg daily.  Continue     Low grade fever  noted  In TCU, low grade fevers of .0  No leukocytosis while in-patient, last WBC 8.6    PLAN:  - PTA Tylenol 500 mg BID - continue  - add (new) Tylenol 1000 mg BID PRN for pain/fever  - monitor for s/s of infection    Obsessive-compulsive disorder, unspecified type  Autism spectrum disorder  Depression, unspecified depression type  On PTA fluoxetine 30 mg daily, seroquel 25 mg q PM  (new) Melatonin 1 mg q HS PRN    Patient reports adequate sleep  Nursing reports patient wandering last night - will attempt non-pharm interventions tonight for improved sleep/decreased wandering.     P{MARY:  - continue PTA fluoxetine, Seroquel  - continue (new) Melatonin 1 mg q HS PRN with addition parameters to not give after 0200.   - monitor for SNF PHQ9 results  - monitor for need to consult in-house psych    Gout, unspecified cause, unspecified chronicity, unspecified site  On PTA allopurinol 100 mg daily  Continue     Bilateral hearing loss, unspecified hearing loss type  Uses pocket talker (headset with microphone) for communication  Primary Care Provider referred patient to ENT PTA    Pocket talker used today for visit and was effective.     PLAN:  - f/u with Dr Ramírez Riley, ENT on 11/29/19 if patient/family desires    Generalized muscle weakness  Physical deconditioning  In setting of acute illness with new dx adenocarcinoma on chronic illnesses    PLAN:  - Physical Therapy, Occupational Therapy for strengthening, rehab, while being assessed for hospice.  - hospice consult       Orders written by provider at facility  1. Hgb recheck Tues, 8/20/19 Dx: anemia   2. Hospice Consult to eval & treat.   3. discontinue Metoprolol  4. Amlodipine parameters to hold for SBP <110  5. Tylenol 1000 mg BID PRN Dx: Pain, fever  6. Melatonin parameters: do not give after 0200.     Total time spent with patient visit at the  skilled nursing facility was >40 min including patient visit, review of past records, phone call to patient contact and coordination with nursing. Greater than 50% of total time spent with counseling and coordinating care due to coordinating care with nurse on admission orders, coordinating care with follow up labs and appointments and patient visit with discussion about symptoms, reassurance offered.  Time also spent in communication with pt's nephew, Gabino, regarding POC with hospice consult to be placed today and other medication changes as above, patient's wandering and non-pharm interventions that can be attempted first, etc. .    Electronically signed by:  TINY Hilario CNP                         Sincerely,        TINY Hilario CNP

## 2019-08-20 ENCOUNTER — TRANSFERRED RECORDS (OUTPATIENT)
Dept: HEALTH INFORMATION MANAGEMENT | Facility: CLINIC | Age: 84
End: 2019-08-20

## 2019-08-20 LAB — HEMOGLOBIN: 8.7 G/DL (ref 13.3–17.7)

## 2019-08-21 ENCOUNTER — PRE VISIT (OUTPATIENT)
Dept: SURGERY | Facility: CLINIC | Age: 84
End: 2019-08-21

## 2019-08-22 ENCOUNTER — DOCUMENTATION ONLY (OUTPATIENT)
Dept: OTHER | Facility: CLINIC | Age: 84
End: 2019-08-22

## 2019-09-03 NOTE — PROGRESS NOTES
[facility]  [facility] Medical Record Number:  [id number]  Place of Service where encounter took place:  [facility] ([facility]) [[id number]]  Chief Complaint   Patient presents with     RECHECK       HPI:    Sharon Baer  is a 88 year old (6/10/1931), who is being seen today for an episodic care visit.  HPI information obtained from: facility chart records, facility staff, patient report and [facility] chart review. Today's concern is:     Adenocarcinoma of colon (H)  Malignant neoplasm of hepatic flexure (H)  Liver lesion  Hospice care patient  Acute blood loss anemia  Iron deficiency anemia, unspecified iron deficiency anemia type  Gastroesophageal reflux disease with esophagitis  Acute kidney injury superimposed on chronic kidney disease (H)  Protein-calorie malnutrition, unspecified severity (H)  Atrial fibrillation, chronic (H)  Bradycardia  Essential hypertension  Hyperlipidemia, unspecified hyperlipidemia type  Low grade fever  Obsessive-compulsive disorder, unspecified type  Autism spectrum disorder  Depression, unspecified depression type  Gout, unspecified cause, unspecified chronicity, unspecified site  Bilateral hearing loss, unspecified hearing loss type  Generalized muscle weakness  Physical deconditioning     Per Epic note/Hx:  Patient is an 88-year-old gentleman with PMH of HTN, HLD, reflux esophagitis, autism, OCD, GIANLUCA, CKD 3, A. fib, gout, hearing loss who presented with 2-month history of progressive fatigue, AGUILA, S OB, pallor, intermittent BRBPR; who was found to have acute on chronic anemia (Hgb 11.0 --> 5.4) s/p 2 pRBC --> 7.6 by discharge, thought 2/2 to right partly circumferential colonic mass with Bx showing adenocarcinoma with imaging showing metastatic lesion to liver.  MRI was attempted by patient did not tolerate.  Patient has no History of GIB except 2010 EGD showing reflux gastritis.  Patient had been hospitalized in March/April 2019 for Influenza and acute  hypoxia and was treated with Prednisone which was to be concluded 4/4/19.  Unknown if this continued after this time but Prednisone was DC'd this hospital stay.    Hospitalization was c/b bradycardia on chronic pAfib. Metoprolol was reduced and (new) dronederone started per EP recommendation.  Patient also sustained SAMEERA on CKD (Creat 1.14 --> 1.44) which resolved to baseline by discharge.  Patient is to f/u with Colorectal MD on 8/21/19 for review of treatment options.    Patient was transferred to TCU for strengthening, nursing cares, and medical management.    ____________    8/19/19 - hospice consulted, metoprolol DC'd, Tylenol PRN added, amlodipine parameters added    Since this time patient has been participating in therapy with goal to optimize function.  Patient scheduled to be enrolled in hospice 09/04/19 as therapy has now ended.     Patient is met today in his TCU room where he reports occasional lightheadedness but otherwise denies all other symptoms including pain, abdominal pain, SOB, CP, HA, heartburn, upset  Stomach, constipation or diarrhea.  He is very [place] and uses a pocket talker for assistance.       Past Medical and Surgical History reviewed in Epic today.    MEDICATIONS:  Current Outpatient Medications   Medication Sig Dispense Refill     acetaminophen 500 MG CAPS Take 500 mg by mouth 2 times daily. May also take 1,000 mg 2 times daily as needed (pain/fever).       allopurinol (ZYLOPRIM) 100 MG tablet Take 1 tablet (100 mg) by mouth daily 100 tablet 3     amLODIPine (NORVASC) 5 MG tablet Take 1 tablet (5 mg) by mouth daily       atorvastatin (LIPITOR) 20 MG tablet Take 1 tablet (20 mg) by mouth daily 100 tablet 3     benzocaine-menthol (CHLORASEPTIC) 6-10 MG lozenge Place 1 lozenge inside cheek every 2 hours as needed for moderate pain       dronedarone (MULTAQ) 400 MG TABS tablet Take 1 tablet (400 mg) by mouth 2 times daily (with meals)       FLUoxetine (PROZAC) 10 MG capsule Take 3 capsules  "(30 mg) by mouth daily       pantoprazole (PROTONIX) 40 MG EC tablet Take 1 tablet (40 mg) by mouth every morning (before breakfast)       polyethylene glycol (MIRALAX/GLYCOLAX) packet Take 17 g by mouth 2 times daily       polyethylene glycol-propylene glycol (SYSTANE ULTRA) 0.4-0.3 % SOLN ophthalmic solution Place 1 drop into both eyes 2 times daily as needed for dry eyes       QUEtiapine (SEROQUEL) 25 MG tablet Take one tablet by mouth every evening with supper 90 tablet 2     REVIEW OF SYSTEMS:  Limited secondary to cognitive impairment but today pt reports 10 point ROS of systems including Constitutional, Eyes, Respiratory, Cardiovascular, Gastroenterology, Genitourinary, Integumentary, Musculoskeletal, Psychiatric were all negative except for pertinent positives noted in my HPI.    Objective:  BP (!) 158/83   Pulse 70   Temp 96.9  F (36.1  C)   Resp 18   Ht 1.727 m (5' 8\")   Wt 86.5 kg (190 lb 9.6 oz)   SpO2 94%   BMI 28.98 kg/m    Exam:  GENERAL APPEARANCE:  Alert, in no distress, pleasant  HEENT: very [place], uses pocket talker for assistance  RESP:  respiratory effort and palpation of chest normal, auscultation of lungs clear, no respiratory distress  CV:  Palpation and auscultation of heart done , rate and rhythm regular, no murmur, no LE peripheral edema  ABDOMEN:  normal bowel sounds, soft, nontender, KIRSTIE fully for hepatosplenomegaly or other masses d/t body habitus  M/S:   Gait and station with W/C for mobility, Digits and nails with arthritic changes, reduced muscle mass  SKIN:  Inspection and Palpation of skin and subcutaneous tissue pale, intact  PSYCH:  insight and judgement, memory with impairment, affect and mood normal, follows commands readily         Labs:   Recent labs in EPIC reviewed by me today.     ASSESSMENT/PLAN:  Adenocarcinoma of colon (H)  Malignant neoplasm of hepatic flexure (H)  Liver Lesion (possible metastasis)   Hospice Care Patient   Presented with 2-month history of " progressive fatigue, AGUILA, SLE, pallor, intermittent BRBPR; EGD & Colonoscopy 8/11/19 showing right partly circumferential colonic mass with liver lesion. Bx showing adenocarcinoma moderately differentiated with a report of tumor buddings (adenocarcinoma).   Colorectal Oncology and Palliative consulted  Abdomen MRI not tolerated  Colorectal Surgeryconsulted for possible curative vs palliative colectomy vs diversion (family has now decided on comfort-focus)   (new) Miralax 17 gm BID with goal for soft, regular BMs - EHR showing 3 BMs/day     Patient denies abdominal pain; had no grimace or reports of pain with palpation today.   09/04/19 - enrolled in hospice with [facility]. Patient is on wait list to be moved to LTC      PLAN:  - continue (new) Miralax 17 gm BID with goal of regular BMs. If >3 BMs/day, will need to titrate med.   - Hospice consult and patient will enroll today as therapy has now ended         Acute blood loss anemia  Iron deficiency anemia, unspecified iron deficiency anemia type  Presented with 2-month history of progressive fatigue, AGUILA, SLE, pallor, intermittent BRBPR - see above  BL Hgb 12.6-15.2  Hgb 4/29/19 11.0  Presented with Hgb 5.4 --> s/p 2 units pRBC --> 7.6 by discharge  IV Venofer given in-patient   PTA ASA DC'd   No bleeding per nursing or patient.   8/20/19 recheck: 8.7 - improving.      PLAN:  - continue discontinue of ASA d/t GIB     Gastroesophageal reflux disease with esophagitis  Per History, 2010 EGD noting reflux esophagitis  Patient reported history of Ibuprofen 2-3 tabs a few times/month.   PTA Omeprazole 20 mg daily DC'd as duplicate with (new) Pantoprazole 40 mg daily  Patient  Reports no heartburn or upset stomach today      PLAN:  - PTA Omeprazole 20 mg daily DC'd  - (new) Pantoprazole 40 mg daily  - recommend no usage of Ibuprofen     Acute kidney injury superimposed on chronic kidney disease (H)  BL Creat 1.1-1.3  Last few BMPs:   4/29/19: BUN 23, Creat  1.14, GFR 57 (reference)  8/8/19: BUN 30, Creat 1.44, GFR 43  8/13/19: BUN 9, Creat 1.11, GFR 59  8/15/19: BUN x, Creat 1.10, GFR 60     Patient reported history of Ibuprofen 2-3 tabs a few times/month.   Is not on diuretics, ACEI     PLAN:  - Will avoid nephrotoxic agents (such as ACEI/ARB/NSAIDs, IV contrast) and mindful prescribing with renal dosing.      Protein-calorie malnutrition, unspecified severity (H)  In setting of acute and chronic illness  Albumin 3.1, Protein 6.4  Body mass index is 28.98 kg/m .    Weights stable around 190 lbs.      PLAN:  - Hospice enrollment today with comfort goal.      Atrial fibrillation, chronic (H) - diagnosed March 2019  Bradycardia  Essential hypertension  hosp c/b bradycardia in setting BB therapy for pAfib and acute illness  EP consulted and PTA Metoprolol XL 50 mg daily --> decreased to 12.5 mg BID (although noted discharge Summary showing patient still to take 50 mg daily and EP MD note says to stop med).  8/22/19 Metoprolol DC'd    (new) dronederone 400 mg BID started for rhythm control per EP   (new) amlodipine 5 mg daily started while in-patient, parameters to hold added, not been held.       BPs variable: Mostly 130-160s/70-80s  HRs 62-77, regular today   Patient denies CP, HA; endorses occasional lightheadedness (may be poor historian)      PLAN:  - discontinued PTA Metoprolol 50 mg XL daily  - continue (new) dronederone 400 mg BID started for rhythm control per EP   - continue (new) amlodipine 5 mg daily with parameters to hold for SBP <110        Hyperlipidemia, unspecified hyperlipidemia type  Continues on PTA atorvastatin 20 mg daily.  Continue but could consider discontinue as soon to be enrolled in Hospice and questionable value     Low grade fever  noted  early in TCU stay, low grade fevers of .0   No leukocytosis while in-patient, last WBC 8.6  No fevers in TCU recently      PLAN:  - PTA Tylenol 500 mg BID - continue   - add (new) Tylenol 1000 mg BID  PRN for pain/fever     Obsessive-compulsive disorder, unspecified type  Autism spectrum disorder  Depression, unspecified depression type  On PTA fluoxetine 30 mg daily, seroquel 25 mg q PM  (new) Melatonin 1 mg q HS PRN - no usage      Patient reports adequate sleep  Nursing reports no behaviors  Noted cognitive scores in TCU:  SLUMS 9/30  Safety questionnaire 5/13  Impulsive at times, lots of verbal cues needed.      P{MARY:  - continue PTA fluoxetine, Seroquel  - discontinue (new) Melatonin 1 mg q HS PRN d/t no usage      Gout, unspecified cause, unspecified chronicity, unspecified site  On PTA allopurinol 100 mg daily  Continue      Bilateral hearing loss, unspecified hearing loss type  Uses pocket talker (headset with microphone) for communication  Pocket talker used today for visit and was effective.      PLAN:  - pocket talker for assistance  - OK to discontinue ENT f/u appt.      Generalized muscle weakness  Physical deconditioning  In setting of acute illness with new dx adenocarcinoma on chronic illnesses    Therapy progress:  CGA for STS transfers  Ambulating 180 ft with 4WW, CGA  Supervision for grooming/hygiene  Max A with toileting  [facility] 9/30  Safety questionnaire 5/13  Impulsive at times, lots of verbal cues needed.       PLAN:  - Physical Therapy, Occupational Therapy for strengthening, rehab, while being assessed for hospice.  - hospice consult         Orders written by provider at facility  1. discontinue Melatonin PRN    Total time spent with patient visit at the skilled nursing facility was at least 25 min including patient visit, review of past records and coordination of care with nursing, Hospice RN. Greater than 50% of total time spent with counseling and coordinating care due to patient visit with update on current status, review of SNF EHR, coordination of care with SNF nursing and Hospice RN regarding POC and medication management. .  Electronically signed by:  TINY Hilario CNP

## 2019-09-04 ENCOUNTER — NURSING HOME VISIT (OUTPATIENT)
Dept: GERIATRICS | Facility: CLINIC | Age: 84
End: 2019-09-04
Payer: COMMERCIAL

## 2019-09-04 VITALS
SYSTOLIC BLOOD PRESSURE: 158 MMHG | DIASTOLIC BLOOD PRESSURE: 83 MMHG | OXYGEN SATURATION: 94 % | WEIGHT: 190.6 LBS | TEMPERATURE: 96.9 F | BODY MASS INDEX: 28.89 KG/M2 | RESPIRATION RATE: 18 BRPM | HEIGHT: 68 IN | HEART RATE: 70 BPM

## 2019-09-04 DIAGNOSIS — K76.9 LIVER LESION: ICD-10-CM

## 2019-09-04 DIAGNOSIS — D50.9 IRON DEFICIENCY ANEMIA, UNSPECIFIED IRON DEFICIENCY ANEMIA TYPE: ICD-10-CM

## 2019-09-04 DIAGNOSIS — E78.5 HYPERLIPIDEMIA, UNSPECIFIED HYPERLIPIDEMIA TYPE: ICD-10-CM

## 2019-09-04 DIAGNOSIS — C18.3 MALIGNANT NEOPLASM OF HEPATIC FLEXURE (H): ICD-10-CM

## 2019-09-04 DIAGNOSIS — N17.9 ACUTE KIDNEY INJURY SUPERIMPOSED ON CHRONIC KIDNEY DISEASE (H): ICD-10-CM

## 2019-09-04 DIAGNOSIS — C18.9 ADENOCARCINOMA OF COLON (H): Primary | ICD-10-CM

## 2019-09-04 DIAGNOSIS — R53.81 PHYSICAL DECONDITIONING: ICD-10-CM

## 2019-09-04 DIAGNOSIS — F42.9 OBSESSIVE-COMPULSIVE DISORDER, UNSPECIFIED TYPE: ICD-10-CM

## 2019-09-04 DIAGNOSIS — M62.81 GENERALIZED MUSCLE WEAKNESS: ICD-10-CM

## 2019-09-04 DIAGNOSIS — Z51.5 HOSPICE CARE PATIENT: ICD-10-CM

## 2019-09-04 DIAGNOSIS — D62 ACUTE BLOOD LOSS ANEMIA: ICD-10-CM

## 2019-09-04 DIAGNOSIS — F32.A DEPRESSION, UNSPECIFIED DEPRESSION TYPE: ICD-10-CM

## 2019-09-04 DIAGNOSIS — E46 PROTEIN-CALORIE MALNUTRITION, UNSPECIFIED SEVERITY (H): ICD-10-CM

## 2019-09-04 DIAGNOSIS — F84.0 AUTISM SPECTRUM DISORDER: ICD-10-CM

## 2019-09-04 DIAGNOSIS — R50.9 LOW GRADE FEVER: ICD-10-CM

## 2019-09-04 DIAGNOSIS — I48.20 ATRIAL FIBRILLATION, CHRONIC (H): ICD-10-CM

## 2019-09-04 DIAGNOSIS — R00.1 BRADYCARDIA: ICD-10-CM

## 2019-09-04 DIAGNOSIS — K21.00 GASTROESOPHAGEAL REFLUX DISEASE WITH ESOPHAGITIS: ICD-10-CM

## 2019-09-04 DIAGNOSIS — I10 ESSENTIAL HYPERTENSION: ICD-10-CM

## 2019-09-04 DIAGNOSIS — N18.9 ACUTE KIDNEY INJURY SUPERIMPOSED ON CHRONIC KIDNEY DISEASE (H): ICD-10-CM

## 2019-09-04 DIAGNOSIS — H91.93 BILATERAL HEARING LOSS, UNSPECIFIED HEARING LOSS TYPE: ICD-10-CM

## 2019-09-04 DIAGNOSIS — M10.9 GOUT, UNSPECIFIED CAUSE, UNSPECIFIED CHRONICITY, UNSPECIFIED SITE: ICD-10-CM

## 2019-09-04 PROCEDURE — 99309 SBSQ NF CARE MODERATE MDM 30: CPT | Mod: GV | Performed by: NURSE PRACTITIONER

## 2019-09-04 ASSESSMENT — MIFFLIN-ST. JEOR: SCORE: 1509.06

## 2019-09-04 NOTE — LETTER
9/4/2019        RE: Sharon Baer  C/o Rosemarie Paty  [address]  [facility] [address]        [facility]  [place] Medical Record Number:  [id number]  Place of Service where encounter took place:  [facility] ([facility]) [[id number]]  Chief Complaint   Patient presents with     RECHECK       HPI:    Sharon Baer  is a 88 year old (6/10/1931), who is being seen today for an episodic care visit.  HPI information obtained from: facility chart records, facility staff, patient report and [facility] chart review. Today's concern is:     Adenocarcinoma of colon (H)  Malignant neoplasm of hepatic flexure (H)  Liver lesion  Hospice care patient  Acute blood loss anemia  Iron deficiency anemia, unspecified iron deficiency anemia type  Gastroesophageal reflux disease with esophagitis  Acute kidney injury superimposed on chronic kidney disease (H)  Protein-calorie malnutrition, unspecified severity (H)  Atrial fibrillation, chronic (H)  Bradycardia  Essential hypertension  Hyperlipidemia, unspecified hyperlipidemia type  Low grade fever  Obsessive-compulsive disorder, unspecified type  Autism spectrum disorder  Depression, unspecified depression type  Gout, unspecified cause, unspecified chronicity, unspecified site  Bilateral hearing loss, unspecified hearing loss type  Generalized muscle weakness  Physical deconditioning     Per Epic note/Hx:  Patient is an 88-year-old gentleman with PMH of HTN, HLD, reflux esophagitis, autism, OCD, GIANLUCA, CKD 3, A. fib, gout, hearing loss who presented with 2-month history of progressive fatigue, AGUILA, S OB, pallor, intermittent BRBPR; who was found to have acute on chronic anemia (Hgb 11.0 --> 5.4) s/p 2 pRBC --> 7.6 by discharge, thought 2/2 to right partly circumferential colonic mass with Bx showing adenocarcinoma with imaging showing metastatic lesion to liver.  MRI was attempted by patient did not tolerate.  Patient has no History of GIB except 2010  EGD showing reflux gastritis.  Patient had been hospitalized in March/April 2019 for Influenza and acute hypoxia and was treated with Prednisone which was to be concluded 4/4/19.  Unknown if this continued after this time but Prednisone was DC'd this hospital stay.    Hospitalization was c/b bradycardia on chronic pAfib. Metoprolol was reduced and (new) dronederone started per EP recommendation.  Patient also sustained SAMEERA on CKD (Creat 1.14 --> 1.44) which resolved to baseline by discharge.  Patient is to f/u with Colorectal MD on 8/21/19 for review of treatment options.    Patient was transferred to TCU for strengthening, nursing cares, and medical management.    ____________    8/19/19 - hospice consulted, metoprolol DC'd, Tylenol PRN added, amlodipine parameters added    Since this time patient has been participating in therapy with goal to optimize function.  Patient scheduled to be enrolled in hospice 09/04/19 as therapy has now ended.     Patient is met today in his TCU room where he reports occasional lightheadedness but otherwise denies all other symptoms including pain, abdominal pain, SOB, CP, HA, heartburn, upset  Stomach, constipation or diarrhea.  He is very [place] and uses a pocket talker for assistance.       Past Medical and Surgical History reviewed in Epic today.    MEDICATIONS:  Current Outpatient Medications   Medication Sig Dispense Refill     acetaminophen 500 MG CAPS Take 500 mg by mouth 2 times daily. May also take 1,000 mg 2 times daily as needed (pain/fever).       allopurinol (ZYLOPRIM) 100 MG tablet Take 1 tablet (100 mg) by mouth daily 100 tablet 3     amLODIPine (NORVASC) 5 MG tablet Take 1 tablet (5 mg) by mouth daily       atorvastatin (LIPITOR) 20 MG tablet Take 1 tablet (20 mg) by mouth daily 100 tablet 3     benzocaine-menthol (CHLORASEPTIC) 6-10 MG lozenge Place 1 lozenge inside cheek every 2 hours as needed for moderate pain       dronedarone (MULTAQ) 400 MG TABS tablet Take 1  "tablet (400 mg) by mouth 2 times daily (with meals)       FLUoxetine (PROZAC) 10 MG capsule Take 3 capsules (30 mg) by mouth daily       pantoprazole (PROTONIX) 40 MG EC tablet Take 1 tablet (40 mg) by mouth every morning (before breakfast)       polyethylene glycol (MIRALAX/GLYCOLAX) packet Take 17 g by mouth 2 times daily       polyethylene glycol-propylene glycol (SYSTANE ULTRA) 0.4-0.3 % SOLN ophthalmic solution Place 1 drop into both eyes 2 times daily as needed for dry eyes       QUEtiapine (SEROQUEL) 25 MG tablet Take one tablet by mouth every evening with supper 90 tablet 2     REVIEW OF SYSTEMS:  Limited secondary to cognitive impairment but today pt reports 10 point ROS of systems including Constitutional, Eyes, Respiratory, Cardiovascular, Gastroenterology, Genitourinary, Integumentary, Musculoskeletal, Psychiatric were all negative except for pertinent positives noted in my HPI.    Objective:  BP (!) 158/83   Pulse 70   Temp 96.9  F (36.1  C)   Resp 18   Ht 1.727 m (5' 8\")   Wt 86.5 kg (190 lb 9.6 oz)   SpO2 94%   BMI 28.98 kg/m     Exam:  GENERAL APPEARANCE:  Alert, in no distress, pleasant  HEENT: very [place], uses pocket talker for assistance  RESP:  respiratory effort and palpation of chest normal, auscultation of lungs clear, no respiratory distress  CV:  Palpation and auscultation of heart done , rate and rhythm regular, no murmur, no LE peripheral edema  ABDOMEN:  normal bowel sounds, soft, nontender, KIRSTIE fully for hepatosplenomegaly or other masses d/t body habitus  M/S:   Gait and station with W/C for mobility, Digits and nails with arthritic changes, reduced muscle mass  SKIN:  Inspection and Palpation of skin and subcutaneous tissue pale, intact  PSYCH:  insight and judgement, memory with impairment, affect and mood normal, follows commands readily         Labs:   Recent labs in [facility] reviewed by me today.     ASSESSMENT/PLAN:  Adenocarcinoma of colon (H)  Malignant neoplasm of hepatic " flexure (H)  Liver Lesion (possible metastasis)   Hospice Care Patient   Presented with 2-month history of progressive fatigue, AGUILA, SLE, pallor, intermittent BRBPR; EGD & Colonoscopy 8/11/19 showing right partly circumferential colonic mass with liver lesion. Bx showing adenocarcinoma moderately differentiated with a report of tumor buddings (adenocarcinoma).   Colorectal Oncology and Palliative consulted  Abdomen MRI not tolerated  Colorectal Surgeryconsulted for possible curative vs palliative colectomy vs diversion (family has now decided on comfort-focus)   (new) Miralax 17 gm BID with goal for soft, regular BMs - EHR showing 3 BMs/day     Patient denies abdominal pain; had no grimace or reports of pain with palpation today.   09/04/19 - enrolled in hospice with [facility]. Patient is on wait list to be moved to LTC      PLAN:  - continue (new) Miralax 17 gm BID  with goal of regular BMs. If >3 BMs/day, will need to titrate med.   - Hospice consult  and patient will enroll today as therapy has now ended         Acute blood loss anemia  Iron deficiency anemia, unspecified iron deficiency anemia type  Presented with 2-month history of progressive fatigue, AGUILA, SLE, pallor, intermittent BRBPR - see above  BL Hgb 12.6-15.2  Hgb 4/29/19 11.0  Presented with Hgb 5.4 --> s/p 2 units pRBC --> 7.6 by discharge  IV Venofer given in-patient   PTA ASA DC'd   No bleeding per nursing or patient.   8/20/19 recheck: 8.7 - improving.      PLAN:  - continue discontinue of ASA d/t GIB     Gastroesophageal reflux disease with esophagitis  Per History, 2010 EGD noting reflux esophagitis  Patient reported history of Ibuprofen 2-3 tabs a few times/month.   PTA Omeprazole 20 mg daily DC'd as duplicate with (new) Pantoprazole 40 mg daily  Patient  Reports no heartburn or upset stomach today      PLAN:  - PTA Omeprazole 20 mg daily DC'd  - (new) Pantoprazole 40 mg daily  - recommend no usage of Ibuprofen     Acute kidney  injury superimposed on chronic kidney disease (H)  BL Creat 1.1-1.3  Last few BMPs:   4/29/19: BUN 23, Creat 1.14, GFR 57 (reference)  8/8/19: BUN 30, Creat 1.44, GFR 43  8/13/19: BUN 9, Creat 1.11, GFR 59  8/15/19: BUN x, Creat 1.10, GFR 60     Patient reported history of Ibuprofen 2-3 tabs a few times/month.   Is not on diuretics, ACEI     PLAN:  - Will avoid nephrotoxic agents (such as ACEI/ARB/NSAIDs, IV contrast) and mindful prescribing with renal dosing.      Protein-calorie malnutrition, unspecified severity (H)  In setting of acute and chronic illness  Albumin 3.1, Protein 6.4  Body mass index is 28.98 kg/m .    Weights stable around 190 lbs.      PLAN:  - Hospice  enrollment today with comfort goal.      Atrial fibrillation, chronic (H) - diagnosed March 2019  Bradycardia  Essential hypertension  hosp c/b bradycardia in setting BB therapy for pAfib and acute illness  EP consulted and PTA Metoprolol XL 50 mg daily --> decreased to 12.5 mg BID (although noted discharge Summary showing patient still to take 50 mg daily and EP MD note says to stop med).  8/22/19 Metoprolol DC'd    (new) dronederone 400 mg BID started for rhythm control per EP   (new) amlodipine 5 mg daily started while in-patient, parameters to hold added, not been held.       BPs variable: Mostly 130-160s/70-80s  HRs 62-77, regular today   Patient denies CP, HA; endorses occasional lightheadedness  (may be poor historian)      PLAN:  - discontinued PTA Metoprolol 50 mg XL daily  - continue (new) dronederone 400 mg BID started for rhythm control per EP   - continue (new) amlodipine 5 mg daily with parameters to hold for SBP <110        Hyperlipidemia, unspecified hyperlipidemia type  Continues on PTA atorvastatin 20 mg daily.  Continue but could consider discontinue as soon to be enrolled in Hospice and questionable value     Low grade fever  noted   early in TCU stay, low grade fevers of .0   No leukocytosis while in-patient, last WBC  8.6  No fevers in TCU recently      PLAN:  - PTA Tylenol 500 mg BID - continue   - add (new) Tylenol 1000 mg BID PRN for pain/fever     Obsessive-compulsive disorder, unspecified type  Autism spectrum disorder  Depression, unspecified depression type  On PTA fluoxetine 30 mg daily, seroquel 25 mg q PM  (new) Melatonin 1 mg q HS PRN - no usage      Patient reports adequate sleep  Nursing reports  no behaviors  Noted cognitive scores in TCU:  SLUMS 9/30  Safety questionnaire 5/13  Impulsive at times, lots of verbal cues needed.      P{MARY:  - continue PTA fluoxetine, Seroquel  -  discontinue (new) Melatonin 1 mg q HS PRN d/t no usage      Gout, unspecified cause, unspecified chronicity, unspecified site  On PTA allopurinol 100 mg daily  Continue      Bilateral hearing loss, unspecified hearing loss type  Uses pocket talker (headset with microphone) for communication  Pocket talker used today for visit and was effective.      PLAN:  - pocket talker for assistance  - OK to discontinue ENT f/u appt.      Generalized muscle weakness  Physical deconditioning  In setting of acute illness with new dx adenocarcinoma on chronic illnesses    Therapy progress:  CGA for STS transfers  Ambulating 180 ft with 4WW, CGA  Supervision for grooming/hygiene  Max A with toileting  SLUMS 9/30  Safety questionnaire 5/13  Impulsive at times, lots of verbal cues needed.       PLAN:  - Physical Therapy, Occupational Therapy for strengthening, rehab, while being assessed for hospice.  - hospice consult         Orders written by provider at facility  1. discontinue Melatonin PRN    Total time spent with patient visit at the skilled nursing facility was at least 25 min including patient visit, review of past records and coordination of care with nursing, Hospice RN. Greater than 50% of total time spent with counseling and coordinating care due to patient visit with update on current status, review of SNF EHR, coordination of care with SNF nursing  and Hospice RN regarding POC and medication management. .  Electronically signed by:  TINY Hilario CNP               Sincerely,        TINY Hilario CNP

## 2019-09-16 ENCOUNTER — TELEPHONE (OUTPATIENT)
Dept: SURGERY | Facility: CLINIC | Age: 84
End: 2019-09-16

## 2019-09-16 NOTE — TELEPHONE ENCOUNTER
Called and spoke to Rosemarie (Niece). She states they had a family meeting and will be continuing hospice care for Reilly. They do not wish to schedule any appointment at this time. They will call back if there are any changes.    Ronald Latham LPN

## 2019-09-16 NOTE — TELEPHONE ENCOUNTER
----- Message from Yajaira Kenney RN sent at 9/11/2019  3:05 PM CDT -----  Can you ask Dr. Irwin is she still wants to see the patient as he enrolled in Hospice.    Thanks!  ----- Message -----  From: Yajaira Kenney RN  Sent: 9/11/2019  To: Yajaira Kenney RN        ----- Message -----  From: Sherry Irwin MD  Sent: 9/1/2019   6:04 PM  To: Edvin Mckay MD, Yajaira Kenney RN    Hi - I am concerned that it looks like does not have anything set up for follow-up.      Yajaira - can we offer him an appointment to be seen with myself or worse comes to worse with David Ceron, or [place]?    - GMM    ----- Message -----  From: Edvin Mckay MD  Sent: 8/20/2019   5:39 PM  To: Sherry Irwin MD    I saw him briefly in the hospital and thought it reasonable to take out his tumor even as a palliative maneuver (since I'm not the one who would have to do it;) and maybe ablate his liver lesion if its a met--assuming we really think he could tolerate surgery and he understands what's going on. I can follow up with him in clinic if you think it appropriate, but haven't scheduled anything yet.

## 2019-10-04 ENCOUNTER — NURSING HOME VISIT (OUTPATIENT)
Dept: GERIATRICS | Facility: CLINIC | Age: 84
End: 2019-10-04
Payer: MEDICARE

## 2019-10-04 DIAGNOSIS — F41.9 ANXIETY AND DEPRESSION: ICD-10-CM

## 2019-10-04 DIAGNOSIS — H91.93 BILATERAL HEARING LOSS, UNSPECIFIED HEARING LOSS TYPE: ICD-10-CM

## 2019-10-04 DIAGNOSIS — I10 ESSENTIAL HYPERTENSION: ICD-10-CM

## 2019-10-04 DIAGNOSIS — D62 ANEMIA DUE TO BLOOD LOSS, ACUTE: ICD-10-CM

## 2019-10-04 DIAGNOSIS — F03.90 DEMENTIA WITHOUT BEHAVIORAL DISTURBANCE, UNSPECIFIED DEMENTIA TYPE: ICD-10-CM

## 2019-10-04 DIAGNOSIS — Z51.5 HOSPICE CARE PATIENT: Primary | ICD-10-CM

## 2019-10-04 DIAGNOSIS — I48.91 ATRIAL FIBRILLATION, UNSPECIFIED TYPE (H): ICD-10-CM

## 2019-10-04 DIAGNOSIS — E78.5 HYPERLIPIDEMIA, UNSPECIFIED HYPERLIPIDEMIA TYPE: ICD-10-CM

## 2019-10-04 DIAGNOSIS — F32.A ANXIETY AND DEPRESSION: ICD-10-CM

## 2019-10-04 DIAGNOSIS — C18.3 MALIGNANT NEOPLASM OF HEPATIC FLEXURE (H): ICD-10-CM

## 2019-10-04 PROCEDURE — 99304 1ST NF CARE SF/LOW MDM 25: CPT | Mod: GW | Performed by: INTERNAL MEDICINE

## 2019-10-04 RX ORDER — PANTOPRAZOLE SODIUM 40 MG/1
40 TABLET, DELAYED RELEASE ORAL DAILY
COMMUNITY
End: 2019-01-01

## 2019-10-04 NOTE — LETTER
10/4/2019        RE: Sharon Baer  C/o Rosemarie Paty  [address]  [facility] [address]        [facility]  PHYSICIAN NOTE    Chief Complaint   Patient presents with     Establish Care       HPI:    Sharon Baer is a 88 year old  (6/10/1931), who is being seen today for a federally mandated E/M visit at [facility] . Admitted to LTC recently after transfer from [facility]. Has new diagnosis of metastatic colon cancer August 2019 with profound anemia and he and family have had him sign on for hospice care. He is very [place] and uses pocket talker. Has dementia with SLUMS 9/30. Comorbidities include as noted below, afib and CKD.  He is met in his room today where he was working on a crossword puzzle. He has a nicely decorated room with a large family picture. He denies concerns and frequently walks throughout the unit. He toilets himself. He denies pain and says he is eating fine. I spoke with his nurse who also has no concerns. He will be re-locating to a new room soon once available as he is now a LTC resident.    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     OCD (obsessive compulsive disorder)     on cymbalta     Osteoarthritis, hip, bilateral      Vitamin D deficiency     resolved 2/2013      Past Surgical History:   Procedure Laterality Date     bilateral cataract surgery       bilateral ear surgery       COLONOSCOPY N/A 8/12/2019    Procedure: COLONOSCOPY, WITH POLYPECTOMY AND BIOPSY Area also Tattooed;  Surgeon: Jamaal Potts MD;  Location:  GI     ESOPHAGOSCOPY, GASTROSCOPY, DUODENOSCOPY  (EGD), COMBINED N/A 8/12/2019    Procedure: ESOPHAGOGASTRODUODENOSCOPY (EGD);  Surgeon: Jamaal Potts MD;  Location: [facility]     HERNIA REPAIR, INGUINAL RT/LT       SUPRAPUBIC PROSTATECTOMY        CODE STATUS: DNR/I    MEDICATIONS: Medications reviewed in [facility]  Current Outpatient Medications   Medication Sig Dispense Refill     acetaminophen (TYLENOL) 500 MG tablet Take 1,000 mg by mouth 2 times daily Also may take 500 mg BID PRN       allopurinol (ZYLOPRIM) 100 MG tablet Take 1 tablet (100 mg) by mouth daily 100 tablet 3     amLODIPine (NORVASC) 5 MG tablet Take 1 tablet (5 mg) by mouth daily       atorvastatin (LIPITOR) 20 MG tablet Take 1 tablet (20 mg) by mouth daily 100 tablet 3     benzocaine-menthol (CHLORASEPTIC) 6-10 MG lozenge Place 1 lozenge inside cheek every 2 hours as needed for moderate pain       dronedarone (MULTAQ) 400 MG TABS tablet Take 1 tablet (400 mg) by mouth 2 times daily (with meals)       FLUoxetine (PROZAC) 10 MG capsule Take 3 capsules (30 mg) by mouth daily       pantoprazole (PROTONIX) 40 MG EC tablet Take 40 mg by mouth daily       polyethylene glycol (MIRALAX/GLYCOLAX) packet Take 17 g by mouth 2 times daily       polyethylene glycol-propylene glycol (SYSTANE ULTRA) 0.4-0.3 % SOLN ophthalmic solution Place 1 drop into both eyes 2 times daily as needed for dry eyes       QUEtiapine (SEROQUEL) 25 MG tablet Take one tablet by mouth every evening with supper 90 tablet 2       ROS:  Limited secondary to cognitive impairment but today pt reports as above in HPI    Exam:  /67   Pulse 68   Temp 97.4  F (36.3  C)    Alert, pleasant, casually dressed, mild odor  Is very [place] but able to communicate using pocket talker  Not pale  Smiles brightly and is seen throughout the day walking through the hallway using rollator    Lab/Diagnostic Data:    Hgb up to 8.7 on 8/20/19 (was as low as 5.4 on 8/8/19)    ASSESSMENT/PLAN:  Hospice care patient  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  Anemia due to blood loss, acute  Comfort based approach - appreciate hospice team  He denies pains and feels he is eating well; does toilet self but no report of GI bleeding  He is on bowel regimen to keep stools moving/soft with Miralax BID - he says he has 2-3 stools per day  Colonoscopy showed \"partially obstructing\" tumor  Is no longer on aspirin; Hgb improved in hospital with transfusion/Venofer and does not appear pale today  Is on PPI (?Needed since EGD was within normal limits) though does have h/o GERD    Dementia without behavioral disturbance, unspecified dementia type   SLUMS 9/30 here at rehab; will need support of LTC staff  Had old CVA noted incidentally in hospital on head imaging    Anxiety and depression  H/o OCD  H/o Autism  PTA fluoxetine and seroquel; continue for comfort based approach if effective - not appropriate for GDR  He seems comfortable in his current environment    Atrial fibrillation, paroxysmal  Had bradycardia so EP cardiologist discontinued Metoprolol and started Multaq  Appropriately not on anticoagulation d/t comfort based approach in the setting of colon cancer and anemia    Essential hypertension  At reasonable goal on meds    Hyperlipidemia, unspecified hyperlipidemia type  Consider discontinuation of statin    Bilateral hearing loss, unspecified hearing loss type  Uses pocket talker      Electronically signed by:  Carin Oro DO      Sincerely,        Carin Oro, DO    "

## 2019-10-14 PROBLEM — I48.91 ATRIAL FIBRILLATION, UNSPECIFIED TYPE (H): Status: ACTIVE | Noted: 2019-01-01

## 2019-10-14 PROBLEM — D64.9 ACUTE ANEMIA: Status: RESOLVED | Noted: 2019-08-08 | Resolved: 2019-01-01

## 2019-10-14 PROBLEM — H91.93 BILATERAL HEARING LOSS, UNSPECIFIED HEARING LOSS TYPE: Status: ACTIVE | Noted: 2019-01-01

## 2019-10-14 NOTE — PROGRESS NOTES
[facility]  PHYSICIAN NOTE    Chief Complaint   Patient presents with     hospitals Care       HPI:    Sharon Baer is a 88 year old  (6/10/1931), who is being seen today for a federally mandated E/M visit at [facility] . Admitted to LTC recently after transfer from TCU. Has new diagnosis of metastatic colon cancer August 2019 with profound anemia and he and family have had him sign on for hospice care. He is very [place] and uses pocket talker. Has dementia with SLUMS 9/30. Comorbidities include as noted below, afib and CKD.  He is met in his room today where he was working on a crossword puzzle. He has a nicely decorated room with a large family picture. He denies concerns and frequently walks throughout the unit. He toilets himself. He denies pain and says he is eating fine. I spoke with his nurse who also has no concerns. He will be re-locating to a new room soon once available as he is now a LTC resident.    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     OCD (obsessive compulsive disorder)     on cymbalta     Osteoarthritis, hip, bilateral      Vitamin D deficiency     resolved 2/2013      Past Surgical History:   Procedure Laterality Date     bilateral cataract surgery       bilateral ear surgery       COLONOSCOPY N/A 8/12/2019    Procedure: COLONOSCOPY, WITH POLYPECTOMY AND BIOPSY Area also Tattooed;  Surgeon: Jamaal Potts MD;  Location:  GI     ESOPHAGOSCOPY, GASTROSCOPY, DUODENOSCOPY (EGD), COMBINED N/A 8/12/2019    Procedure: ESOPHAGOGASTRODUODENOSCOPY (EGD);  Surgeon: Jamaal Potts  MD Vern;  Location: [facility]     HERNIA REPAIR, INGUINAL RT/LT       SUPRAPUBIC PROSTATECTOMY        CODE STATUS: DNR/I    MEDICATIONS: Medications reviewed in [facility]  Current Outpatient Medications   Medication Sig Dispense Refill     acetaminophen (TYLENOL) 500 MG tablet Take 1,000 mg by mouth 2 times daily Also may take 500 mg BID PRN       allopurinol (ZYLOPRIM) 100 MG tablet Take 1 tablet (100 mg) by mouth daily 100 tablet 3     amLODIPine (NORVASC) 5 MG tablet Take 1 tablet (5 mg) by mouth daily       atorvastatin (LIPITOR) 20 MG tablet Take 1 tablet (20 mg) by mouth daily 100 tablet 3     benzocaine-menthol (CHLORASEPTIC) 6-10 MG lozenge Place 1 lozenge inside cheek every 2 hours as needed for moderate pain       dronedarone (MULTAQ) 400 MG TABS tablet Take 1 tablet (400 mg) by mouth 2 times daily (with meals)       FLUoxetine (PROZAC) 10 MG capsule Take 3 capsules (30 mg) by mouth daily       pantoprazole (PROTONIX) 40 MG EC tablet Take 40 mg by mouth daily       polyethylene glycol (MIRALAX/GLYCOLAX) packet Take 17 g by mouth 2 times daily       polyethylene glycol-propylene glycol (SYSTANE ULTRA) 0.4-0.3 % SOLN ophthalmic solution Place 1 drop into both eyes 2 times daily as needed for dry eyes       QUEtiapine (SEROQUEL) 25 MG tablet Take one tablet by mouth every evening with supper 90 tablet 2       ROS:  Limited secondary to cognitive impairment but today pt reports as above in HPI    Exam:  /67   Pulse 68   Temp 97.4  F (36.3  C)   Alert, pleasant, casually dressed, mild odor  Is very [place] but able to communicate using pocket talker  Not pale  Smiles brightly and is seen throughout the day walking through the hallway using rollator    Lab/Diagnostic Data:    Hgb up to 8.7 on 8/20/19 (was as low as 5.4 on 8/8/19)    ASSESSMENT/PLAN:  Hospice care patient  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  Anemia due to blood loss, acute  Comfort based approach - appreciate hospice team  He denies  "pains and feels he is eating well; does toilet self but no report of GI bleeding  He is on bowel regimen to keep stools moving/soft with Miralax BID - he says he has 2-3 stools per day  Colonoscopy showed \"partially obstructing\" tumor  Is no longer on aspirin; Hgb improved in hospital with transfusion/Venofer and does not appear pale today  Is on PPI (?Needed since EGD was within normal limits) though does have h/o GERD    Dementia without behavioral disturbance, unspecified dementia type   SLUMS 9/30 here at rehab; will need support of LTC staff  Had old CVA noted incidentally in hospital on head imaging    Anxiety and depression  H/o OCD  H/o Autism  PTA fluoxetine and seroquel; continue for comfort based approach if effective - not appropriate for GDR  He seems comfortable in his current environment    Atrial fibrillation, paroxysmal  Had bradycardia so EP cardiologist discontinued Metoprolol and started Multaq  Appropriately not on anticoagulation d/t comfort based approach in the setting of colon cancer and anemia    Essential hypertension  At reasonable goal on meds    Hyperlipidemia, unspecified hyperlipidemia type  Consider discontinuation of statin    Bilateral hearing loss, unspecified hearing loss type  Uses pocket talker      Electronically signed by:  Carin Oro,   "

## 2019-10-17 NOTE — PROGRESS NOTES
HPI:  Patient has a history of adenocarcinoma of the colon with possible metastasis to the liver - hospice care patient. Patient complains of new floating spots in his eyes. He denies flashes. He denies curtain over the vision. Patient is very hard of hearing.       Pertinent Medical History:    Vitamin D deficiency    Osteoarthritis    Malignant neoplasm of hepatic flexure 08/14/19    Hypotestosteronism    Hypertension    Hyperlipidemia    Hearing loss    Gout    CKD stage 3    Autism    Atrial fibrillation    Metastatic Colon Cancer 08/2019    Dementia    Bradycardia    Ocular History:    Cataract surgery both eyes 2002    Eye Medications:    Allergic to penicillins    Assessment and Plan:  1.   PVD, both eyes. Retina attached.     Educated on signs and symptoms of retinal detachment to be seen immediately.     Monitor in one month -dilate both eyes.     2.   Lattice Degeneration, left eye. Retina attached.     Educated on signs and symptoms of retinal detachment to be seen immediately.     3.   Dermatochalasis, both eyes.     Monitor.     4.   PCIOL, both eyes.     Monitor.     5.   History of autism and hearing loss.     Challenging to stay focus during examination. Niece is very helpful.         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     OCD (obsessive compulsive disorder)     on cymbalta     Osteoarthritis, hip, bilateral      Vitamin D deficiency     resolved 2/2013       Medications:  Current Outpatient Medications   Medication Sig Dispense Refill     acetaminophen (TYLENOL) 500 MG tablet Take 1,000 mg by mouth 2 times daily Also may take 500 mg BID  PRN       allopurinol (ZYLOPRIM) 100 MG tablet Take 1 tablet (100 mg) by mouth daily 100 tablet 3     amLODIPine (NORVASC) 5 MG tablet Take 1 tablet (5 mg) by mouth daily       atorvastatin (LIPITOR) 20 MG tablet Take 1 tablet (20 mg) by mouth daily 100 tablet 3     benzocaine-menthol (CHLORASEPTIC) 6-10 MG lozenge Place 1 lozenge inside cheek every 2 hours as needed for moderate pain       Dextromethorphan-guaiFENesin  MG/5ML syrup Take 5 mLs by mouth every 4 hours as needed for cough       dronedarone (MULTAQ) 400 MG TABS tablet Take 1 tablet (400 mg) by mouth 2 times daily (with meals)       FLUoxetine (PROZAC) 10 MG capsule Take 3 capsules (30 mg) by mouth daily       ipratropium - albuterol 0.5 mg/2.5 mg/3 mL (DUONEB) 0.5-2.5 (3) MG/3ML neb solution Take 1 vial by nebulization every 4 hours as needed for shortness of breath / dyspnea or wheezing       pantoprazole (PROTONIX) 40 MG EC tablet Take 40 mg by mouth daily       polyethylene glycol (MIRALAX/GLYCOLAX) packet Take 17 g by mouth 2 times daily       polyethylene glycol-propylene glycol (SYSTANE ULTRA) 0.4-0.3 % SOLN ophthalmic solution Place 1 drop into both eyes 2 times daily as needed for dry eyes       QUEtiapine (SEROQUEL) 25 MG tablet Take one tablet by mouth every evening with supper 90 tablet 2   Complete documentation of historical and exam elements from today's encounter can be found in the full encounter summary report (not reduplicated in this progress note). I personally obtained the chief complaint(s) and history of present illness.  I confirmed and edited as necessary the review of systems, past medical/surgical history, family history, social history, and examination findings as documented by others; and I examined the patient myself. I personally reviewed the relevant tests, images, and reports as documented above. I formulated and edited as necessary the assessment and plan and discussed the findings and management plan with the  patient and family. - Adriel Gonsalez, HIRAM

## 2019-10-18 PROBLEM — M62.82 NON-TRAUMATIC RHABDOMYOLYSIS: Status: ACTIVE | Noted: 2019-03-31

## 2019-10-18 PROBLEM — J10.1 INFLUENZA A: Status: ACTIVE | Noted: 2019-03-29

## 2019-10-18 PROBLEM — A41.9 SEPSIS, UNSPECIFIED ORGANISM (H): Status: ACTIVE | Noted: 2019-01-01

## 2019-11-06 NOTE — PROGRESS NOTES
[facility]  Chief Complaint   Patient presents with     jail Regulatory     [facility] Medical Record Number:  [id number]  Place of Service where encounter took place[facility] [[id number]    HPI:    Sharon Baer  is 88 year old (6/10/1931), who is being seen today for a federally mandated E/M visit.  HPI information obtained from: facility chart records, facility staff, patient report and [facility] Epic chart review. Today's concerns are:  Malignant neoplasm of hepatic flexure (H)  Adenocarcinoma of colon (H)  Hospice care patient  EGD & Colonoscopy 8/11/19 showing right partly circumferential colonic mass with liver lesion. Bx showing adenocarcinoma moderately differentiated with a report of tumor buddings (adenocarcinoma).   Colorectal Surgeryconsulted for possible curative vs palliative colectomy vs diversion; family decided on comfort-focus  09/04/19 - enrolled in hospice with [facility]    Is on pantoprazole 40 mg daily for GERD and ppx.  Is on Miralax BID   Patient does self toilet and reports 2-3 BMs/day, occasional blood in stool.    He denies abdominal pain    Dementia without behavioral disturbance, unspecified dementia type (H)  Anxiety and depression  Residing in SNF on hospice now with nursing for supportive cares  On fluoxetine 30 mg daily, seroquel 25 mg q evening    SLUMS 9/30, BIMS 7 recently  PHQ9 - 3 (minimal)     Nursing reports no behaviors.  He has found a good routine and is happy, not bothered by much, very mild mannered for the most part.  Possible move to LTC when bed available.      Atrial fibrillation, unspecified type (H)  Metoprolol DC'd last hospitalization and EP changed to dronedarone 400 mg BID  Taken off anticoagulation d/t anemia and GIB.   HRs 60-84 (mostly 60s), regular today      Essential hypertension  Hyperlipidemia, unspecified hyperlipidemia type  On  dronedarone 400 mg BID, amlodipine 5 mg daily    BPs variable but  mostly 140-150s/60-70s  HRs 60-84 (mostly 60s)  Patient denies CP, HA, lightheadedness (may be poor historian)         ALLERGIES:Penicillins  PAST MEDICAL HISTORY:   has a past medical history of Atrial fibrillation (H), Autism spectrum disorder (7/16/2015), CKD (chronic kidney disease), GERD (gastroesophageal reflux disease), Gout, Hearing loss, Hyperlipemia, Hypertension, Hypotestosteronism, IGT (impaired glucose tolerance), Malignant neoplasm of hepatic flexure (H) (8/14/2019), OCD (obsessive compulsive disorder), Osteoarthritis, hip, bilateral, and Vitamin D deficiency.  PAST SURGICAL HISTORY:   has a past surgical history that includes hernia repair, inguinal rt/lt; bilateral ear surgery; bilateral cataract surgery; suprapubic prostatectomy; Esophagoscopy, gastroscopy, duodenoscopy (EGD), combined (N/A, 8/12/2019); and Colonoscopy (N/A, 8/12/2019).  FAMILY HISTORY: family history includes Cancer in his sister; Myocardial Infarction (age of onset: 60) in his maternal uncle.  SOCIAL HISTORY:  reports that he has quit smoking. He has a 20.00 pack-year smoking history. He has never used smokeless tobacco. He reports that he does not drink alcohol or use drugs.    MEDICATIONS:  Current Outpatient Medications   Medication Sig Dispense Refill     acetaminophen (TYLENOL) 500 MG tablet Take 1,000 mg by mouth 2 times daily Also may take 500 mg BID PRN       allopurinol (ZYLOPRIM) 100 MG tablet Take 1 tablet (100 mg) by mouth daily 100 tablet 3     amLODIPine (NORVASC) 5 MG tablet Take 1 tablet (5 mg) by mouth daily       benzocaine-menthol (CHLORASEPTIC) 6-10 MG lozenge Place 1 lozenge inside cheek every 2 hours as needed for moderate pain       Dextromethorphan-guaiFENesin  MG/5ML syrup Take 5 mLs by mouth every 4 hours as needed for cough       dronedarone (MULTAQ) 400 MG TABS tablet Take 1 tablet (400 mg) by mouth 2 times daily (with meals)       FLUoxetine (PROZAC) 10 MG capsule Take 3 capsules (30 mg) by mouth  "daily       ipratropium - albuterol 0.5 mg/2.5 mg/3 mL (DUONEB) 0.5-2.5 (3) MG/3ML neb solution Take 1 vial by nebulization every 4 hours as needed for shortness of breath / dyspnea or wheezing       pantoprazole (PROTONIX) 20 MG EC tablet Take 1 tablet (20 mg) by mouth daily       polyethylene glycol (MIRALAX/GLYCOLAX) packet Take 17 g by mouth 2 times daily       polyethylene glycol-propylene glycol (SYSTANE ULTRA) 0.4-0.3 % SOLN ophthalmic solution Place 1 drop into both eyes 2 times daily as needed for dry eyes       QUEtiapine (SEROQUEL) 25 MG tablet Take one tablet by mouth every evening with supper 90 tablet 2     Case Management:  I have reviewed the care plan and MDS and do agree with the plan. Patient's desire to return to the community is not present. Information reviewed:  Medications, vital signs, orders, and nursing notes.    ROS:  Limited secondary to cognitive impairment but today pt reports 10 point ROS of systems including Constitutional, Eyes, Respiratory, Cardiovascular, Gastroenterology, Genitourinary, Integumentary, Musculoskeletal, Psychiatric were all negative except for pertinent positives noted in my HPI.    Vitals:  BP (!) 154/73   Pulse 66   Temp 96.3  F (35.7  C)   Resp 16   Ht 1.727 m (5' 8\")   Wt 88.6 kg (195 lb 6.4 oz)   SpO2 96%   BMI 29.71 kg/m    Body mass index is 29.71 kg/m .  Exam:  GENERAL APPEARANCE:  Alert, in no distress, very [place] - uses pocket talker for communication, very pleasant  RESP:  respiratory effort and palpation of chest normal, auscultation of lungs clear, no respiratory distress  CV:  Palpation and auscultation of heart done , rate and rhythm regular, no murmur, no LE peripheral edema  ABDOMEN:  Mildly obese, normal bowel sounds, soft, nontender, no hepatosplenomegaly or other masses  M/S:   Gait and station ambulatory with walker, Digits and nails with arthritic changes, reduced muscle mass  SKIN:  Inspection and Palpation of skin and subcutaneous " tissue pale, intact  PSYCH:  insight and judgement, memory with impairment, affect and mood normal, follows commands readily         Lab/Diagnostic data:   Labs done [facility] done by [facility] [employer] lab. Pertinent results are: reviewed    ASSESSMENT/PLAN  Malignant neoplasm of hepatic flexure (H)  Adenocarcinoma of colon (H)  Hospice care patient  Stable with hospice following.  No GERD symptoms; will attempt GDR of PPI as RvB of PPI include risk of pneumonia, C.Diff., fractures, hypomagnesemia.  Patient has had PNA once recently.  Will GDR and monitor for symptoms.  Will monitor for reports of hematochezia.   Comfort is goal.  Appreciate hospice teams's diligent attention.     Dementia without behavioral disturbance, unspecified dementia type (H)  Anxiety and depression  No behaviors.  Reilly has found a nice routine for him  No GDR of fluoxetine or seroquel as comfort is goal and patient is stable   Possibly moving to LTC when bed is available.   Nursing for supportive cares    Atrial fibrillation, unspecified type (H)  Stable with current rate control med  No OA d/t anemia    Essential hypertension  Hyperlipidemia, unspecified hyperlipidemia type  BP goals are <150/90 mm Hg.This is higher than ACC and AHA recommendations due to goals of care, risk for hypotension, risk of dizziness and falls, risk of tissue/cerebral hypoperfusion and frailty. Patient is stable with current plan of care and routine assessment.        Orders written by provider at facility  1. Reduce Pantoprazole to 20 mg po daily. Dx: GERD      Electronically signed by:  TINY Hilario CNP

## 2019-11-07 NOTE — LETTER
11/7/2019        RE: Sharon Baer  C/o Rosemarie Smithkristy  [address]  [facility] [address]        [place] [facility]  Chief Complaint   Patient presents with     detention Regulatory     [place] Medical Record Number:  [id number]  Place [facility] encounter took [facility] [[id number]]    HPI:    Sharon Baer  is 88 year old (6/10/1931), who is being seen today for a federally mandated E/M visit.  HPI information obtained from: facility chart records, facility staff, patient report and [place] Epic chart review. Today's concerns are:  Malignant neoplasm of hepatic flexure (H)  Adenocarcinoma of colon (H)  Hospice care patient  EGD & Colonoscopy 8/11/19 showing right partly circumferential colonic mass with liver lesion. Bx showing adenocarcinoma moderately differentiated with a report of tumor buddings (adenocarcinoma).   Colorectal Surgeryconsulted for possible curative vs palliative colectomy vs diversion; family decided on comfort-focus  09/04/19 - enrolled in hospice with [facility]    Is on pantoprazole 40 mg daily for GERD and ppx.  Is on Miralax BID   Patient does self toilet and reports 2-3 BMs/day, occasional blood in stool.    He denies abdominal pain    Dementia without behavioral disturbance, unspecified dementia type (H)  Anxiety and depression  Residing in SNF on hospice now with nursing for supportive cares  On fluoxetine 30 mg daily, seroquel 25 mg q evening    SLUMS 9/30, BIMS 7 recently  PHQ9 - 3 (minimal)     Nursing reports no behaviors.  He has found a good routine and is happy, not bothered by much, very mild mannered for the most part.  Possible move to LTC when bed available.      Atrial fibrillation, unspecified type (H)  Metoprolol DC'd last hospitalization and EP changed to dronedarone 400 mg BID  Taken off anticoagulation d/t anemia and GIB.   HRs 60-84 (mostly 60s), regular today      Essential  hypertension  Hyperlipidemia, unspecified hyperlipidemia type  On  dronedarone 400 mg BID, amlodipine 5 mg daily    BPs variable but mostly 140-150s/60-70s  HRs 60-84 (mostly 60s)  Patient denies CP, HA, lightheadedness (may be poor historian)         ALLERGIES:Penicillins  PAST MEDICAL HISTORY:   has a past medical history of Atrial fibrillation (H), Autism spectrum disorder (7/16/2015), CKD (chronic kidney disease), GERD (gastroesophageal reflux disease), Gout, Hearing loss, Hyperlipemia, Hypertension, Hypotestosteronism, IGT (impaired glucose tolerance), Malignant neoplasm of hepatic flexure (H) (8/14/2019), OCD (obsessive compulsive disorder), Osteoarthritis, hip, bilateral, and Vitamin D deficiency.  PAST SURGICAL HISTORY:   has a past surgical history that includes hernia repair, inguinal rt/lt; bilateral ear surgery; bilateral cataract surgery; suprapubic prostatectomy; Esophagoscopy, gastroscopy, duodenoscopy (EGD), combined (N/A, 8/12/2019); and Colonoscopy (N/A, 8/12/2019).  FAMILY HISTORY: family history includes Cancer in his sister; Myocardial Infarction (age of onset: 60) in his maternal uncle.  SOCIAL HISTORY:  reports that he has quit smoking. He has a 20.00 pack-year smoking history. He has never used smokeless tobacco. He reports that he does not drink alcohol or use drugs.    MEDICATIONS:  Current Outpatient Medications   Medication Sig Dispense Refill     acetaminophen (TYLENOL) 500 MG tablet Take 1,000 mg by mouth 2 times daily Also may take 500 mg BID PRN       allopurinol (ZYLOPRIM) 100 MG tablet Take 1 tablet (100 mg) by mouth daily 100 tablet 3     amLODIPine (NORVASC) 5 MG tablet Take 1 tablet (5 mg) by mouth daily       benzocaine-menthol (CHLORASEPTIC) 6-10 MG lozenge Place 1 lozenge inside cheek every 2 hours as needed for moderate pain       Dextromethorphan-guaiFENesin  MG/5ML syrup Take 5 mLs by mouth every 4 hours as needed for cough       dronedarone (MULTAQ) 400 MG TABS  "tablet Take 1 tablet (400 mg) by mouth 2 times daily (with meals)       FLUoxetine (PROZAC) 10 MG capsule Take 3 capsules (30 mg) by mouth daily       ipratropium - albuterol 0.5 mg/2.5 mg/3 mL (DUONEB) 0.5-2.5 (3) MG/3ML neb solution Take 1 vial by nebulization every 4 hours as needed for shortness of breath / dyspnea or wheezing       pantoprazole (PROTONIX) 20 MG EC tablet Take 1 tablet (20 mg) by mouth daily       polyethylene glycol (MIRALAX/GLYCOLAX) packet Take 17 g by mouth 2 times daily       polyethylene glycol-propylene glycol (SYSTANE ULTRA) 0.4-0.3 % SOLN ophthalmic solution Place 1 drop into both eyes 2 times daily as needed for dry eyes       QUEtiapine (SEROQUEL) 25 MG tablet Take one tablet by mouth every evening with supper 90 tablet 2     Case Management:  I have reviewed the care plan and MDS and do agree with the plan. Patient's desire to return to the community is not present. Information reviewed:  Medications, vital signs, orders, and nursing notes.    ROS:  Limited secondary to cognitive impairment but today pt reports 10 point ROS of systems including Constitutional, Eyes, Respiratory, Cardiovascular, Gastroenterology, Genitourinary, Integumentary, Musculoskeletal, Psychiatric were all negative except for pertinent positives noted in my HPI.    Vitals:  BP (!) 154/73   Pulse 66   Temp 96.3  F (35.7  C)   Resp 16   Ht 1.727 m (5' 8\")   Wt 88.6 kg (195 lb 6.4 oz)   SpO2 96%   BMI 29.71 kg/m     Body mass index is 29.71 kg/m .  Exam:  GENERAL APPEARANCE:  Alert, in no distress, very [place] - uses pocket talker for communication, very pleasant  RESP:  respiratory effort and palpation of chest normal, auscultation of lungs clear, no respiratory distress  CV:  Palpation and auscultation of heart done , rate and rhythm regular, no murmur, no LE peripheral edema  ABDOMEN:  Mildly obese, normal bowel sounds, soft, nontender, no hepatosplenomegaly or other masses  M/S:   Gait and station " ambulatory with walker, Digits and nails with arthritic changes, reduced muscle mass  SKIN:  Inspection and Palpation of skin and subcutaneous tissue pale, intact  PSYCH:  insight and judgement, memory with impairment, affect and mood normal, follows commands readily         Lab/Diagnostic data:   Labs done while at [facility] done by [facility]. Pertinent results are: reviewed    ASSESSMENT/PLAN  Malignant neoplasm of hepatic flexure (H)  Adenocarcinoma of colon (H)  Hospice care patient  Stable with hospice following.  No GERD symptoms; will attempt GDR of PPI as RvB of PPI include risk of pneumonia, C.Diff., fractures, hypomagnesemia.  Patient has had PNA once recently.  Will GDR and monitor for symptoms.  Will monitor for reports of hematochezia.   Comfort is goal.  Appreciate hospice teams's diligent attention.     Dementia without behavioral disturbance, unspecified dementia type (H)  Anxiety and depression  No behaviors.  Reilly has found a nice routine for him  No GDR of fluoxetine or seroquel as comfort is goal and patient is stable   Possibly moving to LTC when bed is available.   Nursing for supportive cares    Atrial fibrillation, unspecified type (H)  Stable with current rate control med  No OA d/t anemia    Essential hypertension  Hyperlipidemia, unspecified hyperlipidemia type  BP goals are <150/90 mm Hg.This is higher than ACC and AHA recommendations due to goals of care, risk for hypotension, risk of dizziness and falls, risk of tissue/cerebral hypoperfusion and frailty. Patient is stable with current plan of care and routine assessment.        Orders written by provider at facility  1. Reduce Pantoprazole to 20 mg po daily. Dx: GERD      Electronically signed by:  TINY Hilario CNP              Sincerely,        TINY Hilario CNP

## 2019-11-22 NOTE — PATIENT INSTRUCTIONS
1.  You were seen in the ENT Clinic today by Dr. Riley.  If you have any questions or concerns after your appointment, please call [phone]. Press option #1 for scheduling related needs. Press option #3 for Nurse advice.    2.  Plan is to return to clinic in 4 months.      Ana Kennedy LPN  [facility]aryng[facility]  [phone]    The patient presents with a history of severe sensorineural hearing loss and cerumen impaction with mastoid cavities. His ears are cleaned of impaction today. He will be seen again as needed for repeat cleaning.

## 2019-11-22 NOTE — LETTER
11/22/2019       RE: Sharon Baer  C/o Rosemarie Newman  [address]  [facility] [address]     Dear Colleague,    Thank you for referring your patient, Sharon Baer, to the [facility] EAR NO[facility]AT at [facility]. Please see a copy of my visit note below.    The patient presents with a history of profound sensorineural hearing loss. He has a history of ear infections and ear surgery including canal wall down mastoidectomy procedures. The patient reports a progressive hearing loss in both ears over many years. The patient denies sinusitis, rhinitis, facial pain, nasal obstruction or purulent nasal discharge.       All other systems were reviewed and they are either negative or they are not directly pertinent to this Otolaryngology examination.      Past Medical History:    Past Medical History:   Diagnosis Date     Atrial fibrillation (H)      Autism spectrum disorder 7/16/2015     CKD (chronic kidney disease)      GERD (gastroesophageal reflux disease)      Gout      Hearing loss      Hyperlipemia     on atorvastatin     Hypertension      Hypotestosteronism      IGT (impaired glucose tolerance)      Malignant neoplasm of hepatic flexure (H) 8/14/2019     OCD (obsessive compulsive disorder)     on cymbalta     Osteoarthritis, hip, bilateral      Vitamin D deficiency     resolved 2/2013       Past Surgical History:    Past Surgical History:   Procedure Laterality Date     bilateral cataract surgery       bilateral ear surgery       COLONOSCOPY N/A 8/12/2019    Procedure: COLONOSCOPY, WITH POLYPECTOMY AND BIOPSY Area also Tattooed;  Surgeon: Jamaal Potts MD;  Location:  GI     ESOPHAGOSCOPY, GASTROSCOPY, DUODENOSCOPY (EGD), COMBINED N/A 8/12/2019    Procedure: ESOPHAGOGASTRODUODENOSCOPY (EGD);  Surgeon: Jamaal Potts MD;  Location: [facility] GI     HERNIA REPAIR, INGUINAL RT/LT       SUPRAPUBIC PROSTATECTOMY         Medications:      Current  Outpatient Medications:      acetaminophen (TYLENOL) 500 MG tablet, Take 1,000 mg by mouth 2 times daily Also may take 500 mg BID PRN, Disp: , Rfl:      allopurinol (ZYLOPRIM) 100 MG tablet, Take 1 tablet (100 mg) by mouth daily, Disp: 100 tablet, Rfl: 3     amLODIPine (NORVASC) 5 MG tablet, Take 1 tablet (5 mg) by mouth daily, Disp: , Rfl:      benzocaine-menthol (CHLORASEPTIC) 6-10 MG lozenge, Place 1 lozenge inside cheek every 2 hours as needed for moderate pain, Disp: , Rfl:      Dextromethorphan-guaiFENesin  MG/5ML syrup, Take 5 mLs by mouth every 4 hours as needed for cough, Disp: , Rfl:      dronedarone (MULTAQ) 400 MG TABS tablet, Take 1 tablet (400 mg) by mouth 2 times daily (with meals), Disp: , Rfl:      FLUoxetine (PROZAC) 10 MG capsule, Take 3 capsules (30 mg) by mouth daily, Disp: , Rfl:      ipratropium - albuterol 0.5 mg/2.5 mg/3 mL (DUONEB) 0.5-2.5 (3) MG/3ML neb solution, Take 1 vial by nebulization every 4 hours as needed for shortness of breath / dyspnea or wheezing, Disp: , Rfl:      pantoprazole (PROTONIX) 20 MG EC tablet, Take 1 tablet (20 mg) by mouth daily, Disp: , Rfl:      polyethylene glycol (MIRALAX/GLYCOLAX) packet, Take 17 g by mouth 2 times daily, Disp: , Rfl:      polyethylene glycol-propylene glycol (SYSTANE ULTRA) 0.4-0.3 % SOLN ophthalmic solution, Place 1 drop into both eyes 2 times daily as needed for dry eyes, Disp: , Rfl:      QUEtiapine (SEROQUEL) 25 MG tablet, Take one tablet by mouth every evening with supper, Disp: 90 tablet, Rfl: 2    Allergies:    Penicillins    Physical Examination:    The patient is a well developed, well nourished male in no apparent distress.  He is normocephalic, atraumatic with pupils equally round and reactive to light.    Oral Cavity Examination:  Normal mucosa with no masses or lesions  Nasal Examination: Normal mucosa with no masses or lesions  Ear Examination: Ear canals are impacted and the mastoid cavities are filled with impacted  cerumen. The ear canals and mastoid bowls are cleaned bilaterally of cerumen using an alligator forceps and a suction using a binocular microscope for visualization. The tympanic membranes and middle ear spaces normal.  Neurological Examination: Facial nerve function intact and symmetric  Integumentary Examination: No lesions on the skin of the head and neck      Assessment and Plan:    The patient presents with a history of severe sensorineural hearing loss and cerumen impaction with mastoid cavities. His ears are cleaned of impaction today. He will be seen again as needed for repeat cleaning.     CC: Dr. Neo Castano    Again, thank you for allowing me to participate in the care of your patient.      Sincerely,    Ramírez Riley MD

## 2019-11-22 NOTE — NURSING NOTE
Depression Response    Patient completed the PHQ-9 assessment for depression and scored >9? Yes  Question 9 on the PHQ-9 was positive for suicidality? Yes    I personally notified the following: visit provider       Patient PHQ 9 score is 9. Patient reports some suicidal ideation but ppeared to be somewhat confused by some of the questions. Patient is also very hard of hearing No Patient Care Coordination Note on file.   has inoperable cancer and is  currently on hospice care.. Patient's nephew who was present states that patient sees mental and spiritual health professional; regulalrly as part of his hospice care .   Kofi Ibarra LPN

## 2019-11-22 NOTE — PROGRESS NOTES
The patient presents with a history of profound sensorineural hearing loss. He has a history of ear infections and ear surgery including canal wall down mastoidectomy procedures. The patient reports a progressive hearing loss in both ears over many years. The patient denies sinusitis, rhinitis, facial pain, nasal obstruction or purulent nasal discharge.       All other systems were reviewed and they are either negative or they are not directly pertinent to this Otolaryngology examination.      Past Medical History:    Past Medical History:   Diagnosis Date     Atrial fibrillation (H)      Autism spectrum disorder 7/16/2015     CKD (chronic kidney disease)      GERD (gastroesophageal reflux disease)      Gout      Hearing loss      Hyperlipemia     on atorvastatin     Hypertension      Hypotestosteronism      IGT (impaired glucose tolerance)      Malignant neoplasm of hepatic flexure (H) 8/14/2019     OCD (obsessive compulsive disorder)     on cymbalta     Osteoarthritis, hip, bilateral      Vitamin D deficiency     resolved 2/2013       Past Surgical History:    Past Surgical History:   Procedure Laterality Date     bilateral cataract surgery       bilateral ear surgery       COLONOSCOPY N/A 8/12/2019    Procedure: COLONOSCOPY, WITH POLYPECTOMY AND BIOPSY Area also Tattooed;  Surgeon: Jamaal Potts MD;  Location:  GI     ESOPHAGOSCOPY, GASTROSCOPY, DUODENOSCOPY (EGD), COMBINED N/A 8/12/2019    Procedure: ESOPHAGOGASTRODUODENOSCOPY (EGD);  Surgeon: Jamaal Potts MD;  Location:  GI     HERNIA REPAIR, INGUINAL RT/LT       SUPRAPUBIC PROSTATECTOMY         Medications:      Current Outpatient Medications:      acetaminophen (TYLENOL) 500 MG tablet, Take 1,000 mg by mouth 2 times daily Also may take 500 mg BID PRN, Disp: , Rfl:      allopurinol (ZYLOPRIM) 100 MG tablet, Take 1 tablet (100 mg) by mouth daily, Disp: 100 tablet, Rfl: 3     amLODIPine (NORVASC) 5 MG tablet, Take 1 tablet (5 mg)  by mouth daily, Disp: , Rfl:      benzocaine-menthol (CHLORASEPTIC) 6-10 MG lozenge, Place 1 lozenge inside cheek every 2 hours as needed for moderate pain, Disp: , Rfl:      Dextromethorphan-guaiFENesin  MG/5ML syrup, Take 5 mLs by mouth every 4 hours as needed for cough, Disp: , Rfl:      dronedarone (MULTAQ) 400 MG TABS tablet, Take 1 tablet (400 mg) by mouth 2 times daily (with meals), Disp: , Rfl:      FLUoxetine (PROZAC) 10 MG capsule, Take 3 capsules (30 mg) by mouth daily, Disp: , Rfl:      ipratropium - albuterol 0.5 mg/2.5 mg/3 mL (DUONEB) 0.5-2.5 (3) MG/3ML neb solution, Take 1 vial by nebulization every 4 hours as needed for shortness of breath / dyspnea or wheezing, Disp: , Rfl:      pantoprazole (PROTONIX) 20 MG EC tablet, Take 1 tablet (20 mg) by mouth daily, Disp: , Rfl:      polyethylene glycol (MIRALAX/GLYCOLAX) packet, Take 17 g by mouth 2 times daily, Disp: , Rfl:      polyethylene glycol-propylene glycol (SYSTANE ULTRA) 0.4-0.3 % SOLN ophthalmic solution, Place 1 drop into both eyes 2 times daily as needed for dry eyes, Disp: , Rfl:      QUEtiapine (SEROQUEL) 25 MG tablet, Take one tablet by mouth every evening with supper, Disp: 90 tablet, Rfl: 2    Allergies:    Penicillins    Physical Examination:    The patient is a well developed, well nourished male in no apparent distress.  He is normocephalic, atraumatic with pupils equally round and reactive to light.    Oral Cavity Examination:  Normal mucosa with no masses or lesions  Nasal Examination: Normal mucosa with no masses or lesions  Ear Examination: Ear canals are impacted and the mastoid cavities are filled with impacted cerumen. The ear canals and mastoid bowls are cleaned bilaterally of cerumen using an alligator forceps and a suction using a binocular microscope for visualization. The tympanic membranes and middle ear spaces normal.  Neurological Examination: Facial nerve function intact and symmetric  Integumentary Examination:  No lesions on the skin of the head and neck      Assessment and Plan:    The patient presents with a history of severe sensorineural hearing loss and cerumen impaction with mastoid cavities. His ears are cleaned of impaction today. He will be seen again as needed for repeat cleaning.     CC: Dr. Neo Castano

## 2019-11-26 NOTE — TELEPHONE ENCOUNTER
[facility] Call Center    Phone Message    May a detailed message be left on voicemail: yes    Reason for Call: Medication Question or concern regarding medication   Prescription Clarification  Name of Medication: Quetiapine  Prescribing Provider: Dyan Mathis   Pharmacy: [facility]   What on the order needs clarification? Needs to clarify dosage.           Action Taken: Other: [facility] [place] psych pool

## 2019-11-27 NOTE — TELEPHONE ENCOUNTER
Returned phone call to [facility]. They received a fax from an unknown source with the rx information for Seroquel. They are uncertain if the pt is trying to use their pharmacy and the fax they received is not an actual order. The pt last filled medication with [facility] in April of 2019. They pharmacy will disregard the fax at this time, as it is unknown whether the pt is attempting to switch pharmacies and/or fill the medication through [facility].     Placed phone call to pt's niece, Rosemarie, who is the pt's spokesperson to confirm whether the pt requires a refill of Seroquel thru [facility]. LVM requesting she return phone call to writer to verify.

## 2019-12-12 NOTE — LETTER
"    12/12/2019        RE: Sharon Baer  C/o Rosemarie Paty  [address]  [facility] [address]        [place] [facility]  PHYSICIAN NOTE    Chief Complaint   Patient presents with     group home Regulatory       HPI:    Sharon Baer is a 88 year old  (6/10/1931), who is being seen today for a federally mandated E/M visit at [facility] . Admitted to TCU in Aug 2019 after hospital stay with new diagnosis of metastatic colon cancer with profound anemia electing hospice care. Then transitioned to LTC. He has dementia with SLUMS 9/30 and is very hard of hearing utilizing a pocket talker. Comorbidities include as noted below with afib and CKD. Reilly is met in his room today and we worked with staff to get his pocket talker working as Reilly didn't know how to do this himself. He has a computer in his room that is is requesting help with as he doesn't know how to look up some information. He seems to give himself some leeway and giggles to himself about this. He has a nephew that may be able to help. I tried as able but it wasn't clear what he was trying to accomplish. He denies pain or dyspnea. He recently went to ENT to have cerumen removed. No noted on-going blood loss. Still on hospice care. Nursing staff have no concerns and say he is pleasant and \"great!\".     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     OCD (obsessive compulsive disorder)      Osteoarthritis, hip, bilateral      Vitamin D deficiency     resolved 2/2013      Past Surgical History:   Procedure Laterality Date     bilateral cataract surgery       bilateral ear surgery   "     COLONOSCOPY N/A 8/12/2019    Procedure: COLONOSCOPY, WITH POLYPECTOMY AND BIOPSY Area also Tattooed;  Surgeon: Jamaal Potts MD;  Location: [facility]     ESOPHAGOSCOPY, GASTROSCOPY, DUODENOSCOPY (EGD), COMBINED N/A 8/12/2019    Procedure: ESOPHAGOGASTRODUODENOSCOPY (EGD);  Surgeon: Jamaal Potts MD;  Location: [facility]     HERNIA REPAIR, INGUINAL RT/LT       SUPRAPUBIC PROSTATECTOMY        CODE STATUS: DNR hospice care    MEDICATIONS: Reviewed in [facility]  Current Outpatient Medications   Medication Sig Dispense Refill     acetaminophen (TYLENOL) 500 MG tablet Take 1,000 mg by mouth 2 times daily Also may take 500 mg BID PRN       allopurinol (ZYLOPRIM) 100 MG tablet Take 1 tablet (100 mg) by mouth daily 100 tablet 3     amLODIPine (NORVASC) 5 MG tablet Take 1 tablet (5 mg) by mouth daily       benzocaine-menthol (CHLORASEPTIC) 6-10 MG lozenge Place 1 lozenge inside cheek every 2 hours as needed for moderate pain       Dextromethorphan-guaiFENesin  MG/5ML syrup Take 5 mLs by mouth every 4 hours as needed for cough       dronedarone (MULTAQ) 400 MG TABS tablet Take 1 tablet (400 mg) by mouth 2 times daily (with meals)       FLUoxetine (PROZAC) 10 MG capsule Take 3 capsules (30 mg) by mouth daily       ipratropium - albuterol 0.5 mg/2.5 mg/3 mL (DUONEB) 0.5-2.5 (3) MG/3ML neb solution Take 1 vial by nebulization every 4 hours as needed for shortness of breath / dyspnea or wheezing       pantoprazole (PROTONIX) 20 MG EC tablet Take 1 tablet (20 mg) by mouth daily       polyethylene glycol (MIRALAX/GLYCOLAX) packet Take 17 g by mouth 2 times daily       polyethylene glycol-propylene glycol (SYSTANE ULTRA) 0.4-0.3 % SOLN ophthalmic solution Place 1 drop into both eyes 2 times daily as needed for dry eyes       QUEtiapine (SEROQUEL) 25 MG tablet Take one tablet by mouth every evening with supper 90 tablet 2       ROS:  Limited secondary to cognitive impairment but today pt reports as above  "in HPI    Exam:  /80   Pulse 78   Temp 97.6  F (36.4  C)   Resp 20   Ht 1.422 m (4' 8\")   Wt 87.2 kg (192 lb 3.2 oz)   SpO2 94%   BMI 43.09 kg/m     Alert, pleasant, NAD, well groomed  Very hard of hearing - needs pocket talker to communicate  Walks with rollator with ease  Heart sounds regular, rate controlled  Lungs clear  Makes a few small jokes to himself  Doesn't appear pale    Lab/Diagnostic Data:    None while on hospice care    ASSESSMENT/PLAN:  Bilateral hearing loss, chronic  Thankful for pocket talker to communicate though he needs help with it d/t his dementia  Staff supportive  Went to ENT for cerumen removal    Malignant neoplasm of hepatic flexure   Acute blood loss anemia  No reported pain or active on-going blood loss  Remains on hospice care  Is on bowel regimen to keep stools soft/regular  Weight remains stable in 190s  H/o GERD but EGD unremarkable and thus I agree with PCP in GDR PPI which he seems to be tolerating well (reduced from 40 mg down to 20 mg of Protonix in Nov)    Atrial fibrillation, unspecified type  Sounds regular and rate controlled today; on Multaq   Not on anticoagulation d/t hospice care with h/o GI bleed    Depression, unspecified depression type  H/o obsessive-compulsive disorder  Staff feel he is doing well and has support of family  Continues on Prozac + Seroquel and he seems in good mood today      Electronically signed by:  Carin Oro, DO        Sincerely,        Carin Oro, DO    "

## 2019-12-29 PROBLEM — J10.1 INFLUENZA A: Status: RESOLVED | Noted: 2019-03-29 | Resolved: 2019-01-01

## 2019-12-29 PROBLEM — A41.9 SEPSIS, UNSPECIFIED ORGANISM (H): Status: RESOLVED | Noted: 2019-01-01 | Resolved: 2019-01-01

## 2019-12-29 NOTE — PROGRESS NOTES
"[place] [facility]  PHYSICIAN NOTE    Chief Complaint   Patient presents with     [facility] Regulatory       HPI:    Sharon Baer is a 88 year old  (6/10/1931), who is being seen today for a federally mandated E/M visit at [facility] . Admitted to TCU in Aug 2019 after hospital stay with new diagnosis of metastatic colon cancer with profound anemia electing hospice care. Then transitioned to LTC. He has dementia with SLUMS 9/30 and is very hard of hearing utilizing a pocket talker. Comorbidities include as noted below with afib and CKD. Reilly is met in his room today and we worked with staff to get his pocket talker working as Reilly didn't know how to do this himself. He has a computer in his room that is is requesting help with as he doesn't know how to look up some information. He seems to give himself some leeway and giggles to himself about this. He has a nephew that may be able to help. I tried as able but it wasn't clear what he was trying to accomplish. He denies pain or dyspnea. He recently went to ENT to have cerumen removed. No noted on-going blood loss. Still on hospice care. Nursing staff have no concerns and say he is pleasant and \"great!\".     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     OCD (obsessive compulsive disorder)      Osteoarthritis, hip, bilateral      Vitamin D deficiency     resolved 2/2013      Past Surgical History:   Procedure Laterality Date     bilateral cataract surgery       bilateral ear surgery       COLONOSCOPY N/A 8/12/2019    Procedure: COLONOSCOPY, WITH POLYPECTOMY AND BIOPSY Area also Tattooed;  " "Surgeon: Jamaal Potts MD;  Location: [facility]     ESOPHAGOSCOPY, GASTROSCOPY, DUODENOSCOPY (EGD), COMBINED N/A 8/12/2019    Procedure: ESOPHAGOGASTRODUODENOSCOPY (EGD);  Surgeon: Jamaal Potts MD;  Location: [facility]     HERNIA REPAIR, INGUINAL RT/LT       SUPRAPUBIC PROSTATECTOMY        CODE STATUS: DNR hospice care    MEDICATIONS: Reviewed in [facility]  Current Outpatient Medications   Medication Sig Dispense Refill     acetaminophen (TYLENOL) 500 MG tablet Take 1,000 mg by mouth 2 times daily Also may take 500 mg BID PRN       allopurinol (ZYLOPRIM) 100 MG tablet Take 1 tablet (100 mg) by mouth daily 100 tablet 3     amLODIPine (NORVASC) 5 MG tablet Take 1 tablet (5 mg) by mouth daily       benzocaine-menthol (CHLORASEPTIC) 6-10 MG lozenge Place 1 lozenge inside cheek every 2 hours as needed for moderate pain       Dextromethorphan-guaiFENesin  MG/5ML syrup Take 5 mLs by mouth every 4 hours as needed for cough       dronedarone (MULTAQ) 400 MG TABS tablet Take 1 tablet (400 mg) by mouth 2 times daily (with meals)       FLUoxetine (PROZAC) 10 MG capsule Take 3 capsules (30 mg) by mouth daily       ipratropium - albuterol 0.5 mg/2.5 mg/3 mL (DUONEB) 0.5-2.5 (3) MG/3ML neb solution Take 1 vial by nebulization every 4 hours as needed for shortness of breath / dyspnea or wheezing       pantoprazole (PROTONIX) 20 MG EC tablet Take 1 tablet (20 mg) by mouth daily       polyethylene glycol (MIRALAX/GLYCOLAX) packet Take 17 g by mouth 2 times daily       polyethylene glycol-propylene glycol (SYSTANE ULTRA) 0.4-0.3 % SOLN ophthalmic solution Place 1 drop into both eyes 2 times daily as needed for dry eyes       QUEtiapine (SEROQUEL) 25 MG tablet Take one tablet by mouth every evening with supper 90 tablet 2       ROS:  Limited secondary to cognitive impairment but today pt reports as above in HPI    Exam:  /80   Pulse 78   Temp 97.6  F (36.4  C)   Resp 20   Ht 1.422 m (4' 8\")   Wt " 87.2 kg (192 lb 3.2 oz)   SpO2 94%   BMI 43.09 kg/m    Alert, pleasant, NAD, well groomed  Very hard of hearing - needs pocket talker to communicate  Walks with rollator with ease  Heart sounds regular, rate controlled  Lungs clear  Makes a few small jokes to himself  Doesn't appear pale    Lab/Diagnostic Data:    None while on hospice care    ASSESSMENT/PLAN:  Bilateral hearing loss, chronic  Thankful for pocket talker to communicate though he needs help with it d/t his dementia  Staff supportive  Went to ENT for cerumen removal    Malignant neoplasm of hepatic flexure   Acute blood loss anemia  No reported pain or active on-going blood loss  Remains on hospice care  Is on bowel regimen to keep stools soft/regular  Weight remains stable in 190s  H/o GERD but EGD unremarkable and thus I agree with PCP in GDR PPI which he seems to be tolerating well (reduced from 40 mg down to 20 mg of Protonix in Nov)    Atrial fibrillation, unspecified type  Sounds regular and rate controlled today; on Multaq   Not on anticoagulation d/t hospice care with h/o GI bleed    Depression, unspecified depression type  H/o obsessive-compulsive disorder  Staff feel he is doing well and has support of family  Continues on Prozac + Seroquel and he seems in good mood today      Electronically signed by:  Carin Oro DO

## 2020-01-01 ENCOUNTER — TELEPHONE (OUTPATIENT)
Dept: OTOLARYNGOLOGY | Facility: CLINIC | Age: 85
End: 2020-01-01

## 2020-01-01 ENCOUNTER — NURSING HOME VISIT (OUTPATIENT)
Dept: GERIATRICS | Facility: CLINIC | Age: 85
End: 2020-01-01
Payer: MEDICARE

## 2020-01-01 ENCOUNTER — NURSING HOME VISIT (OUTPATIENT)
Dept: GERIATRICS | Facility: CLINIC | Age: 85
End: 2020-01-01
Payer: COMMERCIAL

## 2020-01-01 ENCOUNTER — VIRTUAL VISIT (OUTPATIENT)
Dept: PSYCHIATRY | Facility: CLINIC | Age: 85
End: 2020-01-01
Attending: CLINICAL NURSE SPECIALIST
Payer: COMMERCIAL

## 2020-01-01 ENCOUNTER — TELEPHONE (OUTPATIENT)
Dept: GERIATRICS | Facility: CLINIC | Age: 85
End: 2020-01-01
Payer: COMMERCIAL

## 2020-01-01 ENCOUNTER — VIRTUAL VISIT (OUTPATIENT)
Dept: GERIATRICS | Facility: CLINIC | Age: 85
End: 2020-01-01
Payer: COMMERCIAL

## 2020-01-01 ENCOUNTER — TELEPHONE (OUTPATIENT)
Dept: GERIATRICS | Facility: CLINIC | Age: 85
End: 2020-01-01

## 2020-01-01 ENCOUNTER — HEALTH MAINTENANCE LETTER (OUTPATIENT)
Age: 85
End: 2020-01-01

## 2020-01-01 ENCOUNTER — RECORDS - HEALTHEAST (OUTPATIENT)
Dept: LAB | Facility: CLINIC | Age: 85
End: 2020-01-01

## 2020-01-01 VITALS
DIASTOLIC BLOOD PRESSURE: 61 MMHG | OXYGEN SATURATION: 95 % | HEART RATE: 59 BPM | SYSTOLIC BLOOD PRESSURE: 120 MMHG | WEIGHT: 177.8 LBS | BODY MASS INDEX: 26.95 KG/M2 | TEMPERATURE: 98.6 F | HEIGHT: 68 IN | RESPIRATION RATE: 18 BRPM

## 2020-01-01 VITALS
HEIGHT: 68 IN | OXYGEN SATURATION: 93 % | BODY MASS INDEX: 24.64 KG/M2 | RESPIRATION RATE: 16 BRPM | TEMPERATURE: 98.4 F | HEART RATE: 68 BPM | WEIGHT: 162.6 LBS | SYSTOLIC BLOOD PRESSURE: 136 MMHG | DIASTOLIC BLOOD PRESSURE: 86 MMHG

## 2020-01-01 VITALS
DIASTOLIC BLOOD PRESSURE: 68 MMHG | WEIGHT: 149.9 LBS | TEMPERATURE: 98.1 F | SYSTOLIC BLOOD PRESSURE: 109 MMHG | BODY MASS INDEX: 22.72 KG/M2 | RESPIRATION RATE: 20 BRPM | HEART RATE: 61 BPM | HEIGHT: 68 IN | OXYGEN SATURATION: 95 %

## 2020-01-01 VITALS
TEMPERATURE: 97.2 F | DIASTOLIC BLOOD PRESSURE: 79 MMHG | HEIGHT: 68 IN | RESPIRATION RATE: 20 BRPM | SYSTOLIC BLOOD PRESSURE: 122 MMHG | OXYGEN SATURATION: 95 % | WEIGHT: 173.5 LBS | HEART RATE: 78 BPM | BODY MASS INDEX: 26.3 KG/M2

## 2020-01-01 VITALS
RESPIRATION RATE: 17 BRPM | DIASTOLIC BLOOD PRESSURE: 68 MMHG | OXYGEN SATURATION: 97 % | SYSTOLIC BLOOD PRESSURE: 132 MMHG | BODY MASS INDEX: 25.54 KG/M2 | HEART RATE: 86 BPM | WEIGHT: 168 LBS | TEMPERATURE: 98 F

## 2020-01-01 VITALS
WEIGHT: 188.5 LBS | TEMPERATURE: 98.4 F | DIASTOLIC BLOOD PRESSURE: 74 MMHG | RESPIRATION RATE: 18 BRPM | HEART RATE: 76 BPM | BODY MASS INDEX: 37.01 KG/M2 | HEIGHT: 60 IN | SYSTOLIC BLOOD PRESSURE: 169 MMHG

## 2020-01-01 VITALS
TEMPERATURE: 97.5 F | HEART RATE: 70 BPM | BODY MASS INDEX: 26.31 KG/M2 | RESPIRATION RATE: 20 BRPM | DIASTOLIC BLOOD PRESSURE: 80 MMHG | WEIGHT: 173.6 LBS | HEIGHT: 68 IN | OXYGEN SATURATION: 95 % | SYSTOLIC BLOOD PRESSURE: 125 MMHG

## 2020-01-01 DIAGNOSIS — F42.9 OBSESSIVE-COMPULSIVE DISORDER, UNSPECIFIED TYPE: ICD-10-CM

## 2020-01-01 DIAGNOSIS — C18.3 MALIGNANT NEOPLASM OF HEPATIC FLEXURE (H): ICD-10-CM

## 2020-01-01 DIAGNOSIS — F33.9 RECURRENT MAJOR DEPRESSIVE DISORDER, REMISSION STATUS UNSPECIFIED (H): ICD-10-CM

## 2020-01-01 DIAGNOSIS — E11.22 TYPE 2 DIABETES MELLITUS WITH STAGE 3 CHRONIC KIDNEY DISEASE, WITHOUT LONG-TERM CURRENT USE OF INSULIN (H): ICD-10-CM

## 2020-01-01 DIAGNOSIS — C18.3 MALIGNANT NEOPLASM OF HEPATIC FLEXURE (H): Primary | ICD-10-CM

## 2020-01-01 DIAGNOSIS — I48.91 ATRIAL FIBRILLATION, UNSPECIFIED TYPE (H): Primary | ICD-10-CM

## 2020-01-01 DIAGNOSIS — Z51.5 HOSPICE CARE PATIENT: ICD-10-CM

## 2020-01-01 DIAGNOSIS — K59.00 CONSTIPATION, UNSPECIFIED CONSTIPATION TYPE: ICD-10-CM

## 2020-01-01 DIAGNOSIS — N18.30 CHRONIC KIDNEY DISEASE, STAGE 3 (MODERATE): ICD-10-CM

## 2020-01-01 DIAGNOSIS — F84.0 AUTISM SPECTRUM DISORDER: ICD-10-CM

## 2020-01-01 DIAGNOSIS — D64.9 ANEMIA, UNSPECIFIED TYPE: ICD-10-CM

## 2020-01-01 DIAGNOSIS — F03.90 DEMENTIA WITHOUT BEHAVIORAL DISTURBANCE, UNSPECIFIED DEMENTIA TYPE: ICD-10-CM

## 2020-01-01 DIAGNOSIS — I10 ESSENTIAL HYPERTENSION: Primary | ICD-10-CM

## 2020-01-01 DIAGNOSIS — D62 ANEMIA DUE TO BLOOD LOSS, ACUTE: ICD-10-CM

## 2020-01-01 DIAGNOSIS — I48.91 ATRIAL FIBRILLATION, UNSPECIFIED TYPE (H): ICD-10-CM

## 2020-01-01 DIAGNOSIS — R63.4 WEIGHT LOSS: ICD-10-CM

## 2020-01-01 DIAGNOSIS — I10 ESSENTIAL HYPERTENSION: ICD-10-CM

## 2020-01-01 DIAGNOSIS — R52 PAIN: ICD-10-CM

## 2020-01-01 DIAGNOSIS — N18.30 TYPE 2 DIABETES MELLITUS WITH STAGE 3 CHRONIC KIDNEY DISEASE, WITHOUT LONG-TERM CURRENT USE OF INSULIN (H): ICD-10-CM

## 2020-01-01 DIAGNOSIS — Z53.9 ERRONEOUS ENCOUNTER--DISREGARD: Primary | ICD-10-CM

## 2020-01-01 DIAGNOSIS — R63.4 WEIGHT LOSS: Primary | ICD-10-CM

## 2020-01-01 DIAGNOSIS — H91.93 BILATERAL HEARING LOSS, UNSPECIFIED HEARING LOSS TYPE: ICD-10-CM

## 2020-01-01 DIAGNOSIS — D50.0 IRON DEFICIENCY ANEMIA DUE TO CHRONIC BLOOD LOSS: ICD-10-CM

## 2020-01-01 DIAGNOSIS — Z20.9 EXPOSURE TO POTENTIAL INFECTION: ICD-10-CM

## 2020-01-01 DIAGNOSIS — S72.145A CLOSED NONDISPLACED INTERTROCHANTERIC FRACTURE OF LEFT FEMUR, INITIAL ENCOUNTER (H): ICD-10-CM

## 2020-01-01 DIAGNOSIS — Z87.01 HX OF BACTERIAL PNEUMONIA: ICD-10-CM

## 2020-01-01 DIAGNOSIS — E66.01 MORBID OBESITY (H): ICD-10-CM

## 2020-01-01 LAB
ANION GAP SERPL CALCULATED.3IONS-SCNC: 7 MMOL/L (ref 5–18)
BUN SERPL-MCNC: 55 MG/DL (ref 8–28)
CALCIUM SERPL-MCNC: 8.8 MG/DL (ref 8.5–10.5)
CHLORIDE BLD-SCNC: 116 MMOL/L (ref 98–107)
CO2 SERPL-SCNC: 26 MMOL/L (ref 22–31)
CREAT SERPL-MCNC: 1.25 MG/DL (ref 0.7–1.3)
GFR SERPL CREATININE-BSD FRML MDRD: 54 ML/MIN/1.73M2
GLUCOSE BLD-MCNC: 107 MG/DL (ref 70–125)
POTASSIUM BLD-SCNC: 4.3 MMOL/L (ref 3.5–5)
SODIUM SERPL-SCNC: 149 MMOL/L (ref 136–145)

## 2020-01-01 PROCEDURE — 99309 SBSQ NF CARE MODERATE MDM 30: CPT | Performed by: NURSE PRACTITIONER

## 2020-01-01 PROCEDURE — 99309 SBSQ NF CARE MODERATE MDM 30: CPT | Mod: 95 | Performed by: INTERNAL MEDICINE

## 2020-01-01 PROCEDURE — 99309 SBSQ NF CARE MODERATE MDM 30: CPT | Mod: GT | Performed by: NURSE PRACTITIONER

## 2020-01-01 PROCEDURE — 99309 SBSQ NF CARE MODERATE MDM 30: CPT | Mod: GW | Performed by: INTERNAL MEDICINE

## 2020-01-01 PROCEDURE — 99207 ZZC CDG-MDM COMPONENT: MEETS MODERATE - UP CODED: CPT | Performed by: INTERNAL MEDICINE

## 2020-01-01 PROCEDURE — 99309 SBSQ NF CARE MODERATE MDM 30: CPT | Mod: GW | Performed by: NURSE PRACTITIONER

## 2020-01-01 RX ORDER — HALOPERIDOL 0.5 MG/1
0.5 TABLET ORAL EVERY 4 HOURS PRN
Qty: 30 TABLET | Refills: 3 | Status: SHIPPED | OUTPATIENT
Start: 2020-01-01 | End: 2020-01-01

## 2020-01-01 RX ORDER — AMLODIPINE BESYLATE 5 MG/1
2.5 TABLET ORAL DAILY
Start: 2020-01-01 | End: 2020-01-01

## 2020-01-01 RX ORDER — MORPHINE SULFATE 30 MG/1
5 TABLET ORAL 2 TIMES DAILY
COMMUNITY
End: 2020-01-01

## 2020-01-01 RX ORDER — SENNOSIDES A AND B 8.6 MG/1
2 TABLET, FILM COATED ORAL 2 TIMES DAILY
COMMUNITY
End: 2020-01-01

## 2020-01-01 RX ORDER — QUETIAPINE FUMARATE 25 MG/1
25 TABLET, FILM COATED ORAL DAILY PRN
Qty: 90 TABLET | Refills: 2
Start: 2020-01-01 | End: 2020-01-01

## 2020-01-01 RX ORDER — HALOPERIDOL 0.5 MG/1
TABLET ORAL
Start: 2020-01-01

## 2020-01-01 RX ORDER — HALOPERIDOL 0.5 MG/1
0.5 TABLET ORAL 2 TIMES DAILY
COMMUNITY
End: 2020-01-01

## 2020-01-01 RX ORDER — AMLODIPINE BESYLATE 5 MG/1
5 TABLET ORAL DAILY
Start: 2020-01-01 | End: 2020-01-01

## 2020-01-01 RX ORDER — BISACODYL 10 MG
10 SUPPOSITORY, RECTAL RECTAL DAILY PRN
COMMUNITY

## 2020-01-01 RX ORDER — METOPROLOL SUCCINATE 25 MG/1
25 TABLET, EXTENDED RELEASE ORAL DAILY
COMMUNITY
End: 2020-01-01

## 2020-01-01 RX ORDER — MORPHINE SULFATE 100 MG/5ML
SOLUTION ORAL
Refills: 0
Start: 2020-01-01

## 2020-01-01 RX ORDER — ACETAMINOPHEN 500 MG
1000 TABLET ORAL 2 TIMES DAILY PRN
Start: 2020-01-01

## 2020-01-01 RX ORDER — AMLODIPINE BESYLATE 5 MG/1
7.5 TABLET ORAL DAILY
Start: 2020-01-01 | End: 2020-01-01

## 2020-01-01 RX ORDER — PHENOL 1.4 %
10 AEROSOL, SPRAY (ML) MUCOUS MEMBRANE AT BEDTIME
COMMUNITY

## 2020-01-01 RX ORDER — ACETAMINOPHEN 500 MG
TABLET ORAL
Start: 2020-01-01 | End: 2020-01-01

## 2020-01-01 ASSESSMENT — MIFFLIN-ST. JEOR
SCORE: 1309.03
SCORE: 1319.44
SCORE: 1382.05
SCORE: 1431.49
SCORE: 1431.94
SCORE: 1451

## 2020-01-30 NOTE — TELEPHONE ENCOUNTER
Called patient/niece to schedule:    Appt Notes: Ear Cleaning - 4 mo follow-up - No WIN   Provider: Dr. Riley   LOV: 11/22/19     Left a VM with scheduling instructions and the ENT call center number.

## 2020-02-09 PROBLEM — E11.9 DIABETES MELLITUS, TYPE 2 (H): Status: ACTIVE | Noted: 2020-01-01

## 2020-02-09 NOTE — PROGRESS NOTES
"[place] [facility]  Chief Complaint   Patient presents with     half-way Regulatory     [place] Medical Record Number:  [id number]  [id number]    HPI:    Sharon Baer  is 88 year old (6/10/1931), who is being seen today for a federally mandated E/M visit.  HPI information obtained from: facility chart records, facility staff, patient report, [place] Epic chart review and family/first contact pt's nephew/POA report. Today's concerns are:  Essential hypertension  Atrial fibrillation, unspecified type (H)  Metoprolol DC'd last hospitalization and EP changed to dronedarone 400 mg BID  Taken off anticoagulation d/t anemia and GIB.   Also is on amlodipine 5 mg daily, hold for SBP <110    BPs 160-170s/60-70s  HRs 70s, regular today  Patient denies CP, HA, lightheadedness     Malignant neoplasm of hepatic flexure (H)  EGD & Colonoscopy 8/11/19 showing right partly circumferential colonic mass with liver lesion. Bx showing adenocarcinoma moderately differentiated with a report of tumor buddings (adenocarcinoma).   Colorectal Surgeryconsulted for possible curative vs palliative colectomy vs diversion; family decided on comfort-focus  Previously enrolled in hospice however now has \"graduated\" d/t stability.     He remains on Miralax 17 gm BID, pantoprazole 20 mg daily   Patient denies constipation. Heartburn, upset stomach  SNF EHR showing daily BMs     Dementia without behavioral disturbance, unspecified dementia type (H)  Recurrent major depression disorder, remission status unspecified (H)  Obsessive-compulsive disorder, unspecified type  Autism spectrum disorder  SLUMS 9/30, BIMS 11 (lower in past)  PHQ9 - 6    He is on fluoxetine 30 gm daily, Seroquel 25 mg q 1700,   Nursing/SW notes report that patient reports he feels sad but can't articulate why  He prefers to spend quiet time in his room vs activities  Family is very supportive " "and involved     Morbid Obesity  Body mass index is 42.26 kg/m .  Resides in SNF on meal plan there.     Chronic kidney disease, stage 3 (H)  BL Creat 1.1-1.3  Last few BMPs:   4/29/19: BUN 23, Creat 1.14, GFR 57 (reference)  8/8/19: BUN 30, Creat 1.44, GFR 43  8/13/19: BUN 9, Creat 1.11, GFR 59  8/15/19: BUN x, Creat 1.10, GFR 60    No labs since this time as patient was previously enrolled in hospice, no longer enrolled    He is not on ACEI, diuretics    Type 2 diabetes mellitus with stage 3 chronic kidney disease, without long-term current use of insulin (H)  Lab Results   Component Value Date    A1C 6.5 05/23/2018    A1C 6.6 09/22/2017    A1C 6.1 02/21/2017    A1C 6.6 11/23/2016    A1C 6.9 06/07/2016     On no meds.  + CKD   Today patient reports \"blurry vision\" - is on in-house Optometry list for this month.       Hx of bacterial pneumonia  History of PNA a while ago  Still has Tussin q4 hours PRN and DuoNebs PRN ordered  Has been afebrile  Patient denies SOB, LS clear today  No cough during visit.     Bilateral hearing loss, unspecified hearing loss type  Uses pocket talker as he is very [place]    Pain  Patient denies pain but notes LBP without radiculopathy when he does have pain on occasion.  Likely he also has some osteoarthritic pain as he has changes in joints present.   Current Analgesia with Tylenol 1000 mg BID and 500 mg BID PRN.      ALLERGIES:Penicillins  PAST MEDICAL HISTORY:   has a past medical history of Atrial fibrillation (H), Autism spectrum disorder (7/16/2015), CKD (chronic kidney disease), GERD (gastroesophageal reflux disease), Gout, Hearing loss, Hyperlipemia, Hypertension, Hypotestosteronism, IGT (impaired glucose tolerance), Malignant neoplasm of hepatic flexure (H) (8/14/2019), OCD (obsessive compulsive disorder), Osteoarthritis, hip, bilateral, and Vitamin D deficiency.  PAST SURGICAL HISTORY:   has a past surgical history that includes hernia repair, inguinal rt/lt; bilateral ear " surgery; bilateral cataract surgery; suprapubic prostatectomy; Esophagoscopy, gastroscopy, duodenoscopy (EGD), combined (N/A, 8/12/2019); and Colonoscopy (N/A, 8/12/2019).  FAMILY HISTORY: family history includes Cancer in his sister; Myocardial Infarction (age of onset: 60) in his maternal uncle.  SOCIAL HISTORY:  reports that he has quit smoking. He has a 20.00 pack-year smoking history. He has never used smokeless tobacco. He reports that he does not drink alcohol or use drugs.    MEDICATIONS:  Current Outpatient Medications   Medication Sig Dispense Refill     acetaminophen (TYLENOL) 500 MG tablet Take 2 tablets (1,000 mg) by mouth daily. May also take 2 tablets (1,000 mg) 2 times daily as needed for mild pain.       allopurinol (ZYLOPRIM) 100 MG tablet Take 1 tablet (100 mg) by mouth daily 100 tablet 3     amLODIPine (NORVASC) 5 MG tablet Take 1.5 tablets (7.5 mg) by mouth daily       benzocaine-menthol (CHLORASEPTIC) 6-10 MG lozenge Place 1 lozenge inside cheek every 2 hours as needed for moderate pain       dronedarone (MULTAQ) 400 MG TABS tablet Take 1 tablet (400 mg) by mouth 2 times daily (with meals)       FLUoxetine (PROZAC) 10 MG capsule Take 3 capsules (30 mg) by mouth daily       pantoprazole (PROTONIX) 20 MG EC tablet Take 1 tablet (20 mg) by mouth daily       polyethylene glycol (MIRALAX/GLYCOLAX) packet Take 17 g by mouth 2 times daily       polyethylene glycol-propylene glycol (SYSTANE ULTRA) 0.4-0.3 % SOLN ophthalmic solution Place 1 drop into both eyes 2 times daily as needed for dry eyes       QUEtiapine (SEROQUEL) 25 MG tablet Take 1 tablet (25 mg) by mouth daily as needed (agitation, wandering,) 90 tablet 2     Case Management:  I have reviewed the care plan and MDS and do agree with the plan. Patient's desire to return to the community is not present. Information reviewed:  Medications, vital signs, orders, and nursing notes.    ROS:  Limited secondary to cognitive impairment but today pt  "reports 10 point ROS of systems including Constitutional, Eyes, Respiratory, Cardiovascular, Gastroenterology, Genitourinary, Integumentary, Musculoskeletal, Psychiatric were all negative except for pertinent positives noted in my HPI.    Vitals:  BP (!) 169/74   Pulse 76   Temp 98.4  F (36.9  C)   Resp 18   Ht 1.422 m (4' 8\")   Wt 85.5 kg (188 lb 8 oz)   BMI 42.26 kg/m    Body mass index is 42.26 kg/m .  Exam:  GENERAL APPEARANCE:  Alert, in no distress, pleasant, calm  RESP:  respiratory effort and palpation of chest normal, auscultation of lungs clear, no respiratory distress  CV:  Palpation and auscultation of heart done , rate and rhythm regular, no murmur, no LE peripheral edema  ABDOMEN:  Rounded, normal bowel sounds, soft, nontender, KIRSTIE for hepatosplenomegaly or other masses  M/S:   Gait and station ambulatory with walker, Digits and nails with arthritic changes, reduced muscle mass  SKIN:  Inspection and Palpation of skin and subcutaneous tissue pale, dry, intact  PSYCH:  insight and judgement, memory with impairment, affect and mood normal, follows commands readily       Lab/Diagnostic data:   Labs done while at [facility] done by [facility]. Pertinent results are: reviewed - see narrative above for details.    ASSESSMENT/PLAN  Essential hypertension  BP goals are <150/90 mm Hg.This is higher than ACC and AHA recommendations due to risk for hypotension, risk of dizziness and falls and risk of tissue/cerebral hypoperfusion. Noted patients BP is higher than goal and will adjust plan of care by increasing amlodipine as patient has no LE edema.  Will monitor for further titration needs    Atrial fibrillation, unspecified type (H)  On dronedarone for rate control  Regular HR on today's exam with rate controlled in 70s  Risk outweighs benefit of anticoagulation  Monitor.     Malignant neoplasm of hepatic flexure (H)  No longer on hospice and has been very stable.   Daily BMs " important for regularity and narrowed passage.   Continue regimen as currently orderd     Dementia without behavioral disturbance, unspecified dementia type (H)  Recurrent major depression disorder, remission status unspecified (H)  Obsessive-compulsive disorder, unspecified type  Autism spectrum disorder  Patient calm and without behaviors.  Previously patient was wandering and sundowning at night.  We will attempt GDR of seroquel and monitor for need.  Patient is also on dronedarone which can prolong QT in presence of seroquel as well.    Continue fluoxetine.  Monitor for behaviors, etc.     Morbid Obesity  Participates in meal plan at [facility]  Recommend exercise as able as patient is fairly sedentary     Chronic kidney disease, stage 3 (H)  Will avoid nephrotoxic agents (such as ACEI/ARB/NSAIDs, IV contrast) and mindful prescribing with renal dosing.   Can recheck BMP now that no longer enrolled in hospice.    Type 2 diabetes mellitus with stage 3 chronic kidney disease, without long-term current use of insulin (H)  Can recheck A1C for updating/monitoring.  Goal: HgbA1C between 8-9%. Per AGS there is potential harm in lowering the A1C to <6.5% in older adults with diabetes.     Hx of bacterial pneumonia  Can discontinue Tussin and DuoNebs as no usage and likely left-over from when he had pneumonia.     Bilateral hearing loss, unspecified hearing loss type  Uses pocket talker well.     Pain  As patient seems quite comfortable - can attempt GDR of Tylenol and decrease to q AM only.    Will monitor for need to continue BID dosing.   Can increase PRN dosing as well.         Orders written by provider at facility  1. Increase amlodipine to 7.5 mg daily  2. discontinue DuoNebs PRN  3. discontinue Tussin PRN  4. Change Seroquel to PRN x 14 days and monitor for usage and need.  Update NP in 12 days.  5. Change Tylenol to 1000 mg daily  6. Increase Tylenol PRN to 1000 mg BID PRN  7. BMP, HgbA1C on Wed, 2/12/20 Dx: CKD, DM2        Electronically signed by:  TINY Hilario CNP

## 2020-02-10 PROBLEM — N18.30 CHRONIC KIDNEY DISEASE, STAGE 3 (MODERATE): Status: ACTIVE | Noted: 2020-01-01

## 2020-02-10 PROBLEM — E66.01 MORBID OBESITY (H): Status: ACTIVE | Noted: 2020-01-01

## 2020-02-10 NOTE — LETTER
"    2/10/2020        RE: Sharon Baer  C/o Rosemarie Paty  [address]  [facility] [address]        [place] [facility]  Chief Complaint   Patient presents with     correction Regulatory     [place] Medical Record Number:  [id number]  [facility] [[id number]]    HPI:    Sharon aBer  is 88 year old (6/10/1931), who is being seen today for a federally mandated E/M visit.  HPI information obtained from: facility chart records, facility staff, patient report, [place] Epic chart review and family/first contact pt's nephew/POA report. Today's concerns are:  Essential hypertension  Atrial fibrillation, unspecified type (H)  Metoprolol DC'd last hospitalization and EP changed to dronedarone 400 mg BID  Taken off anticoagulation d/t anemia and GIB.   Also is on amlodipine 5 mg daily, hold for SBP <110    BPs 160-170s/60-70s  HRs 70s, regular today  Patient denies CP, HA, lightheadedness     Malignant neoplasm of hepatic flexure (H)  EGD & Colonoscopy 8/11/19 showing right partly circumferential colonic mass with liver lesion. Bx showing adenocarcinoma moderately differentiated with a report of tumor buddings (adenocarcinoma).   Colorectal Surgeryconsulted for possible curative vs palliative colectomy vs diversion; family decided on comfort-focus  Previously enrolled in hospice however now has \"graduated\" d/t stability.     He remains on Miralax 17 gm BID, pantoprazole 20 mg daily   Patient denies constipation. Heartburn, upset stomach  [facility] EHR showing daily BMs     Dementia without behavioral disturbance, unspecified dementia type (H)  Recurrent major depression disorder, remission status unspecified (H)  Obsessive-compulsive disorder, unspecified type  Autism spectrum disorder  SLUMS 9/30, BIMS 11 (lower in past)  PHQ9 - 6    He is on fluoxetine 30 gm daily, Seroquel 25 mg q 1700,   Nursing/SW notes report that patient reports he feels sad " "but can't articulate why  He prefers to spend quiet time in his room vs activities  Family is very supportive and involved     Morbid Obesity  Body mass index is 42.26 kg/m .  Resides in SNF on meal plan there.     Chronic kidney disease, stage 3 (H)  BL Creat 1.1-1.3  Last few BMPs:   4/29/19: BUN 23, Creat 1.14, GFR 57 (reference)  8/8/19: BUN 30, Creat 1.44, GFR 43  8/13/19: BUN 9, Creat 1.11, GFR 59  8/15/19: BUN x, Creat 1.10, GFR 60    No labs since this time as patient was previously enrolled in hospice, no longer enrolled    He is not on ACEI, diuretics    Type 2 diabetes mellitus with stage 3 chronic kidney disease, without long-term current use of insulin (H)  Lab Results   Component Value Date    A1C 6.5 05/23/2018    A1C 6.6 09/22/2017    A1C 6.1 02/21/2017    A1C 6.6 11/23/2016    A1C 6.9 06/07/2016     On no meds.  + CKD   Today patient reports \"blurry vision\" - is on in-house Optometry list for this month.       Hx of bacterial pneumonia  History of PNA a while ago  Still has Tussin q4 hours PRN and DuoNebs PRN ordered  Has been afebrile  Patient denies SOB, LS clear today  No cough during visit.     Bilateral hearing loss, unspecified hearing loss type  Uses pocket talker as he is very [place]    Pain  Patient denies pain but notes LBP without radiculopathy when he does have pain on occasion.  Likely he also has some osteoarthritic pain as he has changes in joints present.   Current Analgesia with Tylenol 1000 mg BID and 500 mg BID PRN.      ALLERGIES:Penicillins  PAST MEDICAL HISTORY:   has a past medical history of Atrial fibrillation (H), Autism spectrum disorder (7/16/2015), CKD (chronic kidney disease), GERD (gastroesophageal reflux disease), Gout, Hearing loss, Hyperlipemia, Hypertension, Hypotestosteronism, IGT (impaired glucose tolerance), Malignant neoplasm of hepatic flexure (H) (8/14/2019), OCD (obsessive compulsive disorder), Osteoarthritis, hip, bilateral, and Vitamin D deficiency.  PAST " SURGICAL HISTORY:   has a past surgical history that includes hernia repair, inguinal rt/lt; bilateral ear surgery; bilateral cataract surgery; suprapubic prostatectomy; Esophagoscopy, gastroscopy, duodenoscopy (EGD), combined (N/A, 8/12/2019); and Colonoscopy (N/A, 8/12/2019).  FAMILY HISTORY: family history includes Cancer in his sister; Myocardial Infarction (age of onset: 60) in his maternal uncle.  SOCIAL HISTORY:  reports that he has quit smoking. He has a 20.00 pack-year smoking history. He has never used smokeless tobacco. He reports that he does not drink alcohol or use drugs.    MEDICATIONS:  Current Outpatient Medications   Medication Sig Dispense Refill     acetaminophen (TYLENOL) 500 MG tablet Take 2 tablets (1,000 mg) by mouth daily. May also take 2 tablets (1,000 mg) 2 times daily as needed for mild pain.       allopurinol (ZYLOPRIM) 100 MG tablet Take 1 tablet (100 mg) by mouth daily 100 tablet 3     amLODIPine (NORVASC) 5 MG tablet Take 1.5 tablets (7.5 mg) by mouth daily       benzocaine-menthol (CHLORASEPTIC) 6-10 MG lozenge Place 1 lozenge inside cheek every 2 hours as needed for moderate pain       dronedarone (MULTAQ) 400 MG TABS tablet Take 1 tablet (400 mg) by mouth 2 times daily (with meals)       FLUoxetine (PROZAC) 10 MG capsule Take 3 capsules (30 mg) by mouth daily       pantoprazole (PROTONIX) 20 MG EC tablet Take 1 tablet (20 mg) by mouth daily       polyethylene glycol (MIRALAX/GLYCOLAX) packet Take 17 g by mouth 2 times daily       polyethylene glycol-propylene glycol (SYSTANE ULTRA) 0.4-0.3 % SOLN ophthalmic solution Place 1 drop into both eyes 2 times daily as needed for dry eyes       QUEtiapine (SEROQUEL) 25 MG tablet Take 1 tablet (25 mg) by mouth daily as needed (agitation, wandering,) 90 tablet 2     Case Management:  I have reviewed the care plan and MDS and do agree with the plan. Patient's desire to return to the community is not present. Information reviewed:  Medications,  "vital signs, orders, and nursing notes.    ROS:  Limited secondary to cognitive impairment but today pt reports 10 point ROS of systems including Constitutional, Eyes, Respiratory, Cardiovascular, Gastroenterology, Genitourinary, Integumentary, Musculoskeletal, Psychiatric were all negative except for pertinent positives noted in my HPI.    Vitals:  BP (!) 169/74   Pulse 76   Temp 98.4  F (36.9  C)   Resp 18   Ht 1.422 m (4' 8\")   Wt 85.5 kg (188 lb 8 oz)   BMI 42.26 kg/m     Body mass index is 42.26 kg/m .  Exam:  GENERAL APPEARANCE:  Alert, in no distress, pleasant, calm  RESP:  respiratory effort and palpation of chest normal, auscultation of lungs clear, no respiratory distress  CV:  Palpation and auscultation of heart done , rate and rhythm regular, no murmur, no LE peripheral edema  ABDOMEN:  Rounded, normal bowel sounds, soft, nontender, KIRSTIE for hepatosplenomegaly or other masses  M/S:   Gait and station ambulatory with walker, Digits and nails with arthritic changes, reduced muscle mass  SKIN:  Inspection and Palpation of skin and subcutaneous tissue pale, dry, intact  PSYCH:  insight and judgement, memory with impairment, affect and mood normal, follows commands readily       Lab/Diagnostic data:   Labs done while at [facility] Facility done by [facility] lab. Pertinent results are: reviewed - see narrative above for details.    ASSESSMENT/PLAN  Essential hypertension  BP goals are <150/90 mm Hg.This is higher than ACC and AHA recommendations due to risk for hypotension, risk of dizziness and falls and risk of tissue/cerebral hypoperfusion. Noted patients BP is higher than goal and will adjust plan of care by increasing amlodipine as patient has no LE edema.  Will monitor for further titration needs    Atrial fibrillation, unspecified type (H)  On dronedarone for rate control  Regular HR on today's exam with rate controlled in 70s  Risk outweighs benefit of anticoagulation  Monitor. "     Malignant neoplasm of hepatic flexure (H)  No longer on hospice and has been very stable.   Daily BMs important for regularity and narrowed passage.   Continue regimen as currently orderd     Dementia without behavioral disturbance, unspecified dementia type (H)  Recurrent major depression disorder, remission status unspecified (H)  Obsessive-compulsive disorder, unspecified type  Autism spectrum disorder  Patient calm and without behaviors.  Previously patient was wandering and sundowning at night.  We will attempt GDR of seroquel and monitor for need.  Patient is also on dronedarone which can prolong QT in presence of seroquel as well.    Continue fluoxetine.  Monitor for behaviors, etc.     Morbid Obesity  Participates in meal plan at [facility]  Recommend exercise as able as patient is fairly sedentary     Chronic kidney disease, stage 3 (H)  Will avoid nephrotoxic agents (such as ACEI/ARB/NSAIDs, IV contrast) and mindful prescribing with renal dosing.   Can recheck BMP now that no longer enrolled in hospice.    Type 2 diabetes mellitus with stage 3 chronic kidney disease, without long-term current use of insulin (H)  Can recheck A1C for updating/monitoring.  Goal: HgbA1C between 8-9%. Per AGS there is potential harm in lowering the A1C to <6.5% in older adults with diabetes.     Hx of bacterial pneumonia  Can discontinue Tussin and DuoNebs as no usage and likely left-over from when he had pneumonia.     Bilateral hearing loss, unspecified hearing loss type  Uses pocket talker well.     Pain  As patient seems quite comfortable - can attempt GDR of Tylenol and decrease to q AM only.    Will monitor for need to continue BID dosing.   Can increase PRN dosing as well.         Orders written by provider at facility  1. Increase amlodipine to 7.5 mg daily  2. discontinue DuoNebs PRN  3. discontinue Tussin PRN  4. Change Seroquel to PRN x 14 days and monitor for usage and need.  Update NP in 12 days.  5. Change Tylenol to  1000 mg daily  6. Increase Tylenol PRN to 1000 mg BID PRN  7. BMP, HgbA1C on Wed, 2/12/20 Dx: CKD, DM2       Electronically signed by:  TINY Hilario CNP              Sincerely,        TINY Hilario CNP

## 2020-03-06 NOTE — PROGRESS NOTES
"[place] [facility]  [place] Medical Record Number:  [id number]  Place of Service where encounter took place:  [facility] ([facility]) [[id number]]  Chief Complaint   Patient presents with     RECHECK       HPI:    Sharon Baer  is a 88 year old (6/10/1931), who is being seen today for an episodic care visit.  HPI information obtained from: facility chart records, facility staff, patient report and [facility] chart review.       Today's concern is:     Atrial fibrillation, unspecified type (H)  Essential hypertension  Dementia without behavioral disturbance, unspecified dementia type (H)  Exposure to potential infection     Metoprolol DC'd last hospitalization and EP changed to dronedarone 400 mg BID d/t soft pressures.   Taken off anticoagulation d/t anemia and GIB.   Also is on amlodipine 5 mg daily, hold for SBP <110    Recently patient taken off dronedarone d/t insurance not covering med and patient has higher pressures again. He was started on Metoprolol XL 25 mg daily on 3/3/20.  Since that time:  BPs 140-150s/80s  HRs 60-66  Patient denies CP, HA, lightheadedness but endorses \"not feeling well.\"  When asked to elaborate on this he is unable but seemingly has upset stomach.  Patient has been exposed to Norovirus which is on the SNF unit where he resides.  He denies diarrhea and nursing also denies this.   He has been afebrile.       Past Medical and Surgical History reviewed in Epic today.    MEDICATIONS:    Current Outpatient Medications   Medication Sig Dispense Refill     acetaminophen (TYLENOL) 500 MG tablet Take 2 tablets (1,000 mg) by mouth daily. May also take 2 tablets (1,000 mg) 2 times daily as needed for mild pain.       allopurinol (ZYLOPRIM) 100 MG tablet Take 1 tablet (100 mg) by mouth daily 100 tablet 3     amLODIPine (NORVASC) 5 MG tablet Take 1.5 tablets (7.5 mg) by mouth daily       benzocaine-menthol (CHLORASEPTIC) 6-10 MG lozenge Place 1 lozenge inside cheek every 2 hours as " "needed for moderate pain       FLUoxetine (PROZAC) 10 MG capsule Take 3 capsules (30 mg) by mouth daily       metoprolol succinate ER (TOPROL-XL) 25 MG 24 hr tablet Take 25 mg by mouth daily       pantoprazole (PROTONIX) 20 MG EC tablet Take 1 tablet (20 mg) by mouth daily       polyethylene glycol (MIRALAX/GLYCOLAX) packet Take 17 g by mouth 2 times daily       polyethylene glycol-propylene glycol (SYSTANE ULTRA) 0.4-0.3 % SOLN ophthalmic solution Place 1 drop into both eyes 2 times daily as needed for dry eyes       REVIEW OF SYSTEMS:  Limited secondary to cognitive impairment but today pt reports 10 point ROS of systems including Constitutional, Eyes, Respiratory, Cardiovascular, Gastroenterology, Genitourinary, Integumentary, Musculoskeletal, Psychiatric were all negative except for pertinent positives noted in my HPI.    Objective:  /61   Pulse 59   Temp 98.6  F (37  C)   Resp 18   Ht 1.727 m (5' 8\")   Wt 80.6 kg (177 lb 12.8 oz)   SpO2 95%   BMI 27.03 kg/m    Exam:  GENERAL APPEARANCE:  Alert, in no distress, baseline confusion present (no worse)  RESP:  respiratory effort and palpation of chest normal, auscultation of lungs clear, no respiratory distress  CV:  Palpation and auscultation of heart done , rate and rhythm regular, no murmur, no LE peripheral edema  ABDOMEN:  normal bowel sounds, soft, nontender, no hepatosplenomegaly or other masses  M/S:   Gait and station ambulatory with walker, Digits and nails with arthritic changes, reduced muscle mass  SKIN:  Inspection and Palpation of skin and subcutaneous tissue pale, intact  PSYCH:  insight and judgement, memory with baseline impairment, affect and mood normal, follows commands readily     Labs:   Labs done while at [facility] Facility done by [facility] lab. Pertinent results are: reviewed    ASSESSMENT/PLAN:     Atrial fibrillation, unspecified type (H)  Essential hypertension  Dementia without behavioral disturbance, unspecified " dementia type (H)  Exposure to potential infection     Seemingly change to Metroprolol has gone well without any S/Es.  BPs managed and HRs also adequate.    BP goals are <150/90 mm Hg.This is higher than ACC and AHA recommendations due to goals of care, risk for hypotension, risk of dizziness and falls, risk of tissue/cerebral hypoperfusion and frailty. Patient is stable with current plan of care and routine assessment.    Monitor for Norovirus symptoms and supportive treatment recommended if present.      Electronically signed by:  TIYN Hilario CNP

## 2020-03-09 NOTE — LETTER
"    3/9/2020        RE: Sharon Baer  C/o Rosemarie Smithkristy  [address]  [place] [address]        [place] [facility]  [place] Medical Record Number:  [id number]  Place of Service where encounter took place:  [facility] ([facility]) [[id number]]  Chief Complaint   Patient presents with     RECHECK       HPI:    Sharon Baer  is a 88 year old (6/10/1931), who is being seen today for an episodic care visit.  HPI information obtained from: facility chart records, facility staff, patient report and [facility] chart review.       Today's concern is:     Atrial fibrillation, unspecified type (H)  Essential hypertension  Dementia without behavioral disturbance, unspecified dementia type (H)  Exposure to potential infection     Metoprolol DC'd last hospitalization and EP changed to dronedarone 400 mg BID d/t soft pressures.   Taken off anticoagulation d/t anemia and GIB.   Also is on amlodipine 5 mg daily, hold for SBP <110    Recently patient taken off dronedarone d/t insurance not covering med and patient has higher pressures again. He was started on Metoprolol XL 25 mg daily on 3/3/20.  Since that time:  BPs 140-150s/80s  HRs 60-66  Patient denies CP, HA, lightheadedness but endorses \"not feeling well.\"  When asked to elaborate on this he is unable but seemingly has upset stomach.  Patient has been exposed to Norovirus which is on the SNF unit where he resides.  He denies diarrhea and nursing also denies this.   He has been afebrile.       Past Medical and Surgical History reviewed in Epic today.    MEDICATIONS:    Current Outpatient Medications   Medication Sig Dispense Refill     acetaminophen (TYLENOL) 500 MG tablet Take 2 tablets (1,000 mg) by mouth daily. May also take 2 tablets (1,000 mg) 2 times daily as needed for mild pain.       allopurinol (ZYLOPRIM) 100 MG tablet Take 1 tablet (100 mg) by mouth daily 100 tablet 3     amLODIPine (NORVASC) 5 MG tablet Take 1.5 tablets (7.5 mg) by mouth " "daily       benzocaine-menthol (CHLORASEPTIC) 6-10 MG lozenge Place 1 lozenge inside cheek every 2 hours as needed for moderate pain       FLUoxetine (PROZAC) 10 MG capsule Take 3 capsules (30 mg) by mouth daily       metoprolol succinate ER (TOPROL-XL) 25 MG 24 hr tablet Take 25 mg by mouth daily       pantoprazole (PROTONIX) 20 MG EC tablet Take 1 tablet (20 mg) by mouth daily       polyethylene glycol (MIRALAX/GLYCOLAX) packet Take 17 g by mouth 2 times daily       polyethylene glycol-propylene glycol (SYSTANE ULTRA) 0.4-0.3 % SOLN ophthalmic solution Place 1 drop into both eyes 2 times daily as needed for dry eyes       REVIEW OF SYSTEMS:  Limited secondary to cognitive impairment but today pt reports 10 point ROS of systems including Constitutional, Eyes, Respiratory, Cardiovascular, Gastroenterology, Genitourinary, Integumentary, Musculoskeletal, Psychiatric were all negative except for pertinent positives noted in my HPI.    Objective:  /61   Pulse 59   Temp 98.6  F (37  C)   Resp 18   Ht 1.727 m (5' 8\")   Wt 80.6 kg (177 lb 12.8 oz)   SpO2 95%   BMI 27.03 kg/m    Exam:  GENERAL APPEARANCE:  Alert, in no distress, baseline confusion present (no worse)  RESP:  respiratory effort and palpation of chest normal, auscultation of lungs clear, no respiratory distress  CV:  Palpation and auscultation of heart done , rate and rhythm regular, no murmur, no LE peripheral edema  ABDOMEN:  normal bowel sounds, soft, nontender, no hepatosplenomegaly or other masses  M/S:   Gait and station ambulatory with walker, Digits and nails with arthritic changes, reduced muscle mass  SKIN:  Inspection and Palpation of skin and subcutaneous tissue pale, intact  PSYCH:  insight and judgement, memory with baseline impairment, affect and mood normal, follows commands readily     Labs:   Labs done while at [facility] Facility done by [facility] lab. Pertinent results are: reviewed    ASSESSMENT/PLAN:     Atrial " fibrillation, unspecified type (H)  Essential hypertension  Dementia without behavioral disturbance, unspecified dementia type (H)  Exposure to potential infection     Seemingly change to Metroprolol has gone well without any S/Es.  BPs managed and HRs also adequate.    BP goals are <150/90 mm Hg.This is higher than ACC and AHA recommendations due to goals of care, risk for hypotension, risk of dizziness and falls, risk of tissue/cerebral hypoperfusion and frailty. Patient is stable with current plan of care and routine assessment.    Monitor for Norovirus symptoms and supportive treatment recommended if present.      Electronically signed by:  TINY Hilario CNP             Sincerely,        TINY Hilario CNP

## 2020-04-01 NOTE — PROGRESS NOTES
Phone call instructions per visit schedule were to call Miguel Nurse Manager at [phone].  Attempted to reach this phone number several times but only received a busy signal. Called and left message for his niece Rosemarie that I attempted to call Reilly but could not get through.  Also let her know that I have not been th prescriber for psych medications for the past year and found out after the last visit that his medications were changed while he was hospitalized in March 2019 and then his PCP had taken over the medications. Left my cell phone number for Rosemarie to contact me if needed.  Dyan Mathis CNS, APRN

## 2020-04-23 NOTE — LETTER
"    4/23/2020        RE: Sharon Baer  C/o Rosemarie Newman  [address]  [facility] [address]        [facility]  REGULATORY  Sharon Baer is being evaluated via a billable video visit due to the restrictions of the Covid-19 pandemic.   The patient has been notified of following:\"This video visit will be conducted via a call between you and your provider. We have found that certain health care needs can be provided without the need for an in-person physical exam.  This service lets us provide the care you need with a video conversation. If during the course of the call the provider feels a video visit is not appropriate, you will not be charged for this service.\"   The provider has received verbal consent for a Video Visit from the patient or first contact? Yes    Video Start Time: 12:10 PM    [place] Medical Record Number:  [id number]  Place of Location at the time of visit: [facility]   Chief Complaint   Patient presents with     FPC Regulatory     HPI:  Sharon Baer  is a 88 year old (6/10/1931), who is being seen today for an E-REG visit.  HPI information obtained from: facility chart records, facility staff, patient report and [place] Epic chart review.     Admitted to TCU in Aug 2019 after hospital stay with new diagnosis of metastatic colon cancer with profound anemia electing hospice care. Then transitioned to LTC. He has dementia with SLUMS 9/30 and is very hard of hearing utilizing a pocket talker. Comorbidities include as noted below with afib and CKD.    Reilly is met in his room today for telehealth visit. Unfortunately despite staff working with microphone/headset and his pocket talker its still a challenge to communicate with him. He though appears comfortable and was resting in bed but awake. Able to sit up and says he is doing \"ok\". Vitals reviewed which are stable (-130/70-80 and HR 60-70s). I spoke with his nurse who says that Reilly " "is stable and there are no clinical concerns. He is no longer on hospice. In the past few months he has been able to taper off of Seroquel and remains on Prozac. Also Amlodipine slightly increased and Multaq had to be discontinued as no longer covered by insurance; Metoprolol added in its place for rate control of afib. He is not on anticoagulation d/t his colon cancer with anemia.    Past Medical and Surgical History reviewed in Epic today.    MEDICATIONS: Reviewed in [facility]  Current Outpatient Medications   Medication Sig Dispense Refill     acetaminophen (TYLENOL) 500 MG tablet Take 2 tablets (1,000 mg) by mouth daily. May also take 2 tablets (1,000 mg) 2 times daily as needed for mild pain.       allopurinol (ZYLOPRIM) 100 MG tablet Take 1 tablet (100 mg) by mouth daily 100 tablet 3     amLODIPine (NORVASC) 5 MG tablet Take 1.5 tablets (7.5 mg) by mouth daily       benzocaine-menthol (CHLORASEPTIC) 6-10 MG lozenge Place 1 lozenge inside cheek every 2 hours as needed for moderate pain       FLUoxetine (PROZAC) 10 MG capsule Take 3 capsules (30 mg) by mouth daily       metoprolol succinate ER (TOPROL-XL) 25 MG 24 hr tablet Take 25 mg by mouth daily       pantoprazole (PROTONIX) 20 MG EC tablet Take 1 tablet (20 mg) by mouth daily       polyethylene glycol (MIRALAX/GLYCOLAX) packet Take 17 g by mouth 2 times daily       polyethylene glycol-propylene glycol (SYSTANE ULTRA) 0.4-0.3 % SOLN ophthalmic solution Place 1 drop into both eyes 2 times daily as needed for dry eyes       REVIEW OF SYSTEMS: Unobtainable secondary to cognitive impairment.     Objective: /80   Pulse 70   Temp 97.5  F (36.4  C)   Resp 20   Ht 1.727 m (5' 8\")   Wt 78.7 kg (173 lb 9.6 oz)   SpO2 95%   BMI 26.40 kg/m    Limited visit exam done given COVID-19 precautions.   Alert, pleasant, NAD, appears comfortable  Considerably hard of hearing  Slightly pale      ASSESSMENT/PLAN:  Malignant neoplasm of hepatic flexure   Acute blood " loss anemia  Weight loss  No reported pain or active on-going blood loss though appears slightly pale  Is on bowel regimen to keep stools soft/regular  Weight has slowly dropped though in the past few months from Dec 190# to Feb 180s# and now 174#; I left VM with dietary in regards to this  He is no longer on hospice care but still comfort focus goals of care    Atrial fibrillation, unspecified type  Not on anticoagulation d/t colonic mass and h/o anemia  Good rate control noted now with Multaq discontinued d/t lack of insurance coverage and Metoprolol XL added in its place     Depression, unspecified depression type  H/o obsessive-compulsive disorder  Dementia  Staff feel he is doing well and has support of family  Continues on Prozac though nicely his Seroquel has been able to be tapered off  Benefiting from support of LTC team    Electronically signed by:  Carin Oro DO     Video-Visit Details  Type of service:  Video Visit  Video End Time (time video stopped): 12:16 PM  Distant Location (provider location):  [place]             Sincerely,        Carin Oro DO

## 2020-04-23 NOTE — PROGRESS NOTES
"[facility]  REGULATORY  Sharon Baer is being evaluated via a billable video visit due to the restrictions of the Covid-19 pandemic.   The patient has been notified of following:\"This video visit will be conducted via a call between you and your provider. We have found that certain health care needs can be provided without the need for an in-person physical exam.  This service lets us provide the care you need with a video conversation. If during the course of the call the provider feels a video visit is not appropriate, you will not be charged for this service.\"   The provider has received verbal consent for a Video Visit from the patient or first contact? Yes    Video Start Time: 12:10 PM    [place] Medical Record Number:  [id number]  Place of Location at the time of visit: [facility]   Chief Complaint   Patient presents with     correction Regulatory     HPI:  Sharon Baer  is a 88 year old (6/10/1931), who is being seen today for an E-REG visit.  HPI information obtained from: facility chart records, facility staff, patient report and [facility] chart review.     Admitted to TCU in Aug 2019 after hospital stay with new diagnosis of metastatic colon cancer with profound anemia electing hospice care. Then transitioned to LTC. He has dementia with SLUMS 9/30 and is very hard of hearing utilizing a pocket talker. Comorbidities include as noted below with afib and CKD.    Reilly is met in his room today for telehealth visit. Unfortunately despite staff working with microphone/headset and his pocket talker its still a challenge to communicate with him. He though appears comfortable and was resting in bed but awake. Able to sit up and says he is doing \"ok\". Vitals reviewed which are stable (-130/70-80 and HR 60-70s). I spoke with his nurse who says that Reilly is stable and there are no clinical concerns. He is no longer on hospice. In the past few months he has been " "able to taper off of Seroquel and remains on Prozac. Also Amlodipine slightly increased and Multaq had to be discontinued as no longer covered by insurance; Metoprolol added in its place for rate control of afib. He is not on anticoagulation d/t his colon cancer with anemia.    Past Medical and Surgical History reviewed in Epic today.    MEDICATIONS: Reviewed in [facility]  Current Outpatient Medications   Medication Sig Dispense Refill     acetaminophen (TYLENOL) 500 MG tablet Take 2 tablets (1,000 mg) by mouth daily. May also take 2 tablets (1,000 mg) 2 times daily as needed for mild pain.       allopurinol (ZYLOPRIM) 100 MG tablet Take 1 tablet (100 mg) by mouth daily 100 tablet 3     amLODIPine (NORVASC) 5 MG tablet Take 1.5 tablets (7.5 mg) by mouth daily       benzocaine-menthol (CHLORASEPTIC) 6-10 MG lozenge Place 1 lozenge inside cheek every 2 hours as needed for moderate pain       FLUoxetine (PROZAC) 10 MG capsule Take 3 capsules (30 mg) by mouth daily       metoprolol succinate ER (TOPROL-XL) 25 MG 24 hr tablet Take 25 mg by mouth daily       pantoprazole (PROTONIX) 20 MG EC tablet Take 1 tablet (20 mg) by mouth daily       polyethylene glycol (MIRALAX/GLYCOLAX) packet Take 17 g by mouth 2 times daily       polyethylene glycol-propylene glycol (SYSTANE ULTRA) 0.4-0.3 % SOLN ophthalmic solution Place 1 drop into both eyes 2 times daily as needed for dry eyes       REVIEW OF SYSTEMS: Unobtainable secondary to cognitive impairment.     Objective: /80   Pulse 70   Temp 97.5  F (36.4  C)   Resp 20   Ht 1.727 m (5' 8\")   Wt 78.7 kg (173 lb 9.6 oz)   SpO2 95%   BMI 26.40 kg/m    Limited visit exam done given COVID-19 precautions.   Alert, pleasant, NAD, appears comfortable  Considerably hard of hearing  Slightly pale      ASSESSMENT/PLAN:  Malignant neoplasm of hepatic flexure   Acute blood loss anemia  Weight loss  No reported pain or active on-going blood loss though appears slightly pale  Is on " bowel regimen to keep stools soft/regular  Weight has slowly dropped though in the past few months from Dec 190# to Feb 180s# and now 174#; I left VM with dietary in regards to this  He is no longer on hospice care but still comfort focus goals of care    Atrial fibrillation, unspecified type  Not on anticoagulation d/t colonic mass and h/o anemia  Good rate control noted now with Multaq discontinued d/t lack of insurance coverage and Metoprolol XL added in its place     Depression, unspecified depression type  H/o obsessive-compulsive disorder  Dementia  Staff feel he is doing well and has support of family  Continues on Prozac though nicely his Seroquel has been able to be tapered off  Benefiting from support of LTC team    Electronically signed by:  Carin Oro DO     Video-Visit Details  Type of service:  Video Visit  Video End Time (time video stopped): 12:16 PM  Distant Location (provider location):  [place] [facility]

## 2020-04-30 NOTE — TELEPHONE ENCOUNTER
nsg called pt appeared jaundice yesterday and continues to have wt loss .  Noted MD note last week:  Admitted to TCU in Aug 2019 after hospital stay with new diagnosis of metastatic colon cancer with profound anemia electing hospice care. Then transitioned to LTC. He has dementia with SLUMS 9/30 and is very hard of hearing utilizing a pocket talker. Comorbidities include as noted below with afib and CKD.    No new orders - cont palliative/comfort cares.

## 2020-05-05 NOTE — PROGRESS NOTES
"[facility]   Sharon Baer is being evaluated via a billable video visit due to the restrictions of the Covid-19 pandemic.   The patient has been notified of following:  \"This video visit will be conducted via a call between you and your provider. We have found that certain health care needs can be provided without the need for an in-person physical exam.  This service lets us provide the care you need with a video conversation. If during the course of the call the provider feels a video visit is not appropriate, you will not be charged for this service.\"   The provider has received verbal consent for a Video Visit from the patient or first contact? Yes  Patient  or facility staff would like the video invitation sent by: N/A   Video Start Time: 1158  [place] Medical Record Number:  [id number]  Place of Location at the time of visit: [facility]   Chief Complaint   Patient presents with     RECHECK     HPI:  Sharon Baer  is a 88 year old (6/10/1931), who is being seen today for a visit.  HPI information obtained from: facility chart records, facility staff, patient report and [place] Epic chart review.       Today's concern is:     Malignant neoplasm of hepatic flexure (H)  Dementia without behavioral disturbance, unspecified dementia type (H)  Recurrent major depressive disorder, remission status unspecified (H)  Weight loss  Obsessive-compulsive disorder, unspecified type  Autism spectrum disorder  Essential hypertension  Chronic kidney disease, stage 3 (moderate) (H)     Patient is an 88 year old man with PMH including HTN, HLD, reflux esophagitis, autism, OCD, GIANLUCA, CKD 3, A. fib, gout, hearing loss who presented with 2-month history of progressive fatigue, AGUILA, S OB, pallor, intermittent BRBPR; who was found to have acute on chronic anemia (Hgb 11.0 --> 5.4) s/p 2 pRBC --> 7.6 by discharge, thought 2/2 to right partly circumferential colonic mass with Bx showing " "adenocarcinoma with imaging showing metastatic lesion to liver. He moved from TCU to LTC and was admitted to hospice however did well and therefore \"graduated\" from hospice on 12/3/19.    He has a SLUMS of 9/30 and is very [place], utilizing a pocket talker for assistance.      Recent Med Changes:  - 2/10/20 - Increase amlodipine to 7.5 mg daily, 2. discontinue DuoNebs PRN, discontinue Tussin PRN, Change Seroquel to PRN x 14 days and monitor for usage and need.  Update NP in 12 days., Change Tylenol to 1000 mg daily, Increase Tylenol PRN to 1000 mg BID PRN  - 3/3/20 - discontinue dronedarone d/t insurance not covering, started on Toprol XL 25 mg daily.       Nursing reporting today that patient appears jaundiced and has been having more confusion including wandering more, repetitive questions with only minimal time of reassurance before anxious and asking again.     Past Medical and Surgical History reviewed in Epic today.  MEDICATIONS:    Current Outpatient Medications   Medication Sig Dispense Refill     amLODIPine (NORVASC) 5 MG tablet Take 1 tablet (5 mg) by mouth daily       acetaminophen (TYLENOL) 500 MG tablet Take 2 tablets (1,000 mg) by mouth daily. May also take 2 tablets (1,000 mg) 2 times daily as needed for mild pain.       allopurinol (ZYLOPRIM) 100 MG tablet Take 1 tablet (100 mg) by mouth daily 100 tablet 3     benzocaine-menthol (CHLORASEPTIC) 6-10 MG lozenge Place 1 lozenge inside cheek every 2 hours as needed for moderate pain       FLUoxetine (PROZAC) 10 MG capsule Take 3 capsules (30 mg) by mouth daily       metoprolol succinate ER (TOPROL-XL) 25 MG 24 hr tablet Take 25 mg by mouth daily       pantoprazole (PROTONIX) 20 MG EC tablet Take 1 tablet (20 mg) by mouth daily       polyethylene glycol (MIRALAX/GLYCOLAX) packet Take 17 g by mouth 2 times daily       polyethylene glycol-propylene glycol (SYSTANE ULTRA) 0.4-0.3 % SOLN ophthalmic solution Place 1 drop into both eyes 2 times daily as needed for " "dry eyes       REVIEW OF SYSTEMS: Limited secondary to cognitive impairment but today pt reports no pain  Objective: /79   Pulse 78   Temp 97.2  F (36.2  C)   Resp 20   Ht 1.727 m (5' 8\")   Wt 78.7 kg (173 lb 8 oz)   SpO2 95%   BMI 26.38 kg/m    Limited visit exam done given COVID-19 precautions.   GENERAL APPEARANCE:  Alert, in no distress, comfortable, unfortunately unable to use pocket talker today so visit quite limited  RESP:  respiratory effort normal, no respiratory distress, on RA  CV:  KIRSTIE for LE peripheral edema  ABDOMEN:  nondistended  M/S:   Gait and station ambulatory with walker, Digits and nails with arthritic changes, reduced muscle mass  SKIN:  Inspection and Palpation of skin and subcutaneous tissue pale, dry  PSYCH:  insight and judgement, memory with impairment, affect and mood normal, follows commands readily when able to hear the direction.       Labs:   Labs done [facility] done by [employer] lab. Pertinent results are: reviewed    ASSESSMENT/PLAN:  Malignant neoplasm of hepatic flexure (H)  Plan after diagnosis was for no extensive surgery or treatment. He was admitted to hospice and transferred to LTC.  He then \"graduated\" from hospice after being quite stable for >3 months.    Nursing now noting some jaundice with increased confusion.   I did not appreciate a lot of jaundice today however I was on a video visit and therefore could not notice the intricacies.  His sclera is white and the tip of his nose is not yellowed from my view.     Liver Function Studies -   Recent Labs   Lab Test 08/08/19  1414   PROTTOTAL 6.4*   ALBUMIN 3.1*   BILITOTAL 0.3   ALKPHOS 62   AST 9   ALT 11       PLAN:  - LFTs for monitoring.    Dementia without behavioral disturbance, unspecified dementia type (H)  Recurrent major depressive disorder, remission status unspecified (H)  Obsessive-compulsive disorder, unspecified type  Autism spectrum disorder  Noted increase in " confusion from nursing with increase in repetitive questioning, wandering, etc.  Visit today with patient unable to answer much d/t pocket talker not functional.     Patient is on fluoxetine 30 mg daily (last GDR 8/17/19) - no GDR today as increase in confusion and restlessness, etc noted.       PLAN:  - Labs for monitoring of infectious or metabolic etiology of confusion.    - continue fluoxetine  - could add mirtazapine as noted weight loss as well.    - nursing for increased care needs and safety     Weight loss  Noted weight loss of 14 lbs over the last month and 20.4 lbs in last 6 months.   Charting reports po intake varies  Patient is on house supplement.   Body mass index is 26.38 kg/m .     Current supplements are 4 oz daily --> can increase to 4 oz TID.     PLAN:  - monitor lab results/work-up  - Could add mirtazapine for appetite stimulant.     - Increase nutritional supplements to TID    Essential hypertension  Patient is on amlodipine 7.5 mg q HS and Toprol XL 25 mg daily  BPs 120-130s/70-80s  HRs 70s  Patient unable to comment today on CP, HA, lightheadedness symptoms    PLAN:  - reduce amlodipine to 5 mg q HS  - continue Toprol XL   - BP goals are <150/90 mm Hg.This is higher than ACC and AHA recommendations due to risk for hypotension, risk of dizziness and falls and risk of tissue/cerebral hypoperfusion.     Chronic kidney disease, stage 3 (moderate) (H)  BL Creat 1.1-1.3  Last few BMPs:   8/15/19: BUN x, Creat 1.10, GFR 60  2/3/20: BUN 24, Creat 1.29, GFR 49  2/12/20: BUN 20, Creat 1.23, GFR 52    On no NSAIDs, ACEI, diuretics.    PLAN:  - BMP for monitoring for metabolic etiology for infection and for monitoring.  - Will avoid nephrotoxic agents (such as ACEI/ARB/NSAIDs, IV contrast) and mindful prescribing with renal dosing.       Orders written by provider at facility  1. CMP, CBC with Diff 5/6/20  2. Decrease Amlodipine to 5 mg q HS  3. Increase supplements to TID.       Electronically signed  by:  TINY Hilario CNP   Video-Visit Details  Type of service:  Video Visit  Video End Time (time video stopped): 12:00  Distant Location (provider location):  [facility]

## 2020-05-05 NOTE — LETTER
"    5/5/2020        RE: Sharon Baer  C/o Rosemarie Smithkristy  [address]  [facility] [address]        [place] [facility]   Sharon Baer is being evaluated via a billable video visit due to the restrictions of the Covid-19 pandemic.   The patient has been notified of following:  \"This video visit will be conducted via a call between you and your provider. We have found that certain health care needs can be provided without the need for an in-person physical exam.  This service lets us provide the care you need with a video conversation. If during the course of the call the provider feels a video visit is not appropriate, you will not be charged for this service.\"   The provider has received verbal consent for a Video Visit from the patient or first contact? Yes  Patient  or facility staff would like the video invitation sent by: N/A   Video Start Time: 1158  [place] Medical Record Number:  [id number]  Place of Location at the time of visit: [facility]   Chief Complaint   Patient presents with     RECHECK     HPI:  Sharon Baer  is a 88 year old (6/10/1931), who is being seen today for a visit.  HPI information obtained from: facility chart records, facility staff, patient report and [facility] chart review.       Today's concern is:     Malignant neoplasm of hepatic flexure (H)  Dementia without behavioral disturbance, unspecified dementia type (H)  Recurrent major depressive disorder, remission status unspecified (H)  Weight loss  Obsessive-compulsive disorder, unspecified type  Autism spectrum disorder  Essential hypertension  Chronic kidney disease, stage 3 (moderate) (H)     Patient is an 88 year old man with PMH including HTN, HLD, reflux esophagitis, autism, OCD, GIANLUCA, CKD 3, A. fib, gout, hearing loss who presented with 2-month history of progressive fatigue, AGUILA, S OB, pallor, intermittent BRBPR; who was found to have acute on chronic anemia (Hgb 11.0 --> 5.4) s/p " "2 pRBC --> 7.6 by discharge, thought 2/2 to right partly circumferential colonic mass with Bx showing adenocarcinoma with imaging showing metastatic lesion to liver. He moved from TCU to LTC and was admitted to hospice however did well and therefore \"graduated\" from hospice on 12/3/19.    He has a SLUMS of 9/30 and is very [place], utilizing a pocket talker for assistance.      Recent Med Changes:  - 2/10/20 - Increase amlodipine to 7.5 mg daily, 2. discontinue DuoNebs PRN, discontinue Tussin PRN, Change Seroquel to PRN x 14 days and monitor for usage and need.  Update NP in 12 days., Change Tylenol to 1000 mg daily, Increase Tylenol PRN to 1000 mg BID PRN  - 3/3/20 - discontinue dronedarone d/t insurance not covering, started on Toprol XL 25 mg daily.       Nursing reporting today that patient appears jaundiced and has been having more confusion including wandering more, repetitive questions with only minimal time of reassurance before anxious and asking again.     Past Medical and Surgical History reviewed in Epic today.  MEDICATIONS:    Current Outpatient Medications   Medication Sig Dispense Refill     amLODIPine (NORVASC) 5 MG tablet Take 1 tablet (5 mg) by mouth daily       acetaminophen (TYLENOL) 500 MG tablet Take 2 tablets (1,000 mg) by mouth daily. May also take 2 tablets (1,000 mg) 2 times daily as needed for mild pain.       allopurinol (ZYLOPRIM) 100 MG tablet Take 1 tablet (100 mg) by mouth daily 100 tablet 3     benzocaine-menthol (CHLORASEPTIC) 6-10 MG lozenge Place 1 lozenge inside cheek every 2 hours as needed for moderate pain       FLUoxetine (PROZAC) 10 MG capsule Take 3 capsules (30 mg) by mouth daily       metoprolol succinate ER (TOPROL-XL) 25 MG 24 hr tablet Take 25 mg by mouth daily       pantoprazole (PROTONIX) 20 MG EC tablet Take 1 tablet (20 mg) by mouth daily       polyethylene glycol (MIRALAX/GLYCOLAX) packet Take 17 g by mouth 2 times daily       polyethylene glycol-propylene glycol " "(SYSTANE ULTRA) 0.4-0.3 % SOLN ophthalmic solution Place 1 drop into both eyes 2 times daily as needed for dry eyes       REVIEW OF SYSTEMS: Limited secondary to cognitive impairment but today pt reports no pain  Objective: /79   Pulse 78   Temp 97.2  F (36.2  C)   Resp 20   Ht 1.727 m (5' 8\")   Wt 78.7 kg (173 lb 8 oz)   SpO2 95%   BMI 26.38 kg/m    Limited visit exam done given COVID-19 precautions.   GENERAL APPEARANCE:  Alert, in no distress, comfortable, unfortunately unable to use pocket talker today so visit quite limited  RESP:  respiratory effort normal, no respiratory distress, on RA  CV:  KIRSTIE for LE peripheral edema  ABDOMEN:  nondistended  M/S:   Gait and station ambulatory with walker, Digits and nails with arthritic changes, reduced muscle mass  SKIN:  Inspection and Palpation of skin and subcutaneous tissue pale, dry  PSYCH:  insight and judgement, memory with impairment, affect and mood normal, follows commands readily when able to hear the direction.       Labs:   Labs done [facility] at [facility] done by [facility] lab. Pertinent results are: reviewed    ASSESSMENT/PLAN:  Malignant neoplasm of hepatic flexure (H)  Plan after diagnosis was for no extensive surgery or treatment. He was admitted to hospice and transferred to LTC.  He then \"graduated\" from hospice after being quite stable for >3 months.    Nursing now noting some jaundice with increased confusion.   I did not appreciate a lot of jaundice today however I was on a video visit and therefore could not notice the intricacies.  His sclera is white and the tip of his nose is not yellowed from my view.     Liver Function Studies -   Recent Labs   Lab Test 08/08/19  1414   PROTTOTAL 6.4*   ALBUMIN 3.1*   BILITOTAL 0.3   ALKPHOS 62   AST 9   ALT 11       PLAN:  - LFTs for monitoring.    Dementia without behavioral disturbance, unspecified dementia type (H)  Recurrent major depressive disorder, remission status " unspecified (H)  Obsessive-compulsive disorder, unspecified type  Autism spectrum disorder  Noted increase in confusion from nursing with increase in repetitive questioning, wandering, etc.  Visit today with patient unable to answer much d/t pocket talker not functional.     Patient is on fluoxetine 30 mg daily (last GDR 8/17/19) - no GDR today as increase in confusion and restlessness, etc noted.       PLAN:  - Labs for monitoring of infectious or metabolic etiology of confusion.    - continue fluoxetine  - could add mirtazapine as noted weight loss as well.    - nursing for increased care needs and safety     Weight loss  Noted weight loss of 14 lbs over the last month and 20.4 lbs in last 6 months.   Charting reports po intake varies  Patient is on house supplement.   Body mass index is 26.38 kg/m .     Current supplements are 4 oz daily --> can increase to 4 oz TID.     PLAN:  - monitor lab results/work-up  - Could add mirtazapine for appetite stimulant.     - Increase nutritional supplements to TID    Essential hypertension  Patient is on amlodipine 7.5 mg q HS and Toprol XL 25 mg daily  BPs 120-130s/70-80s  HRs 70s  Patient unable to comment today on CP, HA, lightheadedness symptoms    PLAN:  - reduce amlodipine to 5 mg q HS  - continue Toprol XL   - BP goals are <150/90 mm Hg.This is higher than ACC and AHA recommendations due to risk for hypotension, risk of dizziness and falls and risk of tissue/cerebral hypoperfusion.     Chronic kidney disease, stage 3 (moderate) (H)  BL Creat 1.1-1.3  Last few BMPs:   8/15/19: BUN x, Creat 1.10, GFR 60  2/3/20: BUN 24, Creat 1.29, GFR 49  2/12/20: BUN 20, Creat 1.23, GFR 52    On no NSAIDs, ACEI, diuretics.    PLAN:  - BMP for monitoring for metabolic etiology for infection and for monitoring.  - Will avoid nephrotoxic agents (such as ACEI/ARB/NSAIDs, IV contrast) and mindful prescribing with renal dosing.       Orders written by provider at facility  1. CMP, CBC with  Diff 5/6/20  2. Decrease Amlodipine to 5 mg q HS  3. Increase supplements to TID.       Electronically signed by:  TINY Hilario CNP   Video-Visit Details  Type of service:  Video Visit  Video End Time (time video stopped): 12:00  Distant Location (provider location):  [facility]             Sincerely,        TINY Hilario CNP

## 2020-05-07 NOTE — TELEPHONE ENCOUNTER
"Telephone visit  [facility]  Sharon Baer is a 88 year old year old adult who is being evaluated via a billable telephone visit due to the restrictions of the Covid-19 pandemic. The patient has been notified of following: \"This telephone visit will be conducted via a call between you and your provider. We have found that certain health care needs can be provided without the need for a physical exam. This service lets us provide the care you need with a short phone conversation. If a prescription is necessary we will work with the facility you are at and can send it directly to your pharmacy. If lab work is needed we can place an order to have it drawn at the facility you are at. If during the course of the call the provider feels a telephone visit is not appropriate, you will not be charged for this service.\"   Patient or Patient's first contact has given verbal consent for Telephone visit?  Yes  Place of Location at the time of visit: [facility]   Sharon Baer complains of :   Chief Complaint   Patient presents with     Lab Result Notice     Anemia     I have reviewed and updated the patient's Past Medical History, Social History, Family History and Medication List.  ALLERGIES:   Allergies   Allergen Reactions     Penicillins Rash and Other (See Comments)     He believes he had a positive skin test for PCN allergy at the time of a dog bite, about 1960.      HPI: HPI information obtained from: facility chart records, facility staff, patient report, [facility] chart review and family/first contact pt's niece's report.         Patient is an 88 year old man with PMH including HTN, HLD, reflux esophagitis, autism, OCD, GIANLUCA, CKD 3, A. fib, gout, hearing loss who presented with 2-month history of progressive fatigue, AGUILA, S OB, pallor, intermittent BRBPR; who was found to have acute on chronic anemia (Hgb 11.0 --> 5.4) s/p 2 pRBC --> 7.6 by discharge, thought 2/2 to right " "partly circumferential colonic mass with Bx showing adenocarcinoma with imaging showing metastatic lesion to liver. He moved from TCU to LTC and was admitted to hospice however did well and therefore \"graduated\" from hospice on 12/3/19.      5/5/20 Nursing reporting today that patient appears jaundiced and has been having more confusion including wandering more, repetitive questions with only minimal time of reassurance before anxious and asking again.     Patient was seen by this provider for video visit and labs ordered.  Nurse calling today to report Hgb 4.7  No bleeding has been noted.        MEDICATIONS:    Current Outpatient Medications   Medication Sig Dispense Refill     acetaminophen (TYLENOL) 500 MG tablet Take 2 tablets (1,000 mg) by mouth daily. May also take 2 tablets (1,000 mg) 2 times daily as needed for mild pain.       allopurinol (ZYLOPRIM) 100 MG tablet Take 1 tablet (100 mg) by mouth daily 100 tablet 3     amLODIPine (NORVASC) 5 MG tablet Take 1 tablet (5 mg) by mouth daily       benzocaine-menthol (CHLORASEPTIC) 6-10 MG lozenge Place 1 lozenge inside cheek every 2 hours as needed for moderate pain       FLUoxetine (PROZAC) 10 MG capsule Take 3 capsules (30 mg) by mouth daily       metoprolol succinate ER (TOPROL-XL) 25 MG 24 hr tablet Take 25 mg by mouth daily       pantoprazole (PROTONIX) 20 MG EC tablet Take 1 tablet (20 mg) by mouth daily       polyethylene glycol (MIRALAX/GLYCOLAX) packet Take 17 g by mouth 2 times daily       polyethylene glycol-propylene glycol (SYSTANE ULTRA) 0.4-0.3 % SOLN ophthalmic solution Place 1 drop into both eyes 2 times daily as needed for dry eyes       REVIEW OF SYSTEMS: Limited secondary to cognitive impairment and [place].   Objective: There were no vitals taken for this visit.  Limited visit exam done given COVID-19 precautions.     Labs:   Hgb 4.7 by report of SNF RN.    Assessment/Plan:  1. Malignant neoplasm of hepatic flexure (H)  2. Dementia without " behavioral disturbance, unspecified dementia type (H)  3. Anemia due to blood loss, acute    Spoke with patient's niece/POA about Hgb 4.7 result and recheck being ordered.   Patient's niece noting this was similar presentation last time he was anemic (confusion, lethargy, etc) and this was when cancer diagnosis was assigned.    We discussed treatment POC and patient's family all had a discussion last fall and agreed on comfort focus.   We will see what recheck Hgb is but will also get orders for Hospice Consult in case true lab result.     Called NM on SNF unit and spoke about POC.  Agreement to use [facility] as they have presence in building already during this time of COVID-19.  Noted a rapid admission can be arranged with  Hospice if [facility] not able to rapidly admit patient.  NM will make calls nad update me with result.        Phone call duration:  8  Minutes with patient's family  Additional time with nursing in coordination of care    TINY Hilario CNP

## 2020-06-07 NOTE — PROGRESS NOTES
"[facility]  Chief Complaint   Patient presents with     senior care Regulatory     [facility] Medical Record Number:  [id number]  Place of Service where encounter took place:  [facility] (SNF) [[id number]]    HPI:    Sharon Baer  is 88 year old (6/10/1931), who is being seen today for a federally mandated E/M visit.  HPI information obtained from: facility chart records, facility staff, patient report and [facility] Epic chart review. Today's concerns are:     Malignant neoplasm of hepatic flexure (H)  Hospice care patient  Anemia due to blood loss, acute  Dementia without behavioral disturbance, unspecified dementia type (H)  Recurrent major depressive disorder, remission status unspecified (H)  Obsessive-compulsive disorder, unspecified type  Autism spectrum disorder  Essential hypertension  Chronic kidney disease, stage 3 (moderate) (H)  Atrial fibrillation, unspecified type (H)  Type 2 diabetes mellitus with stage 3 chronic kidney disease, without long-term current use of insulin (H)     Patient is an 88 year old man with PMH including HTN, HLD, reflux esophagitis, autism, OCD, GIANLUCA, CKD 3, A. fib, gout, hearing loss who presented with 2-month history of progressive fatigue, AGUILA, S OB, pallor, intermittent BRBPR; who was found to have acute on chronic anemia (Hgb 11.0 --> 5.4) s/p 2 pRBC --> 7.6 by discharge, thought 2/2 to right partly circumferential colonic mass with Bx showing adenocarcinoma with imaging showing metastatic lesion to liver. He moved from TCU to LTC and was admitted to hospice however did well and therefore \"graduated\" from hospice on 12/3/19.     He has a SLUMS of 9/30 and is very [place], utilizing a pocket talker for assistance.      5/5/20 Nursing reported that patient appeared jaundiced, more confusion including wandering more, repetitive questions with only minimal time of reassurance before anxious and asking again. Labs checked and Hgb 4.7 --> recheck 5.5.  Spoke " "with patient's POA (niece) and decision to transition to Hospice.   5/20/20 Patient admitted to [facility] with Dx of malignant neoplasm of hepatic flexure      Patient is met today in his SNF LTC room where he is resting.  He awakes and reports he is not in pain and is doing \"just fine.\"  I am unable to find his pocket talker, which makes conversing with him nearly impossible.   Nursing reports patient was recently started on scheduled lorazepam but unfortunately this had a paradoxical effect and patient was restless, etc.  Scheduled was discontinue\"d but PRN remains.  Discussion with nursing and decision to discontinue prn lorazepam and have PRN haldol in place instead; monitor for paradoxical effect.   ALLERGIES:Penicillins  PAST MEDICAL HISTORY:   has a past medical history of Atrial fibrillation (H), Autism spectrum disorder (7/16/2015), CKD (chronic kidney disease), GERD (gastroesophageal reflux disease), Gout, Hearing loss, Hyperlipemia, Hypertension, Hypotestosteronism, IGT (impaired glucose tolerance), Malignant neoplasm of hepatic flexure (H) (8/14/2019), OCD (obsessive compulsive disorder), Osteoarthritis, hip, bilateral, and Vitamin D deficiency.  PAST SURGICAL HISTORY:   has a past surgical history that includes hernia repair, inguinal rt/lt; bilateral ear surgery; bilateral cataract surgery; suprapubic prostatectomy; Esophagoscopy, gastroscopy, duodenoscopy (EGD), combined (N/A, 8/12/2019); and Colonoscopy (N/A, 8/12/2019).  FAMILY HISTORY: family history includes Cancer in his sister; Myocardial Infarction (age of onset: 60) in his maternal uncle.  SOCIAL HISTORY:  reports that he has quit smoking. He has a 20.00 pack-year smoking history. He has never used smokeless tobacco. He reports that he does not drink alcohol or use drugs.    MEDICATIONS:  Current Outpatient Medications   Medication Sig Dispense Refill     amLODIPine (NORVASC) 5 MG tablet Take 0.5 tablets (2.5 mg) by mouth daily       " "haloperidol (HALDOL) 0.5 MG tablet Take 1 tablet (0.5 mg) by mouth every 4 hours as needed for agitation 30 tablet 3     acetaminophen (TYLENOL) 500 MG tablet Take 2 tablets (1,000 mg) by mouth daily. May also take 2 tablets (1,000 mg) 2 times daily as needed for mild pain.       allopurinol (ZYLOPRIM) 100 MG tablet Take 1 tablet (100 mg) by mouth daily 100 tablet 3     benzocaine-menthol (CHLORASEPTIC) 6-10 MG lozenge Place 1 lozenge inside cheek every 2 hours as needed for moderate pain       FLUoxetine (PROZAC) 10 MG capsule Take 3 capsules (30 mg) by mouth daily       metoprolol succinate ER (TOPROL-XL) 25 MG 24 hr tablet Take 25 mg by mouth daily       pantoprazole (PROTONIX) 20 MG EC tablet Take 1 tablet (20 mg) by mouth daily       polyethylene glycol (MIRALAX/GLYCOLAX) packet Take 17 g by mouth 2 times daily       polyethylene glycol-propylene glycol (SYSTANE ULTRA) 0.4-0.3 % SOLN ophthalmic solution Place 1 drop into both eyes 2 times daily as needed for dry eyes         Case Management:  I have reviewed the care plan and MDS and do agree with the plan. Patient's desire to return to the community is not assessible due to cognitive impairment. Information reviewed:  Medications, vital signs, orders, and nursing notes.    ROS:  Limited secondary to cognitive impairment but today pt reports no pain, SOB, CP.     Vitals:  /86   Pulse 68   Temp 98.4  F (36.9  C)   Resp 16   Ht 1.727 m (5' 8\")   Wt 73.8 kg (162 lb 9.6 oz)   SpO2 93%   BMI 24.72 kg/m    Body mass index is 24.72 kg/m .  Exam:  GENERAL APPEARANCE:  Resting comfortable, calm, pleasant, in no distress  RESP:  respiratory effort and palpation of chest normal, auscultation of lungs clear, no respiratory distress  CV:  Palpation and auscultation of heart done , rate and rhythm regular, no murmur, no LE peripheral edema  ABDOMEN:  normal bowel sounds, soft, nontender, no hepatosplenomegaly or other masses  M/S:   Gait and station ambulatory, " "Digits and nails with arthritic changes, reduced muscle mass  SKIN:  Inspection and Palpation of skin and subcutaneous tissue pale, intact  PSYCH:  insight and judgement, memory with impairment, affect and mood normal, follows commands readily       Lab/Diagnostic data:   Labs done while at [facility] done by [facility]. Pertinent results are: reviewed    ASSESSMENT/PLAN  Malignant neoplasm of hepatic flexure (H)  Hospice care patient  Anemia due to blood loss, acute  Plan after diagnosis was for no extensive surgery or treatment. He was admitted to hospice and transferred to LTC.  He then \"graduated\" from hospice after being quite stable for >3 months.  Noted weight loss of 14 lbs over the last month and 20.4 lbs in last 6 months.  5/20/20 was re-admitted to [facility] after Hgb 4.7, recheck 5.5 with increase in confusion , spoke to POA/niece and decision for hospice.     On pantoprazole 20 mg daily    No abdominal pain per exam today.    PLAN:  - Appreciate nursing for supportive cares  - Appreciate [facility] for supportive cares and comfort goal  - discontinue PRN Lorazepam  - (new) Haldol 0.5 mg q 4 hours PRN x 14 days.     Dementia without behavioral disturbance, unspecified dementia type (H)  Recurrent major depressive disorder, remission status unspecified (H)  Obsessive-compulsive disorder, unspecified type  Autism spectrum disorder  Noted increase in confusion from nursing with increase in repetitive questioning, wandering, etc.  Resides in LTC SNF for increased care needs  SLUMS 9/30 in past, Most recent BIMS 6/15, PHQ9 - 6 (mild)     On Fluoxetine 30 mg daily, lorazepam 0.5 mg q 4 hours PRN for increase in anxiety/agitation  Recently had scheduled ativan dc'd d/t increase in agitation, increased confusion, restlessness, poor coordination (6/6/20)    Nursing reports paradoxical effect with increase in restlessness, confusion, agitation with lorazepam - will discontinue.  Can " order PRN Haldol and monitor for same effect.     PLAN:  - Nursing and hospice for supportive cares  - no GDR of fluoxetine at this time d/t comfort goal   - discontinue Lorazepam PRN d/t agitation, anxiety  - (new) Haldol 0.5 mg po q4 hours PRN x 14 days.     Essential hypertension  Atrial fibrillation, unspecified type (H)  On amlodipine 5 mg q AM, Toprol XL 25 mg daily,     BPs 120-130s/60-80s  HRs 63-74  Patient denies CP today.     PLAN:  - BP goals are <150/90 mm Hg.This is higher than ACC and AHA recommendations due to goals of care, risk for hypotension, risk of dizziness and falls, risk of tissue/cerebral hypoperfusion and frailty. Noted patients BP is lower than goal and will adjust plan of care by decreasing amlodipine to 2.5 mg po daily.       Chronic kidney disease, stage 3 (moderate) (H)  Enrolled in Hospice   Last few BMPs:   8/15/19: BUN x, Creat 1.10, GFR 60  2/12/20: BUN 20, Creat 1.23, GFR 52  2/21/20: BUN 20, Creat 1.23, GFR 52  5/7/20: BUN 34, Creat 1.57, GFR 39     On no NSAIDs, ACEI, diuretics.    PLAN:  - Will avoid nephrotoxic agents (such as ACEI/ARB/NSAIDs, IV contrast) and mindful prescribing with renal dosing.   - no recheck of labs d/t comfort goal     Type 2 diabetes mellitus with stage 3 chronic kidney disease, without long-term current use of insulin (H)  On no meds  Is on regular diet with House supplements TID   2/21/20 - 6.0    PLAN:  - Goal: HgbA1C between 8-9%. Per AGS there is potential harm in lowering the A1C to <6.5% in older adults with diabetes.       Orders written by provider at facility  1. Decrease amlodipine to 2.5 mg po daily.   2. discontinue PRN lorazepam  3. Haldol 0.5 mg po q 4 hours PRN  X 14 days       Electronically signed by:  TINY Hilario CNP

## 2020-06-08 NOTE — LETTER
"    6/8/2020        RE: Sharon Baer  C/o Rosemarie Paty  [address]  [facility] [address]        [place] [facility]  Chief Complaint   Patient presents with     half-way Regulatory     [place] Medical Record Number:  [id number]  Place of Service where encounter took place:  [facility] [[id number]]    HPI:    Sharon Baer  is 88 year old (6/10/1931), who is being seen today for a federally mandated E/M visit.  HPI information obtained from: facility chart records, facility staff, patient report and [place] Epic chart review. Today's concerns are:     Malignant neoplasm of hepatic flexure (H)  Hospice care patient  Anemia due to blood loss, acute  Dementia without behavioral disturbance, unspecified dementia type (H)  Recurrent major depressive disorder, remission status unspecified (H)  Obsessive-compulsive disorder, unspecified type  Autism spectrum disorder  Essential hypertension  Chronic kidney disease, stage 3 (moderate) (H)  Atrial fibrillation, unspecified type (H)  Type 2 diabetes mellitus with stage 3 chronic kidney disease, without long-term current use of insulin (H)     Patient is an 88 year old man with PMH including HTN, HLD, reflux esophagitis, autism, OCD, GIANLUCA, CKD 3, A. fib, gout, hearing loss who presented with 2-month history of progressive fatigue, AGUILA, S OB, pallor, intermittent BRBPR; who was found to have acute on chronic anemia (Hgb 11.0 --> 5.4) s/p 2 pRBC --> 7.6 by discharge, thought 2/2 to right partly circumferential colonic mass with Bx showing adenocarcinoma with imaging showing metastatic lesion to liver. He moved from TCU to LTC and was admitted to hospice however did well and therefore \"graduated\" from hospice on 12/3/19.     He has a SLUMS of 9/30 and is very [place], utilizing a pocket talker for assistance.      5/5/20 Nursing reported that patient appeared jaundiced, more confusion including wandering more, repetitive questions with only " "minimal time of reassurance before anxious and asking again. Labs checked and Hgb 4.7 --> recheck 5.5.  Spoke with patient's POA (niece) and decision to transition to Hospice.   5/20/20 Patient admitted to [facility] with Dx of malignant neoplasm of hepatic flexure      Patient is met today in his SNF LTC room where he is resting.  He awakes and reports he is not in pain and is doing \"just fine.\"  I am unable to find his pocket talker, which makes conversing with him nearly impossible.   Nursing reports patient was recently started on scheduled lorazepam but unfortunately this had a paradoxical effect and patient was restless, etc.  Scheduled was discontinue\"d but PRN remains.  Discussion with nursing and decision to discontinue prn lorazepam and have PRN haldol in place instead; monitor for paradoxical effect.   ALLERGIES:Penicillins  PAST MEDICAL HISTORY:   has a past medical history of Atrial fibrillation (H), Autism spectrum disorder (7/16/2015), CKD (chronic kidney disease), GERD (gastroesophageal reflux disease), Gout, Hearing loss, Hyperlipemia, Hypertension, Hypotestosteronism, IGT (impaired glucose tolerance), Malignant neoplasm of hepatic flexure (H) (8/14/2019), OCD (obsessive compulsive disorder), Osteoarthritis, hip, bilateral, and Vitamin D deficiency.  PAST SURGICAL HISTORY:   has a past surgical history that includes hernia repair, inguinal rt/lt; bilateral ear surgery; bilateral cataract surgery; suprapubic prostatectomy; Esophagoscopy, gastroscopy, duodenoscopy (EGD), combined (N/A, 8/12/2019); and Colonoscopy (N/A, 8/12/2019).  FAMILY HISTORY: family history includes Cancer in his sister; Myocardial Infarction (age of onset: 60) in his maternal uncle.  SOCIAL HISTORY:  reports that he has quit smoking. He has a 20.00 pack-year smoking history. He has never used smokeless tobacco. He reports that he does not drink alcohol or use drugs.    MEDICATIONS:  Current Outpatient Medications   Medication " "Sig Dispense Refill     amLODIPine (NORVASC) 5 MG tablet Take 0.5 tablets (2.5 mg) by mouth daily       haloperidol (HALDOL) 0.5 MG tablet Take 1 tablet (0.5 mg) by mouth every 4 hours as needed for agitation 30 tablet 3     acetaminophen (TYLENOL) 500 MG tablet Take 2 tablets (1,000 mg) by mouth daily. May also take 2 tablets (1,000 mg) 2 times daily as needed for mild pain.       allopurinol (ZYLOPRIM) 100 MG tablet Take 1 tablet (100 mg) by mouth daily 100 tablet 3     benzocaine-menthol (CHLORASEPTIC) 6-10 MG lozenge Place 1 lozenge inside cheek every 2 hours as needed for moderate pain       FLUoxetine (PROZAC) 10 MG capsule Take 3 capsules (30 mg) by mouth daily       metoprolol succinate ER (TOPROL-XL) 25 MG 24 hr tablet Take 25 mg by mouth daily       pantoprazole (PROTONIX) 20 MG EC tablet Take 1 tablet (20 mg) by mouth daily       polyethylene glycol (MIRALAX/GLYCOLAX) packet Take 17 g by mouth 2 times daily       polyethylene glycol-propylene glycol (SYSTANE ULTRA) 0.4-0.3 % SOLN ophthalmic solution Place 1 drop into both eyes 2 times daily as needed for dry eyes         Case Management:  I have reviewed the care plan and MDS and do agree with the plan. Patient's desire to return to the community is not assessible due to cognitive impairment. Information reviewed:  Medications, vital signs, orders, and nursing notes.    ROS:  Limited secondary to cognitive impairment but today pt reports no pain, SOB, CP.     Vitals:  /86   Pulse 68   Temp 98.4  F (36.9  C)   Resp 16   Ht 1.727 m (5' 8\")   Wt 73.8 kg (162 lb 9.6 oz)   SpO2 93%   BMI 24.72 kg/m    Body mass index is 24.72 kg/m .  Exam:  GENERAL APPEARANCE:  Resting comfortable, calm, pleasant, in no distress  RESP:  respiratory effort and palpation of chest normal, auscultation of lungs clear, no respiratory distress  CV:  Palpation and auscultation of heart done , rate and rhythm regular, no murmur, no LE peripheral edema  ABDOMEN:  normal " "bowel sounds, soft, nontender, no hepatosplenomegaly or other masses  M/S:   Gait and station ambulatory, Digits and nails with arthritic changes, reduced muscle mass  SKIN:  Inspection and Palpation of skin and subcutaneous tissue pale, intact  PSYCH:  insight and judgement, memory with impairment, affect and mood normal, follows commands readily       Lab/Diagnostic data:   Labs done [facility] done by [facility] lab. Pertinent results are: reviewed    ASSESSMENT/PLAN  Malignant neoplasm of hepatic flexure (H)  Hospice care patient  Anemia due to blood loss, acute  Plan after diagnosis was for no extensive surgery or treatment. He was admitted to hospice and transferred to LTC.  He then \"graduated\" from hospice after being quite stable for >3 months.  Noted weight loss of 14 lbs over the last month and 20.4 lbs in last 6 months.  5/20/20 was re-admitted to [facility] after Hgb 4.7, recheck 5.5 with increase in confusion , spoke to POA/niece and decision for hospice.     On pantoprazole 20 mg daily    No abdominal pain per exam today.    PLAN:  - Appreciate nursing for supportive cares  - Appreciate [facility] for supportive cares and comfort goal  - discontinue PRN Lorazepam  - (new) Haldol 0.5 mg q 4 hours PRN x 14 days.     Dementia without behavioral disturbance, unspecified dementia type (H)  Recurrent major depressive disorder, remission status unspecified (H)  Obsessive-compulsive disorder, unspecified type  Autism spectrum disorder  Noted increase in confusion from nursing with increase in repetitive questioning, wandering, etc.  Resides in LTC SNF for increased care needs  SLUMS 9/30 in past, Most recent BIMS 6/15, PHQ9 - 6 (mild)     On Fluoxetine 30 mg daily, lorazepam 0.5 mg q 4 hours PRN for increase in anxiety/agitation  Recently had scheduled ativan dc'd d/t increase in agitation, increased confusion, restlessness, poor coordination (6/6/20)    Nursing reports " paradoxical effect with increase in restlessness, confusion, agitation with lorazepam - will discontinue.  Can order PRN Haldol and monitor for same effect.     PLAN:  - Nursing and hospice for supportive cares  - no GDR of fluoxetine at this time d/t comfort goal   - discontinue Lorazepam PRN d/t agitation, anxiety  - (new) Haldol 0.5 mg po q4 hours PRN x 14 days.     Essential hypertension  Atrial fibrillation, unspecified type (H)  On amlodipine 5 mg q AM, Toprol XL 25 mg daily,     BPs 120-130s/60-80s  HRs 63-74  Patient denies CP today.     PLAN:  - BP goals are <150/90 mm Hg.This is higher than ACC and AHA recommendations due to goals of care, risk for hypotension, risk of dizziness and falls, risk of tissue/cerebral hypoperfusion and frailty. Noted patients BP is lower than goal and will adjust plan of care by decreasing amlodipine to 2.5 mg po daily.       Chronic kidney disease, stage 3 (moderate) (H)  Enrolled in Hospice   Last few BMPs:   8/15/19: BUN x, Creat 1.10, GFR 60  2/12/20: BUN 20, Creat 1.23, GFR 52  2/21/20: BUN 20, Creat 1.23, GFR 52  5/7/20: BUN 34, Creat 1.57, GFR 39     On no NSAIDs, ACEI, diuretics.    PLAN:  - Will avoid nephrotoxic agents (such as ACEI/ARB/NSAIDs, IV contrast) and mindful prescribing with renal dosing.   - no recheck of labs d/t comfort goal     Type 2 diabetes mellitus with stage 3 chronic kidney disease, without long-term current use of insulin (H)  On no meds  Is on regular diet with House supplements TID   2/21/20 - 6.0    PLAN:  - Goal: HgbA1C between 8-9%. Per AGS there is potential harm in lowering the A1C to <6.5% in older adults with diabetes.       Orders written by provider at facility  1. Decrease amlodipine to 2.5 mg po daily.   2. discontinue PRN lorazepam  3. Haldol 0.5 mg po q 4 hours PRN  X 14 days       Electronically signed by:  TINY Hilario CNP              Sincerely,        TINY Hilario CNP

## 2020-08-11 NOTE — LETTER
"    8/11/2020        RE: Sharon Baer  C/o Rosemarie Newman  [address]  [facility] [address]        [place] [facility]  PHYSICIAN NOTE    Chief Complaint   Patient presents with     group home Regulatory       HPI:    Sharon \"Reilly\" Buffy is a 89 year old  (6/10/1931), who is being seen today for a federally mandated E/M visit at [facility] .  Admitted to TCU in Aug 2019 after hospital stay with new diagnosis of metastatic colon cancer with profound anemia electing hospice care. Then transitioned to LTC. He has dementia with SLUMS 9/30 and is very hard of hearing utilizing a pocket talker. Comorbidities include OCD with autism spectrum disorder, afib and CKD. He eventually stabilized and was discharged from hospice Dec 2019. However, in May 2020 he appeared more jaundiced with increased confusion and progressive weight loss. Hgb was checked and profoundly anemic at 4.7 without signs of active blood loss; recheck for confirmation still profoundly low at 5.5. PCP spoke with Reilly's family and Reilly was admitted back onto hospice care with [facility] on 5/20/20.  He had paradoxical effect with Lorazepam leading to increased restlessness so Haldol added in its place. His chronic Fluoxetine was increased from 30 mg to 40 mg daily. Due to continued weight loss, BPs softened and both his Amlodipine and Toprol have since been discontinued.  Reilly was sitting up in a Broda chair listening to music on iPad in nurse manager's office today. Nurse manager says that seems to keep Reilly calm and comforted. Reilly can still be restless at times. Also doesn't sleep well so hospice recently discontinued Trazodone and started Melatonin thinking that will help more. I used pocket talker to speak with Reilly d/t his [place]. Reilly seemed quite comfortable and relaxed but admitted to some \"sadness and nervousness\" but he couldn't expand upon that. He says he didn't need anything and denied pain. However, his abdomen " "felt \"sick\". He feels that he is eating/drinking ok and even sleeping ok from what he can remember. He does have scheduled Tylenol and Morphine with a bowel regimen. BMs however sometimes up to a week apart.    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     OCD (obsessive compulsive disorder)      Osteoarthritis, hip, bilateral      Vitamin D deficiency     resolved 2/2013      CODE STATUS: DNR/I    MEDICATIONS: Reviewed and updated in [facility] according to facility MAR  Current Outpatient Medications   Medication Sig Dispense Refill     acetaminophen (TYLENOL) 500 MG tablet Take 2 tablets (1,000 mg) by mouth daily. May also take 2 tablets (1,000 mg) 2 times daily as needed for mild pain.       allopurinol (ZYLOPRIM) 100 MG tablet Take 1 tablet (100 mg) by mouth daily 100 tablet 3     benzocaine-menthol (CHLORASEPTIC) 6-10 MG lozenge Place 1 lozenge inside cheek every 2 hours as needed for moderate pain       bisacodyl (DULCOLAX) 10 MG suppository Place 10 mg rectally daily as needed for constipation (If no BM in 3 days)       FLUoxetine (PROZAC) 20 MG capsule Take 40 mg by mouth daily       haloperidol (HALDOL) 0.5 MG tablet Take 0.5 mg by mouth 2 times daily At 1200 and 1900       Melatonin 10 MG TABS tablet Take 10 mg by mouth At Bedtime       morphine 5 MG solu-tab Take 5 mg by mouth 2 times daily At 0900 and 2000; also 5 mg every 4 hours PRN pain       pantoprazole (PROTONIX) 20 MG EC tablet Take 1 tablet (20 mg) by mouth daily       polyethylene glycol-propylene glycol (SYSTANE ULTRA) 0.4-0.3 % SOLN ophthalmic solution Place 1 drop into both eyes 2 times daily as needed " "for dry eyes       senna (SENOKOT) 8.6 MG tablet Take 2 tablets by mouth 2 times daily         ROS:  Limited secondary to cognitive impairment but today pt reports as above in HPI    Exam:  /68   Pulse 61   Temp 98.1  F (36.7  C)   Resp 20   Ht 1.727 m (5' 8\")   Wt 68 kg (149 lb 14.4 oz)   SpO2 95%   BMI 22.79 kg/m    Alert, casually dressed, sitting in broda chair listening to music  Appears calm and content  [place] utilizing pocket talker  Breathing non-labored, no cough  Does appear thinner and more frail with paleness to skin    Lab/Diagnostic Data:    Negative COVID-19 screens  Hgb 5.5 in May 2020  Normal electrolytes with BUN 34 and Cr 1.57, alkphos 61, Bilirubin 0.4, ALT 8 and AST 10 in May 2020    ASSESSMENT/PLAN:  Malignant neoplasm of hepatic flexure (H)  Iron deficiency anemia due to chronic blood loss  Hospice care patient  Readmitted onto [facility] May 2020 with progressive weight loss, anemia and increased agitation  Thankful to team helping in his care    Constipation, unspecified constipation type  Looks like Senna adjusted earlier this month but still sometimes going up to 1 week without BM  I spoke with nursing and added Dulcolax suppository PRN if no BM in 3 days  Keep in mind some \"constipation\" could be a sign of progressive disease with potential stricture/obstruction which would be indicative of end of life  He used to be on Miralax but appears that was discontinued maybe by hospice    Atrial fibrillation, unspecified type (H)  Rate controlled; no anticoagulation d/t anemia and comfort goals of care  No longer on Toprol or Amlodipine given progressive weight loss with soft BPs as well    Dementia without behavioral disturbance, unspecified dementia type (H)  Obsessive-compulsive disorder, unspecified type  Autism spectrum disorder  As he became anemic again, he had increased agitation and restlessness  Increased Fluoxetine recently from 30 mg to 40 mg daily  Hospice discontinued " Trazodone and started Melatonin to try and help improve his sleep  He is on scheduled Haldol BID as had paradoxical reaction to Lorazepam  Thankful to staff who help to redirect with calming behaviors --> he looks very comfortable and relaxed today though he has vague reports of nervousness/sadness to monitor closely and intervene upon if consistent  See above re: adjusting bowel regimen which may help as well        Electronically signed by:  Carin Oro, DO        Sincerely,        Carin Oro, DO

## 2020-08-17 NOTE — PROGRESS NOTES
"[place] [facility]  PHYSICIAN NOTE    Chief Complaint   Patient presents with     longterm Regulatory       HPI:    Tilfred \"Reilly\" Buffy is a 89 year old  (6/10/1931), who is being seen today for a federally mandated E/M visit at [facility] .  Admitted to TCU in Aug 2019 after hospital stay with new diagnosis of metastatic colon cancer with profound anemia electing hospice care. Then transitioned to LTC. He has dementia with SLUMS 9/30 and is very hard of hearing utilizing a pocket talker. Comorbidities include OCD with autism spectrum disorder, afib and CKD. He eventually stabilized and was discharged from hospice Dec 2019. However, in May 2020 he appeared more jaundiced with increased confusion and progressive weight loss. Hgb was checked and profoundly anemic at 4.7 without signs of active blood loss; recheck for confirmation still profoundly low at 5.5. PCP spoke with Reilly's family and Reilly was admitted back onto hospice care with [facility] on 5/20/20.  He had paradoxical effect with Lorazepam leading to increased restlessness so Haldol added in its place. His chronic Fluoxetine was increased from 30 mg to 40 mg daily. Due to continued weight loss, BPs softened and both his Amlodipine and Toprol have since been discontinued.  Reilly was sitting up in a Broda chair listening to music on iPad in nurse manager's office today. Nurse manager says that seems to keep Reilly calm and comforted. Reilly can still be restless at times. Also doesn't sleep well so hospice recently discontinued Trazodone and started Melatonin thinking that will help more. I used pocket talker to speak with Reilly d/t his [place]. Reilly seemed quite comfortable and relaxed but admitted to some \"sadness and nervousness\" but he couldn't expand upon that. He says he didn't need anything and denied pain. However, his abdomen felt \"sick\". He feels that he is eating/drinking ok and even sleeping ok from what he can remember. He does " have scheduled Tylenol and Morphine with a bowel regimen. BMs however sometimes up to a week apart.    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     OCD (obsessive compulsive disorder)      Osteoarthritis, hip, bilateral      Vitamin D deficiency     resolved 2/2013      CODE STATUS: DNR/I    MEDICATIONS: Reviewed and updated in [facility] according to facility MAR  Current Outpatient Medications   Medication Sig Dispense Refill     acetaminophen (TYLENOL) 500 MG tablet Take 2 tablets (1,000 mg) by mouth daily. May also take 2 tablets (1,000 mg) 2 times daily as needed for mild pain.       allopurinol (ZYLOPRIM) 100 MG tablet Take 1 tablet (100 mg) by mouth daily 100 tablet 3     benzocaine-menthol (CHLORASEPTIC) 6-10 MG lozenge Place 1 lozenge inside cheek every 2 hours as needed for moderate pain       bisacodyl (DULCOLAX) 10 MG suppository Place 10 mg rectally daily as needed for constipation (If no BM in 3 days)       FLUoxetine (PROZAC) 20 MG capsule Take 40 mg by mouth daily       haloperidol (HALDOL) 0.5 MG tablet Take 0.5 mg by mouth 2 times daily At 1200 and 1900       Melatonin 10 MG TABS tablet Take 10 mg by mouth At Bedtime       morphine 5 MG solu-tab Take 5 mg by mouth 2 times daily At 0900 and 2000; also 5 mg every 4 hours PRN pain       pantoprazole (PROTONIX) 20 MG EC tablet Take 1 tablet (20 mg) by mouth daily       polyethylene glycol-propylene glycol (SYSTANE ULTRA) 0.4-0.3 % SOLN ophthalmic solution Place 1 drop into both eyes 2 times daily as needed for dry eyes       senna (SENOKOT) 8.6 MG tablet Take 2 tablets by mouth 2 times daily         ROS:  Limited  "secondary to cognitive impairment but today pt reports as above in HPI    Exam:  /68   Pulse 61   Temp 98.1  F (36.7  C)   Resp 20   Ht 1.727 m (5' 8\")   Wt 68 kg (149 lb 14.4 oz)   SpO2 95%   BMI 22.79 kg/m    Alert, casually dressed, sitting in broda chair listening to music  Appears calm and content  [place] utilizing pocket talker  Breathing non-labored, no cough  Does appear thinner and more frail with paleness to skin    Lab/Diagnostic Data:    Negative COVID-19 screens  Hgb 5.5 in May 2020  Normal electrolytes with BUN 34 and Cr 1.57, alkphos 61, Bilirubin 0.4, ALT 8 and AST 10 in May 2020    ASSESSMENT/PLAN:  Malignant neoplasm of hepatic flexure (H)  Iron deficiency anemia due to chronic blood loss  Hospice care patient  Readmitted onto [facility] May 2020 with progressive weight loss, anemia and increased agitation  Thankful to team helping in his care    Constipation, unspecified constipation type  Looks like Senna adjusted earlier this month but still sometimes going up to 1 week without BM  I spoke with nursing and added Dulcolax suppository PRN if no BM in 3 days  Keep in mind some \"constipation\" could be a sign of progressive disease with potential stricture/obstruction which would be indicative of end of life  He used to be on Miralax but appears that was discontinued maybe by hospice    Atrial fibrillation, unspecified type (H)  Rate controlled; no anticoagulation d/t anemia and comfort goals of care  No longer on Toprol or Amlodipine given progressive weight loss with soft BPs as well    Dementia without behavioral disturbance, unspecified dementia type (H)  Obsessive-compulsive disorder, unspecified type  Autism spectrum disorder  As he became anemic again, he had increased agitation and restlessness  Increased Fluoxetine recently from 30 mg to 40 mg daily  Hospice discontinued Trazodone and started Melatonin to try and help improve his sleep  He is on scheduled Haldol BID as had " paradoxical reaction to Lorazepam  Thankful to staff who help to redirect with calming behaviors --> he looks very comfortable and relaxed today though he has vague reports of nervousness/sadness to monitor closely and intervene upon if consistent  See above re: adjusting bowel regimen which may help as well        Electronically signed by:  Carin Oro, DO

## 2020-09-23 NOTE — TELEPHONE ENCOUNTER
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     OCD (obsessive compulsive disorder)      Osteoarthritis, hip, bilateral      Vitamin D deficiency     resolved 2/2013      Past Surgical History:   Procedure Laterality Date     bilateral cataract surgery       bilateral ear surgery       COLONOSCOPY N/A 8/12/2019    Procedure: COLONOSCOPY, WITH POLYPECTOMY AND BIOPSY Area also Tattooed;  Surgeon: Jamaal Potts MD;  Location:  GI     ESOPHAGOSCOPY, GASTROSCOPY, DUODENOSCOPY (EGD), COMBINED N/A 8/12/2019    Procedure: ESOPHAGOGASTRODUODENOSCOPY (EGD);  Surgeon: Jamaal Potts MD;  Location:  GI     HERNIA REPAIR, INGUINAL RT/LT       SUPRAPUBIC PROSTATECTOMY          Allergies   Allergen Reactions     Penicillins Rash and Other (See Comments)     He believes he had a positive skin test for PCN allergy at the time of a dog bite, about 1960.      There were no vitals taken for this visit.         X-rays show : very Subtle cortical irregularity at fem,oral neck; indicating a non-displaced femoral neck fracture;     ASSESSMENT / PLAN:  Discussed with NP: patient is on Hospice, not ambulating;    If no surgical consult is wanted ; comfort care should be pain management; activity. Bed to chair,, and while in bed, pillow under left knee, HOB elevated to 30 degrees to place patient in \" beachchair\" position; this will facilitate relaxing the hip capsule tension:    X-rays in 6 weeks for " healing F/U if needed'    [id number]          J.Rubio OPA-C  [phone] Cell

## 2020-10-05 NOTE — PROGRESS NOTES
"[facility]  Chief Complaint   Patient presents with     half-way Regulatory     [facility] Medical Record Number:  [id number]  Place of Service where encounter took place:  [facility] (SNF) [[id number]]    HPI:    Sharon Baer  is 89 year old (6/10/1931), who is being seen today for a federally mandated E/M visit.  HPI information obtained from: facility chart records, facility staff, patient report, [facility] Epic chart review and pt's hospice RN's report. . Today's concerns are:     Malignant neoplasm of hepatic flexure (H)  Hospice care patient  Closed nondisplaced intertrochanteric fracture of left femur, initial encounter (H)  Recurrent major depressive disorder, remission status unspecified (H)  Dementia without behavioral disturbance, unspecified dementia type (H)  Obsessive-compulsive disorder, unspecified type  Autism spectrum disorder  Essential hypertension  Pain       Patient is an 88 year old man with PMH including HTN, HLD, reflux esophagitis, autism, OCD, GIANLUCA, CKD 3, A. fib, gout, hearing loss who presented with 2-month history of progressive fatigue, AGUILA, S OB, pallor, intermittent BRBPR; who was found to have acute on chronic anemia (Hgb 11.0 --> 5.4) s/p 2 pRBC --> 7.6 by discharge, thought 2/2 to right partly circumferential colonic mass with Bx showing adenocarcinoma with imaging showing metastatic lesion to liver. He moved from TCU to LTC and was admitted to hospice however did well and therefore \"graduated\" from hospice on 12/3/19.     He has a SLUMS of 9/30 and is very [place], utilizing a pocket talker for assistance.       5/5/20 Nursing reported that patient appeared jaundiced, more confusion including wandering more, repetitive questions with only minimal time of reassurance before anxious and asking again. Labs checked and Hgb 4.7 --> recheck 5.5.  Spoke with patient's POA (niece) and decision to transition to Hospice.   5/20/20 Patient admitted to [facility] " with Dx of malignant neoplasm of hepatic flexure    9/23/20 Patient had a fall OOB and c/o hip pain. Xray showing closed intertrochanteric left femur fracture.  Orthopedics PA-C consulted and noted very subtle cortical irregularity at femoral neck indicating a nondisplaced femoral neck fracurte.  Recommendation as patient is on hospice, non-ambulatory would be pain management if no surgical consult is wanted by family.  See 9/23/20 note for details.   Family contacted at that time and agreed for comfort measures and monitoring.   Since that time Morphine has been changed to liquid.    Recent VS:  BPs mostly 130s/70-80s  HRs 70-80s  Sats 92-98% on RA  Afebrile     Today nursing reporting that patient is no longer able to take in po meds, etc.  He has had a significant down-turn and was coughing a lot with medication administration.    Patient visited in his LTC SNF room.  He is resting in bed and does not open his eyes to my voice or presence.  I know he is hard of hearing but he does not look over when I touch him on his hand, but rather seems mildly restless.  He is not able to answer my questions.      I spoke to SNF nursing after my visit and with [facility] RN about patient's current status and medications, comfort goals, restlessness, etc.      ALLERGIES:Penicillins  PAST MEDICAL HISTORY:   has a past medical history of Atrial fibrillation (H), Autism spectrum disorder (7/16/2015), CKD (chronic kidney disease), GERD (gastroesophageal reflux disease), Gout, Hearing loss, Hyperlipemia, Hypertension, Hypotestosteronism, IGT (impaired glucose tolerance), Malignant neoplasm of hepatic flexure (H) (8/14/2019), OCD (obsessive compulsive disorder), Osteoarthritis, hip, bilateral, and Vitamin D deficiency.  PAST SURGICAL HISTORY:   has a past surgical history that includes hernia repair, inguinal rt/lt; bilateral ear surgery; bilateral cataract surgery; suprapubic prostatectomy; Esophagoscopy, gastroscopy,  duodenoscopy (EGD), combined (N/A, 8/12/2019); and Colonoscopy (N/A, 8/12/2019).  FAMILY HISTORY: family history includes Cancer in his sister; Myocardial Infarction (age of onset: 60) in his maternal uncle.  SOCIAL HISTORY:  reports that he has quit smoking. He has a 20.00 pack-year smoking history. He has never used smokeless tobacco. He reports that he does not drink alcohol or use drugs.    MEDICATIONS:  Current Outpatient Medications   Medication Sig Dispense Refill     acetaminophen (TYLENOL) 500 MG tablet Take 2 tablets (1,000 mg) by mouth 2 times daily as needed for mild pain       haloperidol (HALDOL) 0.5 MG tablet Take 1 tablet (0.5 mg) by mouth every 6 hours. May also take 1 tablet (0.5 mg) every 4 hours as needed for agitation. At 1200 and 1900       morphine sulfate, high concentrate, (ROXANOL-CONCENTRATED) 20 MG/ML concentrated solution Take 0.25 mLs (5 mg) by mouth 3 times daily. May also take 0.25 mLs (5 mg) every hour as needed for shortness of breath / dyspnea or breakthrough pain.  0     benzocaine-menthol (CHLORASEPTIC) 6-10 MG lozenge Place 1 lozenge inside cheek every 2 hours as needed for moderate pain       bisacodyl (DULCOLAX) 10 MG suppository Place 10 mg rectally daily as needed for constipation (If no BM in 3 days)       Melatonin 10 MG TABS tablet Take 10 mg by mouth At Bedtime       polyethylene glycol-propylene glycol (SYSTANE ULTRA) 0.4-0.3 % SOLN ophthalmic solution Place 1 drop into both eyes 2 times daily as needed for dry eyes         Case Management:  I have reviewed the care plan and MDS and do agree with the plan. Patient's desire to return to the community is not assessible due to cognitive impairment. Information reviewed:  Medications, vital signs, orders, and nursing notes.    ROS:  Unobtainable secondary to cognitive impairment.     Vitals:  /68   Pulse 86   Temp 98  F (36.7  C)   Resp 17   Wt 76.2 kg (168 lb)   SpO2 97%   BMI 25.54 kg/m    Body mass index is  "25.54 kg/m .  Exam:  GENERAL APPEARANCE:  restless, in no overt distress, frail, deconditioned, elderly man with cachectic appearance, possibly near end-of-life   RESP:  respiratory effort and palpation of chest normal, auscultation of lungs clear , no respiratory distress  CV:  Palpation and auscultation of heart done , rate and rhythm regular, no murmur, no LE peripheral edema  ABDOMEN:  normal bowel sounds, soft, nontender to mild palpation   M/S:   Gait and station with w/c for mobility, Digits and nails with arthritic changes, significantly reduced muscle mass  SKIN:  Inspection and Palpation of skin and subcutaneous tissue very pal  PSYCH:  insight and judgement, memory with impairment, affect and mood withdrawn, mildly restless, does not follow commands readily         Lab/Diagnostic data:   Labs done while at [facility] done by [facility] lab. Pertinent results are: reviewed in SNF EHR    ASSESSMENT/PLAN     Malignant neoplasm of hepatic flexure (H)  Hospice care patient  Closed nondisplaced intertrochanteric fracture of left femur, initial encounter (H)  Recurrent major depressive disorder, remission status unspecified (H)  Dementia without behavioral disturbance, unspecified dementia type (H)  Obsessive-compulsive disorder, unspecified type  Autism spectrum disorder  Essential hypertension  Pain     Advanced progression of disease process with patient enrolled in hospice with comfort goals.  Have spoken to family recently about hip fracture and goal is comfort, no surgical intervention at this time.  Patient has comfort meds of Morphine (now liquid) and po Haldol PRN.  Nursing reporting patient unable to take po meds at this time.   Can discontinue all non-comfort meds.  As Seroquel will be discontinue\"d, can schedule Haldol (patient had adverse reaction to Lorazepam) as well as keep PRN.      I spoke extensively with SNF nursing and Hospice RN regarding POC and patient's current status.  " "All are in agreement.     Orders written by provider at facility  1. discontinue scheduled Tylenol  2. discontinue nutritional supplements  3. discontinue allopurinol  4. discontinue pantoprazole  5. Discontinue fluoxetine  6. discontinue Senna  7. discontinue Seroquel  8. discontinue Miralax  9. Schedule 0.5 mg Haldol q 6 hours (& keep  q 4 hours PRN)  10. discontinue order to \"encourage fluids.\"       Electronically signed by:  TINY Hilario CNP          "

## 2020-10-05 NOTE — LETTER
"    10/5/2020        RE: Sharon Baer  C/o Rosemarie Paty  [address]  [facility] [address]        [facility]  Chief Complaint   Patient presents with     [facility] Regulatory     [facility] Medical Record Number:  [id number]  [id number]    HPI:    Sharon Baer  is 89 year old (6/10/1931), who is being seen today for a federally mandated E/M visit.  HPI information obtained from: facility chart records, facility staff, patient report, [facility] Epic chart review and pt's hospice RN's report. . Today's concerns are:     Malignant neoplasm of hepatic flexure (H)  Hospice care patient  Closed nondisplaced intertrochanteric fracture of left femur, initial encounter (H)  Recurrent major depressive disorder, remission status unspecified (H)  Dementia without behavioral disturbance, unspecified dementia type (H)  Obsessive-compulsive disorder, unspecified type  Autism spectrum disorder  Essential hypertension  Pain       Patient is an 88 year old man with PMH including HTN, HLD, reflux esophagitis, autism, OCD, GIANLUCA, CKD 3, A. fib, gout, hearing loss who presented with 2-month history of progressive fatigue, AGUILA, S OB, pallor, intermittent BRBPR; who was found to have acute on chronic anemia (Hgb 11.0 --> 5.4) s/p 2 pRBC --> 7.6 by discharge, thought 2/2 to right partly circumferential colonic mass with Bx showing adenocarcinoma with imaging showing metastatic lesion to liver. He moved from TCU to LTC and was admitted to hospice however did well and therefore \"graduated\" from hospice on 12/3/19.     He has a SLUMS of 9/30 and is very [place], utilizing a pocket talker for assistance.       5/5/20 Nursing reported that patient appeared jaundiced, more confusion including wandering more, repetitive questions with only minimal time of reassurance before anxious and asking again. Labs checked and Hgb 4.7 --> recheck 5.5.  Spoke " with patient's POA (niece) and decision to transition to Hospice.   5/20/20 Patient admitted to [facility] with Dx of malignant neoplasm of hepatic flexure    9/23/20 Patient had a fall OOB and c/o hip pain. Xray showing closed intertrochanteric left femur fracture.  Orthopedics PA-C consulted and noted very subtle cortical irregularity at femoral neck indicating a nondisplaced femoral neck fracurte.  Recommendation as patient is on hospice, non-ambulatory would be pain management if no surgical consult is wanted by family.  See 9/23/20 note for details.   Family contacted at that time and agreed for comfort measures and monitoring.   Since that time Morphine has been changed to liquid.    Recent VS:  BPs mostly 130s/70-80s  HRs 70-80s  Sats 92-98% on RA  Afebrile     Today nursing reporting that patient is no longer able to take in po meds, etc.  He has had a significant down-turn and was coughing a lot with medication administration.    Patient visited in his LTC SNF room.  He is resting in bed and does not open his eyes to my voice or presence.  I know he is hard of hearing but he does not look over when I touch him on his hand, but rather seems mildly restless.  He is not able to answer my questions.      I spoke to SNF nursing after my visit and with [facility] RN about patient's current status and medications, comfort goals, restlessness, etc.      ALLERGIES:Penicillins  PAST MEDICAL HISTORY:   has a past medical history of Atrial fibrillation (H), Autism spectrum disorder (7/16/2015), CKD (chronic kidney disease), GERD (gastroesophageal reflux disease), Gout, Hearing loss, Hyperlipemia, Hypertension, Hypotestosteronism, IGT (impaired glucose tolerance), Malignant neoplasm of hepatic flexure (H) (8/14/2019), OCD (obsessive compulsive disorder), Osteoarthritis, hip, bilateral, and Vitamin D deficiency.  PAST SURGICAL HISTORY:   has a past surgical history that includes hernia repair, inguinal rt/lt;  bilateral ear surgery; bilateral cataract surgery; suprapubic prostatectomy; Esophagoscopy, gastroscopy, duodenoscopy (EGD), combined (N/A, 8/12/2019); and Colonoscopy (N/A, 8/12/2019).  FAMILY HISTORY: family history includes Cancer in his sister; Myocardial Infarction (age of onset: 60) in his maternal uncle.  SOCIAL HISTORY:  reports that he has quit smoking. He has a 20.00 pack-year smoking history. He has never used smokeless tobacco. He reports that he does not drink alcohol or use drugs.    MEDICATIONS:  Current Outpatient Medications   Medication Sig Dispense Refill     acetaminophen (TYLENOL) 500 MG tablet Take 2 tablets (1,000 mg) by mouth 2 times daily as needed for mild pain       haloperidol (HALDOL) 0.5 MG tablet Take 1 tablet (0.5 mg) by mouth every 6 hours. May also take 1 tablet (0.5 mg) every 4 hours as needed for agitation. At 1200 and 1900       morphine sulfate, high concentrate, (ROXANOL-CONCENTRATED) 20 MG/ML concentrated solution Take 0.25 mLs (5 mg) by mouth 3 times daily. May also take 0.25 mLs (5 mg) every hour as needed for shortness of breath / dyspnea or breakthrough pain.  0     benzocaine-menthol (CHLORASEPTIC) 6-10 MG lozenge Place 1 lozenge inside cheek every 2 hours as needed for moderate pain       bisacodyl (DULCOLAX) 10 MG suppository Place 10 mg rectally daily as needed for constipation (If no BM in 3 days)       Melatonin 10 MG TABS tablet Take 10 mg by mouth At Bedtime       polyethylene glycol-propylene glycol (SYSTANE ULTRA) 0.4-0.3 % SOLN ophthalmic solution Place 1 drop into both eyes 2 times daily as needed for dry eyes         Case Management:  I have reviewed the care plan and MDS and do agree with the plan. Patient's desire to return to the community is not assessible due to cognitive impairment. Information reviewed:  Medications, vital signs, orders, and nursing notes.    ROS:  Unobtainable secondary to cognitive impairment.     Vitals:  /68   Pulse 86    "Temp 98  F (36.7  C)   Resp 17   Wt 76.2 kg (168 lb)   SpO2 97%   BMI 25.54 kg/m    Body mass index is 25.54 kg/m .  Exam:  GENERAL APPEARANCE:  restless, in no overt distress, frail, deconditioned, elderly man with cachectic appearance, possibly near end-of-life   RESP:  respiratory effort and palpation of chest normal, auscultation of lungs clear , no respiratory distress  CV:  Palpation and auscultation of heart done , rate and rhythm regular, no murmur, no LE peripheral edema  ABDOMEN:  normal bowel sounds, soft, nontender to mild palpation   M/S:   Gait and station with w/c for mobility, Digits and nails with arthritic changes, significantly reduced muscle mass  SKIN:  Inspection and Palpation of skin and subcutaneous tissue very pal  PSYCH:  insight and judgement, memory with impairment, affect and mood withdrawn, mildly restless, does not follow commands readily         Lab/Diagnostic data:   Labs done while at [facility] done by [facility] lab. Pertinent results are: reviewed in SNF EHR    ASSESSMENT/PLAN     Malignant neoplasm of hepatic flexure (H)  Hospice care patient  Closed nondisplaced intertrochanteric fracture of left femur, initial encounter (H)  Recurrent major depressive disorder, remission status unspecified (H)  Dementia without behavioral disturbance, unspecified dementia type (H)  Obsessive-compulsive disorder, unspecified type  Autism spectrum disorder  Essential hypertension  Pain     Advanced progression of disease process with patient enrolled in hospice with comfort goals.  Have spoken to family recently about hip fracture and goal is comfort, no surgical intervention at this time.  Patient has comfort meds of Morphine (now liquid) and po Haldol PRN.  Nursing reporting patient unable to take po meds at this time.   Can discontinue all non-comfort meds.  As Seroquel will be discontinue\"d, can schedule Haldol (patient had adverse reaction to Lorazepam) as well as keep " "PRN.      I spoke extensively with SNF nursing and Hospice RN regarding POC and patient's current status.  All are in agreement.     Orders written by provider at facility  1. discontinue scheduled Tylenol  2. discontinue nutritional supplements  3. discontinue allopurinol  4. discontinue pantoprazole  5. Discontinue fluoxetine  6. discontinue Senna  7. discontinue Seroquel  8. discontinue Miralax  9. Schedule 0.5 mg Haldol q 6 hours (& keep  q 4 hours PRN)  10. discontinue order to \"encourage fluids.\"       Electronically signed by:  TINY Hilario CNP                Sincerely,        TINY Hilario CNP    "

## 2022-10-04 PROBLEM — F03.90 DEMENTIA WITHOUT BEHAVIORAL DISTURBANCE (H): Status: ACTIVE | Noted: 2019-01-01

## 2023-03-13 NOTE — TELEPHONE ENCOUNTER
lisinopril (PRINIVIL/ZESTRIL) 40 MG tablet  Last Written Prescription Date:  2/28/18  Last Fill Quantity: 100,   # refills: 1  Last Office Visit : 5/23/18  Future Office visit:  none    Routing because: creat HIGH  bp> 140/90  90 day to pharmacy   Lot # (Optional): [id number] Lot # (Optional): [id number]

## 2025-01-16 NOTE — PROGRESS NOTES
Progress Note    Client Name: Sharon Baer  Date: 5/16/17         Service Type: Individual      Session Start Time: 10:30  Session End Time: 11:15      Session Length: 45     Session #: 13     Attendees: Client    Treatment Plan Last Reviewed: tx plan created on 9/19/16  PHQ-9 / KIET-7 : Completed this session      DATA      Progress Since Last Session (Related to Symptoms / Goals / Homework):   Symptoms: Stable    Homework: Completed in session discussed distraction activities he can engage in and continue the thought stopping process when obsessive thoughts appear. Discussed what he tried to decrease in regard to his intrusive harm thoughts. Processed harmful thoughts and reassured client that he has not acted on them.  Continue activities he engages in and how these activities can distract thoughts so he does not get caught up in his negative thinking.  Discussed people and events that he was grateful for in his life.  Client likes to discuss his past and happy and fond memories of the past.  This makes client feel good.  Client created list of important people and descriptors of them and we processed in session. Discussed and reviewed CBT skills, acceptance of OCD thoughts and distraction activities to get mind off of harmful thoughts he has towards others.  We worked on two gratitude activities in book and client stated he would work on this. Did not bring in book today so we discussed activities he engages in and did depression and anxiety screening.          Episode of Care Goals: Minimal progress - PREPARATION (Decided to change - considering how); Intervened by negotiating a change plan and determining options / strategies for behavior change, identifying triggers, exploring social supports, and working towards setting a date to begin behavior change     Current / Ongoing Stressors and Concerns:   Moved to new residence, has thoughts of hurting others and  ----- Message from Kt Telles PA-C sent at 1/15/2025  6:17 PM CST -----  COVID positive. Please notify thank you   "suicidal ideation which is apart of his Harm OCD.  He has never acted on these thoughts.      Treatment Objective(s) Addressed in This Session:   use thought-stopping strategy daily to reduce intrusive thoughts   Engage in activities that he enjoys to distract obssesive thoughts  Chew on ice or use cold pack on face to distract from anxiety about thoughts  Utilize deep breathing when stressed or anxious              Write in Book \"Handbook for Happiness\"                Complete \"Wake up in Gratitude and in the moment\" exercise in the book.  Joyful attention skills.-bring in next session            Both the PHQ9 and GAD7 scores higher this session. Discussed worries and concerns about the president and concerns about possible war for the future.  Reframed and concentrated on positives and                     what he is grateful for.               Concentrated on CBT skills and cognitive restructuring this session, acceptance therapy of negative harmful thoughts, Concentrated on strengths and positives in life.               Complete two gratitude exercises daily if he can-especially when he is ruminating on OCD thoughts               Engage in activities that distract from his thinking especially harmful thoughts and think of them as a wave and go with them instead of fighting them.  Knowing that he will not act on them.    Intervention:   CBT: start teaching skills - gave client mood log and feelings worksheet to try and bring into next session.                Engage in Relaxation and distraction activities that he enjoys    Deep breathing exercises  Thought stopping strategy and engage in activities that he enjoys to distract from negative thinking.    Use guided imagery and go to pleasant memories and events to distract from negative intrusive thoughts when needed.  Complete some activities in happiness Book-bring in and we can process work   Emphasized CBT skills--gratitude and positive thoughts, acceptance " therapy, strengths   Gratitude exercises  Acceptance and distraction activities to get off of ruminating thoughts   ASSESSMENT: Current Emotional / Mental Status (status of significant symptoms):   Risk status (Self / Other harm or suicidal ideation)   Client reports the following current fears or concerns for personal safety: concerned that he might hurt someone or self. (OCD Harm type) .   Client reports the following current or recent suicidal ideation or behaviors: Has obsessive thoughts about not wanting to be alive and suicidal ideation.  Has not attempted in past apart of his obsessive thinking. .   Client reports current or recent homicidal ideation or behaviors including thinking about hurting others but has not acted on this.    Client denies current or recent self injurious behavior or ideation.   Client denies other safety concerns.   A safety and risk management plan has not been developed at this time, however client was given the after-hours number / 911 should there be a change in any of these risk factors.     Appearance:   Appropriate    Eye Contact:   Fair    Psychomotor Behavior: Retarded (Slowed)    Attitude:   Cooperative    Orientation:   All   Speech    Rate / Production: Slow     Volume:  Soft    Mood:    Anxious  Depressed    Affect:    Blunted  Flat  Restricted  Subdued    Thought Content:  Rumination    Thought Form:  Coherent    Insight:    Fair      Medication Review:   No changes to current psychiatric medication(s)     Medication Compliance:   Yes     Changes in Health Issues:   None reported     Chemical Use Review:   Substance Use: Chemical use reviewed, no active concerns identified      Tobacco Use: No current tobacco use.       Collateral Reports Completed:   Not Applicable    PLAN: (Client Tasks / Therapist Tasks / Other)  Client to start using thought stopping process when he has obsessive thoughts of hurting others or self.  Engage in activities that he enjoys like: watching  "movies, comedies, cross word puzzles, word find games, coloring or drawing.  Chew on ice or use cold pack on face to distract thinking if anxiety is too great.  Engage in activities with others when he feels comfortable doing this. Practice deep breathing skills to decrease obsessive thoughts. Client was given mood log and feelings worksheet to read and bring into next session to process with therapist.  Ask for DVD's of movies or shows that he enjoys.   Listen to music and continue computer use when intrusive thoughts appear.  Think of pleasant memories and happy times from the past and write them down and go to list when he get's stuck in negative obsessive thoughts.   Continue with exercises discussed in session to complete in \"Handbook for Happiness\" .  Complete Wake up in gratitude and in the moment activity in book--to help with joyful attention skills. Discuss and process in next session. Reviewed and practiced CBT skills--talked about positive things and what client was grateful for.  Concentrate on positive things and try to let go of negative intrusive thoughts. Revisited acceptance therapy and think of negative thoughts as a wave, reframe to positive thoughts, concentrated on strengths.  Client brought in Acevedo to session--we discussed 2 gratitude exercises to work on.  Once when he gets up in the morning and thinks about people he is grateful for.  The other to concentrate on nature and gratitude of the environment and the beauty of the outdoors. Engage in distraction activities and acceptance of harmful thoughts-like a wave-knowing that he will not act on them.       Loi Prescott, 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 Learning disorder, obsessive thoughts about harming others and self.  Increased anxiety.  WHODAS: Completed first session    Referral / Collaboration:  Referral to another professional/service is not indicated at this time..    Anticipated number of session or this episode of care: 20      MeasurableTreatment Goal(s) related to diagnosis / functional impairment(s)  Goal 1: Client will function daily at a consistent level with minimal interference from obsessions and compulsions.     I will know I've met my goal when I do not have increased anxiety that I will hurt others or self.       Objective #A (Client Action)    Client will use thought-stopping strategy daily to reduce intrusive thoughts.  Status: Continued - Date(s):     Intervention(s)  Therapist will teach thought stopping process to interrupt obsessions.      Objective #B  Client will learn CBT skills to to identify distorted automatic thoughts and beliefs.  .  Status: Continued - Date(s):     Intervention(s)  Therapist will teach CBT skills. .    Objective #C  Client will practice 2 relaxation techniques and engage in 3 distraction activities that reduce obsessive thoughts .  Status: Continued - Date(s):     Intervention(s)  Therapist will assign homework determine a list of activities that client can use to distract form obsessive thinking.   Teach relaxation techniques that are helpful to reduce overall anxiety.       Goal 2: Client will resolve key life conflicts and the emotional stress that fuels obsessive-compulsive behavior patterns.     I will know I've met my goal when I feel settled and comfortable with my life.      Objective #A (Client Action)    Status: Continued - Date(s):     Client will engage in acceptance and commitment  (ACT) therapy or exposure and ritual prevention therapy to reduce obsessions.     Intervention(s)  Therapist will teach ACT or ERP therapy with  client and evaluate it's effectiveness in reducing intrusive obsessive thoughts.      Objective #B  Client will limit amount of time spent on repetitive or obsessive thoughts to 15 minutes.    Status: Continued - Date(s):     Intervention(s)  Therapist will teach and assign homework to determine time to accomplish good time to limit obsessive thoughts and evaluate it's effectiveness. .    Objective #C  Client will use positive self-affirmations daily.  Status: Continued - Date(s):     Intervention(s)  Therapist will provide encouragement and affirmations to develop further overall strengths and self esteem .        Client has reviewed and agreed to the above plan.      Loi Prescott [facility]  September 20, 2016                                             Progress Note    Client Name: Sharon Baer  Date: 2/14/17         Service Type: Individual      Session Start Time: 5:00  Session End Time: 5:45      Session Length: 45     Session #: 10     Attendees: Client    Treatment Plan Last Reviewed: tx plan created on 9/19/16  PHQ-9 / KIET-7 : Completed this session      DATA      Progress Since Last Session (Related to Symptoms / Goals / Homework):   Symptoms: Worsening    Homework: Completed in session discussed distraction activities he can engage in and continue the thought stopping process when obsessive thoughts appear. Discussed what he tried to decrease in regard to his intrusive harm thoughts. Processed harmful thoughts and reassured client that he has not acted on them.  Continue activities he engages in and how these activities can distract thoughts so he does not get caught up in his negative thinking.  Discussed people and events that he was grateful for in his life.  Client likes to discuss his past and happy and fond memories of the past.  This makes client feel good.  Client created list of important people and descriptors of them and we processed in session. Client forgot book we usually work in  "during session.  Discussed and reviewed CBT skills.        Episode of Care Goals: Minimal progress - PREPARATION (Decided to change - considering how); Intervened by negotiating a change plan and determining options / strategies for behavior change, identifying triggers, exploring social supports, and working towards setting a date to begin behavior change     Current / Ongoing Stressors and Concerns:   Moved to new residence, has thoughts of hurting others and suicidal ideation which is apart of his Harm OCD.  He has never acted on these thoughts.      Treatment Objective(s) Addressed in This Session:   use thought-stopping strategy daily to reduce intrusive thoughts   Engage in activities that he enjoys to distract obssesive thoughts  Chew on ice or use cold pack on face to distract from anxiety about thoughts  Utilize deep breathing when stressed or anxious              Write in Book \"Handbook for Happiness\"                Complete \"Wake up in Gratitude and in the moment\" exercise in the book.  Joyful attention skills.-bring in next session               Boththe PHQ9 and GAD7 scores higher this session. Discussed worries and concerns about the president and concerns about possible war for the future.  Reframed and concentrated on positives and                  what he is grateful for.      Intervention:   CBT: start teaching skills - gave client mood log and feelings worksheet to try and bring into next session.                Engage in Relaxation and distraction activities that he enjoys    Deep breathing exercises  Thought stopping strategy and engage in activities that he enjoys to distract from negative thinking.    Use guided imagery and go to pleasant memories and events to distract from negative intrusive thoughts when needed.  Complete some activities in happiness Book-bring in and we can process work   Emphasized CBT skills--gratitude and positive thoughts    ASSESSMENT: Current Emotional / Mental Status " (status of significant symptoms):   Risk status (Self / Other harm or suicidal ideation)   Client reports the following current fears or concerns for personal safety: concerned that he might hurt someone or self. (OCD Harm type) .   Client reports the following current or recent suicidal ideation or behaviors: Has obsessive thoughts about not wanting to be alive and suicidal ideation.  Has not attempted in past apart of his obsessive thinking. .   Client reports current or recent homicidal ideation or behaviors including thinking about hurting others but has not acted on this.    Client denies current or recent self injurious behavior or ideation.   Client denies other safety concerns.   A safety and risk management plan has not been developed at this time, however client was given the after-hours number / 911 should there be a change in any of these risk factors.     Appearance:   Appropriate    Eye Contact:   Fair    Psychomotor Behavior: Restless  Retarded (Slowed)    Attitude:   Cooperative    Orientation:   All   Speech    Rate / Production: Slow     Volume:  Soft    Mood:    Anxious  Depressed  Sad    Affect:    Blunted  Flat  Restricted  Subdued    Thought Content:  Rumination    Thought Form:  Blocking  Coherent    Insight:    Fair      Medication Review:   No changes to current psychiatric medication(s)     Medication Compliance:   Yes     Changes in Health Issues:   None reported     Chemical Use Review:   Substance Use: Chemical use reviewed, no active concerns identified      Tobacco Use: No current tobacco use.       Collateral Reports Completed:   Not Applicable    PLAN: (Client Tasks / Therapist Tasks / Other)  Client to start using thought stopping process when he has obsessive thoughts of hurting others or self.  Engage in activities that he enjoys like: watching movies, comedies, cross word puzzles, word find games, coloring or drawing.  Chew on ice or use cold pack on face to distract thinking if  "anxiety is too great.  Engage in activities with others when he feels comfortable doing this. Practice deep breathing skills to decrease obsessive thoughts. Client was given mood log and feelings worksheet to read and bring into next session to process with therapist.  Ask for DVD's of movies or shows that he enjoys.   Listen to music and continue computer use when intrusive thoughts appear.  Think of pleasant memories and happy times from the past and write them down and go to list when he get's stuck in negative obsessive thoughts.   Continue with exercises discussed in session to complete in \"Handbook for Happiness\" .  Complete Wake up in gratitude and in the moment activity in book--to help with joyful attention skills. Discuss and process in next session. Reviewed and practiced CBT skills--talked about positive things and what client was grateful for.  Concentrate on positive things and try to let go of negative intrusive thoughts.             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 Learning disorder, obsessive thoughts about harming others and self.  Increased anxiety.  WHODAS: Completed first session    Referral / Collaboration:  Referral to another professional/service is not indicated at this time..    Anticipated number of session or this episode of care: 20      MeasurableTreatment Goal(s) related to diagnosis / functional impairment(s)  Goal 1: Client will function daily at a consistent level with minimal interference from obsessions and compulsions.     I will know I've met my goal when I do not have increased anxiety that I will " hurt others or self.       Objective #A (Client Action)    Client will use thought-stopping strategy daily to reduce intrusive thoughts.  Status: Continued - Date(s):     Intervention(s)  Therapist will teach thought stopping process to interrupt obsessions.      Objective #B  Client will learn CBT skills to to identify distorted automatic thoughts and beliefs.  .  Status: Continued - Date(s):     Intervention(s)  Therapist will teach CBT skills. .    Objective #C  Client will practice 2 relaxation techniques and engage in 3 distraction activities that reduce obsessive thoughts .  Status: Continued - Date(s):     Intervention(s)  Therapist will assign homework determine a list of activities that client can use to distract form obsessive thinking.   Teach relaxation techniques that are helpful to reduce overall anxiety.       Goal 2: Client will resolve key life conflicts and the emotional stress that fuels obsessive-compulsive behavior patterns.     I will know I've met my goal when I feel settled and comfortable with my life.      Objective #A (Client Action)    Status: Continued - Date(s):     Client will engage in acceptance and commitment  (ACT) therapy or exposure and ritual prevention therapy to reduce obsessions.     Intervention(s)  Therapist will teach ACT or ERP therapy with client and evaluate it's effectiveness in reducing intrusive obsessive thoughts.      Objective #B  Client will limit amount of time spent on repetitive or obsessive thoughts to 15 minutes.    Status: Continued - Date(s):     Intervention(s)  Therapist will teach and assign homework to determine time to accomplish good time to limit obsessive thoughts and evaluate it's effectiveness. .    Objective #C  Client will use positive self-affirmations daily.  Status: Continued - Date(s):     Intervention(s)  Therapist will provide encouragement and affirmations to develop further overall strengths and self esteem .        Client has reviewed  and agreed to the above plan.      Loi Prescott, [facility]  September 20, 2016                                             Progress Note    Client Name: Sharon Baer  Date: 3/22/17         Service Type: Individual      Session Start Time: 2:00  Session End Time: 2:45      Session Length: 45     Session #: 11     Attendees: Client    Treatment Plan Last Reviewed: tx plan created on 9/19/16  PHQ-9 / KIET-7 : Completed this session      DATA      Progress Since Last Session (Related to Symptoms / Goals / Homework):   Symptoms: Stable    Homework: Completed in session discussed distraction activities he can engage in and continue the thought stopping process when obsessive thoughts appear. Discussed what he tried to decrease in regard to his intrusive harm thoughts. Processed harmful thoughts and reassured client that he has not acted on them.  Continue activities he engages in and how these activities can distract thoughts so he does not get caught up in his negative thinking.  Discussed people and events that he was grateful for in his life.  Client likes to discuss his past and happy and fond memories of the past.  This makes client feel good.  Client created list of important people and descriptors of them and we processed in session. Client forgot book we usually work in during session.  Discussed and reviewed CBT skills, acceptance of OCD thoughts and distraction activities to get mind off of harmful thoughts he has towards others.          Episode of Care Goals: Minimal progress - PREPARATION (Decided to change - considering how); Intervened by negotiating a change plan and determining options / strategies for behavior change, identifying triggers, exploring social supports, and working towards setting a date to begin behavior change     Current / Ongoing Stressors and Concerns:   Moved to new residence, has thoughts of hurting others and suicidal ideation which is apart of his Harm OCD.  He has never acted  "on these thoughts.      Treatment Objective(s) Addressed in This Session:   use thought-stopping strategy daily to reduce intrusive thoughts   Engage in activities that he enjoys to distract obssesive thoughts  Chew on ice or use cold pack on face to distract from anxiety about thoughts  Utilize deep breathing when stressed or anxious              Write in Book \"Handbook for Happiness\"                Complete \"Wake up in Gratitude and in the moment\" exercise in the book.  Joyful attention skills.-bring in next session            Both the PHQ9 and GAD7 scores higher this session. Discussed worries and concerns about the president and concerns about possible war for the future.  Reframed and concentrated on positives and                     what he is grateful for.               Concentrated on CBT skills and cognitive restructuring this session, acceptance therapy of negative harmful thoughts, Concentrated on strengths and positives in life.      Intervention:   CBT: start teaching skills - gave client mood log and feelings worksheet to try and bring into next session.                Engage in Relaxation and distraction activities that he enjoys    Deep breathing exercises  Thought stopping strategy and engage in activities that he enjoys to distract from negative thinking.    Use guided imagery and go to pleasant memories and events to distract from negative intrusive thoughts when needed.  Complete some activities in happiness Book-bring in and we can process work   Emphasized CBT skills--gratitude and positive thoughts, acceptance therapy, strengths    ASSESSMENT: Current Emotional / Mental Status (status of significant symptoms):   Risk status (Self / Other harm or suicidal ideation)   Client reports the following current fears or concerns for personal safety: concerned that he might hurt someone or self. (OCD Harm type) .   Client reports the following current or recent suicidal ideation or behaviors: Has " obsessive thoughts about not wanting to be alive and suicidal ideation.  Has not attempted in past apart of his obsessive thinking. .   Client reports current or recent homicidal ideation or behaviors including thinking about hurting others but has not acted on this.    Client denies current or recent self injurious behavior or ideation.   Client denies other safety concerns.   A safety and risk management plan has not been developed at this time, however client was given the after-hours number / 911 should there be a change in any of these risk factors.     Appearance:   Appropriate    Eye Contact:   Fair    Psychomotor Behavior: Retarded (Slowed)    Attitude:   Cooperative    Orientation:   All   Speech    Rate / Production: Slow     Volume:  Soft    Mood:    Anxious    Affect:    Blunted  Flat  Restricted  Subdued    Thought Content:  Rumination    Thought Form:  Coherent    Insight:    Fair      Medication Review:   No changes to current psychiatric medication(s)     Medication Compliance:   Yes     Changes in Health Issues:   None reported     Chemical Use Review:   Substance Use: Chemical use reviewed, no active concerns identified      Tobacco Use: No current tobacco use.       Collateral Reports Completed:   Not Applicable    PLAN: (Client Tasks / Therapist Tasks / Other)  Client to start using thought stopping process when he has obsessive thoughts of hurting others or self.  Engage in activities that he enjoys like: watching movies, comedies, cross word puzzles, word find games, coloring or drawing.  Chew on ice or use cold pack on face to distract thinking if anxiety is too great.  Engage in activities with others when he feels comfortable doing this. Practice deep breathing skills to decrease obsessive thoughts. Client was given mood log and feelings worksheet to read and bring into next session to process with therapist.  Ask for DVD's of movies or shows that he enjoys.   Listen to music and continue  "computer use when intrusive thoughts appear.  Think of pleasant memories and happy times from the past and write them down and go to list when he get's stuck in negative obsessive thoughts.   Continue with exercises discussed in session to complete in \"Handbook for Happiness\" .  Complete Wake up in gratitude and in the moment activity in book--to help with joyful attention skills. Discuss and process in next session. Reviewed and practiced CBT skills--talked about positive things and what client was grateful for.  Concentrate on positive things and try to let go of negative intrusive thoughts. Revisited acceptance therapy and think of negative thoughts as a wave, reframe to positive thoughts, concentrated on strengths.  Client will bring in [employer] book next session for homework in between session.               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 Learning disorder, obsessive thoughts about harming others and self.  Increased anxiety.  WHODAS: Completed first session    Referral / Collaboration:  Referral to another professional/service is not indicated at this time..    Anticipated number of session or this episode of care: 20      MeasurableTreatment Goal(s) related to diagnosis / functional impairment(s)  Goal 1: Client will function daily at a consistent level with minimal interference from obsessions and compulsions.     I will know I've met my goal when I do not have increased anxiety that I will hurt others or self.       Objective #A (Client Action)    Client will use thought-stopping strategy daily to reduce intrusive " thoughts.  Status: Continued - Date(s):     Intervention(s)  Therapist will teach thought stopping process to interrupt obsessions.      Objective #B  Client will learn CBT skills to to identify distorted automatic thoughts and beliefs.  .  Status: Continued - Date(s):     Intervention(s)  Therapist will teach CBT skills. .    Objective #C  Client will practice 2 relaxation techniques and engage in 3 distraction activities that reduce obsessive thoughts .  Status: Continued - Date(s):     Intervention(s)  Therapist will assign homework determine a list of activities that client can use to distract form obsessive thinking.   Teach relaxation techniques that are helpful to reduce overall anxiety.       Goal 2: Client will resolve key life conflicts and the emotional stress that fuels obsessive-compulsive behavior patterns.     I will know I've met my goal when I feel settled and comfortable with my life.      Objective #A (Client Action)    Status: Continued - Date(s):     Client will engage in acceptance and commitment  (ACT) therapy or exposure and ritual prevention therapy to reduce obsessions.     Intervention(s)  Therapist will teach ACT or ERP therapy with client and evaluate it's effectiveness in reducing intrusive obsessive thoughts.      Objective #B  Client will limit amount of time spent on repetitive or obsessive thoughts to 15 minutes.    Status: Continued - Date(s):     Intervention(s)  Therapist will teach and assign homework to determine time to accomplish good time to limit obsessive thoughts and evaluate it's effectiveness. .    Objective #C  Client will use positive self-affirmations daily.  Status: Continued - Date(s):     Intervention(s)  Therapist will provide encouragement and affirmations to develop further overall strengths and self esteem .        Client has reviewed and agreed to the above plan.      Loi Prescott [facility]  September 20, 2016                                              Progress Note    Client Name: Sharon Baer  Date: 2/14/17         Service Type: Individual      Session Start Time: 5:00  Session End Time: 5:45      Session Length: 45     Session #: 10     Attendees: Client    Treatment Plan Last Reviewed: tx plan created on 9/19/16  PHQ-9 / KIET-7 : Completed this session      DATA      Progress Since Last Session (Related to Symptoms / Goals / Homework):   Symptoms: Worsening    Homework: Completed in session discussed distraction activities he can engage in and continue the thought stopping process when obsessive thoughts appear. Discussed what he tried to decrease in regard to his intrusive harm thoughts. Processed harmful thoughts and reassured client that he has not acted on them.  Continue activities he engages in and how these activities can distract thoughts so he does not get caught up in his negative thinking.  Discussed people and events that he was grateful for in his life.  Client likes to discuss his past and happy and fond memories of the past.  This makes client feel good.  Client created list of important people and descriptors of them and we processed in session. Client forgot book we usually work in during session.  Discussed and reviewed CBT skills.        Episode of Care Goals: Minimal progress - PREPARATION (Decided to change - considering how); Intervened by negotiating a change plan and determining options / strategies for behavior change, identifying triggers, exploring social supports, and working towards setting a date to begin behavior change     Current / Ongoing Stressors and Concerns:   Moved to new residence, has thoughts of hurting others and suicidal ideation which is apart of his Harm OCD.  He has never acted on these thoughts.      Treatment Objective(s) Addressed in This Session:   use thought-stopping strategy daily to reduce intrusive thoughts   Engage in activities that he enjoys to distract obssesive thoughts  Chew on ice or use  "cold pack on face to distract from anxiety about thoughts  Utilize deep breathing when stressed or anxious              Write in Book \"Handbook for Happiness\"                Complete \"Wake up in Gratitude and in the moment\" exercise in the book.  Joyful attention skills.-bring in next session               Boththe PHQ9 and GAD7 scores higher this session. Discussed worries and concerns about the president and concerns about possible war for the future.  Reframed and concentrated on positives and                  what he is grateful for.      Intervention:   CBT: start teaching skills - gave client mood log and feelings worksheet to try and bring into next session.                Engage in Relaxation and distraction activities that he enjoys    Deep breathing exercises  Thought stopping strategy and engage in activities that he enjoys to distract from negative thinking.    Use guided imagery and go to pleasant memories and events to distract from negative intrusive thoughts when needed.  Complete some activities in happiness Book-bring in and we can process work   Emphasized CBT skills--gratitude and positive thoughts    ASSESSMENT: Current Emotional / Mental Status (status of significant symptoms):   Risk status (Self / Other harm or suicidal ideation)   Client reports the following current fears or concerns for personal safety: concerned that he might hurt someone or self. (OCD Harm type) .   Client reports the following current or recent suicidal ideation or behaviors: Has obsessive thoughts about not wanting to be alive and suicidal ideation.  Has not attempted in past apart of his obsessive thinking. .   Client reports current or recent homicidal ideation or behaviors including thinking about hurting others but has not acted on this.    Client denies current or recent self injurious behavior or ideation.   Client denies other safety concerns.   A safety and risk management plan has not been developed at this " "time, however client was given the after-hours number / 911 should there be a change in any of these risk factors.     Appearance:   Appropriate    Eye Contact:   Fair    Psychomotor Behavior: Restless  Retarded (Slowed)    Attitude:   Cooperative    Orientation:   All   Speech    Rate / Production: Slow     Volume:  Soft    Mood:    Anxious  Depressed  Sad    Affect:    Blunted  Flat  Restricted  Subdued    Thought Content:  Rumination    Thought Form:  Blocking  Coherent    Insight:    Fair      Medication Review:   No changes to current psychiatric medication(s)     Medication Compliance:   Yes     Changes in Health Issues:   None reported     Chemical Use Review:   Substance Use: Chemical use reviewed, no active concerns identified      Tobacco Use: No current tobacco use.       Collateral Reports Completed:   Not Applicable    PLAN: (Client Tasks / Therapist Tasks / Other)  Client to start using thought stopping process when he has obsessive thoughts of hurting others or self.  Engage in activities that he enjoys like: watching movies, comedies, cross word puzzles, word find games, coloring or drawing.  Chew on ice or use cold pack on face to distract thinking if anxiety is too great.  Engage in activities with others when he feels comfortable doing this. Practice deep breathing skills to decrease obsessive thoughts. Client was given mood log and feelings worksheet to read and bring into next session to process with therapist.  Ask for DVD's of movies or shows that he enjoys.   Listen to music and continue computer use when intrusive thoughts appear.  Think of pleasant memories and happy times from the past and write them down and go to list when he get's stuck in negative obsessive thoughts.   Continue with exercises discussed in session to complete in \"Handbook for Happiness\" .  Complete Wake up in gratitude and in the moment activity in book--to help with joyful attention skills. Discuss and process in next " session. Reviewed and practiced CBT skills--talked about positive things and what client was grateful for.  Concentrate on positive things and try to let go of negative intrusive thoughts.             Loi EVANGELINAMaribel Prescott, 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 Learning disorder, obsessive thoughts about harming others and self.  Increased anxiety.  WHODAS: Completed first session    Referral / Collaboration:  Referral to another professional/service is not indicated at this time..    Anticipated number of session or this episode of care: 20      MeasurableTreatment Goal(s) related to diagnosis / functional impairment(s)  Goal 1: Client will function daily at a consistent level with minimal interference from obsessions and compulsions.     I will know I've met my goal when I do not have increased anxiety that I will hurt others or self.       Objective #A (Client Action)    Client will use thought-stopping strategy daily to reduce intrusive thoughts.  Status: Continued - Date(s):     Intervention(s)  Therapist will teach thought stopping process to interrupt obsessions.      Objective #B  Client will learn CBT skills to to identify distorted automatic thoughts and beliefs.  .  Status: Continued - Date(s):     Intervention(s)  Therapist will teach CBT skills. .    Objective #C  Client will practice 2 relaxation techniques and engage in 3 distraction activities that reduce obsessive thoughts .  Status: Continued - Date(s):     Intervention(s)  Therapist will assign homework determine a list of activities that client can use to distract form obsessive thinking.    Teach relaxation techniques that are helpful to reduce overall anxiety.       Goal 2: Client will resolve key life conflicts and the emotional stress that fuels obsessive-compulsive behavior patterns.     I will know I've met my goal when I feel settled and comfortable with my life.      Objective #A (Client Action)    Status: Continued - Date(s):     Client will engage in acceptance and commitment  (ACT) therapy or exposure and ritual prevention therapy to reduce obsessions.     Intervention(s)  Therapist will teach ACT or ERP therapy with client and evaluate it's effectiveness in reducing intrusive obsessive thoughts.      Objective #B  Client will limit amount of time spent on repetitive or obsessive thoughts to 15 minutes.    Status: Continued - Date(s):     Intervention(s)  Therapist will teach and assign homework to determine time to accomplish good time to limit obsessive thoughts and evaluate it's effectiveness. .    Objective #C  Client will use positive self-affirmations daily.  Status: Continued - Date(s):     Intervention(s)  Therapist will provide encouragement and affirmations to develop further overall strengths and self esteem .        Client has reviewed and agreed to the above plan.      Loi Prescott [facility]  September 20, 2016

## (undated) RX ORDER — FENTANYL CITRATE 50 UG/ML
INJECTION, SOLUTION INTRAMUSCULAR; INTRAVENOUS
Status: DISPENSED
Start: 2019-08-09

## (undated) RX ORDER — SIMETHICONE 20 MG/.3ML
EMULSION ORAL
Status: DISPENSED
Start: 2019-08-09

## (undated) RX ORDER — FENTANYL CITRATE 50 UG/ML
INJECTION, SOLUTION INTRAMUSCULAR; INTRAVENOUS
Status: DISPENSED
Start: 2019-08-12